# Patient Record
Sex: FEMALE | Race: BLACK OR AFRICAN AMERICAN | NOT HISPANIC OR LATINO | Employment: FULL TIME | URBAN - METROPOLITAN AREA
[De-identification: names, ages, dates, MRNs, and addresses within clinical notes are randomized per-mention and may not be internally consistent; named-entity substitution may affect disease eponyms.]

---

## 2017-01-10 ENCOUNTER — OFFICE VISIT (OUTPATIENT)
Dept: INTERNAL MEDICINE | Facility: CLINIC | Age: 42
End: 2017-01-10
Payer: COMMERCIAL

## 2017-01-10 VITALS
DIASTOLIC BLOOD PRESSURE: 74 MMHG | SYSTOLIC BLOOD PRESSURE: 126 MMHG | WEIGHT: 190.69 LBS | HEIGHT: 62 IN | TEMPERATURE: 99 F | BODY MASS INDEX: 35.09 KG/M2 | HEART RATE: 71 BPM | OXYGEN SATURATION: 95 %

## 2017-01-10 DIAGNOSIS — H00.021 HORDEOLUM INTERNUM OF RIGHT UPPER EYELID: Primary | ICD-10-CM

## 2017-01-10 PROCEDURE — 99999 PR PBB SHADOW E&M-EST. PATIENT-LVL III: CPT | Mod: PBBFAC,,, | Performed by: INTERNAL MEDICINE

## 2017-01-10 PROCEDURE — 99213 OFFICE O/P EST LOW 20 MIN: CPT | Mod: S$GLB,,, | Performed by: INTERNAL MEDICINE

## 2017-01-10 PROCEDURE — 1159F MED LIST DOCD IN RCRD: CPT | Mod: S$GLB,,, | Performed by: INTERNAL MEDICINE

## 2017-01-10 RX ORDER — ERYTHROMYCIN 5 MG/G
OINTMENT OPHTHALMIC NIGHTLY
Qty: 3.5 G | Refills: 0 | Status: SHIPPED | OUTPATIENT
Start: 2017-01-10 | End: 2017-01-20

## 2017-01-10 NOTE — MR AVS SNAPSHOT
[facility]  [address]  [place] [address]  Phone: [phone]  Fax: [phone]                  Terese Macias   1/10/2017 5:20 PM   Office Visit    Description:  Female : 1975   Provider:  Milla Foote MD   Department:  [facility]           Reason for Visit     Right Eye Lid w/swelling/itching/irritation x5 days           Diagnoses this Visit        Comments    Hordeolum internum of right upper eyelid    -  Primary            To Do List           Goals (5 Years of Data)     None       These Medications        Disp Refills Start End    erythromycin (ROMYCIN) ophthalmic ointment 3.5 g 0 1/10/2017 2017    Place into the right eye every evening. - Right Eye    Pharmacy: [facility]/pharmacy #[id number] - [place] - [address] Ph #: [phone]         [facility] On [facility]     [facility] On Call Nurse Care Line -  Assistance  Registered nurses in the [facility] provide clinical advisement, health education, appointment booking, and other advisory services.  Call for this free service at [phone].             Medications           Message regarding Medications     Verify the changes and/or additions to your medication regime listed below are the same as discussed with your clinician today.  If any of these changes or additions are incorrect, please notify your healthcare provider.        START taking these NEW medications        Refills    erythromycin (ROMYCIN) ophthalmic ointment 0    Sig: Place into the right eye every evening.    Class: Normal    Route: Right Eye           Verify that the below list of medications is an accurate representation of the medications you are currently taking.  If none reported, the list may be blank. If incorrect, please contact your healthcare provider. Carry this list with you in case of emergency.           Current Medications     naproxen sodium (ANAPROX) 550 MG tablet Take 1 tablet (550 mg  "total) by mouth 2 (two) times daily as needed. Do not take for more than 5 straight days    cetirizine (ZYRTEC) 10 MG tablet Take 10 mg by mouth once daily.    erythromycin (ROMYCIN) ophthalmic ointment Place into the right eye every evening.    etonogestrel-ethinyl estradiol (NUVARING) 0.12-0.015 mg/24 hr vaginal ring Please give one ring every 3 weeks. Insert vaginally and leave in place for 3 consecutive weeks continuously, no break for one week. After 3 weeks change immediately to new ring.    gabapentin (NEURONTIN) 300 MG capsule Take 1 capsule (300 mg total) by mouth 3 (three) times daily.    meloxicam (MOBIC) 15 MG tablet Take 1 tablet (15 mg total) by mouth once daily.    methylPREDNISolone (MEDROL DOSEPACK) 4 mg tablet Take 1 tablet (4 mg total) by mouth once daily. Use as instructed on dose pack    NUVARING 0.12-0.015 mg/24 hr vaginal ring INSERT 1 RING VAGINALLY AS DIRECTED. REMOVE AFTER 3 WEEKS & WAIT 7 DAYS BEFORE INSERTING A NEW RING           Clinical Reference Information           Vital Signs - Last Recorded  Most recent update: 1/10/2017  5:35 PM by Smita García MA    BP Pulse Temp Ht Wt SpO2    126/74 (BP Location: Left arm) 71 98.7 °F (37.1 °C) (Oral) 5' 2" (1.575 m) 86.5 kg (190 lb 11.2 oz) 95%    BMI                34.88 kg/m2          Blood Pressure          Most Recent Value    BP  126/74      Allergies as of 1/10/2017     Pcn [Penicillins]      Immunizations Administered on Date of Encounter - 1/10/2017     None      Orders Placed During Today's Visit      Normal Orders This Visit    Ambulatory consult to Ophthalmology       "

## 2017-01-11 ENCOUNTER — OFFICE VISIT (OUTPATIENT)
Dept: OPTOMETRY | Facility: CLINIC | Age: 42
End: 2017-01-11
Payer: COMMERCIAL

## 2017-01-11 DIAGNOSIS — H00.021 HORDEOLUM INTERNUM OF RIGHT UPPER EYELID: Primary | ICD-10-CM

## 2017-01-11 PROCEDURE — 92004 COMPRE OPH EXAM NEW PT 1/>: CPT | Mod: S$GLB,,, | Performed by: OPTOMETRIST

## 2017-01-11 PROCEDURE — 99999 PR PBB SHADOW E&M-EST. PATIENT-LVL II: CPT | Mod: PBBFAC,,, | Performed by: OPTOMETRIST

## 2017-01-11 RX ORDER — FLUOROMETHOLONE 1 MG/ML
1 SUSPENSION/ DROPS OPHTHALMIC 4 TIMES DAILY
Qty: 5 ML | Refills: 0 | Status: SHIPPED | OUTPATIENT
Start: 2017-01-11 | End: 2017-01-21

## 2017-01-11 RX ORDER — DOXYCYCLINE HYCLATE 100 MG
100 TABLET ORAL 2 TIMES DAILY
Qty: 20 TABLET | Refills: 0 | Status: SHIPPED | OUTPATIENT
Start: 2017-01-11 | End: 2017-01-11 | Stop reason: ALTCHOICE

## 2017-01-11 RX ORDER — DOXYCYCLINE 100 MG/1
100 CAPSULE ORAL 2 TIMES DAILY
Qty: 20 CAPSULE | Refills: 0 | Status: SHIPPED | OUTPATIENT
Start: 2017-01-11 | End: 2017-04-26

## 2017-01-11 NOTE — PROGRESS NOTES
HPI     Pt states right eye is swollen and very painful with FBS x 1 week. Pt   states pain is a 7-8 on pain scale.   +yellow discharge with crusting on the lashes  +photophobia od   +sensitive to touch  Pt states va is blurry in the left eye.   Pt has been using warm compresses     EES amanda BID        Last edited by Milana Araya on 1/11/2017  1:43 PM.     ROS     Positive for: Eyes    Negative for: Constitutional, Gastrointestinal, Neurological, Skin,   Genitourinary, Musculoskeletal, HENT, Endocrine, Cardiovascular,   Respiratory, Psychiatric, Allergic/Imm, Heme/Lymph    Last edited by Heath Ruffin, OD on 1/11/2017  1:48 PM. (History)        Assessment /Plan     For exam results, see Encounter Report.    Hordeolum internum of right upper eyelid  -     doxycycline (VIBRA-TABS) 100 MG tablet; Take 1 tablet (100 mg total) by mouth 2 (two) times daily.  Dispense: 20 tablet; Refill: 0  -     fluorometholone 0.1% (FML) 0.1 % DrpS; Place 1 drop into the right eye 4 (four) times daily.  Dispense: 5 mL; Refill: 0      Int adeola RUL w mild preseptal cellulitis    PLAN:    1)  PCN allergy--so Rx DOXY 100mg BID PO X 10 days   2) Hot compresses, 5 minutes at a time, QID, followed by FML drops QID, for one week.  3) Discussed chalazion formation  4) Pt will call if not resolved in one week, and will schedule excision

## 2017-01-11 NOTE — PROGRESS NOTES
Subjective:       Patient ID: Terese Macias is a 41 y.o. female.    Chief Complaint: Right Eye Lid w/swelling/itching/irritation x5 days    HPI Comments: Swelling , itching and redness of right upper eye lid for a few days. Has some sticky discharge    Review of Systems   Constitutional: Negative for activity change, chills, fatigue and fever.   HENT: Negative for congestion, ear pain, nosebleeds, postnasal drip, sinus pressure and sore throat.    Eyes: Negative.  Negative for visual disturbance.   Respiratory: Negative for cough, chest tightness, shortness of breath and wheezing.    Cardiovascular: Negative for chest pain.   Gastrointestinal: Negative for abdominal pain, diarrhea, nausea and vomiting.   Genitourinary: Negative for difficulty urinating, dysuria, frequency and urgency.   Musculoskeletal: Negative for arthralgias and neck stiffness.   Skin: Negative for rash.   Neurological: Negative for dizziness, weakness and headaches.   Psychiatric/Behavioral: Negative for sleep disturbance. The patient is not nervous/anxious.        Objective:      Physical Exam   Eyes: Right eye exhibits hordeolum. No scleral icterus.           Assessment:       1. Hordeolum internum of right upper eyelid        Plan:   Terese was seen today for right eye lid w/swelling/itching/irritation x5 days.    Diagnoses and all orders for this visit:    Hordeolum internum of right upper eyelid  -     Ambulatory consult to Ophthalmology    Other orders  -     erythromycin (ROMYCIN) ophthalmic ointment; Place into the right eye every evening.

## 2017-01-11 NOTE — MR AVS SNAPSHOT
[facility]   [facility]  [place] [address]  Phone: [phone]  Fax: [phone]                  Terese Macias   2017 1:15 PM   Office Visit    Description:  Female : 1975   Provider:  Heath Ruffin OD   Department:  [facility]           Reason for Visit     Concerns About Ocular Health           Diagnoses this Visit        Comments    Hordeolum internum of right upper eyelid    -  Primary            To Do List           Goals (5 Years of Data)     None       These Medications        Disp Refills Start End    doxycycline (VIBRA-TABS) 100 MG tablet 20 tablet 0 2017     Take 1 tablet (100 mg total) by mouth 2 (two) times daily. - Oral    Pharmacy: [facility]/pharmacy #[id number] - [place] [address] Ph #: [phone]       fluorometholone 0.1% (FML) 0.1 % DrpS 5 mL 0 2017    Place 1 drop into the right eye 4 (four) times daily. - Right Eye    Pharmacy: [facility]/pharmacy #[id number] - [place] [address] Ph #: [phone]         [facility] On Call     [facility] On Call Nurse Care Line -  Assistance  Registered nurses in the [facility] provide clinical advisement, health education, appointment booking, and other advisory services.  Call for this free service at [phone].             Medications           Message regarding Medications     Verify the changes and/or additions to your medication regime listed below are the same as discussed with your clinician today.  If any of these changes or additions are incorrect, please notify your healthcare provider.        START taking these NEW medications        Refills    doxycycline (VIBRA-TABS) 100 MG tablet 0    Sig: Take 1 tablet (100 mg total) by mouth 2 (two) times daily.    Class: Normal    Route: Oral    fluorometholone 0.1% (FML) 0.1 % DrpS 0    Sig: Place 1 drop into the right eye 4 (four) times daily.    Class: Normal    Route: Right Eye           Verify that the  below list of medications is an accurate representation of the medications you are currently taking.  If none reported, the list may be blank. If incorrect, please contact your healthcare provider. Carry this list with you in case of emergency.           Current Medications     cetirizine (ZYRTEC) 10 MG tablet Take 10 mg by mouth once daily.    erythromycin (ROMYCIN) ophthalmic ointment Place into the right eye every evening.    etonogestrel-ethinyl estradiol (NUVARING) 0.12-0.015 mg/24 hr vaginal ring Please give one ring every 3 weeks. Insert vaginally and leave in place for 3 consecutive weeks continuously, no break for one week. After 3 weeks change immediately to new ring.    meloxicam (MOBIC) 15 MG tablet Take 1 tablet (15 mg total) by mouth once daily.    methylPREDNISolone (MEDROL DOSEPACK) 4 mg tablet Take 1 tablet (4 mg total) by mouth once daily. Use as instructed on dose pack    naproxen sodium (ANAPROX) 550 MG tablet Take 1 tablet (550 mg total) by mouth 2 (two) times daily as needed. Do not take for more than 5 straight days    NUVARING 0.12-0.015 mg/24 hr vaginal ring INSERT 1 RING VAGINALLY AS DIRECTED. REMOVE AFTER 3 WEEKS & WAIT 7 DAYS BEFORE INSERTING A NEW RING    doxycycline (VIBRA-TABS) 100 MG tablet Take 1 tablet (100 mg total) by mouth 2 (two) times daily.    fluorometholone 0.1% (FML) 0.1 % DrpS Place 1 drop into the right eye 4 (four) times daily.    gabapentin (NEURONTIN) 300 MG capsule Take 1 capsule (300 mg total) by mouth 3 (three) times daily.           Clinical Reference Information           Allergies as of 1/11/2017     Pcn [Penicillins]      Immunizations Administered on Date of Encounter - 1/11/2017     None

## 2017-04-24 DIAGNOSIS — Z12.31 OTHER SCREENING MAMMOGRAM: ICD-10-CM

## 2017-04-25 ENCOUNTER — TELEPHONE (OUTPATIENT)
Dept: OPTOMETRY | Facility: CLINIC | Age: 42
End: 2017-04-25

## 2017-04-25 NOTE — TELEPHONE ENCOUNTER
----- Message from Heath Ruffin, FELIX sent at 4/25/2017 10:06 AM CDT -----  Contact: Terese  Really, I need to see her before we call in the med.      ----- Message -----     From: Milana Araya     Sent: 4/25/2017   9:54 AM       To: Heath Ruffin, OD    Is it okay to call in this Rx.     -Milana   ----- Message -----     From: Karen Chapman     Sent: 4/24/2017   2:17 PM       To: Augustin Torres    Ms. Macias states that she is having the same problem in her right now in her left eye. She would like the medication to be called into the [facility] pharmacy [address] [place] [address] [phone] ( fluorometholone). She also would like for you to contact her to discuss the issue. She can be reached at [phone]

## 2017-04-26 ENCOUNTER — OFFICE VISIT (OUTPATIENT)
Dept: OPTOMETRY | Facility: CLINIC | Age: 42
End: 2017-04-26
Payer: COMMERCIAL

## 2017-04-26 DIAGNOSIS — H00.024 HORDEOLUM INTERNUM OF LEFT UPPER EYELID: ICD-10-CM

## 2017-04-26 PROCEDURE — 92012 INTRM OPH EXAM EST PATIENT: CPT | Mod: S$GLB,,, | Performed by: OPTOMETRIST

## 2017-04-26 PROCEDURE — 99999 PR PBB SHADOW E&M-EST. PATIENT-LVL I: CPT | Mod: PBBFAC,,, | Performed by: OPTOMETRIST

## 2017-04-26 RX ORDER — FLUOROMETHOLONE 1 MG/ML
1 SUSPENSION/ DROPS OPHTHALMIC 4 TIMES DAILY
Qty: 5 ML | Refills: 0 | Status: SHIPPED | OUTPATIENT
Start: 2017-04-26 | End: 2017-05-06

## 2017-04-26 RX ORDER — DOXYCYCLINE 100 MG/1
100 CAPSULE ORAL 2 TIMES DAILY
Qty: 20 CAPSULE | Refills: 0 | Status: SHIPPED | OUTPATIENT
Start: 2017-04-26 | End: 2017-11-27

## 2017-04-26 NOTE — PROGRESS NOTES
Subjective:       Patient ID: Terese Macias is a 41 y.o. female      Chief Complaint   Patient presents with    Eye Problem     History of Present Illness  Dls: 1/11/17 Dr. Ruffin    Pt states x 6 days notice bump on PRIMO itching os mucous os pt states has   decreased in size. Pt did not use any gtts. Using warm compresses. Today   no pain, only pain when she applies pressure on it        Assessment/Plan:     1. Hordeolum internum of left upper eyelid  Discussed with patient. Internal hordeolum with mild preseptal. FML drop four times daily OS x 7-10 days. Warm compresses QID x 5 - 10 minutes. Oral doxy 100mg BID PO (PCN allergy). Discussed chalazion formation. RTC if no improvement or worsening of symptoms    -     doxycycline (MONODOX) 100 MG capsule; Take 1 capsule (100 mg total) by mouth 2 (two) times daily.  Dispense: 20 capsule; Refill: 0  -     fluorometholone 0.1% (FML) 0.1 % DrpS; Place 1 drop into the left eye 4 (four) times daily.  Dispense: 5 mL; Refill: 0        Return if symptoms worsen or fail to improve.

## 2017-09-10 RX ORDER — ETONOGESTREL AND ETHINYL ESTRADIOL .12; .015 MG/D; MG/D
INSERT, EXTENDED RELEASE VAGINAL
Qty: 1 EACH | Refills: 1 | Status: SHIPPED | OUTPATIENT
Start: 2017-09-10 | End: 2017-10-19

## 2017-09-12 NOTE — TELEPHONE ENCOUNTER
I called the pt and someone picked up the phone but no answer. I was unable to leave a message for the pt

## 2017-09-28 ENCOUNTER — NURSE TRIAGE (OUTPATIENT)
Dept: ADMINISTRATIVE | Facility: CLINIC | Age: 42
End: 2017-09-28

## 2017-09-28 NOTE — TELEPHONE ENCOUNTER
Reason for Disposition   [1] Age > 40 AND [2] no obvious cause AND [3] pain even when not moving the arm    (Exception: pain is clearly made worse by moving arm or bending neck)    Protocols used: ST ARM PAIN-A-AH

## 2017-09-28 NOTE — TELEPHONE ENCOUNTER
"  Answer Assessment - Initial Assessment Questions  1. ONSET: "When did the pain start?"      Tuesday   2. LOCATION: "Where is the pain located?"      Right arm/shoulder   3. PAIN: "How bad is the pain?" (Scale 1-10; or mild, moderate, severe)    - MILD (1-3): doesn't interfere with normal activities    - MODERATE (4-7): interferes with normal activities (e.g., work or school) or awakens from sleep    - SEVERE (8-10): excruciating pain, unable to do any normal activities, unable to hold a cup of water      Mild-moderate   4. WORK OR EXERCISE: "Has there been any recent work or exercise that involved this part of the body?"      No   5. CAUSE: "What do you think is causing the arm pain?"      Unsure (angia years ago)   6. OTHER SYMPTOMS: "Do you have any other symptoms?" (e.g., neck pain, swelling, rash, fever, numbness, weakness)      No   7. PREGNANCY: "Is there any chance you are pregnant?" "When was your last menstrual period?"      No    Protocols used: ST ARM PAIN-A-AH    "

## 2017-10-19 ENCOUNTER — OFFICE VISIT (OUTPATIENT)
Dept: OBSTETRICS AND GYNECOLOGY | Facility: CLINIC | Age: 42
End: 2017-10-19
Payer: COMMERCIAL

## 2017-10-19 VITALS
SYSTOLIC BLOOD PRESSURE: 114 MMHG | WEIGHT: 189.63 LBS | HEIGHT: 62 IN | DIASTOLIC BLOOD PRESSURE: 78 MMHG | BODY MASS INDEX: 34.89 KG/M2

## 2017-10-19 DIAGNOSIS — Z30.9 ENCOUNTER FOR CONTRACEPTIVE MANAGEMENT, UNSPECIFIED TYPE: ICD-10-CM

## 2017-10-19 DIAGNOSIS — Z12.31 VISIT FOR SCREENING MAMMOGRAM: ICD-10-CM

## 2017-10-19 DIAGNOSIS — K21.9 GASTROESOPHAGEAL REFLUX DISEASE, ESOPHAGITIS PRESENCE NOT SPECIFIED: Primary | ICD-10-CM

## 2017-10-19 DIAGNOSIS — Z01.419 ENCOUNTER FOR GYNECOLOGICAL EXAMINATION WITHOUT ABNORMAL FINDING: ICD-10-CM

## 2017-10-19 PROCEDURE — 99999 PR PBB SHADOW E&M-EST. PATIENT-LVL III: CPT | Mod: PBBFAC,,, | Performed by: OBSTETRICS & GYNECOLOGY

## 2017-10-19 PROCEDURE — 99396 PREV VISIT EST AGE 40-64: CPT | Mod: S$GLB,,, | Performed by: OBSTETRICS & GYNECOLOGY

## 2017-10-19 RX ORDER — NAPROXEN 500 MG/1
500 TABLET ORAL 2 TIMES DAILY
Refills: 0 | COMMUNITY
Start: 2017-09-28 | End: 2017-11-27

## 2017-10-19 RX ORDER — CYCLOBENZAPRINE HCL 10 MG
10 TABLET ORAL EVERY 8 HOURS
Refills: 0 | COMMUNITY
Start: 2017-09-28 | End: 2017-11-27

## 2017-10-19 NOTE — PROGRESS NOTES
HISTORY OF PRESENT ILLNESS:    Terese Macias is a 42 y.o. female, , Patient's last menstrual period was 2017.,  presents for a routine exam.   Patient previously on Nuva Ring continuously for CPP. Had 2-3 cycles since using meds continuously.     Past Medical History:   Diagnosis Date    Abnormal Pap smear     Abnormal Pap smear of vagina     Anemia     Fever blister     GERD (gastroesophageal reflux disease)     Heavy periods 2015    Hemorrhoids without complication     Joint pain     Screening for HPV (human papillomavirus)     Thalassemia     Thalassemia        Past Surgical History:   Procedure Laterality Date    COLONOSCOPY      HERNIA REPAIR       MEDICATIONS AND ALLERGIES:    Current Outpatient Prescriptions:     cetirizine (ZYRTEC) 10 MG tablet, Take 10 mg by mouth once daily., Disp: , Rfl:     cyclobenzaprine (FLEXERIL) 10 MG tablet, Take 10 mg by mouth every 8 (eight) hours., Disp: , Rfl: 0    doxycycline (MONODOX) 100 MG capsule, Take 1 capsule (100 mg total) by mouth 2 (two) times daily., Disp: 20 capsule, Rfl: 0    etonogestrel-ethinyl estradiol (NUVARING) 0.12-0.015 mg/24 hr vaginal ring, Please give one ring every 3 weeks. Insert vaginally and leave in place for 3 consecutive weeks continuously, no break for one week. After 3 weeks change immediately to new ring., Disp: 1 each, Rfl: 6    meloxicam (MOBIC) 15 MG tablet, Take 1 tablet (15 mg total) by mouth once daily., Disp: 30 tablet, Rfl: 0    naproxen (NAPROSYN) 500 MG tablet, Take 500 mg by mouth 2 (two) times daily., Disp: , Rfl: 0    naproxen sodium (ANAPROX) 550 MG tablet, Take 1 tablet (550 mg total) by mouth 2 (two) times daily as needed. Do not take for more than 5 straight days, Disp: 30 tablet, Rfl: 1    Allergies   Allergen Reactions    Pcn [Penicillins] Other (See Comments)     Pt had reaction to PCN as an infant.       Family History   Problem Relation Age of Onset    Hypertension Mother      Diabetes Father     Heart disease Father     Eczema Father     Eczema Other     Colon cancer Paternal Uncle 60     colon cancer    Cancer Cousin      breast    Breast cancer Cousin     No Known Problems Other     Cancer Cousin      stomach cancer    Breast cancer Maternal Grandmother     Cancer Maternal Grandmother      breast    Eczema Sister     Acne Sister     Eczema Brother     Eczema Daughter     Cancer Maternal Aunt      breast    Breast cancer Maternal Aunt     No Known Problems Maternal Uncle     No Known Problems Paternal Aunt     No Known Problems Maternal Grandfather     No Known Problems Paternal Grandmother     No Known Problems Paternal Grandfather     Melanoma Neg Hx     Psoriasis Neg Hx     Lupus Neg Hx     Ovarian cancer Neg Hx     Amblyopia Neg Hx     Blindness Neg Hx     Cataracts Neg Hx     Glaucoma Neg Hx     Macular degeneration Neg Hx     Retinal detachment Neg Hx     Strabismus Neg Hx     Stroke Neg Hx     Thyroid disease Neg Hx        Social History     Social History    Marital status: Single     Spouse name: N/A    Number of children: N/A    Years of education: N/A     Occupational History     [employer]     Social History Main Topics    Smoking status: Never Smoker    Smokeless tobacco: Never Used    Alcohol use Yes      Comment: Occasionally    Drug use: No    Sexual activity: No     Other Topics Concern    Are You Pregnant Or Think You May Be? No    Breast-Feeding No     Social History Narrative    Teacher, [employer]. S, 1 child       COMPREHENSIVE GYN HISTORY:  PAP History: Denies abnormal Paps.  Infection History: Denies STDs. Denies PID.  Benign History: Denies uterine fibroids. Denies ovarian cysts. Denies endometriosis. Denies other conditions.  Cancer History: Denies cervical cancer. Denies uterine cancer or hyperplasia. Denies ovarian cancer. Denies vulvar cancer or pre-cancer. Denies vaginal cancer or  "pre-cancer. Denies breast cancer. Denies colon cancer.  Sexual Activity History: Reports currently being sexually active  Menstrual History: Monthly. Mod then light flow.   Dysmenorrhea History: Reports mild dysmenorrhea.     ROS:  GENERAL: No weight changes. No swelling. No fatigue. No fever.  CARDIOVASCULAR: No chest pain. No shortness of breath. No leg cramps.   NEUROLOGICAL: No headaches. No vision changes.  BREASTS: No pain. No lumps. No discharge.  ABDOMEN: No pain. No nausea. No vomiting. No diarrhea. No constipation.  REPRODUCTIVE: No abnormal bleeding.   VULVA: No pain. No lesions. No itching.  VAGINA: No relaxation. No itching. No odor. No discharge. No lesions.  URINARY: No incontinence. No nocturia. No frequency. No dysuria.    /78   Ht 5' 2" (1.575 m)   Wt 86 kg (189 lb 9.5 oz)   LMP 09/25/2017   BMI 34.68 kg/m²     PE:  APPEARANCE: Well nourished, well developed, in no acute distress.  AFFECT: WNL, alert and oriented x 3.  SKIN: No acne or hirsutism.  NECK: Neck symmetric, without masses or thyromegaly.  NODES: No inguinal, cervical, axillary or femoral lymph node enlargement.  CHEST: Good respiratory effort.   ABDOMEN: Soft. No tenderness or masses. No hepatosplenomegaly. No hernias.  BREASTS: Symmetrical, no skin changes, visible lesions, palpable masses or nipple discharge bilaterally.  PELVIC: External female genitalia without lesions.  Female hair distribution. Adequate perineal body, Normal urethral meatus. Vagina moist and well rugated without lesions or discharge.  No significant cystocele or rectocele present. Cervix pink without lesions, discharge or tenderness. Uterus is 6 week size, regular, mobile and nontender. Adnexa without masses or tenderness.  EXTREMITIES: No edema    8/2015  Comparison: Ultrasound pelvis 05/27/14.     Technique: Transabdominal and transvaginal sonographic evaluation of pelvic organs was performed including grayscale, color flow and spectral " technique.    Findings:    The uterus is normal in size measuring 10.1 x 4.8 x 7.1 cm and the endometrial stripe is uniform in thickness measuring 5 mm. Again identified is a small intramural uterine fibroid along the posterior aspect of the uterine body, which has slightly   increased in size and now measures 2.1 x 1.9 x 2.1 cm, previously 1.3 x 1.3 x 1.5 cm. There is a newly-visualized subserosal uterine fibroid along the anterior left aspect of the uterine body, which measures 1.9 x 1.6 x 2.0 cm.    The right ovary measures 2.5 x 1.3 x 2.8 cm with normal blood flow.    The left ovary measures 2.7 x 1.2 x 2.7 cm with normal blood flow.    There is no free pelvic fluid.      Impression         Leiomyomatous uterus, with slight enlargement of the previously identified posterior uterine intramural fibroid and identification of a new anterior subserosal fibroid.    Otherwise, no significant sonographic abnormalities are detected.  ______________________________________          DIAGNOSIS:  1. Gastroesophageal reflux disease, esophagitis presence not specified    2. Visit for screening mammogram    3. Encounter for gynecological examination without abnormal finding      COUNSELING:  The patient was counseled today on:  -A.C.S. Pap and pelvic exam guidelines (pap every 3 years), recomendations for yearly mammogram;  -to follow up with her PCP for other health maintenance.    FOLLOW-UP with me in 6 months   Patient to follow up with PCP for chronic left leg pain

## 2017-11-27 ENCOUNTER — OFFICE VISIT (OUTPATIENT)
Dept: FAMILY MEDICINE | Facility: CLINIC | Age: 42
End: 2017-11-27
Payer: COMMERCIAL

## 2017-11-27 ENCOUNTER — TELEPHONE (OUTPATIENT)
Dept: INTERNAL MEDICINE | Facility: CLINIC | Age: 42
End: 2017-11-27

## 2017-11-27 VITALS
HEART RATE: 74 BPM | DIASTOLIC BLOOD PRESSURE: 80 MMHG | TEMPERATURE: 98 F | OXYGEN SATURATION: 99 % | SYSTOLIC BLOOD PRESSURE: 112 MMHG | BODY MASS INDEX: 34.61 KG/M2 | WEIGHT: 188.06 LBS | HEIGHT: 62 IN | RESPIRATION RATE: 16 BRPM

## 2017-11-27 DIAGNOSIS — J06.9 UPPER RESPIRATORY TRACT INFECTION, UNSPECIFIED TYPE: Primary | ICD-10-CM

## 2017-11-27 DIAGNOSIS — M79.10 MYALGIA: ICD-10-CM

## 2017-11-27 PROCEDURE — 99213 OFFICE O/P EST LOW 20 MIN: CPT | Mod: S$GLB,,, | Performed by: NURSE PRACTITIONER

## 2017-11-27 PROCEDURE — 99999 PR PBB SHADOW E&M-EST. PATIENT-LVL IV: CPT | Mod: PBBFAC,,, | Performed by: NURSE PRACTITIONER

## 2017-11-27 NOTE — TELEPHONE ENCOUNTER
Message left for pt to call office to notify pt dr spencer is out until tomorrow but pt can schedule uc appt with a different provider

## 2017-11-27 NOTE — LETTER
November 27, 2017      Terese Macias   [address] [facility] [place] [address]             [facility]  [address] [facility] [place] [address]  Phone: [phone]  Fax: [phone] Terese Macias    Was treated here on 11/27/2017    May Return to work/school on 11/28/2017 after lab work scheduled for 830 am.                 Kathryn Rausch, NP

## 2017-11-27 NOTE — TELEPHONE ENCOUNTER
----- Message from Shruthi Kulkarni sent at 11/27/2017 11:17 AM CST -----  Contact: Pt [phone]  Patient would like to get medical advice.  Symptoms (please be specific):  Nose bleeding, green mucus, nose running, congested, unusual snorning  How long has patient had these symptoms:  Last week   Pharmacy name and phone #:  [id number] - [place] - [address] [phone] (Phone)  [phone] (Fax)  Any drug allergies:    Comments:

## 2017-11-27 NOTE — TELEPHONE ENCOUNTER
Message left on VM for patient to contact Nurse Mildred at [phone] to discuss her needs and attempts to get concerns resolved as per phone staff ( Ext. [phone] ) this Am.

## 2017-11-27 NOTE — PATIENT INSTRUCTIONS
Viral Upper Respiratory Illness (Adult)  You have a viral upper respiratory illness (URI), which is another term for the common cold. This illness is contagious during the first few days. It is spread through the air by coughing and sneezing. It may also be spread by direct contact (touching the sick person and then touching your own eyes, nose, or mouth). Frequent handwashing will decrease risk of spread. Most viral illnesses go away within 7 to 10 days with rest and simple home remedies. Sometimes the illness may last for several weeks. Antibiotics will not kill a virus, and they are generally not prescribed for this condition.    Home care  · If symptoms are severe, rest at home for the first 2 to 3 days. When you resume activity, don't let yourself get too tired.  · Avoid being exposed to cigarette smoke (yours or others).  · You may use acetaminophen or ibuprofen to control pain and fever, unless another medicine was prescribed. (Note: If you have chronic liver or kidney disease, have ever had a stomach ulcer or gastrointestinal bleeding, or are taking blood-thinning medicines, talk with your healthcare provider before using these medicines.) Aspirin should never be given to anyone under 18 years of age who is ill with a viral infection or fever. It may cause severe liver or brain damage.  · Your appetite may be poor, so a light diet is fine. Avoid dehydration by drinking 6 to 8 glasses of fluids per day (water, soft drinks, juices, tea, or soup). Extra fluids will help loosen secretions in the nose and lungs.  · Over-the-counter cold medicines will not shorten the length of time youre sick, but they may be helpful for the following symptoms: cough, sore throat, and nasal and sinus congestion. (Note: Do not use decongestants if you have high blood pressure.)  Follow-up care  Follow up with your healthcare provider, or as advised.  When to seek medical advice  Call your healthcare provider right away if any  of these occur:  · Cough with lots of colored sputum (mucus)  · Severe headache; face, neck, or ear pain  · Difficulty swallowing due to throat pain  · Fever of 100.4°F (38°C)  Call 911, or get immediate medical care  Call emergency services right away if any of these occur:  · Chest pain, shortness of breath, wheezing, or difficulty breathing  · Coughing up blood  · Inability to swallow due to throat pain  Date Last Reviewed: 9/13/2015  © 7354-2766 [employer]. [address] [place] [id number]. All rights reserved. This information is not intended as a substitute for professional medical care. Always follow your healthcare professional's instructions.

## 2017-11-28 ENCOUNTER — LAB VISIT (OUTPATIENT)
Dept: LAB | Facility: HOSPITAL | Age: 42
End: 2017-11-28
Attending: INTERNAL MEDICINE
Payer: COMMERCIAL

## 2017-11-28 ENCOUNTER — TELEPHONE (OUTPATIENT)
Dept: FAMILY MEDICINE | Facility: CLINIC | Age: 42
End: 2017-11-28

## 2017-11-28 DIAGNOSIS — M79.10 MYALGIA: ICD-10-CM

## 2017-11-28 LAB
ALBUMIN SERPL BCP-MCNC: 3.2 G/DL
ALP SERPL-CCNC: 64 U/L
ALT SERPL W/O P-5'-P-CCNC: 10 U/L
ANION GAP SERPL CALC-SCNC: 5 MMOL/L
ANISOCYTOSIS BLD QL SMEAR: SLIGHT
AST SERPL-CCNC: 16 U/L
BASOPHILS # BLD AUTO: 0.04 K/UL
BASOPHILS NFR BLD: 0.4 %
BILIRUB SERPL-MCNC: 0.3 MG/DL
BUN SERPL-MCNC: 9 MG/DL
CALCIUM SERPL-MCNC: 9.1 MG/DL
CHLORIDE SERPL-SCNC: 108 MMOL/L
CHOLEST SERPL-MCNC: 133 MG/DL
CHOLEST/HDLC SERPL: 2.5 {RATIO}
CO2 SERPL-SCNC: 24 MMOL/L
CREAT SERPL-MCNC: 0.8 MG/DL
DIFFERENTIAL METHOD: ABNORMAL
EOSINOPHIL # BLD AUTO: 0.1 K/UL
EOSINOPHIL NFR BLD: 1 %
ERYTHROCYTE [DISTWIDTH] IN BLOOD BY AUTOMATED COUNT: 15.4 %
EST. GFR  (AFRICAN AMERICAN): >60 ML/MIN/1.73 M^2
EST. GFR  (NON AFRICAN AMERICAN): >60 ML/MIN/1.73 M^2
FERRITIN SERPL-MCNC: 5 NG/ML
GLUCOSE SERPL-MCNC: 86 MG/DL
HCT VFR BLD AUTO: 32 %
HDLC SERPL-MCNC: 54 MG/DL
HDLC SERPL: 40.6 %
HGB BLD-MCNC: 9.9 G/DL
IMM GRANULOCYTES # BLD AUTO: 0.02 K/UL
IMM GRANULOCYTES NFR BLD AUTO: 0.2 %
LDLC SERPL CALC-MCNC: 65.4 MG/DL
LYMPHOCYTES # BLD AUTO: 3 K/UL
LYMPHOCYTES NFR BLD: 28.9 %
MCH RBC QN AUTO: 25.3 PG
MCHC RBC AUTO-ENTMCNC: 30.9 G/DL
MCV RBC AUTO: 82 FL
MONOCYTES # BLD AUTO: 0.8 K/UL
MONOCYTES NFR BLD: 7.9 %
NEUTROPHILS # BLD AUTO: 6.3 K/UL
NEUTROPHILS NFR BLD: 61.6 %
NONHDLC SERPL-MCNC: 79 MG/DL
NRBC BLD-RTO: 0 /100 WBC
OVALOCYTES BLD QL SMEAR: ABNORMAL
PLATELET # BLD AUTO: 323 K/UL
PMV BLD AUTO: 11.6 FL
POIKILOCYTOSIS BLD QL SMEAR: SLIGHT
POTASSIUM SERPL-SCNC: 3.7 MMOL/L
PROT SERPL-MCNC: 8 G/DL
RBC # BLD AUTO: 3.92 M/UL
SODIUM SERPL-SCNC: 137 MMOL/L
T4 FREE SERPL-MCNC: 1.07 NG/DL
TRIGL SERPL-MCNC: 68 MG/DL
TSH SERPL DL<=0.005 MIU/L-ACNC: 1.91 UIU/ML
WBC # BLD AUTO: 10.23 K/UL

## 2017-11-28 PROCEDURE — 85025 COMPLETE CBC W/AUTO DIFF WBC: CPT

## 2017-11-28 PROCEDURE — 36415 COLL VENOUS BLD VENIPUNCTURE: CPT | Mod: PO

## 2017-11-28 PROCEDURE — 84443 ASSAY THYROID STIM HORMONE: CPT

## 2017-11-28 PROCEDURE — 84439 ASSAY OF FREE THYROXINE: CPT

## 2017-11-28 PROCEDURE — 82728 ASSAY OF FERRITIN: CPT

## 2017-11-28 PROCEDURE — 80061 LIPID PANEL: CPT

## 2017-11-28 PROCEDURE — 80053 COMPREHEN METABOLIC PANEL: CPT

## 2017-11-28 NOTE — TELEPHONE ENCOUNTER
Pt inform of anemia, pt reports intolerance (bad constipation to iron) in the past so she has taken iron infusions. Reports heavy cycles, US pelvis in past reveal fibroid, pt reports cycles usually once a month, last 6 days, usually use thick pad, change every 3 hours, pt instructed to f/u with GYN to manage heavy cycle and restart iron infusions if indicated, pt verbalized understanding and agreement.

## 2017-12-01 ENCOUNTER — TELEPHONE (OUTPATIENT)
Dept: OBSTETRICS AND GYNECOLOGY | Facility: CLINIC | Age: 42
End: 2017-12-01

## 2017-12-01 NOTE — TELEPHONE ENCOUNTER
----- Message from Kiera Dacosta sent at 12/1/2017 10:55 AM CST -----  Contact: self  Pt needing a call back, she have questions and can be reached at [phone].

## 2017-12-01 NOTE — PROGRESS NOTES
Patient Name: Terese Macias    : 1975  MRN: [id number]    Subjective:  Terese is a 42 y.o. female who presents today for     1. Feeling ill x 1 week. Reports no tx. Reports scratchy throat, sniffling, blowing mucus, feeling drain, slight nosebleed, productive cough. She reports she always has bodyaches, this is not usual for her. No change in bowel or bladder. Reports heavy periods.     Past Medical History  Past Medical History:   Diagnosis Date    Abnormal Pap smear     Abnormal Pap smear of vagina     Anemia     Fever blister     GERD (gastroesophageal reflux disease)     Heavy periods 2015    Hemorrhoids without complication     Joint pain     Screening for HPV (human papillomavirus)     Thalassemia     Thalassemia        Past Surgical History  Past Surgical History:   Procedure Laterality Date    COLONOSCOPY      HERNIA REPAIR         Family History  Family History   Problem Relation Age of Onset    Hypertension Mother     Diabetes Father     Heart disease Father     Eczema Father     Eczema Other     Colon cancer Paternal Uncle 60     colon cancer    Cancer Cousin      breast    Breast cancer Cousin     No Known Problems Other     Cancer Cousin      stomach cancer    Breast cancer Maternal Grandmother     Cancer Maternal Grandmother      breast    Eczema Sister     Acne Sister     Eczema Brother     Eczema Daughter     Cancer Maternal Aunt      breast    Breast cancer Maternal Aunt     No Known Problems Maternal Uncle     No Known Problems Paternal Aunt     No Known Problems Maternal Grandfather     No Known Problems Paternal Grandmother     No Known Problems Paternal Grandfather     Melanoma Neg Hx     Psoriasis Neg Hx     Lupus Neg Hx     Ovarian cancer Neg Hx     Amblyopia Neg Hx     Blindness Neg Hx     Cataracts Neg Hx     Glaucoma Neg Hx     Macular degeneration Neg Hx     Retinal detachment Neg Hx     Strabismus Neg Hx     Stroke Neg Hx      "Thyroid disease Neg Hx        Social History  Social History     Social History    Marital status: Single     Spouse name: N/A    Number of children: N/A    Years of education: N/A     Occupational History     [employer]     Social History Main Topics    Smoking status: Never Smoker    Smokeless tobacco: Never Used    Alcohol use Yes      Comment: Occasionally    Drug use: No    Sexual activity: No     Other Topics Concern    Are You Pregnant Or Think You May Be? No    Breast-Feeding No     Social History Narrative    Teacher, [employer]. S, 1 child       Allergies  Review of patient's allergies indicates:   Allergen Reactions    Pcn [penicillins] Other (See Comments)     Pt had reaction to PCN as an infant.    -reviewed and updated      Medications  Reviewed and updated.   Current Outpatient Prescriptions   Medication Sig Dispense Refill    etonogestrel-ethinyl estradiol (NUVARING) 0.12-0.015 mg/24 hr vaginal ring Please give one ring every 3 weeks. Insert vaginally and leave in place for 3 consecutive weeks continuously, no break for one week. After 3 weeks change immediately to new ring. 1 each 6     No current facility-administered medications for this visit.          Review of Systems   Constitutional: Positive for malaise/fatigue. Negative for chills and fever.   HENT: Positive for congestion and sore throat. Negative for ear pain and sinus pain.    Respiratory: Positive for cough and sputum production. Negative for shortness of breath and wheezing.    Cardiovascular: Negative for chest pain.   Gastrointestinal: Positive for nausea (in am ). Negative for abdominal pain and vomiting.   Genitourinary:        Heavy cycles   Musculoskeletal: Positive for myalgias (chronic).         Physical Exam  /80 (BP Location: Right arm, Patient Position: Sitting, BP Method: Medium (Manual))   Pulse 74   Temp 98.3 °F (36.8 °C) (Oral)   Resp 16   Ht 5' 2" (1.575 m)   Wt 85.3 kg " (188 lb 0.8 oz)   LMP 11/17/2017   SpO2 99%   BMI 34.40 kg/m²   Physical Exam   Constitutional: She appears well-developed. No distress.   HENT:   Head: Normocephalic.   Right Ear: A middle ear effusion is present.   Left Ear: A middle ear effusion is present.   Nose: Mucosal edema and nose lacerations (right nare with excoriation and dried blood) present.   Mouth/Throat: No posterior oropharyngeal edema or posterior oropharyngeal erythema.   Cardiovascular: Normal rate, regular rhythm and normal heart sounds.    Pulmonary/Chest: Effort normal and breath sounds normal.   Skin: She is not diaphoretic.         Assessment/Plan:  Terese Macias is a 42 y.o. female who presents today for :    Myalgia  Suspect secondary to iron deficiency, obtain labwork  -     CBC auto differential; Future; Expected date: 11/27/2017  -     Ferritin; Future; Expected date: 11/27/2017  -     TSH; Future; Expected date: 11/27/2017  -     T4, free; Future; Expected date: 11/27/2017  -     Lipid panel; Future; Expected date: 11/27/2017  -     Comprehensive metabolic panel; Future; Expected date: 11/27/2017    Upper respiratory tract infection, unspecified type  Advise hydration, rest, symptom management, zyrtec, delsym, tylenol or motrin prn      Return if symptoms worsen or fail to improve.

## 2017-12-13 ENCOUNTER — TELEPHONE (OUTPATIENT)
Dept: INTERNAL MEDICINE | Facility: CLINIC | Age: 42
End: 2017-12-13

## 2017-12-14 ENCOUNTER — HOSPITAL ENCOUNTER (OUTPATIENT)
Dept: RADIOLOGY | Facility: HOSPITAL | Age: 42
Discharge: HOME | End: 2017-12-14
Attending: INTERNAL MEDICINE
Payer: COMMERCIAL

## 2017-12-14 VITALS — HEIGHT: 62 IN | BODY MASS INDEX: 34.6 KG/M2 | WEIGHT: 188 LBS

## 2017-12-14 DIAGNOSIS — Z12.31 SCREENING MAMMOGRAM, ENCOUNTER FOR: ICD-10-CM

## 2017-12-14 PROCEDURE — 77067 SCR MAMMO BI INCL CAD: CPT | Mod: 26,,, | Performed by: RADIOLOGY

## 2017-12-14 PROCEDURE — 77063 BREAST TOMOSYNTHESIS BI: CPT | Mod: 26,,, | Performed by: RADIOLOGY

## 2017-12-14 PROCEDURE — 77067 SCR MAMMO BI INCL CAD: CPT | Mod: TC

## 2017-12-16 ENCOUNTER — OFFICE VISIT (OUTPATIENT)
Dept: INTERNAL MEDICINE | Facility: CLINIC | Age: 42
End: 2017-12-16
Payer: COMMERCIAL

## 2017-12-16 VITALS
BODY MASS INDEX: 34.89 KG/M2 | WEIGHT: 189.63 LBS | DIASTOLIC BLOOD PRESSURE: 70 MMHG | TEMPERATURE: 98 F | HEIGHT: 62 IN | SYSTOLIC BLOOD PRESSURE: 110 MMHG | HEART RATE: 76 BPM

## 2017-12-16 DIAGNOSIS — R19.00 ABDOMINAL MASS, UNSPECIFIED ABDOMINAL LOCATION: Primary | ICD-10-CM

## 2017-12-16 DIAGNOSIS — L98.9 SKIN LESION: ICD-10-CM

## 2017-12-16 DIAGNOSIS — D50.9 IRON DEFICIENCY ANEMIA, UNSPECIFIED IRON DEFICIENCY ANEMIA TYPE: ICD-10-CM

## 2017-12-16 PROCEDURE — 99999 PR PBB SHADOW E&M-EST. PATIENT-LVL IV: CPT | Mod: PBBFAC,,, | Performed by: INTERNAL MEDICINE

## 2017-12-16 PROCEDURE — 99214 OFFICE O/P EST MOD 30 MIN: CPT | Mod: S$GLB,,, | Performed by: INTERNAL MEDICINE

## 2017-12-18 RX ORDER — ETONOGESTREL AND ETHINYL ESTRADIOL .12; .015 MG/D; MG/D
INSERT, EXTENDED RELEASE VAGINAL
Qty: 1 EACH | Refills: 3 | Status: SHIPPED | OUTPATIENT
Start: 2017-12-18 | End: 2018-01-10 | Stop reason: SDUPTHER

## 2017-12-20 ENCOUNTER — TELEPHONE (OUTPATIENT)
Dept: HEMATOLOGY/ONCOLOGY | Facility: CLINIC | Age: 42
End: 2017-12-20

## 2017-12-20 NOTE — PROGRESS NOTES
Subjective:       Patient ID: Terese Macias is a 42 y.o. female.    Chief Complaint: Anemia    HPI: She returns for follow-up of anemia.  She has a history of iron deficiency anemia.  Previously treated with IV infusion.  She also has a history of a mesenteric mass.  She denies pain located in her abdomen.  Denies vomiting.  No blood in stool.    Past medical history: Iron deficiency anemia, mesenteric mass     Medications: None     ALLERGIES: Penicillin       Review of Systems   Constitutional: Negative for chills, fatigue, fever and unexpected weight change.   Respiratory: Negative for chest tightness and shortness of breath.    Cardiovascular: Negative for chest pain and palpitations.   Gastrointestinal: Negative for abdominal pain and blood in stool.   Neurological: Negative for dizziness, syncope, numbness and headaches.       Objective:      Physical Exam   HENT:   Right Ear: External ear normal.   Left Ear: External ear normal.   Nose: Nose normal.   Mouth/Throat: Oropharynx is clear and moist.   Eyes: Pupils are equal, round, and reactive to light.   Neck: Normal range of motion.   Cardiovascular: Normal rate and regular rhythm.    No murmur heard.  Pulmonary/Chest: Breath sounds normal.   Abdominal: She exhibits no distension. There is no hepatosplenomegaly. There is no tenderness.   Lymphadenopathy:     She has no cervical adenopathy.     She has no axillary adenopathy.   Neurological: She has normal strength and normal reflexes. No cranial nerve deficit or sensory deficit.       Assessment:     assessment and plan: 1.  Anemia: Follow-up with hematology  2.  Mesenteric mass: Schedule follow-up CT scan      Plan:       As above

## 2018-01-01 ENCOUNTER — TELEPHONE (OUTPATIENT)
Dept: INTERNAL MEDICINE | Facility: CLINIC | Age: 43
End: 2018-01-01

## 2018-01-01 NOTE — TELEPHONE ENCOUNTER
She did not show for her CT scan appt. Called her numerous times about rescheduling-no answer. Will mail letter asking her to contact our office

## 2018-01-05 ENCOUNTER — TELEPHONE (OUTPATIENT)
Dept: INTERNAL MEDICINE | Facility: CLINIC | Age: 43
End: 2018-01-05

## 2018-01-05 RX ORDER — DOXYCYCLINE HYCLATE 100 MG
100 TABLET ORAL 2 TIMES DAILY WITH MEALS
Qty: 14 TABLET | Refills: 0 | Status: SHIPPED | OUTPATIENT
Start: 2018-01-05 | End: 2018-01-12

## 2018-01-05 NOTE — TELEPHONE ENCOUNTER
----- Message from Ro Gates sent at 1/5/2018 10:55 AM CST -----  Contact: SELF/[phone]  Patient called in regards needing an rx for coughing, a little of soar throat, and sinus drip. [id number] - [place], [place] - [address] [phone] (Phone)  [phone] (Fax). Please call and advise.       Thank you!!!

## 2018-01-10 ENCOUNTER — OFFICE VISIT (OUTPATIENT)
Dept: OBSTETRICS AND GYNECOLOGY | Facility: CLINIC | Age: 43
End: 2018-01-10
Payer: COMMERCIAL

## 2018-01-10 VITALS
WEIGHT: 188.25 LBS | SYSTOLIC BLOOD PRESSURE: 122 MMHG | HEIGHT: 62 IN | DIASTOLIC BLOOD PRESSURE: 84 MMHG | BODY MASS INDEX: 34.64 KG/M2

## 2018-01-10 DIAGNOSIS — D25.9 UTERINE LEIOMYOMA, UNSPECIFIED LOCATION: Primary | ICD-10-CM

## 2018-01-10 DIAGNOSIS — D50.8 OTHER IRON DEFICIENCY ANEMIA: ICD-10-CM

## 2018-01-10 PROCEDURE — 99213 OFFICE O/P EST LOW 20 MIN: CPT | Mod: S$GLB,,, | Performed by: OBSTETRICS & GYNECOLOGY

## 2018-01-10 PROCEDURE — 99999 PR PBB SHADOW E&M-EST. PATIENT-LVL III: CPT | Mod: PBBFAC,,, | Performed by: OBSTETRICS & GYNECOLOGY

## 2018-01-10 RX ORDER — ETONOGESTREL AND ETHINYL ESTRADIOL VAGINAL RING .015; .12 MG/D; MG/D
RING VAGINAL
Qty: 1 EACH | Refills: 6 | Status: SHIPPED | OUTPATIENT
Start: 2018-01-10 | End: 2018-06-17 | Stop reason: SDUPTHER

## 2018-01-10 NOTE — PROGRESS NOTES
HISTORY OF PRESENT ILLNESS:    Terese Macias is a 42 y.o. female  Patient's last menstrual period was 2017. presents today complaining of menorrhagia.   Cycles once a month lasting 6-7 days using 5-6 pads per day with clotting for the last 4-5 years.     Anemia H&H 9.9/32.0  Patient on continuously Nuva Ring since November with cycle beginning today.       Narrative     Comparison: Ultrasound pelvis 14.      Technique: Transabdominal and transvaginal sonographic evaluation of pelvic organs was performed including grayscale, color flow and spectral technique.    Findings:    The uterus is normal in size measuring 10.1 x 4.8 x 7.1 cm and the endometrial stripe is uniform in thickness measuring 5 mm.  Again identified is a small intramural uterine fibroid along the posterior aspect of the uterine body, which has slightly   increased in size and now measures 2.1 x 1.9 x 2.1 cm, previously 1.3 x 1.3 x 1.5 cm.  There is a newly-visualized subserosal uterine fibroid along the anterior left aspect of the uterine body, which measures 1.9 x 1.6 x 2.0 cm.    The right ovary measures 2.5 x 1.3 x 2.8 cm with normal blood flow.    The left ovary measures 2.7 x 1.2 x 2.7 cm with normal blood flow.    There is no free pelvic fluid.   Impression     Leiomyomatous uterus, with slight enlargement of the previously identified posterior uterine intramural fibroid and identification of a new anterior subserosal fibroid.    Otherwise, no significant sonographic abnormalities are detected.  ______________________________________     Electronically signed by resident: SANDEEP WOODY MD  Date: 08/31/15  Time: 15:38         Past Medical History:   Diagnosis Date    Abnormal Pap smear     Abnormal Pap smear of vagina     Anemia     Fever blister     GERD (gastroesophageal reflux disease)     Heavy periods 2015    Hemorrhoids without complication     Joint pain     Screening for HPV (human papillomavirus)      Thalassemia     Thalassemia        Past Surgical History:   Procedure Laterality Date    COLONOSCOPY      HERNIA REPAIR         MEDICATIONS AND ALLERGIES:      Current Outpatient Prescriptions:     doxycycline (VIBRA-TABS) 100 MG tablet, Take 1 tablet (100 mg total) by mouth 2 (two) times daily with meals., Disp: 14 tablet, Rfl: 0    etonogestrel-ethinyl estradiol (NUVARING) 0.12-0.015 mg/24 hr vaginal ring, Please give one ring every 3 weeks. Insert vaginally and leave in place for 3 consecutive weeks continuously, no break for one week. After 3 weeks change immediately to new ring., Disp: 1 each, Rfl: 6    etonogestrel-ethinyl estradiol (NUVARING) 0.12-0.015 mg/24 hr vaginal ring, INSERT 1 RING VAGINALLY AS DIRECTED. REMOVE AFTER 3 WEEKS & WAIT 7 DAYS BEFORE INSERTING A NEW RING, Disp: 1 each, Rfl: 6    Review of patient's allergies indicates:   Allergen Reactions    Pcn [penicillins] Other (See Comments)     Pt had reaction to PCN as an infant.       COMPREHENSIVE GYN HISTORY:  PAP History: Denies abnormal Paps.  Infection History: Denies STDs. Denies PID.  Benign History: Denies uterine fibroids. Denies ovarian cysts. Denies endometriosis. Denies other conditions.  Cancer History: Denies cervical cancer. Denies uterine cancer or hyperplasia. Denies ovarian cancer. Denies vulvar cancer or pre-cancer. Denies vaginal cancer or pre-cancer. Denies breast cancer. Denies colon cancer.  Sexual Activity History: Reports currently being sexually active  Menstrual History: Every 28 days, flows for 4 days. Light flow.  Dysmenorrhea History: Denies dysmenorrhea.    ROS:  GENERAL: No fever or chills.  BREASTS: No pain. No lumps. No discharge.  ABDOMEN: No pain. No nausea. No vomiting. No diarrhea. No constipation.  REPRODUCTIVE: No abnormal bleeding.   VULVA: No pain. No lesions. No itching.  VAGINA: No relaxation. No itching. No odor. No discharge. No lesions.  URINARY: No incontinence. No nocturia. No frequency. No  dysuria.    PE:  APPEARANCE: Well nourished, well developed, in no acute distress.  AFFECT: WNL, alert and oriented x 3.  Deferred      1. Uterine leiomyoma, unspecified location    2. Other iron deficiency anemia      FOLLOW-UP with me in 4-6 weeks  Pelvic US   Labs

## 2018-01-16 ENCOUNTER — HOSPITAL ENCOUNTER (OUTPATIENT)
Dept: RADIOLOGY | Facility: HOSPITAL | Age: 43
Discharge: HOME | End: 2018-01-16
Attending: OBSTETRICS & GYNECOLOGY
Payer: COMMERCIAL

## 2018-01-16 DIAGNOSIS — D25.9 UTERINE LEIOMYOMA, UNSPECIFIED LOCATION: ICD-10-CM

## 2018-02-07 ENCOUNTER — OFFICE VISIT (OUTPATIENT)
Dept: INTERNAL MEDICINE | Facility: CLINIC | Age: 43
End: 2018-02-07
Payer: COMMERCIAL

## 2018-02-07 VITALS
HEIGHT: 62 IN | WEIGHT: 188.69 LBS | BODY MASS INDEX: 34.72 KG/M2 | DIASTOLIC BLOOD PRESSURE: 72 MMHG | OXYGEN SATURATION: 97 % | SYSTOLIC BLOOD PRESSURE: 134 MMHG | HEART RATE: 91 BPM

## 2018-02-07 DIAGNOSIS — J45.909 MILD REACTIVE AIRWAYS DISEASE, UNSPECIFIED WHETHER PERSISTENT: ICD-10-CM

## 2018-02-07 DIAGNOSIS — R05.9 COUGH: ICD-10-CM

## 2018-02-07 DIAGNOSIS — J30.9 ALLERGIC RHINITIS, UNSPECIFIED CHRONICITY, UNSPECIFIED SEASONALITY, UNSPECIFIED TRIGGER: Primary | ICD-10-CM

## 2018-02-07 PROCEDURE — 99999 PR PBB SHADOW E&M-EST. PATIENT-LVL III: CPT | Mod: PBBFAC,,, | Performed by: NURSE PRACTITIONER

## 2018-02-07 PROCEDURE — 3008F BODY MASS INDEX DOCD: CPT | Mod: S$GLB,,, | Performed by: NURSE PRACTITIONER

## 2018-02-07 PROCEDURE — 99213 OFFICE O/P EST LOW 20 MIN: CPT | Mod: S$GLB,,, | Performed by: NURSE PRACTITIONER

## 2018-02-07 RX ORDER — ALBUTEROL SULFATE 90 UG/1
2 AEROSOL, METERED RESPIRATORY (INHALATION) EVERY 6 HOURS PRN
Qty: 18 G | Refills: 0 | Status: SHIPPED | OUTPATIENT
Start: 2018-02-07 | End: 2021-11-02

## 2018-02-07 RX ORDER — FLUTICASONE PROPIONATE 50 MCG
1 SPRAY, SUSPENSION (ML) NASAL DAILY
Qty: 1 BOTTLE | Refills: 0 | Status: SHIPPED | OUTPATIENT
Start: 2018-02-07

## 2018-02-07 RX ORDER — BENZONATATE 100 MG/1
100 CAPSULE ORAL 3 TIMES DAILY PRN
Qty: 30 CAPSULE | Refills: 0 | Status: SHIPPED | OUTPATIENT
Start: 2018-02-07 | End: 2018-02-17

## 2018-02-07 NOTE — MEDICAL/APP STUDENT
Subjective:       Patient ID: Terese Macias is a 42 y.o. female.    Chief Complaint: Cough (x2 months )    43 yo female with c/o cough for approximately 1 months.  The cough is mostly dry, but occsinally productive (sputum is currently clear).  Has taken antibiotic prescribed to her a few weeks ago, cough did not subside.  Denies congestion, fever, chills.  Took mucinex for the first time last night, mild relief.     GERD- does not take regular medication.  Patient reports occasinal indigestion.       Review of Systems   Constitutional: Negative for chills, fatigue and fever.   HENT: Positive for postnasal drip, sneezing and sore throat. Negative for congestion, sinus pain and sinus pressure.    Eyes: Negative.    Respiratory: Positive for shortness of breath (when coughing) and wheezing.    Cardiovascular: Positive for chest pain (pinching pain, pain was fleeting).   Gastrointestinal: Positive for nausea. Negative for diarrhea.   Genitourinary: Negative.    Neurological: Positive for dizziness (r/t coughing), light-headedness (r/t coughing) and headaches.       Objective:      Physical Exam   Constitutional: She appears well-developed and well-nourished.   HENT:   Head: Normocephalic and atraumatic.   Nose: Right sinus exhibits no maxillary sinus tenderness and no frontal sinus tenderness. Left sinus exhibits no maxillary sinus tenderness and no frontal sinus tenderness.   Mouth/Throat: Oropharynx is clear and moist and mucous membranes are normal.   Eyes: Pupils are equal, round, and reactive to light.   Neck: Normal range of motion. Neck supple.   Cardiovascular: Normal rate and regular rhythm.    Pulmonary/Chest: Effort normal and breath sounds normal.   Lymphadenopathy:     She has no axillary adenopathy.       Assessment:           Plan:

## 2018-02-07 NOTE — PROGRESS NOTES
INTERNAL MEDICINE PROGRESS/URGENT CARE NOTE    CHIEF COMPLAINT     Chief Complaint   Patient presents with    Cough     x2 months        HPI     Terese Macias is a 42 y.o. female with DUSTIN, gerd, hemorrhoids, and thalassemia minor who presents for an urgent visit today.  She is an established pt of Dr. Mohamud.     Here with c/o cough x 2 months - treated with doxycycline 100mg bid starting 1/5/2018 for cough and congestion. Completed antibiotics but still with fatigue, cough prod with clear sputum, occ. congestion, no fever. No body aches, no chills.  Took mucinex for the first time last night, mild relief.     GERD- does not take regular medication.  Patient reports occasinal indigestion.     Past Medical History:  Past Medical History:   Diagnosis Date    Abnormal Pap smear     Abnormal Pap smear of vagina     Anemia     Fever blister     GERD (gastroesophageal reflux disease)     Heavy periods 6/8/2015    Hemorrhoids without complication     Joint pain     Screening for HPV (human papillomavirus)     Thalassemia     Thalassemia        Home Medications:  Prior to Admission medications    Medication Sig Start Date End Date Taking? Authorizing Provider   etonogestrel-ethinyl estradiol (NUVARING) 0.12-0.015 mg/24 hr vaginal ring Please give one ring every 3 weeks. Insert vaginally and leave in place for 3 consecutive weeks continuously, no break for one week. After 3 weeks change immediately to new ring. 2/17/16  Yes Ellen Moctezuma MD   etonogestrel-ethinyl estradiol (NUVARING) 0.12-0.015 mg/24 hr vaginal ring INSERT 1 RING VAGINALLY AS DIRECTED. REMOVE AFTER 3 WEEKS & WAIT 7 DAYS BEFORE INSERTING A NEW RING 1/10/18   Ellen Moctezuma MD       Review of Systems:  Review of Systems   Constitutional: Negative for chills, fatigue and fever.   HENT: Positive for postnasal drip, sneezing and sore throat. Negative for congestion, sinus pain and sinus pressure.    Eyes: Negative.    Respiratory:  "Positive for shortness of breath (when coughing) and wheezing.    Cardiovascular: Positive for chest pain (pinching pain, pain was fleeting).   Gastrointestinal: Positive for nausea. Negative for diarrhea.   Genitourinary: Negative.    Neurological: Positive for dizziness (r/t coughing), light-headedness (r/t coughing) and headaches.     Health Maintainence:   Immunizations:  Health Maintenance       Date Due Completion Date    Pneumococcal PCV13 (High Risk) (1 - PCV13 Required) 09/13/1977 ---    TETANUS VACCINE 09/13/1994 ---    Pneumococcal PPSV23 (High Risk) (1) 09/13/1994 ---    Influenza Vaccine 08/01/2017 ---    Pap Smear with HPV Cotest 02/17/2019 2/17/2016    Mammogram 12/14/2019 12/14/2017           PHYSICAL EXAM     /72 (BP Location: Left arm, Patient Position: Sitting, BP Method: Large (Manual))   Pulse 91   Ht 5' 2" (1.575 m)   Wt 85.6 kg (188 lb 11.4 oz)   SpO2 97%   BMI 34.52 kg/m²     Physical Exam   HENT:   Nose: Mucosal edema and rhinorrhea present.   turbs boggy; PND       Constitutional: She appears well-developed and well-nourished.   HENT:   Head: Normocephalic and atraumatic.   Nose: Right sinus exhibits no maxillary sinus tenderness and no frontal sinus tenderness. Left sinus exhibits no maxillary sinus tenderness and no frontal sinus tenderness.   Mouth/Throat: Oropharynx is clear and moist and mucous membranes are normal.   Eyes: Pupils are equal, round, and reactive to light.   Neck: Normal range of motion. Neck supple.   Cardiovascular: Normal rate and regular rhythm.    Pulmonary/Chest: Effort normal and breath sounds normal.   Lymphadenopathy:     She has no axillary adenopathy.     LABS     No results found for: LABA1C, HGBA1C  CMP  Sodium   Date Value Ref Range Status   11/28/2017 137 136 - 145 mmol/L Final     Potassium   Date Value Ref Range Status   11/28/2017 3.7 3.5 - 5.1 mmol/L Final     Chloride   Date Value Ref Range Status   11/28/2017 108 95 - 110 mmol/L Final "     CO2   Date Value Ref Range Status   11/28/2017 24 23 - 29 mmol/L Final     Glucose   Date Value Ref Range Status   11/28/2017 86 70 - 110 mg/dL Final     BUN, Bld   Date Value Ref Range Status   11/28/2017 9 6 - 20 mg/dL Final     Creatinine   Date Value Ref Range Status   11/28/2017 0.8 0.5 - 1.4 mg/dL Final     Calcium   Date Value Ref Range Status   11/28/2017 9.1 8.7 - 10.5 mg/dL Final     Total Protein   Date Value Ref Range Status   11/28/2017 8.0 6.0 - 8.4 g/dL Final     Albumin   Date Value Ref Range Status   11/28/2017 3.2 (L) 3.5 - 5.2 g/dL Final     Total Bilirubin   Date Value Ref Range Status   11/28/2017 0.3 0.1 - 1.0 mg/dL Final     Comment:     For infants and newborns, interpretation of results should be based  on gestational age, weight and in agreement with clinical  observations.  Premature Infant recommended reference ranges:  Up to 24 hours.............<8.0 mg/dL  Up to 48 hours............<12.0 mg/dL  3-5 days..................<15.0 mg/dL  6-29 days.................<15.0 mg/dL       Alkaline Phosphatase   Date Value Ref Range Status   11/28/2017 64 55 - 135 U/L Final     AST   Date Value Ref Range Status   11/28/2017 16 10 - 40 U/L Final     ALT   Date Value Ref Range Status   11/28/2017 10 10 - 44 U/L Final     Anion Gap   Date Value Ref Range Status   11/28/2017 5 (L) 8 - 16 mmol/L Final     eGFR if    Date Value Ref Range Status   11/28/2017 >60.0 >60 mL/min/1.73 m^2 Final     eGFR if non    Date Value Ref Range Status   11/28/2017 >60.0 >60 mL/min/1.73 m^2 Final     Comment:     Calculation used to obtain the estimated glomerular filtration  rate (eGFR) is the CKD-EPI equation.        Lab Results   Component Value Date    WBC 10.23 11/28/2017    HGB 9.9 (L) 11/28/2017    HCT 32.0 (L) 11/28/2017    MCV 82 11/28/2017     11/28/2017     Lab Results   Component Value Date    CHOL 133 11/28/2017    CHOL 156 06/28/2016     Lab Results   Component Value  Date    HDL 54 11/28/2017    HDL 59 06/28/2016     Lab Results   Component Value Date    LDLCALC 65.4 11/28/2017    LDLCALC 83.2 06/28/2016     Lab Results   Component Value Date    TRIG 68 11/28/2017    TRIG 69 06/28/2016     Lab Results   Component Value Date    CHOLHDL 40.6 11/28/2017    CHOLHDL 37.8 06/28/2016     Lab Results   Component Value Date    TSH 1.911 11/28/2017    I5NMIJP 6.4 02/24/2010       ASSESSMENT/PLAN     Terese Macias is a 42 y.o. female with  Past Medical History:   Diagnosis Date    Abnormal Pap smear     Abnormal Pap smear of vagina     Anemia     Fever blister     GERD (gastroesophageal reflux disease)     Heavy periods 6/8/2015    Hemorrhoids without complication     Joint pain     Screening for HPV (human papillomavirus)     Thalassemia     Thalassemia      Allergic rhinitis, unspecified chronicity, unspecified seasonality, unspecified trigger- will start nasal saline rinses bid and flonase 2 squirts daily   -     fluticasone (FLONASE) 50 mcg/actuation nasal spray; 1 spray (50 mcg total) by Each Nare route once daily.  Dispense: 1 Bottle; Refill: 0    Cough- mitchell from [place], will treat as above and may use benzonatate as needed for cough   -     benzonatate (TESSALON) 100 MG capsule; Take 1 capsule (100 mg total) by mouth 3 (three) times daily as needed for Cough.  Dispense: 30 capsule; Refill: 0    Mild reactive airways disease, unspecified whether persistent  -     albuterol 90 mcg/actuation inhaler; Inhale 2 puffs into the lungs every 6 (six) hours as needed for Wheezing. Rescue  Dispense: 18 g; Refill: 0          Follow up if s/s do not improve.     Patient education provided from [facility]. Patient was counseled on when and how to seek emergent care.       Sidra DOWNING, APRN, FNP-c   Department of Internal Medicine - [facility]  4:52 PM

## 2018-03-21 ENCOUNTER — OFFICE VISIT (OUTPATIENT)
Dept: INTERNAL MEDICINE | Facility: CLINIC | Age: 43
End: 2018-03-21
Payer: COMMERCIAL

## 2018-03-21 ENCOUNTER — LAB VISIT (OUTPATIENT)
Dept: LAB | Facility: HOSPITAL | Age: 43
End: 2018-03-21
Attending: NURSE PRACTITIONER
Payer: COMMERCIAL

## 2018-03-21 ENCOUNTER — NURSE TRIAGE (OUTPATIENT)
Dept: ADMINISTRATIVE | Facility: CLINIC | Age: 43
End: 2018-03-21

## 2018-03-21 VITALS
HEART RATE: 76 BPM | SYSTOLIC BLOOD PRESSURE: 116 MMHG | HEIGHT: 62 IN | DIASTOLIC BLOOD PRESSURE: 66 MMHG | OXYGEN SATURATION: 99 % | WEIGHT: 186.31 LBS | TEMPERATURE: 99 F | BODY MASS INDEX: 34.29 KG/M2

## 2018-03-21 DIAGNOSIS — R07.9 CHEST PAIN, UNSPECIFIED TYPE: ICD-10-CM

## 2018-03-21 DIAGNOSIS — R07.9 CHEST PAIN, UNSPECIFIED TYPE: Primary | ICD-10-CM

## 2018-03-21 LAB
ANION GAP SERPL CALC-SCNC: 6 MMOL/L
BUN SERPL-MCNC: 11 MG/DL
CALCIUM SERPL-MCNC: 9.2 MG/DL
CHLORIDE SERPL-SCNC: 106 MMOL/L
CO2 SERPL-SCNC: 24 MMOL/L
CREAT SERPL-MCNC: 0.7 MG/DL
EST. GFR  (AFRICAN AMERICAN): >60 ML/MIN/1.73 M^2
EST. GFR  (NON AFRICAN AMERICAN): >60 ML/MIN/1.73 M^2
GLUCOSE SERPL-MCNC: 89 MG/DL
POTASSIUM SERPL-SCNC: 4.1 MMOL/L
SODIUM SERPL-SCNC: 136 MMOL/L
TROPONIN I SERPL DL<=0.01 NG/ML-MCNC: 0.02 NG/ML

## 2018-03-21 PROCEDURE — 93010 ELECTROCARDIOGRAM REPORT: CPT | Mod: S$GLB,,, | Performed by: INTERNAL MEDICINE

## 2018-03-21 PROCEDURE — 93005 ELECTROCARDIOGRAM TRACING: CPT | Mod: S$GLB,,, | Performed by: NURSE PRACTITIONER

## 2018-03-21 PROCEDURE — 99999 PR PBB SHADOW E&M-EST. PATIENT-LVL III: CPT | Mod: PBBFAC,,, | Performed by: NURSE PRACTITIONER

## 2018-03-21 PROCEDURE — 36415 COLL VENOUS BLD VENIPUNCTURE: CPT

## 2018-03-21 PROCEDURE — 80048 BASIC METABOLIC PNL TOTAL CA: CPT

## 2018-03-21 PROCEDURE — 84484 ASSAY OF TROPONIN QUANT: CPT

## 2018-03-21 PROCEDURE — 99213 OFFICE O/P EST LOW 20 MIN: CPT | Mod: S$GLB,,, | Performed by: NURSE PRACTITIONER

## 2018-03-21 RX ORDER — OMEPRAZOLE 20 MG/1
20 CAPSULE, DELAYED RELEASE ORAL DAILY
Qty: 30 CAPSULE | Refills: 2 | Status: SHIPPED | OUTPATIENT
Start: 2018-03-21 | End: 2019-09-17

## 2018-03-21 RX ORDER — NAPROXEN 500 MG/1
500 TABLET ORAL 2 TIMES DAILY
Qty: 10 TABLET | Refills: 0 | Status: SHIPPED | OUTPATIENT
Start: 2018-03-21 | End: 2018-03-26

## 2018-03-21 NOTE — TELEPHONE ENCOUNTER
Pt c/o of onset of chest pain yesterday- located upper left quad below collar bone- pain worse with certain movements. Up to 8/10 with movement and 2 at this time -driving,not moving much. No radiation. No other sx's reported. No trauma/strain reported. 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  Reason for Disposition   All other patients with chest pain    Protocols used: ST CHEST PAIN-A-[place]    Recommended due to her additional comments about past  Hx would recommend she go to ER for eval rather than walk in clinic

## 2018-03-22 NOTE — PROGRESS NOTES
"Subjective:       Patient ID: Terese Macias is a 42 y.o. female.    Chief Complaint: Chest Pain    HPI:  43 yo female that presents to clinic with chest pain x 2 days.    States that the chest pain started yesterday.  Denies any trauma to chest or any recent illnesses.  States that the pain is located to the left side of her chest and is a deep sharp pain.  States that pain is mostly brought on by changes in body position.  States that the pain comes on all  Of a sudden and then resolves on its own.  States that it does not radiate down her arm or into her back.  States that she had similar episode of few years ago and was told that it was "muscle spasms or contractions."    Patient has history of mesenteric mass.    Denies any SOB, n/v, dizziness or heart palpitations.  Denies any numbness or tingling in arm.  States that she has noticed that she has had more "gas" recently as she has been belching more.      Review of Systems   Constitutional: Negative for appetite change, chills, fatigue and fever.   Respiratory: Negative for apnea, cough, shortness of breath and wheezing.    Cardiovascular: Positive for chest pain. Negative for palpitations and leg swelling.   Gastrointestinal: Negative for abdominal pain, constipation, diarrhea, nausea and vomiting.   Musculoskeletal: Negative for arthralgias, back pain, joint swelling, myalgias, neck pain and neck stiffness.   Neurological: Negative for dizziness, light-headedness and headaches.   Psychiatric/Behavioral: Negative for behavioral problems.       Objective:      Physical Exam   Constitutional: She is oriented to person, place, and time. She appears well-developed and well-nourished. No distress.   Neck: Normal range of motion. Neck supple. No thyromegaly present.   Cardiovascular: Normal rate, regular rhythm, normal heart sounds and intact distal pulses.    No murmur heard.  Pulmonary/Chest: Effort normal and breath sounds normal. No respiratory distress. She " has no wheezes. She has no rales. She exhibits no mass, no tenderness, no edema and no swelling.       Patient reports left anterior chest wall pain with changes in body position.  There is no pain or tenderness with palpation of area.  There is no pain reproduced with flexion, extension, abduction or adduction of bilateral upper extremities.  Pain is not reproduced with flexion or extension of back.  There is no pain produced with deep inspiration or exhalation.   Lymphadenopathy:     She has no cervical adenopathy.   Neurological: She is alert and oriented to person, place, and time. No sensory deficit.   Skin: Skin is warm and dry. No erythema.   Psychiatric: Her behavior is normal.       Assessment:       1. Chest pain, unspecified type        Plan:     1. Chest pain, unspecified type   -Vitals are stable in clinic.  -Chest pain is not reproducible in clinic.  -EKG in clinic is normal.  -Will check a bmp and troponin level today.  -Musculoskeletal vs acid reflux.  -Will try a 5 day course of scheduled naproxen 500mg po bid to see if this improves pain.  -Will also start patient on omeprazole 20mg po daily to see if this improves chest pain.  -Encouraged to reduce meal portion sizes and limit intake of greasy, fried and spicy foods.

## 2018-06-19 RX ORDER — ETONOGESTREL AND ETHINYL ESTRADIOL .12; .015 MG/D; MG/D
INSERT, EXTENDED RELEASE VAGINAL
Qty: 1 EACH | Refills: 3 | Status: SHIPPED | OUTPATIENT
Start: 2018-06-19 | End: 2019-04-25

## 2018-08-25 ENCOUNTER — NURSE TRIAGE (OUTPATIENT)
Dept: ADMINISTRATIVE | Facility: CLINIC | Age: 43
End: 2018-08-25

## 2018-08-25 NOTE — TELEPHONE ENCOUNTER
"    Reason for Disposition   [1] SEVERE pain (e.g., excruciating, scale 8-10) AND [2] present > 1 hour    Answer Assessment - Initial Assessment Questions  1. LOCATION: "Where does it hurt?" (e.g., left, right)      Left side  2. ONSET: "When did the pain start?"      Night before last  3. SEVERITY: "How bad is the pain?" (e.g., Scale 1-10; mild, moderate, or severe)    - MILD (1-3): doesn't interfere with normal activities     - MODERATE (4-7): interferes with normal activities or awakens from sleep     - SEVERE (8-10): excruciating pain and patient unable to do normal activities (stays in bed)        7-8/10    4. PATTERN: "Does the pain come and go, or is it constant?"       intermittent  5. CAUSE: "What do you think is causing the pain?"      unsure  6. OTHER SYMPTOMS:  "Do you have any other symptoms?" (e.g., fever, abdominal pain, vomiting, leg weakness, burning with urination, blood in urine)      Like spasms in her side  7. PREGNANCY:  "Is there any chance you are pregnant?" "When was your last menstrual period?"      No, lmp last week    Feels like spasms, worse with any movement, better if she lays on the left side.  Took aleve and zantac, and tums, nothing seems to make it any better.    Protocols used: [facility] FLANK PAIN-A-[facility]      "

## 2018-09-17 ENCOUNTER — APPOINTMENT (OUTPATIENT)
Dept: RADIOLOGY | Facility: HOSPITAL | Age: 43
End: 2018-09-17
Attending: ORTHOPAEDIC SURGERY
Payer: COMMERCIAL

## 2018-09-17 ENCOUNTER — OFFICE VISIT (OUTPATIENT)
Dept: ORTHOPEDICS | Facility: CLINIC | Age: 43
End: 2018-09-17
Payer: COMMERCIAL

## 2018-09-17 VITALS
BODY MASS INDEX: 33.31 KG/M2 | HEIGHT: 62 IN | DIASTOLIC BLOOD PRESSURE: 70 MMHG | WEIGHT: 181 LBS | SYSTOLIC BLOOD PRESSURE: 110 MMHG

## 2018-09-17 DIAGNOSIS — M25.512 ACUTE PAIN OF LEFT SHOULDER: Primary | ICD-10-CM

## 2018-09-17 DIAGNOSIS — M25.512 CHRONIC LEFT SHOULDER PAIN: ICD-10-CM

## 2018-09-17 DIAGNOSIS — G89.29 CHRONIC LEFT SHOULDER PAIN: ICD-10-CM

## 2018-09-17 DIAGNOSIS — G89.29 CHRONIC LEFT SHOULDER PAIN: Primary | ICD-10-CM

## 2018-09-17 DIAGNOSIS — M25.512 CHRONIC LEFT SHOULDER PAIN: Primary | ICD-10-CM

## 2018-09-17 PROCEDURE — 73030 X-RAY EXAM OF SHOULDER: CPT | Mod: TC,FY,PN,LT

## 2018-09-17 PROCEDURE — 73030 X-RAY EXAM OF SHOULDER: CPT | Mod: 26,LT,, | Performed by: RADIOLOGY

## 2018-09-17 PROCEDURE — 99999 PR PBB SHADOW E&M-EST. PATIENT-LVL III: CPT | Mod: PBBFAC,,, | Performed by: ORTHOPAEDIC SURGERY

## 2018-09-17 PROCEDURE — 3008F BODY MASS INDEX DOCD: CPT | Mod: CPTII,S$GLB,, | Performed by: ORTHOPAEDIC SURGERY

## 2018-09-17 PROCEDURE — 99203 OFFICE O/P NEW LOW 30 MIN: CPT | Mod: S$GLB,,, | Performed by: ORTHOPAEDIC SURGERY

## 2018-09-17 RX ORDER — MELOXICAM 7.5 MG/1
7.5 TABLET ORAL DAILY
Qty: 30 TABLET | Refills: 0 | Status: SHIPPED | OUTPATIENT
Start: 2018-09-17 | End: 2019-03-12

## 2018-09-17 NOTE — LETTER
September 17, 2018    Terese Macias  [address] [place] [address]         [facility]  [address] [place] [address]  Phone: [phone] September 17, 2018     Patient: Terese Macias   YOB: 1975   Date of Visit: 9/17/2018       To Whom It May Concern:    It is my medical opinion that Terese Macias may return to work on 9/19/18.    If you have any questions or concerns, please don't hesitate to call.    Sincerely,        Vannesa Suarez MD

## 2018-09-17 NOTE — H&P
CC: Left shoulder pain      HPI: Terese Macias is a 43 y.o. female who presents today complaining of left shoulder pain for 1 day. Onset of pain was not associated with injury or new activities -- she first noticed it yesterday afternoon while napping after [employer]anda[employer]. No associated shortness of breath or chest pain.  The pain is constant and 8/10.  Aggravating factors include lying on the left shoulder and moving the left arm and relieving factors include aleeve, lidocaine patch and resting.  She does not have associated shortness of breath or worsening of pain with exertion.  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    She has not been treated in the past with PT, prescription NSAIDs, ice and steroid injection .   The pain does interfere with activities at work  and of leisure.    This is the extent of the patient's complaints at this time.     Hand dominance: Right     Occupation:* Works for [employer]     Review of Systems   Constitutional: Negative.    HENT: Negative.    Eyes: Negative.    Respiratory: Negative.    Cardiovascular: Negative.    Gastrointestinal: Positive for nausea.   Genitourinary: Negative.    Musculoskeletal: Positive for joint pain. Negative for myalgias.   Skin: Negative.    Neurological: Negative.    Endo/Heme/Allergies: Negative.         History of thalessemia and anemia    Psychiatric/Behavioral: Negative.          Review of patient's allergies indicates:   Allergen Reactions    Pcn [penicillins] Other (See Comments)     Pt had reaction to PCN as an infant.       Current Outpatient Medications:     albuterol 90 mcg/actuation inhaler, Inhale 2 puffs into the lungs every 6 (six) hours as needed for Wheezing. Rescue, Disp: 18 g, Rfl: 0    etonogestrel-ethinyl estradiol (NUVARING) 0.12-0.015 mg/24 hr vaginal ring, Please  give one ring every 3 weeks. Insert vaginally and leave in place for 3 consecutive weeks continuously, no break for one week. After 3 weeks change immediately to new ring., Disp: 1 each, Rfl: 6    fluticasone (FLONASE) 50 mcg/actuation nasal spray, 1 spray (50 mcg total) by Each Nare route once daily., Disp: 1 Bottle, Rfl: 0    NUVARING 0.12-0.015 mg/24 hr vaginal ring, INSERT 1 RING VAGINALLY AS DIRECTED. REMOVE AFTER 3 WEEKS & WAIT 7 DAYS BEFORE INSERTING A NEW RING, Disp: 1 each, Rfl: 3    omeprazole (PRILOSEC) 20 MG capsule, Take 1 capsule (20 mg total) by mouth once daily., Disp: 30 capsule, Rfl: 2  Past Medical History:   Diagnosis Date    Abnormal Pap smear     Abnormal Pap smear of vagina     Anemia     Fever blister     GERD (gastroesophageal reflux disease)     Heavy periods 6/8/2015    Hemorrhoids without complication     Joint pain     Screening for HPV (human papillomavirus)     Thalassemia     Thalassemia      Social History     Tobacco Use    Smoking status: Never Smoker    Smokeless tobacco: Never Used   Substance Use Topics    Alcohol use: Yes     Comment: Occasionally    Drug use: No     Family History   Problem Relation Age of Onset    Hypertension Mother     Diabetes Father     Heart disease Father     Eczema Father     Eczema Other     Colon cancer Paternal Uncle 60        colon cancer    Cancer Cousin         breast    Breast cancer Cousin     No Known Problems Other     Cancer Cousin         stomach cancer    Breast cancer Maternal Grandmother     Cancer Maternal Grandmother         breast    Eczema Sister     Acne Sister     Eczema Brother     Eczema Daughter     Cancer Maternal Aunt         breast    Breast cancer Maternal Aunt     No Known Problems Maternal Uncle     No Known Problems Paternal Aunt     No Known Problems Maternal Grandfather     No Known Problems Paternal Grandmother     No Known Problems Paternal Grandfather     Melanoma Neg Hx      Psoriasis Neg Hx     Lupus Neg Hx     Ovarian cancer Neg Hx     Amblyopia Neg Hx     Blindness Neg Hx     Cataracts Neg Hx     Glaucoma Neg Hx     Macular degeneration Neg Hx     Retinal detachment Neg Hx     Strabismus Neg Hx     Stroke Neg Hx     Thyroid disease Neg Hx      Physical Exam:     There were no vitals filed for this visit.      General: Weight: 82.1 kg (180 lb 16 oz) Body mass index is 33.1 kg/m².  Patient is alert, awake and oriented to time, place and person. Mood and affect are appropriate.  Patient does not appear to be in any distress, denies any constitutional symptoms and appears stated age.   HEENT: Pupils are equal and round, sclera are not injected. External examination of ears and nose reveals no abnormalities. Cranial nerves II-X are grossly intact  Neck: examination demonstrates painful extension of the c spine, other motion full and painless.  Spurling's sign is negative  Skin: no rashes, abrasions or open wounds on the affected extremity   Resp: No respiratory distress or audible wheezing   CV: 2+  pulses, all extremities warm and well perfused   Left Shoulder:    Shoulder Range of Motion    Right     Left   (Active/Passive)       Forward Elevation     160/160           160/160  External rotation (arm at side)  40/40              40/40   Internal rotation behind the back  t12     t12    Range of motion is painful     Scapular winging no   Scapular dyskinesia no    Examination of the back shows no atrophy     Acromioclavicular joint is not tender  Crossbody test: negative    Neer's negative  Hawkin's positive    Cherrie's positive - pain no weakness   Drop arm negative  Belly press negative  Liftoff negative  External rotation lag sign negative  Internal rotation lag sign negative    Cuff Strength     Right     Left   Supraspinatus        5/5    5/5  Infraspinatus     5/5    5/5  Subscapularis     5/5    5/5    Deltoid testing            5/5    5/5    Vasquez's test  negative  Speeds negative  Yergasons negative    Elbow examination demonstrates no tenderness to palpation and has normal range of motion.     Imaging: 3 views L shoulder:  Mild DJD of AC joint. No glenohumeral arthritis.  The humeral head is centered on the glenoid on AP and axillary views.  Humeral-acromial interval is preserved. No calcfications, sclerosis or cystic changes at the greater tuberosity.      Assessment: 43 y.o. female with left shoulder pain     Plan:   - Mobic 7.5 mg qd x 2 weeks then PRN. The patient was advised that NSAID-type medications have two very important potential side effects: gastrointestinal irritation including hemorrhage and renal injuries. She was asked to take the medication with food and to stop if she experiences any GI upset. I asked her to call for vomiting, abdominal pain or black/bloody stools. The patient expresses understanding of these issues and questions were answered.  - Call clinic in 1 week if mobic does not help relieve syptoms. Return sooner if symptoms worsen or fail to improve.  - Discussed that sometimes shoulder pain can be a cardiac symptom -- instructed her to go to ER for any associated shortness of breath, diaphoresis, malaise etc.  On exam today her pain does appear to be associated with shoulder motion/testing and does not appear to be cardiac related.     All questions were answered in detail. The patient is in full agreement with the treatment plan and will proceed accordingly.

## 2018-09-19 ENCOUNTER — OFFICE VISIT (OUTPATIENT)
Dept: FAMILY MEDICINE | Facility: CLINIC | Age: 43
End: 2018-09-19
Payer: COMMERCIAL

## 2018-09-19 VITALS
HEART RATE: 82 BPM | BODY MASS INDEX: 33.39 KG/M2 | RESPIRATION RATE: 20 BRPM | HEIGHT: 62 IN | WEIGHT: 181.44 LBS | DIASTOLIC BLOOD PRESSURE: 64 MMHG | OXYGEN SATURATION: 99 % | TEMPERATURE: 99 F | SYSTOLIC BLOOD PRESSURE: 110 MMHG

## 2018-09-19 DIAGNOSIS — D56.3 THALASSEMIA MINOR: ICD-10-CM

## 2018-09-19 DIAGNOSIS — F32.A DEPRESSION, UNSPECIFIED DEPRESSION TYPE: Primary | ICD-10-CM

## 2018-09-19 PROCEDURE — 99999 PR PBB SHADOW E&M-EST. PATIENT-LVL IV: CPT | Mod: PBBFAC,,, | Performed by: FAMILY MEDICINE

## 2018-09-19 PROCEDURE — 99214 OFFICE O/P EST MOD 30 MIN: CPT | Mod: S$GLB,,, | Performed by: FAMILY MEDICINE

## 2018-09-19 PROCEDURE — 3008F BODY MASS INDEX DOCD: CPT | Mod: CPTII,S$GLB,, | Performed by: FAMILY MEDICINE

## 2018-09-19 RX ORDER — FLUOXETINE 10 MG/1
10 CAPSULE ORAL DAILY
Qty: 30 CAPSULE | Refills: 0 | Status: SHIPPED | OUTPATIENT
Start: 2018-09-19 | End: 2018-10-17 | Stop reason: SDUPTHER

## 2018-09-19 NOTE — PROGRESS NOTES
[facility]   Psychiatric Assessment  Patient Health Questionnaire-9 (PHQ-9)    Patient Name: Terese Macias  2018  4:34 PM  Start Date:  : 1975    SUBJECTIVE:      Patient Active Problem List    Diagnosis Date Noted    Pain in limb     Calcified mesenteric mass 2015    Abdominal pain, left lateral 2015    Heavy periods 2015    DUSTIN (iron deficiency anemia) 2015    Abdominal pain 2014    Thalassemia minor 2013    GERD (gastroesophageal reflux disease)     Hemorrhoids without complication    , presenting with principal problem of   Chief Complaint   Patient presents with    Establish Care   . Psychiatry is asking for a depression screening score.     Psychiatric Review Of Systems - Is patient experiencing or having changes in:  sleep: yes  appetite: yes  weight: yes  energy/anergy: yes  interest/pleasure/anhedonia: yes  somatic symptoms: no  libido: yes  anxiety/panic: yes  guilty/hopelessness: yes  concentration: yes  S.I.B.s/risky behavior: no  any drugs: no  alcohol: no    1. Little interest or pleasure in doing things? ,  More than half of days  = 2    2. Feeling down, depressed, or hopeless? , Several days                = 1    3. Trouble falling or staying asleep, or sleeping too much? , Nearly every day           = 3    4. Feeling tired or having little energy? , Nearly every day           = 3    5. Poor appetite or overeating? , More than half of days  = 2    6. Feeling bad about yourself- or that you are a failure or have let yourself or your family down?  Nearly every day           = 3    7. Trouble concentrating on things, such as reading the newspaper or watching TV?  Nearly every day           = 3    8. Moving or speaking so slowly that other people could have noticed? Or the opposite- being so fidgety or restless that you have been moving around a lot more than usual? , Nearly every day           = 3    9. Thoughts that you would be better off dead  or of hurting yourself in some way? , Not at all                       = 0    Total Score: 20    How difficult have these problems made it for you to do your work, take care of things at home, or get along with other people? , More than half of days  = 2    Scale:   0-4= No intervention  5-9= Recheck next visit and make patient aware of resources  10-14= Call  and consider initiation of antidepressant with MD  15-19= Call  to refer to Psychiatry and start antidepressant  20-27= Call social work and consult Psychiatry    Past Medical/Surgical History:   Past Medical History:   Diagnosis Date    Abnormal Pap smear     Abnormal Pap smear of vagina     Anemia     Fever blister     GERD (gastroesophageal reflux disease)     Heavy periods 6/8/2015    Hemorrhoids without complication     Joint pain     Screening for HPV (human papillomavirus)     Thalassemia     Thalassemia      Past Surgical History:   Procedure Laterality Date    COLONOSCOPY      ESOPHAGOGASTRODUODENOSCOPY (EGD) N/A 3/23/2015    Performed [facility] at [facility] ([facility] F[facility])    HERNIA REPAIR      [place] COLONOSCOPY,DIAGNOSTIC [48808 (CPT®)] N/A 7/2/2014    Performed by Cj Lassiter MD at [facility] (4TH FLR)     Current Medications:   Home Psychiatric Meds:   Psychotherapeutics (From admission, onward)    None        Allergies:   Review of patient's allergies indicates:   Allergen Reactions    Pcn [penicillins] Other (See Comments)     Pt had reaction to PCN as an infant.       Jaun Justice Jr, MD  9/19/2018

## 2018-09-19 NOTE — LETTER
September 25, 2018      Vannesa Suarez MD  [address]  [place] [address]           [facility]  [address]  [place] [address]  Phone: [phone]          Patient: Terese Macias   MR Number: [id number]   YOB: 1975   Date of Visit: 9/19/2018       Dear Dr. Vannesa Suarez:    Thank you for referring Terese Macias to me for evaluation. Attached you will find relevant portions of my assessment and plan of care.    If you have questions, please do not hesitate to call me. I look forward to following Terese Macias along with you.    Sincerely,    Jaun Justice Jr., MD    Enclosure  CC:  No Recipients    If you would like to receive this communication electronically, please contact [email or web address] or [phone] to request more information on [facility] access.    For providers and/or their staff who would like to refer a patient to [facility], please contact us through our one-stop-shop provider referral line, [facility], at [phone].    If you feel you have received this communication in error or would no longer like to receive these types of communications, please e-mail [email or web address]

## 2018-09-25 ENCOUNTER — LAB VISIT (OUTPATIENT)
Dept: LAB | Facility: HOSPITAL | Age: 43
End: 2018-09-25
Attending: FAMILY MEDICINE
Payer: COMMERCIAL

## 2018-09-25 DIAGNOSIS — D56.3 THALASSEMIA MINOR: ICD-10-CM

## 2018-09-25 LAB
BASOPHILS # BLD AUTO: 0.01 K/UL
BASOPHILS NFR BLD: 0.2 %
DIFFERENTIAL METHOD: ABNORMAL
EOSINOPHIL # BLD AUTO: 0.1 K/UL
EOSINOPHIL NFR BLD: 1.9 %
ERYTHROCYTE [DISTWIDTH] IN BLOOD BY AUTOMATED COUNT: 15.9 %
HCT VFR BLD AUTO: 25.8 %
HGB BLD-MCNC: 7.6 G/DL
IRON SERPL-MCNC: 19 UG/DL
LYMPHOCYTES # BLD AUTO: 1.8 K/UL
LYMPHOCYTES NFR BLD: 27.4 %
MCH RBC QN AUTO: 23.8 PG
MCHC RBC AUTO-ENTMCNC: 29.5 G/DL
MCV RBC AUTO: 81 FL
MONOCYTES # BLD AUTO: 0.7 K/UL
MONOCYTES NFR BLD: 11 %
NEUTROPHILS # BLD AUTO: 3.8 K/UL
NEUTROPHILS NFR BLD: 59.3 %
NRBC BLD-RTO: 0 /100 WBC
PLATELET # BLD AUTO: 172 K/UL
PMV BLD AUTO: 11.7 FL
RBC # BLD AUTO: 3.19 M/UL
SATURATED IRON: 4 %
TOTAL IRON BINDING CAPACITY: 487 UG/DL
TRANSFERRIN SERPL-MCNC: 329 MG/DL
WBC # BLD AUTO: 6.45 K/UL

## 2018-09-25 PROCEDURE — 85025 COMPLETE CBC W/AUTO DIFF WBC: CPT

## 2018-09-25 PROCEDURE — 36415 COLL VENOUS BLD VENIPUNCTURE: CPT

## 2018-09-25 PROCEDURE — 83540 ASSAY OF IRON: CPT

## 2018-09-27 ENCOUNTER — TELEPHONE (OUTPATIENT)
Dept: FAMILY MEDICINE | Facility: CLINIC | Age: 43
End: 2018-09-27

## 2018-09-27 DIAGNOSIS — D50.9 IRON DEFICIENCY ANEMIA, UNSPECIFIED IRON DEFICIENCY ANEMIA TYPE: Primary | ICD-10-CM

## 2018-09-28 ENCOUNTER — TELEPHONE (OUTPATIENT)
Dept: HEMATOLOGY/ONCOLOGY | Facility: CLINIC | Age: 43
End: 2018-09-28

## 2018-09-28 NOTE — TELEPHONE ENCOUNTER
Please let patient know that it is very important she keep her appointment that is scheduled as she is once again anemic.  I went ahead and placed a referral for Hematology so she can discuss options as I know that in the past she needed infusions.

## 2018-10-01 NOTE — PROGRESS NOTES
Routine Office Visit    Patient Name: Terese Macias    : 1975  MRN: [id number]    Subjective:  Terese is a 43 y.o. female who presents today for:   Chief Complaint   Patient presents with    [facility] Care       43-year-old female comes in to establish care.  Initially, she has several somatic complaints.  She states that she has seen several providers in the past for these and has been to the emergency room as well and providers have not been able to figure out what is going on.  I did the PHQ-9 with her with the results below.    Once I explained to the patient my concerns for depression she reports that she used to see a therapist when she was a teenager.  She also reports a family history of mental health concerns.  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  The patient is a .  She has no history of a psychiatric hospitalizations, suicide attempts, reports no history of risky behaviors, no gambling, no drug or alcohol abuse.    Medically, she has a history of thalassemia.    Past Medical History  Past Medical History:   Diagnosis Date    Abnormal Pap smear     Abnormal Pap smear of vagina     Anemia     Fever blister     GERD (gastroesophageal reflux disease)     Heavy periods 2015    Hemorrhoids without complication     Joint pain     Screening for HPV (human papillomavirus)     Thalassemia     Thalassemia        Past Surgical History  Past Surgical History:   Procedure Laterality Date    COLONOSCOPY      ESOPHAGOGASTRODUODENOSCOPY (EGD) N/A 3/23/2015    Performed [facility] at [facility] (4TH FLR)    HERNIA REPAIR      [place] COLONOSCOPY,DIAGNOSTIC [53837 (CPT®)] N/A 2014    [facility] by [facility] at [facility] (4TH FLR)        Family History  Family History   Problem Relation Age of Onset    Hypertension Mother     Diabetes Father     Heart  disease Father     Eczema Father     Eczema Other     Colon cancer Paternal Uncle 60        colon cancer    Cancer Cousin         breast    Breast cancer Cousin     No Known Problems Other     Cancer Cousin         stomach cancer    Breast cancer Maternal Grandmother     Cancer Maternal Grandmother         breast    Eczema Sister     Acne Sister     Eczema Brother     Eczema Daughter     Cancer Maternal Aunt         breast    Breast cancer Maternal Aunt     No Known Problems Maternal Uncle     No Known Problems Paternal Aunt     No Known Problems Maternal Grandfather     No Known Problems Paternal Grandmother     No Known Problems Paternal Grandfather     Melanoma Neg Hx     Psoriasis Neg Hx     Lupus Neg Hx     Ovarian cancer Neg Hx     Amblyopia Neg Hx     Blindness Neg Hx     Cataracts Neg Hx     Glaucoma Neg Hx     Macular degeneration Neg Hx     Retinal detachment Neg Hx     Strabismus Neg Hx     Stroke Neg Hx     Thyroid disease Neg Hx        Social History  Social History     Socioeconomic History    Marital status: Single     Spouse name: Not on file    Number of children: Not on file    Years of education: Not on file    Highest education level: Not on file   Social Needs    Financial resource strain: Not on file    Food insecurity - worry: Not on file    Food insecurity - inability: Not on file    Transportation needs - medical: Not on file    Transportation needs - non-medical: Not on file   Occupational History     Employer: [employer]   Tobacco Use    Smoking status: Never Smoker    Smokeless tobacco: Never Used   Substance and Sexual Activity    Alcohol use: Yes     Comment: Occasionally    Drug use: No    Sexual activity: No     Partners: Male     Birth control/protection: OCP   Other Topics Concern    Are you pregnant or think you may be? No    Breast-feeding No   Social History Narrative    Teacher, Demario arce. S, 1 child        Current Medications  Current Outpatient Medications on File Prior to Visit   Medication Sig Dispense Refill    albuterol 90 mcg/actuation inhaler Inhale 2 puffs into the lungs every 6 (six) hours as needed for Wheezing. Rescue 18 g 0    fluticasone (FLONASE) 50 mcg/actuation nasal spray 1 spray (50 mcg total) by Each Nare route once daily. 1 Bottle 0    meloxicam (MOBIC) 7.5 MG tablet Take 1 tablet (7.5 mg total) by mouth once daily. Take once a day for two weeks then as needed. Take with food. 30 tablet 0    NUVARING 0.12-0.015 mg/24 hr vaginal ring INSERT 1 RING VAGINALLY AS DIRECTED. REMOVE AFTER 3 WEEKS & WAIT 7 DAYS BEFORE INSERTING A NEW RING 1 each 3    omeprazole (PRILOSEC) 20 MG capsule Take 1 capsule (20 mg total) by mouth once daily. 30 capsule 2     No current facility-administered medications on file prior to visit.        Allergies   Review of patient's allergies indicates:   Allergen Reactions    Pcn [penicillins] Other (See Comments)     Pt had reaction to PCN as an infant.       Review of Systems   Constitutional: Positive for fatigue. Negative for chills and fever.   Respiratory: Positive for chest tightness (when anxious). Negative for cough, shortness of breath, wheezing and stridor.    Cardiovascular: Positive for palpitations (when anxious). Negative for chest pain and leg swelling.   Gastrointestinal: Positive for abdominal pain (recurring). Negative for anal bleeding, blood in stool, constipation, diarrhea, nausea and vomiting.   Genitourinary: Negative for dysuria.   Psychiatric/Behavioral: Positive for decreased concentration, dysphoric mood and sleep disturbance. Negative for agitation, behavioral problems, hallucinations, self-injury and suicidal ideas. The patient is nervous/anxious. The patient is not hyperactive.          /64 (BP Location: Left arm, Patient Position: Sitting, BP Method: Medium (Manual))   Pulse 82   Temp 98.5 °F (36.9 °C) (Oral)   Resp 20   Ht 5'  "2" (1.575 m)   Wt 82.3 kg (181 lb 7 oz)   LMP 08/19/2018 (Approximate)   SpO2 99%   BMI 33.19 kg/m²     Physical Exam   Constitutional: She appears well-developed and well-nourished.   HENT:   Head: Normocephalic and atraumatic.   Right Ear: External ear normal.   Left Ear: External ear normal.   Nose: Nose normal.   Mouth/Throat: Oropharynx is clear and moist. No oropharyngeal exudate.   Eyes: Conjunctivae and EOM are normal. Pupils are equal, round, and reactive to light. Right eye exhibits no discharge. Left eye exhibits no discharge.   Neck: Normal range of motion. Neck supple. No tracheal deviation present.   Cardiovascular: Normal rate, regular rhythm, normal heart sounds and intact distal pulses.   No murmur heard.  Pulmonary/Chest: Effort normal and breath sounds normal. She has no wheezes. She has no rales.   Abdominal: Soft. Bowel sounds are normal. She exhibits no mass. There is no tenderness.   Lymphadenopathy:     She has no cervical adenopathy.   Psychiatric: Her mood appears anxious. Her affect is not angry, not blunt, not labile and not inappropriate. Her speech is not rapid and/or pressured. She is not agitated, not aggressive, not hyperactive, not slowed, not withdrawn, not actively hallucinating and not combative. Thought content is not paranoid. Cognition and memory are not impaired. She exhibits a depressed mood. She expresses no homicidal and no suicidal ideation.   Once we start discussing mental health concerns, the patient becomes very emotional, and is crying She is attentive.   Vitals reviewed.      Assessment/Plan:  Terese was seen today for establish care.    Diagnoses and all orders for this visit:    Depression, unspecified depression type  -     FLUoxetine (PROZAC) 10 MG capsule; Take 1 capsule (10 mg total) by mouth once daily.  -     Ambulatory referral to Psychiatry  -     Ambulatory referral to Social Work  I discussed risks and benefits of treatment with SSRIs, and it these " medicines can take several weeks to see the full effect.  Also patient agrees to see Psychiatry and a therapist for further management.  She is to follow up me in 2 weeks if she has not seen the therapist and psychiatrist by then.  We discussed somatic complaints that may be related to depression.    Thalassemia minor  -     CBC auto differential; Future  -     Iron and TIBC; Future  Check anemia labs.        This office note has been dictated.  This dictation has been generated using M-Modal Fluency Direct dictation; some phonetic errors may occur.

## 2018-10-03 ENCOUNTER — OFFICE VISIT (OUTPATIENT)
Dept: FAMILY MEDICINE | Facility: CLINIC | Age: 43
End: 2018-10-03
Payer: COMMERCIAL

## 2018-10-03 VITALS
DIASTOLIC BLOOD PRESSURE: 70 MMHG | HEART RATE: 80 BPM | BODY MASS INDEX: 33.15 KG/M2 | HEIGHT: 62 IN | SYSTOLIC BLOOD PRESSURE: 110 MMHG | OXYGEN SATURATION: 99 % | TEMPERATURE: 98 F | WEIGHT: 180.13 LBS | RESPIRATION RATE: 20 BRPM

## 2018-10-03 DIAGNOSIS — D50.9 IRON DEFICIENCY ANEMIA, UNSPECIFIED IRON DEFICIENCY ANEMIA TYPE: ICD-10-CM

## 2018-10-03 DIAGNOSIS — F32.A DEPRESSION, UNSPECIFIED DEPRESSION TYPE: Primary | ICD-10-CM

## 2018-10-03 DIAGNOSIS — D56.3 THALASSEMIA MINOR: ICD-10-CM

## 2018-10-03 PROCEDURE — 3008F BODY MASS INDEX DOCD: CPT | Mod: CPTII,S$GLB,, | Performed by: FAMILY MEDICINE

## 2018-10-03 PROCEDURE — 99999 PR PBB SHADOW E&M-EST. PATIENT-LVL III: CPT | Mod: PBBFAC,,, | Performed by: FAMILY MEDICINE

## 2018-10-03 PROCEDURE — 99214 OFFICE O/P EST MOD 30 MIN: CPT | Mod: S$GLB,,, | Performed by: FAMILY MEDICINE

## 2018-10-03 RX ORDER — FERROUS GLUCONATE 324(38)MG
324 TABLET ORAL
Qty: 30 TABLET | Refills: 0 | Status: SHIPPED | OUTPATIENT
Start: 2018-10-03 | End: 2019-03-12

## 2018-10-05 NOTE — PROGRESS NOTES
Routine Office Visit    Patient Name: Terese Macias    : 1975  MRN: [id number]    Subjective:  Terese is a 43 y.o. female who presents today for:   Chief Complaint   Patient presents with    Depression    Follow-up       43-year-old female comes in for follow-up on depression.  She was prescribed Prozac at the last visit for this.  However she reports that she has not started taking it because after discussing with her mother, her mother felt that medications are not good for psychiatric problems.  Also discussing with the patient, the patient states that her family is very hesitant when it comes to mental health concerns.  The patient has an appointment scheduled with Psychiatry and states that she will discuss this further with the psychiatrist.  The patient is also here to discuss her lab results.    Past Medical History  Past Medical History:   Diagnosis Date    Abnormal Pap smear     Abnormal Pap smear of vagina     Anemia     Fever blister     GERD (gastroesophageal reflux disease)     Heavy periods 2015    Hemorrhoids without complication     Joint pain     Screening for HPV (human papillomavirus)     Thalassemia     Thalassemia        Past Surgical History  Past Surgical History:   Procedure Laterality Date    COLONOSCOPY      ESOPHAGOGASTRODUODENOSCOPY (EGD) N/A 3/23/2015    [facility] (4[facility] F[facility])    HERNIA REPAIR      [place] COLONOSCOPY,DIAGNOSTIC [94728 (CPT®)] N/A 2014    Performed by Cj Lassiter MD at [facility] (4TH FLR)        Family History  Family History   Problem Relation Age of Onset    Hypertension Mother     Diabetes Father     Heart disease Father     Eczema Father     Eczema Other     Colon cancer Paternal Uncle 60        colon cancer    Cancer Cousin         breast    Breast cancer Cousin     No Known Problems Other     Cancer Cousin         stomach cancer    Breast cancer Maternal Grandmother     Cancer Maternal  Grandmother         breast    Eczema Sister     Acne Sister     Eczema Brother     Eczema Daughter     Cancer Maternal Aunt         breast    Breast cancer Maternal Aunt     No Known Problems Maternal Uncle     No Known Problems Paternal Aunt     No Known Problems Maternal Grandfather     No Known Problems Paternal Grandmother     No Known Problems Paternal Grandfather     Melanoma Neg Hx     Psoriasis Neg Hx     Lupus Neg Hx     Ovarian cancer Neg Hx     Amblyopia Neg Hx     Blindness Neg Hx     Cataracts Neg Hx     Glaucoma Neg Hx     Macular degeneration Neg Hx     Retinal detachment Neg Hx     Strabismus Neg Hx     Stroke Neg Hx     Thyroid disease Neg Hx        Social History  Social History     Socioeconomic History    Marital status: Single     Spouse name: Not on file    Number of children: Not on file    Years of education: Not on file    Highest education level: Not on file   Social Needs    Financial resource strain: Not on file    Food insecurity - worry: Not on file    Food insecurity - inability: Not on file    Transportation needs - medical: Not on file    Transportation needs - non-medical: Not on file   Occupational History     Employer: [employer]   Tobacco Use    Smoking status: Never Smoker    Smokeless tobacco: Never Used   Substance and Sexual Activity    Alcohol use: Yes     Comment: Occasionally    Drug use: No    Sexual activity: No     Partners: Male     Birth control/protection: OCP   Other Topics Concern    Are you pregnant or think you may be? No    Breast-feeding No   Social History Narrative    Teacher, Demario arce. S, 1 child       Current Medications  Current Outpatient Medications on File Prior to Visit   Medication Sig Dispense Refill    albuterol 90 mcg/actuation inhaler Inhale 2 puffs into the lungs every 6 (six) hours as needed for Wheezing. Rescue 18 g 0    FLUoxetine (PROZAC) 10 MG capsule Take 1 capsule (10 mg  "total) by mouth once daily. 30 capsule 0    fluticasone (FLONASE) 50 mcg/actuation nasal spray 1 spray (50 mcg total) by Each Nare route once daily. 1 Bottle 0    meloxicam (MOBIC) 7.5 MG tablet Take 1 tablet (7.5 mg total) by mouth once daily. Take once a day for two weeks then as needed. Take with food. 30 tablet 0    NUVARING 0.12-0.015 mg/24 hr vaginal ring INSERT 1 RING VAGINALLY AS DIRECTED. REMOVE AFTER 3 WEEKS & WAIT 7 DAYS BEFORE INSERTING A NEW RING 1 each 3    omeprazole (PRILOSEC) 20 MG capsule Take 1 capsule (20 mg total) by mouth once daily. 30 capsule 2     No current facility-administered medications on file prior to visit.        Allergies   Review of patient's allergies indicates:   Allergen Reactions    Pcn [penicillins] Other (See Comments)     Pt had reaction to PCN as an infant.     Review of Systems   Constitutional: Positive for fatigue. Negative for chills and fever.   Respiratory: Positive for chest tightness (when anxious). Negative for cough, shortness of breath, wheezing and stridor.    Cardiovascular: Positive for palpitations (when anxious). Negative for chest pain and leg swelling.   Gastrointestinal: Positive for abdominal pain (recurring). Negative for anal bleeding, blood in stool, constipation, diarrhea, nausea and vomiting.   Genitourinary: Negative for dysuria.   Psychiatric/Behavioral: Positive for decreased concentration, dysphoric mood and sleep disturbance. Negative for agitation, behavioral problems, hallucinations, self-injury and suicidal ideas. The patient is nervous/anxious. The patient is not hyperactive.        /70 (BP Location: Right arm, Patient Position: Sitting, BP Method: Medium (Manual))   Pulse 80   Temp 98.3 °F (36.8 °C) (Oral)   Resp 20   Ht 5' 2" (1.575 m)   Wt 81.7 kg (180 lb 1.9 oz)   SpO2 99%   BMI 32.94 kg/m²     Physical Exam   Constitutional: She appears well-developed and well-nourished.   HENT:   Head: Normocephalic and atraumatic. "   Right Ear: External ear normal.   Left Ear: External ear normal.   Nose: Nose normal.   Mouth/Throat: Oropharynx is clear and moist. No oropharyngeal exudate.   Eyes: Conjunctivae and EOM are normal. Pupils are equal, round, and reactive to light. Right eye exhibits no discharge. Left eye exhibits no discharge.   Neck: Normal range of motion. Neck supple. No tracheal deviation present.   Cardiovascular: Normal rate, regular rhythm, normal heart sounds and intact distal pulses.   No murmur heard.  Pulmonary/Chest: Effort normal and breath sounds normal. She has no wheezes. She has no rales.   Abdominal: Soft. Bowel sounds are normal. She exhibits no mass. There is no tenderness.   Lymphadenopathy:     She has no cervical adenopathy.   Psychiatric: She has a normal mood and affect.   Vitals reviewed.    Lab Visit on 09/25/2018   Component Date Value Ref Range Status    WBC 09/25/2018 6.45  3.90 - 12.70 K/uL Final    RBC 09/25/2018 3.19* 4.00 - 5.40 M/uL Final    Hemoglobin 09/25/2018 7.6* 12.0 - 16.0 g/dL Final    Hematocrit 09/25/2018 25.8* 37.0 - 48.5 % Final    MCV 09/25/2018 81* 82 - 98 fL Final    MCH 09/25/2018 23.8* 27.0 - 31.0 pg Final    MCHC 09/25/2018 29.5* 32.0 - 36.0 g/dL Final    RDW 09/25/2018 15.9* 11.5 - 14.5 % Final    Platelets 09/25/2018 172  150 - 350 K/uL Final    MPV 09/25/2018 11.7  9.2 - 12.9 fL Final    Gran # (ANC) 09/25/2018 3.8  1.8 - 7.7 K/uL Final    Lymph # 09/25/2018 1.8  1.0 - 4.8 K/uL Final    Mono # 09/25/2018 0.7  0.3 - 1.0 K/uL Final    Eos # 09/25/2018 0.1  0.0 - 0.5 K/uL Final    Baso # 09/25/2018 0.01  0.00 - 0.20 K/uL Final    nRBC 09/25/2018 0  0 /100 WBC Final    Gran% 09/25/2018 59.3  38.0 - 73.0 % Final    Lymph% 09/25/2018 27.4  18.0 - 48.0 % Final    Mono% 09/25/2018 11.0  4.0 - 15.0 % Final    Eosinophil% 09/25/2018 1.9  0.0 - 8.0 % Final    Basophil% 09/25/2018 0.2  0.0 - 1.9 % Final    Differential Method 09/25/2018 Automated   Final    Iron  09/25/2018 19* 30 - 160 ug/dL Final    Transferrin 09/25/2018 329  200 - 375 mg/dL Final    TIBC 09/25/2018 487* 250 - 450 ug/dL Final    Saturated Iron 09/25/2018 4* 20 - 50 % Final         Assessment/Plan:  Terese was seen today for depression and follow-up.    Diagnoses and all orders for this visit:    Depression, unspecified depression type  The patient is currently not a danger to herself or others, and she will discuss treatment options with the psychiatrist and therapist.    Iron deficiency anemia, unspecified iron deficiency anemia type  -     ferrous gluconate (FERGON) 324 MG tablet; Take 1 tablet (324 mg total) by mouth daily with breakfast.  -     docusate sodium (COLACE) 50 MG capsule; Take 1 capsule (50 mg total) by mouth nightly as needed for Constipation.  The patient will do a temporary course of iron.  She will follow up with hematologist which is already scheduled.  We did discuss that in thalassemia, we need to be careful to not iron overload her.    Thalassemia minor  See above.         This office note has been dictated.  This dictation has been generated using M-Modal Fluency Direct dictation; some phonetic errors may occur.

## 2018-10-09 ENCOUNTER — OFFICE VISIT (OUTPATIENT)
Dept: PSYCHIATRY | Facility: CLINIC | Age: 43
End: 2018-10-09
Payer: COMMERCIAL

## 2018-10-09 VITALS
WEIGHT: 181 LBS | DIASTOLIC BLOOD PRESSURE: 71 MMHG | BODY MASS INDEX: 33.31 KG/M2 | HEIGHT: 62 IN | HEART RATE: 60 BPM | SYSTOLIC BLOOD PRESSURE: 118 MMHG

## 2018-10-09 DIAGNOSIS — F32.1 MAJOR DEPRESSIVE DISORDER, SINGLE EPISODE, MODERATE WITH ANXIOUS DISTRESS: Primary | ICD-10-CM

## 2018-10-09 DIAGNOSIS — F40.248 SITUATIONAL PHOBIA: ICD-10-CM

## 2018-10-09 DIAGNOSIS — Z79.899 DRUG THERAPY: ICD-10-CM

## 2018-10-09 DIAGNOSIS — F51.05 INSOMNIA RELATED TO ANOTHER MENTAL DISORDER: ICD-10-CM

## 2018-10-09 PROCEDURE — 3008F BODY MASS INDEX DOCD: CPT | Mod: CPTII,S$GLB,, | Performed by: NURSE PRACTITIONER

## 2018-10-09 PROCEDURE — 99999 PR PBB SHADOW E&M-EST. PATIENT-LVL III: CPT | Mod: PBBFAC,,, | Performed by: NURSE PRACTITIONER

## 2018-10-09 PROCEDURE — 99204 OFFICE O/P NEW MOD 45 MIN: CPT | Mod: S$GLB,,, | Performed by: NURSE PRACTITIONER

## 2018-10-09 RX ORDER — HYDROXYZINE PAMOATE 25 MG/1
25 CAPSULE ORAL NIGHTLY PRN
Qty: 30 CAPSULE | Refills: 0 | Status: SHIPPED | OUTPATIENT
Start: 2018-10-09 | End: 2019-03-12

## 2018-10-09 NOTE — PROGRESS NOTES
Outpatient Psychiatry Initial Visit (MD/NP)    10/9/2018    Terese Macias, a 43 y.o. female, presenting for initial evaluation visit. Met with patient.    Reason for Encounter: Referral from Dr. Justice. Patient complains of   Chief Complaint   Patient presents with    Anxiety    Depression   .    History of Present Illness: Terese Macias states that she has a lot of pain and she went to an Orthopedist for pain to her shoulder and she sent her to Dr. Justice but can not clearly articulate what she was going to see Dr. Justice for. She does state that she was referred back to her PCP but did not feel that she was getting what she needed from her PCP and was referred to . Dr. Justice suggested that she might have some anxiety and depression and he referred her here for an evaluation. She did see a counselor in the past because she was feeling down at the time. This was a spiritual counselor coming from a Hoah[employer]ism point of view. This was a few years ago.     Her overall symptom complex includes: feels sad, excessive worry, cries easily, low energy, easily irritated, anhedonia, history of passive suicidal ideation, poor sleep, feels anxious about driving in the rain, especially at night and especially over bridges, circumstantial speech, multiple somatic complaints, racing thoughts.     Review Of Systems:     GENERAL:  overweight  SKIN:  No rashes or lacerations  HEAD:  No headaches  EYES:  No exophthalmos, jaundice or blindness  EARS:  No dizziness, tinnitus or hearing loss  NOSE:  No changes in smell  MOUTH & THROAT:  No dyskinetic movements or obvious goiter  CHEST:  No shortness of breath  CARDIOVASCULAR:  No chest pain  ABDOMEN:  No vomiting, pain, constipation or diarrhea; feels a little nauseous today  URINARY:  No dysuria  NEUROLOGIC:  No abnormal movements    Current Evaluation:     Nutritional Screening: Considering the patient's height and weight, medications, medical history and preferences, should a  "referral be made to the dietitian? no    Constitutional  Vitals:  Most recent vital signs, dated less than 90 days prior to this appointment, were reviewed.    Vitals:    10/09/18 1408   BP: 118/71   Pulse: 60   Weight: 82.1 kg (180 lb 16 oz)   Height: 5' 2" (1.575 m)        General:  unremarkable, age appropriate     Musculoskeletal  Muscle Strength/Tone:  no tremor, no tic   Gait & Station:  non-ataxic     Psychiatric  Speech:  no latency; no press   Mood & Affect:  "mellow."  sad   Thought Process:  normal and logical; appropriately abstract   Associations:  intact   Thought Content:  normal, no suicidality, no homicidality, delusions, or paranoia   Insight:  has awareness of illness   Judgement: behavior is adequate to circumstances   Orientation:  person, place, situation   Memory: intact for content of interview; immediate memory is 3/3 objects, after 5 minutes is 3/3 objects   Language: grossly intact   Attention Span & Concentration:  able to focus; able to spell WORLD forward and backward   Fund of Knowledge:  intact and appropriate to age and level of education; adequate (President, Obama, white house, current events: Felice).       Relevant Elements of Neurological Exam: normal gait    Functioning in Relationships:  Spouse/partner: NA  Peers: Good  Employers: poor    Laboratory Data  Lab Visit on 09/25/2018   Component Date Value Ref Range Status    WBC 09/25/2018 6.45  3.90 - 12.70 K/uL Final    RBC 09/25/2018 3.19* 4.00 - 5.40 M/uL Final    Hemoglobin 09/25/2018 7.6* 12.0 - 16.0 g/dL Final    Hematocrit 09/25/2018 25.8* 37.0 - 48.5 % Final    MCV 09/25/2018 81* 82 - 98 fL Final    MCH 09/25/2018 23.8* 27.0 - 31.0 pg Final    MCHC 09/25/2018 29.5* 32.0 - 36.0 g/dL Final    RDW 09/25/2018 15.9* 11.5 - 14.5 % Final    Platelets 09/25/2018 172  150 - 350 K/uL Final    MPV 09/25/2018 11.7  9.2 - 12.9 fL Final    Gran # (ANC) 09/25/2018 3.8  1.8 - 7.7 K/uL Final    Lymph # 09/25/2018 1.8  1.0 - " 4.8 K/uL Final    Mono # 09/25/2018 0.7  0.3 - 1.0 K/uL Final    Eos # 09/25/2018 0.1  0.0 - 0.5 K/uL Final    Baso # 09/25/2018 0.01  0.00 - 0.20 K/uL Final    nRBC 09/25/2018 0  0 /100 WBC Final    Gran% 09/25/2018 59.3  38.0 - 73.0 % Final    Lymph% 09/25/2018 27.4  18.0 - 48.0 % Final    Mono% 09/25/2018 11.0  4.0 - 15.0 % Final    Eosinophil% 09/25/2018 1.9  0.0 - 8.0 % Final    Basophil% 09/25/2018 0.2  0.0 - 1.9 % Final    Differential Method 09/25/2018 Automated   Final    Iron 09/25/2018 19* 30 - 160 ug/dL Final    Transferrin 09/25/2018 329  200 - 375 mg/dL Final    TIBC 09/25/2018 487* 250 - 450 ug/dL Final    Saturated Iron 09/25/2018 4* 20 - 50 % Final         Medications  Outpatient Encounter Medications as of 10/9/2018   Medication Sig Dispense Refill    albuterol 90 mcg/actuation inhaler Inhale 2 puffs into the lungs every 6 (six) hours as needed for Wheezing. Rescue 18 g 0    docusate sodium (COLACE) 50 MG capsule Take 1 capsule (50 mg total) by mouth nightly as needed for Constipation. 30 capsule 0    ferrous gluconate (FERGON) 324 MG tablet Take 1 tablet (324 mg total) by mouth daily with breakfast. 30 tablet 0    FLUoxetine (PROZAC) 10 MG capsule Take 1 capsule (10 mg total) by mouth once daily. 30 capsule 0    fluticasone (FLONASE) 50 mcg/actuation nasal spray 1 spray (50 mcg total) by Each Nare route once daily. 1 Bottle 0    meloxicam (MOBIC) 7.5 MG tablet Take 1 tablet (7.5 mg total) by mouth once daily. Take once a day for two weeks then as needed. Take with food. 30 tablet 0    NUVARING 0.12-0.015 mg/24 hr vaginal ring INSERT 1 RING VAGINALLY AS DIRECTED. REMOVE AFTER 3 WEEKS & WAIT 7 DAYS BEFORE INSERTING A NEW RING 1 each 3    omeprazole (PRILOSEC) 20 MG capsule Take 1 capsule (20 mg total) by mouth once daily. 30 capsule 2     No facility-administered encounter medications on file as of 10/9/2018.            Assessment - Diagnosis - Goals:     Impression: major  depressive disorder, moderate with anxious distress, situational phobia and insomnia      ICD-10-CM ICD-9-CM   1. Major depressive disorder, single episode, moderate with anxious distress F32.1 296.22   2. Situational phobia F40.248 300.29   3. Insomnia related to another mental disorder F51.05 300.9     327.02   4. Drug therapy Z79.899 V58.69       Strengths and Liabilities: Strength: Patient accepts guidance/feedback, Strength: Patient is motivated for change., Liability: Patient lacks coping skills.    Treatment Goals:  Specify outcomes written in observable, behavioral terms:   Safety: Will call 911 or Crisis Line or go to ER for suicidal ideation, adverse effects of medication or any other emergency  Depression:  Will no longer be inappropriately depressed.  Will start enjoying things that she once enjoyed.  Anxiety: reducing physical symptoms of anxiety   Insomnia:  Will get a minimum of 7 restful hours of sleep.      Treatment Plan/Recommendations:   · Medication Management: The risks and benefits of medication were discussed with the patient.  · The treatment plan and follow up plan were reviewed with the patient.   1. Safety: Call 911 or Crisis Line or go to ER for suicidal ideation, adverse effects of medication or any other emergency  2. Start Prozac 10 mg po qd (previously ordered by  but not started by patient yet).  3. Start hydroxyzine pamoate 25mg po qhs prn sleep.   4. RTC in 2 weeks or sooner prn.     Patient agrees with POC.    INSTRUCTIONS  Instructed to call 911 or Crisis Line or go to ER for suicidal ideation, adverse effects of medication or any other emergency. Verbalizes understanding and plan to comply.    Instructed on uses, effects, side effects, adverse reactions and benefits vs risks of Prozac with emphasis on risks for suicidality, robert, serotonin syndrome and delay in feeling effects of medication and instructed on when to seek 911/ER. Verbalizes understanding and plans to  comply.    Instructed on uses, effects, side effects, adverse reactions and benefits vs risks of hydroxyzine pamoate with emphasis on sleepiness and avoidance of driving and heavy machinery until effects are known and instructed on when to seek 911/ER. Verbalizes understanding and plans to comply.    Return to Clinic: 2 weeks, as needed    Counseling time: 38 minutes  Total time: 63 minutes  Consulting clinician was informed of the encounter and consult note.

## 2018-10-09 NOTE — PATIENT INSTRUCTIONS
[facility] - DEPARTMENT OF PSYCHIATRY   NEW PATIENT ORIENTATION INFORMATION  OUTPATIENT SERVICES COUNSELING CONTRACT    We appreciate the opportunity to participate in your medical care and hope the following protocols will make it easier for you to receive quality treatment in our department.    1. PUNCTUALITY: Your appointment is scheduled for a fixed amount of time reserved especially for you.  To get the benefit of your appointment, please arrive early enough to allow time for parking and registration.  If you are late for your appointment, your clinician is not able to offer additional time.  Please make every effort to be on time.  You may be asked to reschedule.    2. PAYMENT FOR SERVICES:   Payments are expected at the time of service.  Please contact [phone] if you need to resolve issues involving your account at [facility] or to set up a payment plan.    3. CANCELLATION / MISSED APPOINTMENTS:   In order to receive quality care, all appointments must be kept.  Appointment may be cancelled, ONLY by talking with an  at phone number [phone], between 8:00 a.m. and 5:00 p.m., Monday through Friday, at least 24 hours before your appointment time.  Your clinician reserves this time specifically for you, and if you will be unable to use it, it is necessary that you cancel in a timely manner.  If you do not give at least 24-hour notice of cancellation a fee may be assessed.  Please note that insurance does not cover no-show charges, so you will be billed directly.  If you are consistently late, cancel, or do not show for your appointments, our department reserves the right to terminate treatment.    4. CALLING THE DEPARTMENT:   MESSAGES, SCHEDULE OR CANCEL APPOINTMENTS- In general you can reach the department by calling [phone], between 8:00 a.m. and 5:00 p.m., Monday through Friday, to schedule or cancel appointments or leave a message for your clinician.  It is  advisable to schedule your visits far in advance to obtain the most convenient times for your appointments.   AFTER HOURS, WEEKEND OR HOLIDAYS- For urgent questions after hours, weekends and holidays, calling the department number [phone] will connect you to the [facility] On Call nursing staff or the Psychiatry Inpatient Unit.  The [facility] On Call nursing staff will speak with you and direct your call/care as necessary.   EMERGENCY-  In case of a crisis when there is a concern of harm to self or others, call 911 or the office [phone] between 8:00 a.m. and 5:00 p.m., Monday through Friday.  After hours, weekends or holidays, please call 911 or go to the Emergency Department where you can be thoroughly evaluated by a physician.    5. TEAM APPROACH:  Most patients receive therapy through our team system.  In the team system, your primary therapist will be a , psychologist, psychiatry resident or psychiatrist.  If your therapist is a , psychologist or psychiatry resident, please contact your primary therapist first in matters other than medications or acute medical problems.    6. PRESCRIPTION REFILLS:  Prescription refills must be done at your physician office visit.  You will be given a sufficient number of refills to last one extra month beyond your next appointment.  No additional refills will be approved beyond the original treatment plan.  After hours, sufficient medication may be approved to last until the next scheduled appointment. After hours requests for refills on controlled substances will be declined by the psychiatrist on call, as he or she may not be familiar with your case.  Please work closely with your doctor so that you have sufficient medication until your next appointment.  Again, please note that no additional prescriptions will be approved per patient request over the phone.   No additional refills will be approved beyond the original treatment plan.   In  "certain exceptional situations, a phone consult appointment may be arranged, with appropriate charge, for the review and approval of prescription refills to last until the next scheduled appointment.    7. FOLLOW UP APPOINTMENTS:  Follow-up appointments can be made in person at the [facility] Psych[facility]ry office, or by calling [phone], from 8am to 5pm, between Monday and Friday.  It is advisable to schedule your office visits far enough in advance to obtain the most convenient times for your appointments.    Revised Feb. 23, 2010 - F/OA1/Newpatient                You have been provided with a certain amount of medication with a specified number of refills.  Please follow up within an adequate time before you run out of medications.    REFILLS FOR CONTROLLED SUBSTANCES WILL NOT BE GIVEN WITHOUT AN APPOINTMENT.  I will not honor or fill automated refill requests from pharmacies.  You must come in for an appointment to get refills.        Please book your next appointment for myself or therapist by phone by calling our office at [phone].          PLEASE BE AT LEAST 15 MINUTES EARLY FOR YOUR NEXT APPOINTMENT.  PLEASE, DO NOT BE LATE OR YOU WILL BE TURNED AWAY AND ASKED TO RESCHEDULE.  YOU MUST COME EARLY TO ALLOW TIME FOR CHECK-IN AS WELL AS GET YOUR VITAL SIGNS AND GO OVER YOUR MEDICATIONS.  [employer]iness is not fair to the patients who present after you and are on time for their appointments.  It causes a delay in the appointments for patients and staff.  IF YOU ARE LATE, THERE IS A POSSIBILITY THAT YOU WILL BE CHARGED FOR THE APPOINTMENT TIME PERSONALLY AND IT WILL NOT GO TO YOUR INSURANCE.  YOU MAY ALSO BE DISCHARGED FROM CLINIC with multiple "No Show" appointments.       -----------------------------------------------------------------------------------------------------------------  IF YOU FEEL SUICIDAL OR HAVING THOUGHTS OR PLANS TO HURT YOURSELF OR OTHERS, CALL 911 OR REPORT TO THE NEAREST EMERGENCY ROOM.  " YOU CAN ALSO ACCESS THE FOLLOWING HOTLINE:    National Suicide Hotline Number 3-774-214-TALK (4268)

## 2018-10-17 DIAGNOSIS — F32.A DEPRESSION, UNSPECIFIED DEPRESSION TYPE: ICD-10-CM

## 2018-10-17 RX ORDER — FLUOXETINE 10 MG/1
CAPSULE ORAL
Qty: 30 CAPSULE | Refills: 0 | Status: SHIPPED | OUTPATIENT
Start: 2018-10-17 | End: 2019-03-12

## 2018-10-22 ENCOUNTER — INITIAL CONSULT (OUTPATIENT)
Dept: HEMATOLOGY/ONCOLOGY | Facility: CLINIC | Age: 43
End: 2018-10-22
Payer: COMMERCIAL

## 2018-10-22 ENCOUNTER — LAB VISIT (OUTPATIENT)
Dept: LAB | Facility: HOSPITAL | Age: 43
End: 2018-10-22
Attending: INTERNAL MEDICINE
Payer: COMMERCIAL

## 2018-10-22 VITALS
RESPIRATION RATE: 16 BRPM | DIASTOLIC BLOOD PRESSURE: 74 MMHG | OXYGEN SATURATION: 99 % | TEMPERATURE: 98 F | BODY MASS INDEX: 33.15 KG/M2 | WEIGHT: 180.13 LBS | HEIGHT: 62 IN | HEART RATE: 89 BPM | SYSTOLIC BLOOD PRESSURE: 116 MMHG

## 2018-10-22 DIAGNOSIS — D50.0 IRON DEFICIENCY ANEMIA DUE TO CHRONIC BLOOD LOSS: ICD-10-CM

## 2018-10-22 DIAGNOSIS — D50.0 IRON DEFICIENCY ANEMIA DUE TO CHRONIC BLOOD LOSS: Primary | ICD-10-CM

## 2018-10-22 LAB
ERYTHROCYTE [DISTWIDTH] IN BLOOD BY AUTOMATED COUNT: 17 %
HCT VFR BLD AUTO: 26 %
HGB BLD-MCNC: 7.7 G/DL
IMM GRANULOCYTES # BLD AUTO: 0.01 K/UL
MCH RBC QN AUTO: 23.5 PG
MCHC RBC AUTO-ENTMCNC: 29.6 G/DL
MCV RBC AUTO: 80 FL
NEUTROPHILS # BLD AUTO: 4.3 K/UL
PLATELET # BLD AUTO: 142 K/UL
PMV BLD AUTO: 11.7 FL
RBC # BLD AUTO: 3.27 M/UL
WBC # BLD AUTO: 7.29 K/UL

## 2018-10-22 PROCEDURE — 99215 OFFICE O/P EST HI 40 MIN: CPT | Mod: S$GLB,,, | Performed by: INTERNAL MEDICINE

## 2018-10-22 PROCEDURE — 36415 COLL VENOUS BLD VENIPUNCTURE: CPT

## 2018-10-22 PROCEDURE — 3008F BODY MASS INDEX DOCD: CPT | Mod: CPTII,S$GLB,, | Performed by: INTERNAL MEDICINE

## 2018-10-22 PROCEDURE — 99999 PR PBB SHADOW E&M-EST. PATIENT-LVL IV: CPT | Mod: PBBFAC,,, | Performed by: INTERNAL MEDICINE

## 2018-10-22 PROCEDURE — 85027 COMPLETE CBC AUTOMATED: CPT

## 2018-10-22 RX ORDER — HEPARIN 100 UNIT/ML
500 SYRINGE INTRAVENOUS
Status: CANCELLED | OUTPATIENT
Start: 2018-10-24

## 2018-10-22 RX ORDER — SODIUM CHLORIDE 0.9 % (FLUSH) 0.9 %
10 SYRINGE (ML) INJECTION
Status: CANCELLED | OUTPATIENT
Start: 2018-11-02

## 2018-10-22 RX ORDER — HEPARIN 100 UNIT/ML
500 SYRINGE INTRAVENOUS
Status: CANCELLED | OUTPATIENT
Start: 2018-11-02

## 2018-10-22 RX ORDER — SODIUM CHLORIDE 0.9 % (FLUSH) 0.9 %
10 SYRINGE (ML) INJECTION
Status: CANCELLED | OUTPATIENT
Start: 2018-10-24

## 2018-10-22 NOTE — LETTER
October 22, 2018      Jaun Justice Jr., MD  [address]  [place] [address]           [facility] Hematology Oncology  [facility]  [place] [address]  Phone: [phone]          Patient: Terese Macias   MR Number: [id number]   YOB: 1975   Date of Visit: 10/22/2018       Dear Dr. Jaun Justice Jr.:    Thank you for referring Terese Macias to me for evaluation. Attached you will find relevant portions of my assessment and plan of care.    If you have questions, please do not hesitate to call me. I look forward to following Terese Macias along with you.    Sincerely,    Donell Medina DO, FACP    Enclosure  CC:  No Recipients    If you would like to receive this communication electronically, please contact [email or web address] or [phone] to request more information on [employer] access.    For providers and/or their staff who would like to refer a patient to [facility], please contact us through our one-stop-shop provider referral line, [facility] [place], at [phone].    If you feel you have received this communication in error or would no longer like to receive these types of communications, please e-mail [email or web address]

## 2018-10-22 NOTE — PROGRESS NOTES
PATIENT: Terese Macias  MRN: [id number]  DATE: 10/22/2018      Diagnosis:   1. Iron deficiency anemia due to chronic blood loss        Chief Complaint: Anemia             Subjective:    Interval History: Ms. Macias is seen in follow-up for iron deficiency anemia.  I last saw her in 08/2016 for iron deficiency anemia.  She was previously seen for iron deficiency anemia which was thought to be related to menorrhagia.  She had received 2 weeks of IV iron replacement with improvement her hemoglobin and iron levels.  She subsequently followed up with her primary care physician and in 11/2017 it was noted that she had a drop in her hemoglobin and further drops in 2018 when she started to become symptomatic.  She states that she is no longer having periods every month and is now on different birth control which is helping.  When she does have a period she states that she sometimes has a few days where she has heavy bleeding but she has not had 1 for several months.  She denies any blood in the stool or dark stools.  Denies abdominal pain. She does complain fatigue and some dyspnea on exertion.  Denies chest pain.  No other new complaints.    Past Medical History:   Past Medical History:   Diagnosis Date    Abnormal Pap smear     Abnormal Pap smear of vagina     Anemia     Depression     Fever blister     GERD (gastroesophageal reflux disease)     Heavy periods 6/8/2015    Hemorrhoids without complication     Joint pain     Psychiatric problem     Screening for HPV (human papillomavirus)     Sleep difficulties     Thalassemia     Thalassemia     Therapy        Past Surgical HIstory:   Past Surgical History:   Procedure Laterality Date    COLONOSCOPY      ESOPHAGOGASTRODUODENOSCOPY (EGD) N/A 3/23/2015    Performed by Ba Stewart [facility] at [facility] (4TH FLR)    HERNIA REPAIR      [place] COLONOSCOPY,DIAGNOSTIC [64621 (CPT®)] N/A 7/2/2014    Performed by Cj Lassiter [facility] at [facility] (4[facility] F[facility])       Family History:    Family History   Problem Relation Age of Onset    Hypertension Mother     Diabetes Father     Heart disease Father     Eczema Father     Eczema Other     Colon cancer Paternal Uncle 60        colon cancer    Cancer Cousin         breast    Breast cancer Cousin     No Known Problems Other     Cancer Cousin         stomach cancer    Breast cancer Maternal Grandmother     Cancer Maternal Grandmother         breast    Eczema Sister     Acne Sister     Eczema Brother     Eczema Daughter     Cancer Maternal Aunt         breast    Breast cancer Maternal Aunt     No Known Problems Maternal Uncle     No Known Problems Paternal Aunt     No Known Problems Maternal Grandfather     No Known Problems Paternal Grandmother     No Known Problems Paternal Grandfather     Melanoma Neg Hx     Psoriasis Neg Hx     Lupus Neg Hx     Ovarian cancer Neg Hx     Amblyopia Neg Hx     Blindness Neg Hx     Cataracts Neg Hx     Glaucoma Neg Hx     Macular degeneration Neg Hx     Retinal detachment Neg Hx     Strabismus Neg Hx     Stroke Neg Hx     Thyroid disease Neg Hx        Social History:  reports that  has never smoked. she has never used smokeless tobacco. She reports that she drinks alcohol. She reports that she does not use drugs.    Allergies:  Review of patient's allergies indicates:   Allergen Reactions    Pcn [penicillins] Other (See Comments)     Pt had reaction to PCN as an infant.       Medications:  Current Outpatient Medications   Medication Sig Dispense Refill    albuterol 90 mcg/actuation inhaler Inhale 2 puffs into the lungs every 6 (six) hours as needed for Wheezing. Rescue 18 g 0    docusate sodium (COLACE) 50 MG capsule Take 1 capsule (50 mg total) by mouth nightly as needed for Constipation. 30 capsule 0    ferrous gluconate (FERGON) 324 MG tablet Take 1 tablet (324 mg total) by mouth daily with breakfast. 30 tablet 0    FLUoxetine (PROZAC) 10 MG capsule TAKE 1 CAPSULE BY MOUTH  "EVERY DAY 30 capsule 0    fluticasone (FLONASE) 50 mcg/actuation nasal spray 1 spray (50 mcg total) by Each Nare route once daily. 1 Bottle 0    hydrOXYzine pamoate (VISTARIL) 25 MG Cap Take 1 capsule (25 mg total) by mouth nightly as needed (sleep). 30 capsule 0    meloxicam (MOBIC) 7.5 MG tablet Take 1 tablet (7.5 mg total) by mouth once daily. Take once a day for two weeks then as needed. Take with food. 30 tablet 0    NUVARING 0.12-0.015 mg/24 hr vaginal ring INSERT 1 RING VAGINALLY AS DIRECTED. REMOVE AFTER 3 WEEKS & WAIT 7 DAYS BEFORE INSERTING A NEW RING 1 each 3    omeprazole (PRILOSEC) 20 MG capsule Take 1 capsule (20 mg total) by mouth once daily. 30 capsule 2     No current facility-administered medications for this visit.        Review of Systems   Constitutional: Positive for fatigue. Negative for chills, fever and unexpected weight change.   HENT: Negative for congestion, hearing loss and nosebleeds.    Eyes: Negative for visual disturbance.   Respiratory: Positive for shortness of breath. Negative for cough.    Cardiovascular: Negative for chest pain and palpitations.   Gastrointestinal: Negative for abdominal pain, anal bleeding, blood in stool, constipation, diarrhea, nausea and vomiting.   Genitourinary: Negative for dysuria.   Musculoskeletal: Negative for back pain and gait problem.   Skin: Negative for color change and rash.   Neurological: Negative for dizziness, weakness and headaches.   Hematological: Negative for adenopathy. Does not bruise/bleed easily.   Psychiatric/Behavioral: Negative for confusion.        Objective:      Vitals:   Vitals:    10/22/18 1616   BP: 116/74   Pulse: 89   Resp: 16   Temp: 98.3 °F (36.8 °C)   SpO2: 99%   Weight: 81.7 kg (180 lb 1.9 oz)   Height: 5' 2" (1.575 m)     BMI: Body mass index is 32.94 kg/m².    Physical Exam   Constitutional: She is oriented to person, place, and time. She appears well-developed and well-nourished.   HENT:   Head: Normocephalic " and atraumatic.   Eyes: Pupils are equal, round, and reactive to light. No scleral icterus.   Neck: Normal range of motion. Neck supple.   Cardiovascular: Normal rate and regular rhythm.   Pulmonary/Chest: Effort normal and breath sounds normal.   Abdominal: Soft. She exhibits no distension. There is no tenderness.   Musculoskeletal: Normal range of motion. She exhibits no edema.   Neurological: She is alert and oriented to person, place, and time. No cranial nerve deficit.   Skin: Skin is warm and dry.   Psychiatric: She has a normal mood and affect. Thought content normal.       Laboratory Data:  Lab Visit on 09/25/2018   Component Date Value Ref Range Status    WBC 09/25/2018 6.45  3.90 - 12.70 K/uL Final    RBC 09/25/2018 3.19* 4.00 - 5.40 M/uL Final    Hemoglobin 09/25/2018 7.6* 12.0 - 16.0 g/dL Final    Hematocrit 09/25/2018 25.8* 37.0 - 48.5 % Final    MCV 09/25/2018 81* 82 - 98 fL Final    MCH 09/25/2018 23.8* 27.0 - 31.0 pg Final    MCHC 09/25/2018 29.5* 32.0 - 36.0 g/dL Final    RDW 09/25/2018 15.9* 11.5 - 14.5 % Final    Platelets 09/25/2018 172  150 - 350 K/uL Final    MPV 09/25/2018 11.7  9.2 - 12.9 fL Final    Gran # (ANC) 09/25/2018 3.8  1.8 - 7.7 K/uL Final    Lymph # 09/25/2018 1.8  1.0 - 4.8 K/uL Final    Mono # 09/25/2018 0.7  0.3 - 1.0 K/uL Final    Eos # 09/25/2018 0.1  0.0 - 0.5 K/uL Final    Baso # 09/25/2018 0.01  0.00 - 0.20 K/uL Final    nRBC 09/25/2018 0  0 /100 WBC Final    Gran% 09/25/2018 59.3  38.0 - 73.0 % Final    Lymph% 09/25/2018 27.4  18.0 - 48.0 % Final    Mono% 09/25/2018 11.0  4.0 - 15.0 % Final    Eosinophil% 09/25/2018 1.9  0.0 - 8.0 % Final    Basophil% 09/25/2018 0.2  0.0 - 1.9 % Final    Differential Method 09/25/2018 Automated   Final    Iron 09/25/2018 19* 30 - 160 ug/dL Final    Transferrin 09/25/2018 329  200 - 375 mg/dL Final    TIBC 09/25/2018 487* 250 - 450 ug/dL Final    Saturated Iron 09/25/2018 4* 20 - 50 % Final          Assessment:        1. Iron deficiency anemia due to chronic blood loss           Plan:   Ms. Macias's iron deficiency has returned.  She has previously been intolerant to iron by mouth and wishes to proceed with repeat iron infusions.  I have cautioned her about allergic reactions.  She did tolerate iron infusions well previously.  Will plan to resume.  Additionally, given the fact that she is now denying menorrhagia I will plan to refer her back to GI for further evaluation.  All questions were answered and she is agreeable with this plan.    Donell Medina DO, FACP  Hematology & Oncology  [address] [place] [id number]  ph. [phone]  Fax. [phone]    45 minutes were spent in coordination of patient's care, record review and counseling.  More than 50% of the time was face-to-face.

## 2018-10-23 ENCOUNTER — TELEPHONE (OUTPATIENT)
Dept: HEMATOLOGY/ONCOLOGY | Facility: CLINIC | Age: 43
End: 2018-10-23

## 2018-10-23 NOTE — TELEPHONE ENCOUNTER
Called patient to let her know she is scheduled on Thursday to see Nicolasa Tiwari. She may have to change it I gave her the number to their dept to call if she has to reschedule. Also informed her once the injectafer is approved I will call her to schedule.    ----- Message from Donell Medina DO, FACP sent at 10/22/2018  5:04 PM CDT -----  Set up for injectefer infusion  Refer to GI had previously seen Dr. Lassiter  F/torri 8 weeks

## 2018-10-25 ENCOUNTER — PATIENT MESSAGE (OUTPATIENT)
Dept: GASTROENTEROLOGY | Facility: CLINIC | Age: 43
End: 2018-10-25

## 2018-10-25 ENCOUNTER — TELEPHONE (OUTPATIENT)
Dept: ENDOSCOPY | Facility: HOSPITAL | Age: 43
End: 2018-10-25

## 2018-10-25 ENCOUNTER — OFFICE VISIT (OUTPATIENT)
Dept: GASTROENTEROLOGY | Facility: CLINIC | Age: 43
End: 2018-10-25
Payer: COMMERCIAL

## 2018-10-25 VITALS
SYSTOLIC BLOOD PRESSURE: 117 MMHG | HEART RATE: 90 BPM | BODY MASS INDEX: 32.46 KG/M2 | WEIGHT: 176.38 LBS | HEIGHT: 62 IN | DIASTOLIC BLOOD PRESSURE: 83 MMHG

## 2018-10-25 DIAGNOSIS — D50.9 IRON DEFICIENCY ANEMIA, UNSPECIFIED IRON DEFICIENCY ANEMIA TYPE: Primary | ICD-10-CM

## 2018-10-25 DIAGNOSIS — Z12.11 SPECIAL SCREENING FOR MALIGNANT NEOPLASMS, COLON: Primary | ICD-10-CM

## 2018-10-25 DIAGNOSIS — K58.1 IRRITABLE BOWEL SYNDROME WITH CONSTIPATION: ICD-10-CM

## 2018-10-25 PROCEDURE — 99999 PR PBB SHADOW E&M-EST. PATIENT-LVL V: CPT | Mod: PBBFAC,,, | Performed by: NURSE PRACTITIONER

## 2018-10-25 PROCEDURE — 99204 OFFICE O/P NEW MOD 45 MIN: CPT | Mod: S$GLB,,, | Performed by: NURSE PRACTITIONER

## 2018-10-25 PROCEDURE — 3008F BODY MASS INDEX DOCD: CPT | Mod: CPTII,S$GLB,, | Performed by: NURSE PRACTITIONER

## 2018-10-25 RX ORDER — POLYETHYLENE GLYCOL 3350, SODIUM SULFATE ANHYDROUS, SODIUM BICARBONATE, SODIUM CHLORIDE, POTASSIUM CHLORIDE 236; 22.74; 6.74; 5.86; 2.97 G/4L; G/4L; G/4L; G/4L; G/4L
4 POWDER, FOR SOLUTION ORAL ONCE
Qty: 4000 ML | Refills: 0 | Status: SHIPPED | OUTPATIENT
Start: 2018-10-25 | End: 2018-11-02

## 2018-10-25 NOTE — Clinical Note
I would like to add an EGD to this pt's existing colonoscopy please. I sent a message to the pt about this in [email or web address]

## 2018-10-25 NOTE — PROGRESS NOTES
[facility] Note    Reason for Visit:  The primary encounter diagnosis was Iron deficiency anemia, unspecified iron deficiency anemia type. A diagnosis of Irritable bowel syndrome with constipation was also pertinent to this visit.    PCP:   Mildred Mohamud   [address] [facility] / [place] [address]    Referring MD:  Donell Medina, Do, Facp  [address] [place] [address]    HPI:  This is a 43 y.o. female here for evaluation of anemia    Anemia  Onset-childhood  SOB-yes  CP-no  Increased fatigue-yes  Syncope/near syncope-no  Dizziness/ light headedness-lightheadedness  PICA-yes  GI Bleeding-no  Previous studies and /or eval per GI-evaluated by Dr. Stewart in 2015. EGD nl, sb bx nl. Per pt colon in 2014 unrevealing.  Iron therapy-h/o IV iron infusions. Preparing for IV iron infusions.  Unable to tolerate PO iron d/t constipation, but did restart it last week    Lab Results   Component Value Date    IRON 19 (L) 09/25/2018    TIBC 487 (H) 09/25/2018    FERRITIN 5 (L) 11/28/2017     Lab Results   Component Value Date    WBC 7.29 10/22/2018    HGB 7.7 (L) 10/22/2018    HCT 26.0 (L) 10/22/2018    MCV 80 (L) 10/22/2018     (L) 10/22/2018     Abdominal pain -h/o IBs-C with left sided abd pain on occ. Sometimes better with BM.   Reflux - indigestion on occ. No meds  Dysphagia/odynophagia - no   Bowel habits - h/o IBS-C. Currently with 1-2 bristol type 3,4>5 BM/week. Will take mag 7 tablets prn with some improvement.   GI bleeding - denies hematochezia, hematemesis, melena, BRBPR, black/tarry stools, and coffee ground emesis  NSAID usage - aleve couple days monthly, meloxicam 7.5 mg once weekly or less for shoulder pain     ROS:  Constitutional: No fevers, no chills, No unintentional weight loss, + fatigue,   ENT: No allergies  CV: no chest pain, no palpitations, no perif. edema, no sob on exertion  Pulm: No cough, + shortness of breath, no wheezes, no sputum  Ophtho: No vision  changes  GI: see HPI; also no nausea, no vomiting, decrease in appetite  Derm: No rash  Heme: No lymphadenopathy, No bruising  MSK: + shoulder pain, no muscle pain, no muscle weakness  : No dysuria, No hematuria  Endo: + hot or cold intolerance  Neuro: No syncope, No seizure,       Medical History:  has a past medical history of Abnormal Pap smear, Abnormal Pap smear of vagina, Anemia, Depression, Fever blister, GERD (gastroesophageal reflux disease), Heavy periods (6/8/2015), Hemorrhoids without complication, Joint pain, Psychiatric problem, Screening for HPV (human papillomavirus), Sleep difficulties, Thalassemia, Thalassemia, and Therapy.    Surgical History:  has a past surgical history that includes Hernia repair; Colonoscopy; ESOPHAGOGASTRODUODENOSCOPY (EGD) (N/A, 3/23/2015); and [place] COLONOSCOPY,DIAGNOSTIC [33658 (CPT®)] (N/A, 7/2/2014).    Family History: family history includes Acne in her sister; Breast cancer in her cousin, maternal aunt, and maternal grandmother; Cancer in her cousin, cousin, maternal aunt, and maternal grandmother; Colon cancer (age of onset: 60) in her paternal uncle; Diabetes in her father; Eczema in her brother, daughter, father, other, and sister; Heart disease in her father; Hypertension in her mother; No Known Problems in her maternal grandfather, maternal uncle, other, paternal aunt, paternal grandfather, and paternal grandmother; Stomach cancer (age of onset: 44) in her cousin..     Social History:  reports that  has never smoked. she has never used smokeless tobacco. She reports that she drinks alcohol. She reports that she does not use drugs.    Review of patient's allergies indicates:   Allergen Reactions    Pcn [penicillins] Other (See Comments)     Pt had reaction to PCN as an infant.       Current Outpatient Medications   Medication Sig    albuterol 90 mcg/actuation inhaler Inhale 2 puffs into the lungs every 6 (six) hours as needed for Wheezing. Rescue    ferrous  "gluconate (FERGON) 324 MG tablet Take 1 tablet (324 mg total) by mouth daily with breakfast.    FLUoxetine (PROZAC) 10 MG capsule TAKE 1 CAPSULE BY MOUTH EVERY DAY    fluticasone (FLONASE) 50 mcg/actuation nasal spray 1 spray (50 mcg total) by Each Nare route once daily.    hydrOXYzine pamoate (VISTARIL) 25 MG Cap Take 1 capsule (25 mg total) by mouth nightly as needed (sleep).    meloxicam (MOBIC) 7.5 MG tablet Take 1 tablet (7.5 mg total) by mouth once daily. Take once a day for two weeks then as needed. Take with food.    NUVARING 0.12-0.015 mg/24 hr vaginal ring INSERT 1 RING VAGINALLY AS DIRECTED. REMOVE AFTER 3 WEEKS & WAIT 7 DAYS BEFORE INSERTING A NEW RING    docusate sodium (COLACE) 50 MG capsule Take 1 capsule (50 mg total) by mouth nightly as needed for Constipation.    omeprazole (PRILOSEC) 20 MG capsule Take 1 capsule (20 mg total) by mouth once daily.     No current facility-administered medications for this visit.        Objective Findings:    Vital Signs:  /83   Pulse 90   Ht 5' 2" (1.575 m)   Wt 80 kg (176 lb 5.9 oz)   LMP 10/17/2018   BMI 32.26 kg/m²   Body mass index is 32.26 kg/m².    Physical Exam:  General Appearance: Well appearing in no acute distress  Head:   Normocephalic, without obvious abnormality  Eyes:    No scleral icterus, EOMI  ENT: Neck supple, Lips, mucosa, and tongue normal; teeth and gums normal  Lungs: CTA bilaterally in anterior and posterior fields, no wheezes, no crackles.  Heart:  Regular rate and rhythm, S1, S2 normal, no murmurs heard  Abdomen: Soft, non tender, non distended with positive bowel sounds in all four quadrants. No hepatosplenomegaly, ascites, or mass  Extremities: 2+ radial pulses, no clubbing, cyanosis or edema  Skin: No rash to exposed areas  Neurologic: A&Ox4      Labs:  Lab Results   Component Value Date    WBC 7.29 10/22/2018    HGB 7.7 (L) 10/22/2018    HCT 26.0 (L) 10/22/2018     (L) 10/22/2018    CHOL 133 11/28/2017    TRIG " 68 11/28/2017    HDL 54 11/28/2017    ALT 10 11/28/2017    AST 16 11/28/2017     03/21/2018    K 4.1 03/21/2018     03/21/2018    CREATININE 0.7 03/21/2018    BUN 11 03/21/2018    CO2 24 03/21/2018    TSH 1.911 11/28/2017       Imaging:  CT abd/pelvis 7/6/16: Stable calcified mesenteric mass and in the mid abdomen, again possibly a partially calcified lymph node.  Assessment:  1. Iron deficiency anemia, unspecified iron deficiency anemia type    2. Irritable bowel syndrome with constipation           Recommendations:  1. DUSTIN-EGD/colon. If unrevealing, will discuss VCE  2. IBS-C- daily fiber supplimentation as per handout provided    Follow-up for DUSTIN pending results. .      Order summary:  Orders Placed This Encounter    Case request GI: COLONOSCOPY    Case request GI: EGD (ESOPHAGOGASTRODUODENOSCOPY)         Thank you so much for allowing me to participate in the care of Terese Soares, ABELARDO, FNP-C

## 2018-10-25 NOTE — LETTER
October 25, 2018      Donell Medina DO, FACP  [address] [facility] [address]           [facility] - Gastroenterology  [address]  [place] [address]  Phone: [phone]  Fax: [phone]          Patient: Terese Macias   MR Number: [id number]   YOB: 1975   Date of Visit: 10/25/2018       Dear Dr. Donell Medina:    Thank you for referring Terese Macias to me for evaluation. Attached you will find relevant portions of my assessment and plan of care.    If you have questions, please do not hesitate to call me. I look forward to following Teerse Macias along with you.    Sincerely,    Nicolasa Soares, NP    Enclosure  CC:  No Recipients    If you would like to receive this communication electronically, please contact [email or web address] or [phone] to request more information on [facility] Link access.    For providers and/or their staff who would like to refer a patient to [facility], please contact us through our one-stop-shop provider referral line, [facility], at [phone].    If you feel you have received this communication in error or would no longer like to receive these types of communications, please e-mail [email or web address]

## 2018-10-25 NOTE — PATIENT INSTRUCTIONS
HIGH FIBER KEY POINTS:  1. Goal of 20-25 grams of fiber each day for women, 30-35 grams each day for men. Slowly build up to this goal as you may experience gas and bloating at first.  2. Take a fiber supplement to help reach this goal: Metamucil, Citrucel, Fibercon, Konsyl, or Psyllium  3. For Constipation: Finely ground psyllium husk (with or without flavoring or Additives)   - To start: 1 teaspoon once a day in the morning with 8 oz of liquid    followed by an additional 8 ounce glass of water   - After a week: add a second teaspoon in the middle of the day   - After two weeks: add a third teaspoon at bedtime   - Follow each dose with another glass of water. Can add a splash of juice or lemonade for taste.  3. Drink 6-8 glasses of water a day.  4. Try to do plant fiber (fruits and vegetables) over processed fibers (cereals and breads)     HIGH FIBER DIET  Fiber is present in all fruits, vegetables, cereals and grains. Fiber passes through the body undigested. A high fiber diet helps food move through the intestinal tract. The added bulk is helpful in preventing constipation. In persons with diverticulosis it serves to clean out the pouches along the colon wall while preventing new ones from forming. A high fiber diet may reduce the risk of colon cancer, decreases blood cholesterol and prevents high blood sugar in diabetics.    The foods listed below are high in fiber and should be included in your diet. If you are not used to high fiber foods, start with 1 or 2 foods from this list. Every 3-4 days add a new one to your diet until you are eating 4 high fiber foods per day. This should give you 20-35 Gm of fiber/day. It is also important to drink a lot of water when you are on this diet (6-8 glasses a day). Water causes the fiber to swell and increases the benefit.  Add Fiber to Your Diet   Adding fiber to your diet can help relieve constipation by making stools softer and easier to pass. To increase your fiber  intake, your doctor may recommend a bulking agent, such as psyllium. This is a high-fiber supplement available at most grocery and drugstores. Eating more fiber-rich foods will also help. There are two types of fiber:   Insoluble fiber is the main ingredient in bulking agents. Its also found in foods such as wheat bran, whole-grain breads, fresh fruits, and vegetables.   Soluble fiber is found in foods such as oat bran. Although soluble fiber is good for you, it may not ease constipation as much as foods high in insoluble fiber.    FOODS HIGH IN DIETARY FIBER:  BREADS: Made with 100% whole wheat flour; Joshua, wheat or rye crackers; tortillas, bran muffins. Whole grains, such as wheat bran, corn bran, and brown rice.  CEREALS: Whole grain cereal with bran (Chex, Raisin Bran, Corn Bran), oatmeal, rolled oats, granola, wheat flakes, brown rice  NUTS: Any nuts  FRUITS: All fresh fruits along with edible skins, (bananas, citrus fruit, mangoes, pears, prunes, raisins, apples, pineapple, apricot, melon, jams and marmalades), fruit juices (especially prune juice)  VEGETABLES: All types, preferably raw or lightly cooked: especially, celery, eggplant, potatoes,spinach, broccoli, brussel sprouts, winter squash, carrots, cauliflower, soybeans, lentils, fresh and dried beans of all kinds  OTHER: Popcorn, any spices.   Nuts and legumes, especially peanuts, lentils, and kidney beans.  Easy Ways to Add Fiber   The tips below offer some simple ways to add more high-fiber foods to your meals.   Start your day with a high-fiber breakfast. Eat a wheat bran cereal along with a sliced banana. Or, try peanut butter on whole-wheat toast.   Eat carrot sticks for snacks. Theyre easy to prepare, taste great, and are low in calories.   Use whole-grain breads instead of white bread for sandwiches.   Eat fruits for treats. Try an apple and some raisins instead of a candy bar.  Vegetables  Artichoke, cooked 10.3  Asparagus - 2.8  Beans -  2  Broccoli, boiled 1 cup - 5.1  McKinney sprouts, cooked 1 cup - 4.1  Carrots - boiled 2.5, raw 4  Celery - 1  Corn - 4  Corn on the Cob - 5.9  Lettuce - 1  Peas (canned) - 4  Peas (dried) - 7.9  Green peas (cooked) - 8.8  Potato, with skin, baked - 3.0  Spinach - 4  Sweet potato - 3.4  Turnip greens, boiled 1 cup - 5.0  Sweet corn, cooked  1 cup  4.0  Tomato paste -1/4 cup -2.7  Yam - 2.7  Legumes, Nuts, and Seeds  Split peas, cooked  1 cup  16.3  Lentils, cooked 1 cup 15.6  Black beans, cooked 1 cup 15.0  Black eyed peas (1/2 cup) 4.2  Chick peas (1/2 cup) 5  Lima beans, cooked 1 cup  13.2  Baked beans, vegetarian, canned, cooked 1 cup 10.4  Sunflower seed kernels 1/4 cup 3.9  Almonds 1 ounce (23 nuts)  3.5  Pistachio nuts 1 ounce (49 nuts) 2.9  Pecans 1 ounce (19 halves) 2.7  Beans (lima,kidney,baked) - 10 (1/2 cup)  Refried beans -12 (1cup)  Lentils - 8  Soybeans (1/2 cup) 5  Fruit  Raspberries  1 cup  8.0  Pear, with skin - 5.5  Apple, with skin 4.4 (Juice = 0)  Banana - 3.1  Orange - 3.1  Strawberries (halves) 1 cup - 3.0  Figs, dried 2 medium - 1.6  Raisins 1 ounce (60 raisins) -1.0  Grapefruit - 1.4  Peach - 2  Kiwi - 5  Kendall - 3.7  Pineapple - 2  Cereal, Grains, and Pasta  Spaghetti, whole-wheat, cooked 1 cup 6.3  Barley, pearled, cooked 1 cup 6.0  Bran flakes 3/4 cup 5.3  Oat bran muffin 1 medium 5.2  Oatmeal, instant, cooked 1 cup 4.0  Popcorn, air-popped 3 cups 3.5  Brown rice, cooked  1 cup  3.5  Bread, rye 1 slice 1.9, pumpernickel - 2.1  Bagel - 1.6  Bread, whole-wheat or multigrain  1 slice 1.9  Fiber One - 14  100% Bran - 13  Raisin Bran - 3.5  All Bran Extra Fiber - 13

## 2018-10-25 NOTE — H&P (VIEW-ONLY)
[facility] Note    Reason for Visit:  The primary encounter diagnosis was Iron deficiency anemia, unspecified iron deficiency anemia type. A diagnosis of Irritable bowel syndrome with constipation was also pertinent to this visit.    PCP:   Mildred Mohamud   [address] [facility] / [place] [address]    Referring MD:  Donell Medina, Do, Facp  [address] [facility] [place] [address]    HPI:  This is a 43 y.o. female here for evaluation of anemia    Anemia  Onset-childhood  SOB-yes  CP-no  Increased fatigue-yes  Syncope/near syncope-no  Dizziness/ light headedness-lightheadedness  PICA-yes  GI Bleeding-no  Previous studies and /or eval per GI-evaluated by Dr. Stewart in 2015. EGD nl, sb bx nl. Per pt colon in 2014 unrevealing.  Iron therapy-h/o IV iron infusions. Preparing for IV iron infusions.  Unable to tolerate PO iron d/t constipation, but did restart it last week    Lab Results   Component Value Date    IRON 19 (L) 09/25/2018    TIBC 487 (H) 09/25/2018    FERRITIN 5 (L) 11/28/2017     Lab Results   Component Value Date    WBC 7.29 10/22/2018    HGB 7.7 (L) 10/22/2018    HCT 26.0 (L) 10/22/2018    MCV 80 (L) 10/22/2018     (L) 10/22/2018     Abdominal pain -h/o IBs-C with left sided abd pain on occ. Sometimes better with BM.   Reflux - indigestion on occ. No meds  Dysphagia/odynophagia - no   Bowel habits - h/o IBS-C. Currently with 1-2 bristol type 3,4>5 BM/week. Will take mag 7 tablets prn with some improvement.   GI bleeding - denies hematochezia, hematemesis, melena, BRBPR, black/tarry stools, and coffee ground emesis  NSAID usage - aleve couple days monthly, meloxicam 7.5 mg once weekly or less for shoulder pain     ROS:  Constitutional: No fevers, no chills, No unintentional weight loss, + fatigue,   ENT: No allergies  CV: no chest pain, no palpitations, no perif. edema, no sob on exertion  Pulm: No cough, + shortness of breath, no wheezes, no sputum  Ophtho: No vision  changes  GI: see HPI; also no nausea, no vomiting, decrease in appetite  Derm: No rash  Heme: No lymphadenopathy, No bruising  MSK: + shoulder pain, no muscle pain, no muscle weakness  : No dysuria, No hematuria  Endo: + hot or cold intolerance  Neuro: No syncope, No seizure,       Medical History:  has a past medical history of Abnormal Pap smear, Abnormal Pap smear of vagina, Anemia, Depression, Fever blister, GERD (gastroesophageal reflux disease), Heavy periods (6/8/2015), Hemorrhoids without complication, Joint pain, Psychiatric problem, Screening for HPV (human papillomavirus), Sleep difficulties, Thalassemia, Thalassemia, and Therapy.    Surgical History:  has a past surgical history that includes Hernia repair; Colonoscopy; ESOPHAGOGASTRODUODENOSCOPY (EGD) (N/A, 3/23/2015); and OR COLONOSCOPY,DIAGNOSTIC [49558 (CPT®)] (N/A, 7/2/2014).    Family History: family history includes Acne in her sister; Breast cancer in her cousin, maternal aunt, and maternal grandmother; Cancer in her cousin, cousin, maternal aunt, and maternal grandmother; Colon cancer (age of onset: 60) in her paternal uncle; Diabetes in her father; Eczema in her brother, daughter, father, other, and sister; Heart disease in her father; Hypertension in her mother; No Known Problems in her maternal grandfather, maternal uncle, other, paternal aunt, paternal grandfather, and paternal grandmother; Stomach cancer (age of onset: 44) in her cousin..     Social History:  reports that  has never smoked. she has never used smokeless tobacco. She reports that she drinks alcohol. She reports that she does not use drugs.    Review of patient's allergies indicates:   Allergen Reactions    Pcn [penicillins] Other (See Comments)     Pt had reaction to PCN as an infant.       Current Outpatient Medications   Medication Sig    albuterol 90 mcg/actuation inhaler Inhale 2 puffs into the lungs every 6 (six) hours as needed for Wheezing. Rescue    ferrous  "gluconate (FERGON) 324 MG tablet Take 1 tablet (324 mg total) by mouth daily with breakfast.    FLUoxetine (PROZAC) 10 MG capsule TAKE 1 CAPSULE BY MOUTH EVERY DAY    fluticasone (FLONASE) 50 mcg/actuation nasal spray 1 spray (50 mcg total) by Each Nare route once daily.    hydrOXYzine pamoate (VISTARIL) 25 MG Cap Take 1 capsule (25 mg total) by mouth nightly as needed (sleep).    meloxicam (MOBIC) 7.5 MG tablet Take 1 tablet (7.5 mg total) by mouth once daily. Take once a day for two weeks then as needed. Take with food.    NUVARING 0.12-0.015 mg/24 hr vaginal ring INSERT 1 RING VAGINALLY AS DIRECTED. REMOVE AFTER 3 WEEKS & WAIT 7 DAYS BEFORE INSERTING A NEW RING    docusate sodium (COLACE) 50 MG capsule Take 1 capsule (50 mg total) by mouth nightly as needed for Constipation.    omeprazole (PRILOSEC) 20 MG capsule Take 1 capsule (20 mg total) by mouth once daily.     No current facility-administered medications for this visit.        Objective Findings:    Vital Signs:  /83   Pulse 90   Ht 5' 2" (1.575 m)   Wt 80 kg (176 lb 5.9 oz)   LMP 10/17/2018   BMI 32.26 kg/m²   Body mass index is 32.26 kg/m².    Physical Exam:  General Appearance: Well appearing in no acute distress  Head:   Normocephalic, without obvious abnormality  Eyes:    No scleral icterus, EOMI  ENT: Neck supple, Lips, mucosa, and tongue normal; teeth and gums normal  Lungs: CTA bilaterally in anterior and posterior fields, no wheezes, no crackles.  Heart:  Regular rate and rhythm, S1, S2 normal, no murmurs heard  Abdomen: Soft, non tender, non distended with positive bowel sounds in all four quadrants. No hepatosplenomegaly, ascites, or mass  Extremities: 2+ radial pulses, no clubbing, cyanosis or edema  Skin: No rash to exposed areas  Neurologic: A&Ox4      Labs:  Lab Results   Component Value Date    WBC 7.29 10/22/2018    HGB 7.7 (L) 10/22/2018    HCT 26.0 (L) 10/22/2018     (L) 10/22/2018    CHOL 133 11/28/2017    TRIG " 68 11/28/2017    HDL 54 11/28/2017    ALT 10 11/28/2017    AST 16 11/28/2017     03/21/2018    K 4.1 03/21/2018     03/21/2018    CREATININE 0.7 03/21/2018    BUN 11 03/21/2018    CO2 24 03/21/2018    TSH 1.911 11/28/2017       Imaging:  CT abd/pelvis 7/6/16: Stable calcified mesenteric mass and in the mid abdomen, again possibly a partially calcified lymph node.  Assessment:  1. Iron deficiency anemia, unspecified iron deficiency anemia type    2. Irritable bowel syndrome with constipation           Recommendations:  1. DUSTIN-EGD/colon. If unrevealing, will discuss VCE  2. IBS-C- daily fiber supplimentation as per handout provided    Follow-up for DUSTIN pending results. .      Order summary:  Orders Placed This Encounter    Case request GI: COLONOSCOPY    Case request GI: EGD (ESOPHAGOGASTRODUODENOSCOPY)         Thank you so much for allowing me to participate in the care of Terese Soares, ABELARDO, FNP-C

## 2018-10-29 ENCOUNTER — TELEPHONE (OUTPATIENT)
Dept: HEMATOLOGY/ONCOLOGY | Facility: CLINIC | Age: 43
End: 2018-10-29

## 2018-10-29 NOTE — TELEPHONE ENCOUNTER
Left message to call the office about scheduling iron infusion. Number was provided.        ----- Message from Sandra Larry sent at 10/23/2018  8:10 AM CDT -----  Regarding:  injectefer infusion pending  Donell Medina DO, FACP  Sandra Larry     Set up for injectefer infusion -# # Pending     Refer to GI had previously seen Dr. Lassiter # # 10/25    F/u 8 weeks # # 12/17

## 2018-10-30 ENCOUNTER — TELEPHONE (OUTPATIENT)
Dept: HEMATOLOGY/ONCOLOGY | Facility: CLINIC | Age: 43
End: 2018-10-30

## 2018-10-30 NOTE — TELEPHONE ENCOUNTER
Called patient to schedule injectafer. She will call me back with date and time.    ----- Message from Sandra Larry sent at 10/23/2018  8:10 AM CDT -----  Regarding:  injectefer infusion pending  Donell Medina DO, FACP  Sandra Larry     Set up for injectefer infusion -# # Pending     Refer to GI had previously seen Dr. Lassiter # # 10/25    F/u 8 weeks # # 12/17

## 2018-10-30 NOTE — TELEPHONE ENCOUNTER
Patient called to schedule injectafer for Thursday 11/1.        ----- Message from Sandra Larry sent at 10/23/2018  8:10 AM CDT -----  Regarding:  injectefer infusion pending  Donell Medina DO, FACP  Sandra Larry     Set up for injectefer infusion -# # Pending     Refer to GI had previously seen Dr. Lassiter # # 10/25    F/u 8 weeks # # 12/17

## 2018-11-01 ENCOUNTER — INFUSION (OUTPATIENT)
Dept: INFUSION THERAPY | Facility: HOSPITAL | Age: 43
End: 2018-11-01
Attending: INTERNAL MEDICINE
Payer: COMMERCIAL

## 2018-11-01 VITALS
TEMPERATURE: 99 F | SYSTOLIC BLOOD PRESSURE: 117 MMHG | RESPIRATION RATE: 18 BRPM | HEART RATE: 92 BPM | DIASTOLIC BLOOD PRESSURE: 68 MMHG

## 2018-11-01 DIAGNOSIS — D50.0 IRON DEFICIENCY ANEMIA DUE TO CHRONIC BLOOD LOSS: Primary | ICD-10-CM

## 2018-11-01 PROCEDURE — 96365 THER/PROPH/DIAG IV INF INIT: CPT

## 2018-11-01 PROCEDURE — 63600175 PHARM REV CODE 636 W HCPCS: Mod: JG | Performed by: INTERNAL MEDICINE

## 2018-11-01 PROCEDURE — 25000003 PHARM REV CODE 250: Performed by: INTERNAL MEDICINE

## 2018-11-01 RX ORDER — SODIUM CHLORIDE 0.9 % (FLUSH) 0.9 %
10 SYRINGE (ML) INJECTION
Status: DISCONTINUED | OUTPATIENT
Start: 2018-11-01 | End: 2018-11-01 | Stop reason: HOSPADM

## 2018-11-01 RX ORDER — HEPARIN 100 UNIT/ML
500 SYRINGE INTRAVENOUS
Status: DISCONTINUED | OUTPATIENT
Start: 2018-11-01 | End: 2018-11-01 | Stop reason: HOSPADM

## 2018-11-01 RX ADMIN — FERRIC CARBOXYMALTOSE INJECTION 750 MG: 50 INJECTION, SOLUTION INTRAVENOUS at 05:11

## 2018-11-01 RX ADMIN — SODIUM CHLORIDE: 9 INJECTION, SOLUTION INTRAVENOUS at 05:11

## 2018-11-01 NOTE — PLAN OF CARE
Problem: Patient Care Overview  Goal: Plan of Care Review  Outcome: Ongoing (interventions implemented as appropriate)  8477 -- Patient tolerated treatment well.  Vital signs stable.  No apparent distress noted.  Patient observed for 30 min post infusion and discharged without S/S of adverse effects.  AVS given.  Patient instructed to call provider with any questions or concerns.  RTC in one week for D2.

## 2018-11-01 NOTE — PLAN OF CARE
Problem: Patient Care Overview  Goal: Individualization & Mutuality  Outcome: Ongoing (interventions implemented as appropriate)  1303 -- Patient's labs, history, allergies, and medication reviewed.  Assessment complete.  Vital signs stable.  Patient to receive Injectafer D1.  Discussed plan of care with patient.  Patient in agreement. Chair reclined.  Warm blanket and snack offered.  Will monitor.

## 2018-11-02 ENCOUNTER — TELEPHONE (OUTPATIENT)
Dept: GASTROENTEROLOGY | Facility: CLINIC | Age: 43
End: 2018-11-02

## 2018-11-02 ENCOUNTER — PATIENT MESSAGE (OUTPATIENT)
Dept: GASTROENTEROLOGY | Facility: CLINIC | Age: 43
End: 2018-11-02

## 2018-11-02 NOTE — TELEPHONE ENCOUNTER
----- Message from Geovanna Hannon sent at 11/2/2018 12:49 PM CDT -----  Contact: Self- [phone]  Rodger- pt called to speak with staff regarding the abdominal pain she's experiencing hard to take deep breaths- pt also sent a message via [facility]- please contact pt at [phone]

## 2018-11-02 NOTE — TELEPHONE ENCOUNTER
Spoke with pt. Gave Nicolasa recommendations to take over the counter IBgard and start fiber diet listed in last visits AVS as well as go to ED if SOB is getting worst due to anemia. Pt verbalized understanding.

## 2018-11-08 ENCOUNTER — ANESTHESIA (OUTPATIENT)
Dept: ENDOSCOPY | Facility: HOSPITAL | Age: 43
End: 2018-11-08
Payer: COMMERCIAL

## 2018-11-08 ENCOUNTER — HOSPITAL ENCOUNTER (OUTPATIENT)
Facility: HOSPITAL | Age: 43
Discharge: HOME | End: 2018-11-08
Attending: INTERNAL MEDICINE | Admitting: INTERNAL MEDICINE
Payer: COMMERCIAL

## 2018-11-08 ENCOUNTER — ANESTHESIA EVENT (OUTPATIENT)
Dept: ENDOSCOPY | Facility: HOSPITAL | Age: 43
End: 2018-11-08
Payer: COMMERCIAL

## 2018-11-08 ENCOUNTER — INFUSION (OUTPATIENT)
Dept: INFUSION THERAPY | Facility: HOSPITAL | Age: 43
End: 2018-11-08
Attending: INTERNAL MEDICINE
Payer: COMMERCIAL

## 2018-11-08 VITALS
SYSTOLIC BLOOD PRESSURE: 110 MMHG | RESPIRATION RATE: 18 BRPM | HEART RATE: 91 BPM | TEMPERATURE: 99 F | DIASTOLIC BLOOD PRESSURE: 72 MMHG

## 2018-11-08 VITALS
HEART RATE: 81 BPM | HEIGHT: 62 IN | RESPIRATION RATE: 16 BRPM | DIASTOLIC BLOOD PRESSURE: 68 MMHG | BODY MASS INDEX: 32.39 KG/M2 | OXYGEN SATURATION: 100 % | TEMPERATURE: 98 F | SYSTOLIC BLOOD PRESSURE: 113 MMHG | WEIGHT: 176 LBS

## 2018-11-08 DIAGNOSIS — R10.9 ABDOMINAL PAIN, LEFT LATERAL: ICD-10-CM

## 2018-11-08 DIAGNOSIS — D50.0 IRON DEFICIENCY ANEMIA DUE TO CHRONIC BLOOD LOSS: Primary | ICD-10-CM

## 2018-11-08 DIAGNOSIS — D50.9 IDA (IRON DEFICIENCY ANEMIA): ICD-10-CM

## 2018-11-08 LAB
B-HCG UR QL: NEGATIVE
CTP QC/QA: YES

## 2018-11-08 PROCEDURE — 88305 TISSUE EXAM BY PATHOLOGIST: CPT | Performed by: PATHOLOGY

## 2018-11-08 PROCEDURE — 43235 EGD DIAGNOSTIC BRUSH WASH: CPT | Mod: 51,,, | Performed by: INTERNAL MEDICINE

## 2018-11-08 PROCEDURE — 25000003 PHARM REV CODE 250: Performed by: INTERNAL MEDICINE

## 2018-11-08 PROCEDURE — D9220A PRA ANESTHESIA: Mod: ANES,,, | Performed by: ANESTHESIOLOGY

## 2018-11-08 PROCEDURE — 27201089 HC SNARE, DISP (ANY): Performed by: INTERNAL MEDICINE

## 2018-11-08 PROCEDURE — D9220A PRA ANESTHESIA: Mod: CRNA,,, | Performed by: NURSE ANESTHETIST, CERTIFIED REGISTERED

## 2018-11-08 PROCEDURE — 37000009 HC ANESTHESIA EA ADD 15 MINS: Performed by: INTERNAL MEDICINE

## 2018-11-08 PROCEDURE — 43235 EGD DIAGNOSTIC BRUSH WASH: CPT | Performed by: INTERNAL MEDICINE

## 2018-11-08 PROCEDURE — 88305 TISSUE EXAM BY PATHOLOGIST: CPT | Mod: 26,,, | Performed by: PATHOLOGY

## 2018-11-08 PROCEDURE — 37000008 HC ANESTHESIA 1ST 15 MINUTES: Performed by: INTERNAL MEDICINE

## 2018-11-08 PROCEDURE — 63600175 PHARM REV CODE 636 W HCPCS: Mod: JG | Performed by: INTERNAL MEDICINE

## 2018-11-08 PROCEDURE — 63600175 PHARM REV CODE 636 W HCPCS: Performed by: NURSE ANESTHETIST, CERTIFIED REGISTERED

## 2018-11-08 PROCEDURE — 25000003 PHARM REV CODE 250: Performed by: NURSE ANESTHETIST, CERTIFIED REGISTERED

## 2018-11-08 PROCEDURE — 96365 THER/PROPH/DIAG IV INF INIT: CPT

## 2018-11-08 PROCEDURE — 45385 COLONOSCOPY W/LESION REMOVAL: CPT | Performed by: INTERNAL MEDICINE

## 2018-11-08 PROCEDURE — 45385 COLONOSCOPY W/LESION REMOVAL: CPT | Mod: ,,, | Performed by: INTERNAL MEDICINE

## 2018-11-08 PROCEDURE — 81025 URINE PREGNANCY TEST: CPT | Performed by: INTERNAL MEDICINE

## 2018-11-08 RX ORDER — PROPOFOL 10 MG/ML
VIAL (ML) INTRAVENOUS CONTINUOUS PRN
Status: DISCONTINUED | OUTPATIENT
Start: 2018-11-08 | End: 2018-11-08

## 2018-11-08 RX ORDER — SODIUM CHLORIDE 9 MG/ML
INJECTION, SOLUTION INTRAVENOUS CONTINUOUS
Status: DISCONTINUED | OUTPATIENT
Start: 2018-11-08 | End: 2018-11-08 | Stop reason: HOSPADM

## 2018-11-08 RX ORDER — PROPOFOL 10 MG/ML
VIAL (ML) INTRAVENOUS
Status: DISCONTINUED | OUTPATIENT
Start: 2018-11-08 | End: 2018-11-08

## 2018-11-08 RX ORDER — HEPARIN 100 UNIT/ML
500 SYRINGE INTRAVENOUS
Status: DISCONTINUED | OUTPATIENT
Start: 2018-11-08 | End: 2018-11-08 | Stop reason: HOSPADM

## 2018-11-08 RX ORDER — SODIUM CHLORIDE 9 MG/ML
INJECTION, SOLUTION INTRAVENOUS CONTINUOUS PRN
Status: DISCONTINUED | OUTPATIENT
Start: 2018-11-08 | End: 2018-11-08

## 2018-11-08 RX ORDER — SODIUM CHLORIDE 0.9 % (FLUSH) 0.9 %
10 SYRINGE (ML) INJECTION
Status: DISCONTINUED | OUTPATIENT
Start: 2018-11-08 | End: 2018-11-08 | Stop reason: HOSPADM

## 2018-11-08 RX ORDER — SODIUM CHLORIDE 0.9 % (FLUSH) 0.9 %
3 SYRINGE (ML) INJECTION
Status: DISCONTINUED | OUTPATIENT
Start: 2018-11-08 | End: 2018-11-08 | Stop reason: HOSPADM

## 2018-11-08 RX ORDER — LIDOCAINE HYDROCHLORIDE 10 MG/ML
INJECTION, SOLUTION INTRAVENOUS
Status: DISCONTINUED | OUTPATIENT
Start: 2018-11-08 | End: 2018-11-08

## 2018-11-08 RX ADMIN — PROPOFOL 200 MCG/KG/MIN: 10 INJECTION, EMULSION INTRAVENOUS at 10:11

## 2018-11-08 RX ADMIN — SODIUM CHLORIDE: 0.9 INJECTION, SOLUTION INTRAVENOUS at 10:11

## 2018-11-08 RX ADMIN — PROPOFOL 80 MG: 10 INJECTION, EMULSION INTRAVENOUS at 10:11

## 2018-11-08 RX ADMIN — LIDOCAINE HYDROCHLORIDE 100 MG: 10 INJECTION, SOLUTION INTRAVENOUS at 10:11

## 2018-11-08 RX ADMIN — SODIUM CHLORIDE: 9 INJECTION, SOLUTION INTRAVENOUS at 01:11

## 2018-11-08 RX ADMIN — FERRIC CARBOXYMALTOSE INJECTION 750 MG: 50 INJECTION, SOLUTION INTRAVENOUS at 01:11

## 2018-11-08 NOTE — ANESTHESIA PREPROCEDURE EVALUATION
11/08/2018  Terese Macias is a 43 y.o., female.  Past Medical History:   Diagnosis Date    Abnormal Pap smear     Abnormal Pap smear of vagina     Anemia     Depression     Fever blister     GERD (gastroesophageal reflux disease)     Heavy periods 6/8/2015    Hemorrhoids without complication     Joint pain     Psychiatric problem     Screening for HPV (human papillomavirus)     Sleep difficulties     Thalassemia     Thalassemia     Therapy      Past Surgical History:   Procedure Laterality Date    COLONOSCOPY      ESOPHAGOGASTRODUODENOSCOPY (EGD) N/A 3/23/2015    Performed by Ba Stewart MD at [facility] (4TH FLR)    HERNIA REPAIR      [place] COLONOSCOPY,DIAGNOSTIC [89871 (CPT®)] N/A 7/2/2014    Performed by Cj Lassiter MD at [facility] (4TH FLR)         Anesthesia Evaluation    I have reviewed the Patient Summary Reports.    I have reviewed the Nursing Notes.   I have reviewed the Medications.     Review of Systems  Anesthesia Hx:  No problems with previous Anesthesia  History of prior surgery of interest to airway management or planning: Previous anesthesia: General Denies Family Hx of Anesthesia complications.   Denies Personal Hx of Anesthesia complications.   Social:  Non-Smoker    Hematology/Oncology:        Hematology Comments: thalassemia   Cardiovascular:  Cardiovascular Normal Exercise tolerance: good  Denies Hypertension.  Denies MI.  Denies CAD.       Pulmonary:  Pulmonary Normal    Renal/:  Renal/ Normal     Hepatic/GI:   Bowel Prep. IBS   Endocrine:  Endocrine Normal      Lab Results   Component Value Date    WBC 7.29 10/22/2018    HGB 7.7 (L) 10/22/2018    HCT 26.0 (L) 10/22/2018    MCV 80 (L) 10/22/2018     (L) 10/22/2018         Physical Exam  General:  Well nourished    Airway/Jaw/Neck:  Airway Findings: Mouth Opening: Normal Tongue: Normal  General Airway  Assessment: Adult  Mallampati: I  TM Distance: Normal, at least 6 cm  Jaw/Neck Findings:  Neck ROM: Normal ROM      Dental:  Dental Findings: In tact   Chest/Lungs:  Chest/Lungs Findings: Normal Respiratory Rate     Heart/Vascular:  Heart Findings: Rate: Normal        Mental Status:  Mental Status Findings:  Cooperative, Alert and Oriented         Anesthesia Plan  Type of Anesthesia, risks & benefits discussed:  Anesthesia Type:  general  Patient's Preference:   Intra-op Monitoring Plan:   Intra-op Monitoring Plan Comments:   Post Op Pain Control Plan:   Post Op Pain Control Plan Comments:   Induction:   IV  Beta Blocker:  Patient is not currently on a Beta-Blocker (No further documentation required).       Informed Consent: Patient understands risks and agrees with Anesthesia plan.  Questions answered. Anesthesia consent signed with patient.  ASA Score: 3     Day of Surgery Review of History & Physical:            Ready For Surgery From Anesthesia Perspective.

## 2018-11-08 NOTE — ANESTHESIA POSTPROCEDURE EVALUATION
"Anesthesia Post Evaluation    Patient: Terese Macias    Procedure(s) Performed: Procedure(s) (LRB):  EGD (ESOPHAGOGASTRODUODENOSCOPY) (N/A)  COLONOSCOPY (N/A)    Final Anesthesia Type: general  Patient location during evaluation: [facility]  Patient participation: Yes- Able to Participate  Level of consciousness: awake and alert  Post-procedure vital signs: reviewed and stable  Pain management: adequate  Airway patency: patent  PONV status at discharge: No PONV  Anesthetic complications: no      Cardiovascular status: hemodynamically stable  Respiratory status: unassisted, spontaneous ventilation and room air  Hydration status: euvolemic  Follow-up not needed.        Visit Vitals  /68   Pulse 81   Temp 36.7 °C (98 °F) (Temporal)   Resp 16   Ht 5' 2" (1.575 m)   Wt 79.8 kg (176 lb)   LMP 10/17/2018   SpO2 100%   Breastfeeding? No   BMI 32.19 kg/m²       Pain/Eduardo Score: Pain Assessment Performed: Yes (11/8/2018 11:45 AM)  Presence of Pain: denies (11/8/2018 11:45 AM)  Eduardo Score: 10 (11/8/2018 11:30 AM)        "

## 2018-11-08 NOTE — PROVATION PATIENT INSTRUCTIONS
Discharge Summary/Instructions after an Endoscopic Procedure  Patient Name: Terese Macias  Patient MRN: [id number]  Patient YOB: 1975 Thursday, November 08, 2018  Ba Stewart MD  RESTRICTIONS:  During your procedure today, you received medications for sedation.  These   medications may affect your judgment, balance and coordination.  Therefore,   for 24 hours, you have the following restrictions:   - DO NOT drive a car, operate machinery, make legal/financial decisions,   sign important papers or drink alcohol.    ACTIVITY:  Today: no heavy lifting, straining or running due to procedural   sedation/anesthesia.  The following day: return to full activity including work.  DIET:  Eat and drink normally unless instructed otherwise.     TREATMENT FOR COMMON SIDE EFFECTS:  - Mild abdominal pain, nausea, belching, bloating or excessive gas:  rest,   eat lightly and use a heating pad.  - Sore Throat: treat with throat lozenges and/or gargle with warm salt   water.  - Because air was used during the procedure, expelling large amounts of air   from your rectum or belching is normal.  - If a bowel prep was taken, you may not have a bowel movement for 1-3 days.    This is normal.  SYMPTOMS TO WATCH FOR AND REPORT TO YOUR PHYSICIAN:  1. Abdominal pain or bloating, other than gas cramps.  2. Chest pain.  3. Back pain.  4. Signs of infection such as: chills or fever occurring within 24 hours   after the procedure.  5. Rectal bleeding, which would show as bright red, maroon, or black stools.   (A tablespoon of blood from the rectum is not serious, especially if   hemorrhoids are present.)  6. Vomiting.  7. Weakness or dizziness.  GO DIRECTLY TO THE NEAREST EMERGENCY ROOM IF YOU HAVE ANY OF THE FOLLOWING:      Difficulty breathing              Chills and/or fever over 101 F   Persistent vomiting and/or vomiting blood   Severe abdominal pain   Severe chest pain   Black, tarry stools   Bleeding- more than one  tablespoon   Any other symptom or condition that you feel may need urgent attention  Your doctor recommends these additional instructions:  If any biopsies were taken, your doctors clinic will contact you in 1 to 2   weeks with any results.  - Perform CT scan of the abdomen with contrast.   - Perform a colonoscopy now.   - See colonoscopy report for further details.  For questions, problems or results please call your physician - Ba Stewart MD at Work:  [phone].  [facility] [place], EMERGENCY ROOM PHONE NUMBER: [phone]  IF A COMPLICATION OR EMERGENCY SITUATION ARISES AND YOU ARE UNABLE TO REACH   YOUR PHYSICIAN - GO DIRECTLY TO THE EMERGENCY ROOM.  Ba Stewart MD  11/8/2018 11:27:16 AM  This report has been verified and signed electronically.  [employer]

## 2018-11-08 NOTE — PLAN OF CARE
Problem: Anemia (Adult)  Goal: Identify Related Risk Factors and Signs and Symptoms  Related risk factors and signs and symptoms are identified upon initiation of Human Response Clinical Practice Guideline (CPG)  Outcome: Ongoing (interventions implemented as appropriate)  Labs , hx, and medications reviewed. Assessment completed. Discussed plan of care with patient. Arrived with PIV s/p EGD. Flushes well with slight burning.  Will restart if continues to burn.  Patient in agreement. VSS.  Chair reclined and warm blanket and snack offered. Will cont to monitor

## 2018-11-08 NOTE — INTERVAL H&P NOTE
Pre-Procedure H and P Addendum    Patient seen and examined.  History and exam unchanged from prior history and physical.      Procedure: EGD and colonoscopy  Indication: worsening iron deficiency anemia   ASA Class: per anesthesiology  Airway: normal  Neck Mobility: full range of motion  Mallampatti score: per anesthesia  History of anesthesia problems: no  Family history of anesthesia problems: no  Anesthesia Plan: MAC    Anesthesia/Surgery risks, benefits and alternative options discussed and understood by patient/family.          Active Hospital Problems    Diagnosis  POA    DUSTIN (iron deficiency anemia) [D50.9]  Yes      Resolved Hospital Problems   No resolved problems to display.

## 2018-11-08 NOTE — DISCHARGE INSTRUCTIONS

## 2018-11-08 NOTE — PLAN OF CARE
Problem: Patient Care Overview  Goal: Plan of Care Review  Outcome: Ongoing (interventions implemented as appropriate)  Pt tolerated C@ injectafer & obs period without adverse effects. VSS. Provided AVS & verbalized understanding of RTC date. DC with family ambulating independently.

## 2018-11-08 NOTE — TRANSFER OF CARE
"Anesthesia Transfer of Care Note    Patient: Terese Macias    Procedure(s) Performed: Procedure(s) (LRB):  EGD (ESOPHAGOGASTRODUODENOSCOPY) (N/A)  COLONOSCOPY (N/A)    Patient location: PACU    Anesthesia Type: general    Transport from OR: Transported from OR on room air with adequate spontaneous ventilation. Transported from OR on 6-10 L/min O2 by face mask with adequate spontaneous ventilation    Post pain: adequate analgesia    Post assessment: no apparent anesthetic complications and tolerated procedure well    Post vital signs: stable    Level of consciousness: awake and alert    Nausea/Vomiting: no nausea/vomiting    Complications: none    Transfer of care protocol was followed      Last vitals:   Visit Vitals  /77 (BP Location: Left arm, Patient Position: Sitting)   Pulse 101   Temp 36.7 °C (98.1 °F) (Temporal)   Resp 18   Ht 5' 2" (1.575 m)   Wt 79.8 kg (176 lb)   LMP 10/17/2018   SpO2 100%   Breastfeeding? No   BMI 32.19 kg/m²     "

## 2018-11-08 NOTE — PROVATION PATIENT INSTRUCTIONS
Discharge Summary/Instructions after an Endoscopic Procedure  Patient Name: Terese Macias  Patient MRN: [id number]  Patient YOB: 1975 Thursday, November 08, 2018  Ba Stewart MD  RESTRICTIONS:  During your procedure today, you received medications for sedation.  These   medications may affect your judgment, balance and coordination.  Therefore,   for 24 hours, you have the following restrictions:   - DO NOT drive a car, operate machinery, make legal/financial decisions,   sign important papers or drink alcohol.    ACTIVITY:  Today: no heavy lifting, straining or running due to procedural   sedation/anesthesia.  The following day: return to full activity including work.  DIET:  Eat and drink normally unless instructed otherwise.     TREATMENT FOR COMMON SIDE EFFECTS:  - Mild abdominal pain, nausea, belching, bloating or excessive gas:  rest,   eat lightly and use a heating pad.  - Sore Throat: treat with throat lozenges and/or gargle with warm salt   water.  - Because air was used during the procedure, expelling large amounts of air   from your rectum or belching is normal.  - If a bowel prep was taken, you may not have a bowel movement for 1-3 days.    This is normal.  SYMPTOMS TO WATCH FOR AND REPORT TO YOUR PHYSICIAN:  1. Abdominal pain or bloating, other than gas cramps.  2. Chest pain.  3. Back pain.  4. Signs of infection such as: chills or fever occurring within 24 hours   after the procedure.  5. Rectal bleeding, which would show as bright red, maroon, or black stools.   (A tablespoon of blood from the rectum is not serious, especially if   hemorrhoids are present.)  6. Vomiting.  7. Weakness or dizziness.  GO DIRECTLY TO THE NEAREST EMERGENCY ROOM IF YOU HAVE ANY OF THE FOLLOWING:      Difficulty breathing              Chills and/or fever over 101 F   Persistent vomiting and/or vomiting blood   Severe abdominal pain   Severe chest pain   Black, tarry stools   Bleeding- more than one  tablespoon   Any other symptom or condition that you feel may need urgent attention  Your doctor recommends these additional instructions:  If any biopsies were taken, your doctors clinic will contact you in 1 to 2   weeks with any results.  - Discharge patient to home.   - Patient has a contact number available for emergencies.  The signs and   symptoms of potential delayed complications were discussed with the   patient.  Return to normal activities tomorrow.  Written discharge   instructions were provided to the patient.   - Resume previous diet.   - Continue present medications.   - Await pathology results.   - Telephone GI clinic for pathology results in 1 week.   - Repeat colonoscopy is recommended.  The colonoscopy date will be   determined after pathology results from today's exam become available for   review.   - Return to GI clinic.  For questions, problems or results please call your physician - Ba Stewart MD at Work:  [phone].  [facility] [place], EMERGENCY ROOM PHONE NUMBER: [phone]  IF A COMPLICATION OR EMERGENCY SITUATION ARISES AND YOU ARE UNABLE TO REACH   YOUR PHYSICIAN - GO DIRECTLY TO THE EMERGENCY ROOM.  Ba Stewart MD  11/8/2018 11:23:48 AM  This report has been verified and signed electronically.  [employer]

## 2018-11-08 NOTE — PROGRESS NOTES
Dr. Stewart and Anesthesiology said to leave the patient's IV in upon discharge because she has an iron infusion scheduled for after her procedure.

## 2018-11-08 NOTE — ANESTHESIA RELEASE NOTE
"Anesthesia Release from PACU Note    Patient: Terese Macias    Procedure(s) Performed: Procedure(s) (LRB):  EGD (ESOPHAGOGASTRODUODENOSCOPY) (N/A)  COLONOSCOPY (N/A)    Anesthesia type: general    Post pain: Adequate analgesia    Post assessment: no apparent anesthetic complications and tolerated procedure well    Last Vitals:   Visit Vitals  /68   Pulse 81   Temp 36.7 °C (98 °F) (Temporal)   Resp 16   Ht 5' 2" (1.575 m)   Wt 79.8 kg (176 lb)   LMP 10/17/2018   SpO2 100%   Breastfeeding? No   BMI 32.19 kg/m²       Post vital signs: stable    Level of consciousness: awake and alert     Nausea/Vomiting: no nausea/no vomiting    Complications: none    Airway Patency: patent    Respiratory: unassisted, spontaneous ventilation, room air    Cardiovascular: stable and blood pressure at baseline    Hydration: euvolemic  "

## 2018-11-09 ENCOUNTER — TELEPHONE (OUTPATIENT)
Dept: GASTROENTEROLOGY | Facility: CLINIC | Age: 43
End: 2018-11-09

## 2018-11-09 NOTE — TELEPHONE ENCOUNTER
----- Message from Nicolasa Soares NP sent at 11/9/2018  2:40 PM CST -----  Regarding: FW: follow-up  Pt needs clinic visit with me to consent for VCE  Llilian Loja NP  ----- Message -----  From: Ba Stewart MD  Sent: 11/8/2018  11:59 AM  To: Nicolasa Soares NP, Terry Cosme Staff  Subject: follow-up                                        Jose Alberto barrera,  Did this patient's EGD and colonoscopy today.  Didn't find anything to explain the low blood counts and iron.  She will need a pill cam.  I explained it in detail, but due to the anesthetics, I could not get consent yet.    Found an extrensic compression in the fundus area and recommend we do a CT of her abdomen - i've already placed the order.  Skye, will you please contact her about scheduling.      Patrick

## 2018-11-12 ENCOUNTER — TELEPHONE (OUTPATIENT)
Dept: GASTROENTEROLOGY | Facility: CLINIC | Age: 43
End: 2018-11-12

## 2018-11-12 NOTE — TELEPHONE ENCOUNTER
----- Message from Ba Stewart MD sent at 11/8/2018 11:59 AM CST -----  Regarding: follow-up  Hi holly,  Did this patient's EGD and colonoscopy today.  Didn't find anything to explain the low blood counts and iron.  She will need a pill cam.  I explained it in detail, but due to the anesthetics, I could not get consent yet.    Found an extrensic compression in the fundus area and recommend we do a CT of her abdomen - i've already placed the order.  Skye, will you please contact her about scheduling.      Patrick

## 2018-11-12 NOTE — TELEPHONE ENCOUNTER
Spoke with patient. GI and CT scan appointments scheduled and instructions reviewed with patient.

## 2018-11-13 ENCOUNTER — TELEPHONE (OUTPATIENT)
Dept: GASTROENTEROLOGY | Facility: CLINIC | Age: 43
End: 2018-11-13

## 2018-11-13 NOTE — TELEPHONE ENCOUNTER
----- Message from Conchita Riley sent at 11/13/2018  4:15 PM CST -----  Contact: self - [phone]  haseeb - has questions re her appt tomorrow - please call patient at

## 2018-11-14 ENCOUNTER — OFFICE VISIT (OUTPATIENT)
Dept: GASTROENTEROLOGY | Facility: CLINIC | Age: 43
End: 2018-11-14
Payer: COMMERCIAL

## 2018-11-14 ENCOUNTER — HOSPITAL ENCOUNTER (OUTPATIENT)
Dept: RADIOLOGY | Facility: HOSPITAL | Age: 43
Discharge: HOME | End: 2018-11-14
Attending: INTERNAL MEDICINE
Payer: COMMERCIAL

## 2018-11-14 VITALS
BODY MASS INDEX: 32.49 KG/M2 | WEIGHT: 176.56 LBS | HEART RATE: 99 BPM | HEIGHT: 62 IN | DIASTOLIC BLOOD PRESSURE: 68 MMHG | SYSTOLIC BLOOD PRESSURE: 113 MMHG

## 2018-11-14 DIAGNOSIS — D50.9 IRON DEFICIENCY ANEMIA, UNSPECIFIED IRON DEFICIENCY ANEMIA TYPE: Primary | ICD-10-CM

## 2018-11-14 DIAGNOSIS — R10.9 ABDOMINAL PAIN, LEFT LATERAL: ICD-10-CM

## 2018-11-14 DIAGNOSIS — K58.1 IRRITABLE BOWEL SYNDROME WITH CONSTIPATION: ICD-10-CM

## 2018-11-14 PROCEDURE — 74177 CT ABD & PELVIS W/CONTRAST: CPT | Mod: 26,,, | Performed by: RADIOLOGY

## 2018-11-14 PROCEDURE — 99999 PR PBB SHADOW E&M-EST. PATIENT-LVL IV: CPT | Mod: PBBFAC,,, | Performed by: NURSE PRACTITIONER

## 2018-11-14 PROCEDURE — 25500020 PHARM REV CODE 255: Performed by: INTERNAL MEDICINE

## 2018-11-14 PROCEDURE — 74177 CT ABD & PELVIS W/CONTRAST: CPT | Mod: TC

## 2018-11-14 PROCEDURE — 99214 OFFICE O/P EST MOD 30 MIN: CPT | Mod: S$GLB,,, | Performed by: NURSE PRACTITIONER

## 2018-11-14 PROCEDURE — 3008F BODY MASS INDEX DOCD: CPT | Mod: CPTII,S$GLB,, | Performed by: NURSE PRACTITIONER

## 2018-11-14 RX ADMIN — IOHEXOL 75 ML: 350 INJECTION, SOLUTION INTRAVENOUS at 06:11

## 2018-11-14 RX ADMIN — IOHEXOL 15 ML: 350 INJECTION, SOLUTION INTRAVENOUS at 05:11

## 2018-11-14 NOTE — PROGRESS NOTES
[facility] Note    Reason for Visit:  The primary encounter diagnosis was Iron deficiency anemia, unspecified iron deficiency anemia type. A diagnosis of Irritable bowel syndrome with constipation was also pertinent to this visit.    PCP:   Mildred Mohamud       Referring MD:  No referring provider defined for this encounter.    HPI:  This is a 43 y.o. female here for f/u evaluation of anemia    Anemia  Onset-childhood  SOB-yes  CP-no  Increased fatigue-yes  Syncope/near syncope-no  Dizziness/ light headedness-lightheadedness  PICA-yes, some improveent  GI Bleeding-no  Previous studies and /or eval per GI-EGD/colon 11/8/18 unrevealing; also evaluated by Dr. Stewart in 2015. EGD nl, sb bx nl. Per pt colon in 2014 unrevealing.  Iron therapy-.has started IV iron infusions.  Unable to tolerate PO iron d/t constipation.    Lab Results   Component Value Date    IRON 19 (L) 09/25/2018    TIBC 487 (H) 09/25/2018    FERRITIN 5 (L) 11/28/2017     Lab Results   Component Value Date    WBC 7.29 10/22/2018    HGB 7.7 (L) 10/22/2018    HCT 26.0 (L) 10/22/2018    MCV 80 (L) 10/22/2018     (L) 10/22/2018     Abdominal pain -h/o IBs-C with left sided abd pain  x at least 3 yrs. Now constant. Worse with lying on rt side. Associated with BM. Sometimes better with BM. Worse with deep breaths, coughing, sneezing. Tightening.has not tried IBgard.  Reflux - indigestion on occ. No meds  Dysphagia/odynophagia - no   Bowel habits - h/o IBS-C. Currently with 1-2 bristol type 3,4>5 BM/week. Will take mag 7 tablets prn with some improvement.   GI bleeding - denies hematochezia, hematemesis, melena, BRBPR, black/tarry stools, and coffee ground emesis  NSAID usage - aleve couple days monthly, meloxicam 7.5 mg once weekly or less for shoulder pain     ROS:  Constitutional: No fevers, no chills, No unintentional weight loss, + fatigue,   ENT: No allergies  CV: no chest pain, no palpitations, no perif. edema, no sob on  exertion  Pulm: No cough, + shortness of breath, no wheezes, no sputum  Ophtho: No vision changes  GI: see HPI; also no nausea, no vomiting, decrease in appetite  Derm: No rash  Heme: No lymphadenopathy, No bruising  MSK: + shoulder pain, no muscle pain, no muscle weakness  : No dysuria, No hematuria  Endo: + hot or cold intolerance  Neuro: No syncope, No seizure,       Medical History:  has a past medical history of Abnormal Pap smear, Abnormal Pap smear of vagina, Anemia, Depression, Fever blister, GERD (gastroesophageal reflux disease), Heavy periods (6/8/2015), Hemorrhoids without complication, IBS (irritable colon syndrome), Joint pain, Psychiatric problem, Screening for HPV (human papillomavirus), Sleep difficulties, Thalassemia, Thalassemia, and Therapy.    Surgical History:  has a past surgical history that includes Hernia repair; Colonoscopy; EGD (ESOPHAGOGASTRODUODENOSCOPY) (N/A, 11/8/2018); COLONOSCOPY (N/A, 11/8/2018); ESOPHAGOGASTRODUODENOSCOPY (EGD) (N/A, 3/23/2015); and [place] COLONOSCOPY,DIAGNOSTIC [62604 (CPT®)] (N/A, 7/2/2014).    Family History: family history includes Acne in her sister; Breast cancer in her cousin, maternal aunt, and maternal grandmother; Cancer in her cousin, cousin, maternal aunt, and maternal grandmother; Colon cancer (age of onset: 60) in her paternal uncle; Diabetes in her father; Eczema in her brother, daughter, father, other, and sister; Heart disease in her father; Hypertension in her mother; No Known Problems in her maternal grandfather, maternal uncle, other, paternal aunt, paternal grandfather, and paternal grandmother; Stomach cancer (age of onset: 44) in her cousin..     Social History:  reports that  has never smoked. she has never used smokeless tobacco. She reports that she drinks alcohol. She reports that she does not use drugs.    Review of patient's allergies indicates:   Allergen Reactions    Pcn [penicillins] Other (See Comments)     Pt had reaction to PCN as  "an infant.       Current Outpatient Medications   Medication Sig    FLUoxetine (PROZAC) 10 MG capsule TAKE 1 CAPSULE BY MOUTH EVERY DAY    fluticasone (FLONASE) 50 mcg/actuation nasal spray 1 spray (50 mcg total) by Each Nare route once daily. (Patient taking differently: 1 spray by Each Nare route daily as needed. )    hydrOXYzine pamoate (VISTARIL) 25 MG Cap Take 1 capsule (25 mg total) by mouth nightly as needed (sleep).    meloxicam (MOBIC) 7.5 MG tablet Take 1 tablet (7.5 mg total) by mouth once daily. Take once a day for two weeks then as needed. Take with food.    NUVARING 0.12-0.015 mg/24 hr vaginal ring INSERT 1 RING VAGINALLY AS DIRECTED. REMOVE AFTER 3 WEEKS & WAIT 7 DAYS BEFORE INSERTING A NEW RING    omeprazole (PRILOSEC) 20 MG capsule Take 1 capsule (20 mg total) by mouth once daily.    albuterol 90 mcg/actuation inhaler Inhale 2 puffs into the lungs every 6 (six) hours as needed for Wheezing. Rescue    docusate sodium (COLACE) 50 MG capsule Take 1 capsule (50 mg total) by mouth nightly as needed for Constipation.    ferrous gluconate (FERGON) 324 MG tablet Take 1 tablet (324 mg total) by mouth daily with breakfast.    linaclotide (LINZESS) 145 mcg Cap capsule Take 1 capsule (145 mcg total) by mouth once daily.     No current facility-administered medications for this visit.        Objective Findings:    Vital Signs:  /68   Pulse 99   Ht 5' 2" (1.575 m)   Wt 80.1 kg (176 lb 9.4 oz)   LMP 10/17/2018   BMI 32.30 kg/m²   Body mass index is 32.3 kg/m².    Physical Exam:  General Appearance: Well appearing in no acute distress  Head:   Normocephalic, without obvious abnormality  Eyes:    No scleral icterus, EOMI  ENT: Neck supple, Lips, mucosa, and tongue normal; teeth and gums normal  Lungs: CTA bilaterally in anterior and posterior fields, no wheezes, no crackles.  Heart:  Regular rate and rhythm, S1, S2 normal, no murmurs heard  Abdomen: Soft, non tender, non distended with positive " bowel sounds in all four quadrants. No hepatosplenomegaly, ascites, or mass  Extremities: 2+ radial pulses, no clubbing, cyanosis or edema  Skin: No rash to exposed areas  Neurologic: A&Ox4      Labs:  Lab Results   Component Value Date    WBC 7.29 10/22/2018    HGB 7.7 (L) 10/22/2018    HCT 26.0 (L) 10/22/2018     (L) 10/22/2018    CHOL 133 11/28/2017    TRIG 68 11/28/2017    HDL 54 11/28/2017    ALT 10 11/28/2017    AST 16 11/28/2017     03/21/2018    K 4.1 03/21/2018     03/21/2018    CREATININE 0.7 03/21/2018    BUN 11 03/21/2018    CO2 24 03/21/2018    TSH 1.911 11/28/2017       Imaging:  CT abd/pelvis 7/6/16: Stable calcified mesenteric mass and in the mid abdomen, again possibly a partially calcified lymph node.  Assessment:  1. Iron deficiency anemia, unspecified iron deficiency anemia type    2. Irritable bowel syndrome with constipation           Recommendations:  1. DUSTIN-SBFT. VCE if SBFT okay. pt consented today for VCE  2. IBS-C- linzess 145 mcg daily. OTC IBgard.           Order summary:  Orders Placed This Encounter    FL Small Bowel Follow Through    linaclotide (LINZESS) 145 mcg Cap capsule         Thank you so much for allowing me to participate in the care of Terese Soares, APRN, FNP-C

## 2018-11-14 NOTE — PATIENT INSTRUCTIONS
Take over the counter IBgard for abdominal pain.  Start linzess 145 mcg one daily for constipation. This can help with abdominal pain over time.

## 2018-11-14 NOTE — LETTER
November 14, 2018                 [facility]  [address]  [facility] [address]  Phone: [phone]  Fax: [phone] To Whom it May Concern,    Ms. Terese Macias is currently under our care here at [facility]. Due to her on going health issues and evaluation, we will be in constant telephone communication with her. We are unable to reach her on any work/office phone due to HIPPA and patient privacy and confidentiality rights.     If you have any questions or concerns, please don't hesitate to call.    Warm Regards,          ABELARDO Moreno, FNP-C

## 2018-11-20 ENCOUNTER — HOSPITAL ENCOUNTER (OUTPATIENT)
Dept: RADIOLOGY | Facility: HOSPITAL | Age: 43
Discharge: HOME | End: 2018-11-20
Attending: NURSE PRACTITIONER
Payer: COMMERCIAL

## 2018-11-20 DIAGNOSIS — D50.9 IRON DEFICIENCY ANEMIA, UNSPECIFIED IRON DEFICIENCY ANEMIA TYPE: ICD-10-CM

## 2018-11-20 PROCEDURE — 74250 X-RAY XM SM INT 1CNTRST STD: CPT | Mod: TC,FY

## 2018-11-20 PROCEDURE — 74250 X-RAY XM SM INT 1CNTRST STD: CPT | Mod: 26,,, | Performed by: RADIOLOGY

## 2018-11-30 ENCOUNTER — TELEPHONE (OUTPATIENT)
Dept: GASTROENTEROLOGY | Facility: CLINIC | Age: 43
End: 2018-11-30

## 2018-11-30 DIAGNOSIS — D50.9 IRON DEFICIENCY ANEMIA, UNSPECIFIED IRON DEFICIENCY ANEMIA TYPE: Primary | ICD-10-CM

## 2018-11-30 DIAGNOSIS — R93.5 ABNORMAL CT OF THE ABDOMEN: ICD-10-CM

## 2018-11-30 DIAGNOSIS — R16.2 HEPATOSPLENOMEGALY: Primary | ICD-10-CM

## 2018-11-30 NOTE — TELEPHONE ENCOUNTER
Spoke with pt and gave pt results and scheduled VCE. Pt verbalized understanding. Also informed pt that she was contacted for ct results but didn't answer. Sent message to Dr. Terry TAPIA to contact with results.

## 2018-11-30 NOTE — TELEPHONE ENCOUNTER
----- Message from Nicolasa Soares NP sent at 11/30/2018 12:36 PM CST -----  SBFT w/o GI stricture or obstruction. May proceed with VCE. Order placed. Pt consented during clinic visit.     Thanks,  ALEXIS Snyder

## 2018-11-30 NOTE — TELEPHONE ENCOUNTER
----- Message from Nichol Orr MA sent at 11/30/2018  3:15 PM CST -----  Regarding: CT results   Pt would like her Ct results. Told pt that you were contacted but Dr. Terry TAPIA and she left a vm for you to call back.

## 2018-12-01 NOTE — TELEPHONE ENCOUNTER
Spoke with the patient by phone.  The CT indicates an enlarged spleen and small area of possible infarct.  Unclear etiology.  She was having left sided pain that was positional and this could be related.    I will send a message to the her hematologist, Dr. Medina, for recommendations regarding further evaluation.

## 2018-12-05 ENCOUNTER — TELEPHONE (OUTPATIENT)
Dept: GASTROENTEROLOGY | Facility: CLINIC | Age: 43
End: 2018-12-05

## 2018-12-05 ENCOUNTER — TELEPHONE (OUTPATIENT)
Dept: HEMATOLOGY/ONCOLOGY | Facility: CLINIC | Age: 43
End: 2018-12-05

## 2018-12-05 NOTE — TELEPHONE ENCOUNTER
Patient she reports she spoke with Dr. Stewart today.  Her EUS is not scheduled yet, I told her I would message ENDO about that. She pushed her appt to later in the day. Will ask Dr. Medina if he would like to wait for results from EUS prior to seeing her.

## 2018-12-05 NOTE — TELEPHONE ENCOUNTER
Spoke with patient. US w/doppler scheduled for tomorrow. Instructions reviewed with patient, she verbalized understanding.

## 2018-12-05 NOTE — TELEPHONE ENCOUNTER
----- Message from Conchita Riley sent at 12/5/2018  4:53 PM CST -----  Contact: self - [phone]  kiersten  - does she need anyone to come with her tomorrow for her procedure - please call patient at [phone]

## 2018-12-05 NOTE — TELEPHONE ENCOUNTER
I have communicated with Dr. Medina.  He points out that she also has a small irregular area of the pancreas that was not on previous scans.  It is unclear what the cause of the enlarged spleen is yet.    To evaluate further, we will proceed with a couple of tests.  I recommend an upper endoscopic ultrasound and a doppler ultrasound of the abdominal vessels.    I have spoken with the patient regarding this.

## 2018-12-05 NOTE — TELEPHONE ENCOUNTER
----- Message from Dolores Hitchcock sent at 12/5/2018  1:57 PM CST -----  Contact: patient  Please call pt at [phone]    Patient is calling to discuss the recent CT scan results (spleen enlarged)  Having pain on the left side    Thank you

## 2018-12-06 ENCOUNTER — HOSPITAL ENCOUNTER (OUTPATIENT)
Dept: RADIOLOGY | Facility: HOSPITAL | Age: 43
Discharge: HOME | End: 2018-12-06
Attending: INTERNAL MEDICINE
Payer: COMMERCIAL

## 2018-12-06 DIAGNOSIS — R16.2 HEPATOSPLENOMEGALY: ICD-10-CM

## 2018-12-06 DIAGNOSIS — F32.A DEPRESSION, UNSPECIFIED DEPRESSION TYPE: ICD-10-CM

## 2018-12-06 DIAGNOSIS — R93.5 ABNORMAL CT OF THE ABDOMEN: ICD-10-CM

## 2018-12-06 PROCEDURE — 93975 VASCULAR STUDY: CPT | Mod: TC

## 2018-12-06 PROCEDURE — 93975 VASCULAR STUDY: CPT | Mod: 26,,, | Performed by: RADIOLOGY

## 2018-12-06 RX ORDER — FLUOXETINE 10 MG/1
10 CAPSULE ORAL DAILY
Qty: 30 CAPSULE | Refills: 0 | OUTPATIENT
Start: 2018-12-06

## 2018-12-06 NOTE — TELEPHONE ENCOUNTER
----- Message from Marleny Canales sent at 12/5/2018  4:09 PM CST -----  Contact: Self   Med refill- [phone]    FLUoxetine (PROZAC) 10 MG capsule      [facility]/pharmacy #[id number] - [place], [place] - [address]

## 2018-12-07 NOTE — TELEPHONE ENCOUNTER
Attempted to contact patient regarding mediation denial. No answer, mailbox was full and unable to LVM.       *PATIENT NEEDS OV FOR FURTHER REFILLS.

## 2018-12-10 ENCOUNTER — TELEPHONE (OUTPATIENT)
Dept: ENDOSCOPY | Facility: HOSPITAL | Age: 43
End: 2018-12-10

## 2018-12-10 ENCOUNTER — TELEPHONE (OUTPATIENT)
Dept: GASTROENTEROLOGY | Facility: CLINIC | Age: 43
End: 2018-12-10

## 2018-12-10 DIAGNOSIS — K86.89 PANCREATIC MASS: Primary | ICD-10-CM

## 2018-12-10 NOTE — TELEPHONE ENCOUNTER
Spoke with patient about ___1230__ arrival time. Clear liquids past 7pm the day before the procedure. NPO past midnight. Please take blood pressure, heart seizure medication the morning of the procedure. Hold all diabetic medication the morning of the procedure. Leave all valuable at home.  Please have a ride available the day of the procedure.

## 2018-12-10 NOTE — TELEPHONE ENCOUNTER
Your Upper EUS is scheduled for  12/12/2018 @ 1230pm      At [facility] which is located at [address] [place] [address].  You will check in at the Hospital Admit Desk located on the 1st floor of the hospital. Please call the the office if you have any questions at [phone]      Upper Endoscopic Ultrasound    Endoscopic ultrasound(EUS) is a procedure used to image the digestive tract, including pancreas, lesions in esophagus and stomach.  It is used to diagnose and stage cancers of the digestive tract.  If necessary, your doctor may need to take samples during the procedure.    A responsible adult (family member or friend) must come with you and transport you home.  You are not allowed to drive, take a taxi or bus or leave the Endoscopy Center alone.  If you do not have a responsible adult with you to take you home, your exam will be cancelled.     If you have questions about the cost of your procedure, you should contact your insurance company as soon as possible.  Please bring a picture ID and your insurance card.  You will sign  treatment authorization forms at check in.  It is necessary for you to sign these forms again even if you recently signed these at the time of your clinic visit.    Please follow instructions of  if you are taking anticoagulant/blood thinning medications such as Aggrenox, aspirin, Brilinta, Effient, Eliquis, Lovenox, Plavix, Pletal, Pradaxa, Ticilid, Xarelto or Coumadin.     Take blood pressure, heart, anti-rejection and or seizure medication at 600 am the morning of the procedure.    Skip your morning dose of insulin or other oral medications for diabetes the morning of the procedure unless instructed otherwise by your doctor.      Day Before The Procedure    Eat a light evening meal and eat no solid food after 7 pm.  You may drink clear liquids including:    Water, Coffee or decaffeinated coffee (no milk or cream)  Tea, Herbal tea  Carbonated beverages (soft  drinks), regular and sugar free  Gelatin  Apple Juice, grape juice, white cranberry juice  Gatorade, Power Aid, Crystal Light, Derian Aid  Lemonade and Limeade  Bouillon, clear consomme'  Snowball, popsicles  DO NOT DRINK ANY LIQUIDS CONTAINING RED DYE  DO NOT DRINK ANY LIQUIDS NOT SPECIFICALLY LISTED  DO NOT DRINK ALCOHOL  NOTHING BY MOUTH AFTER MIDNIGHT    Day of Procedure    600 am take last dose of any medications you are allowed to take with a small amount of clear liquid

## 2018-12-11 ENCOUNTER — PATIENT MESSAGE (OUTPATIENT)
Dept: GASTROENTEROLOGY | Facility: CLINIC | Age: 43
End: 2018-12-11

## 2018-12-12 ENCOUNTER — HOSPITAL ENCOUNTER (OUTPATIENT)
Facility: HOSPITAL | Age: 43
Discharge: HOME | End: 2018-12-12
Attending: INTERNAL MEDICINE | Admitting: INTERNAL MEDICINE
Payer: COMMERCIAL

## 2018-12-12 ENCOUNTER — ANESTHESIA (OUTPATIENT)
Dept: ENDOSCOPY | Facility: HOSPITAL | Age: 43
End: 2018-12-12
Payer: COMMERCIAL

## 2018-12-12 ENCOUNTER — ANESTHESIA EVENT (OUTPATIENT)
Dept: ENDOSCOPY | Facility: HOSPITAL | Age: 43
End: 2018-12-12
Payer: COMMERCIAL

## 2018-12-12 VITALS
SYSTOLIC BLOOD PRESSURE: 120 MMHG | TEMPERATURE: 98 F | BODY MASS INDEX: 32.57 KG/M2 | HEART RATE: 78 BPM | HEIGHT: 62 IN | RESPIRATION RATE: 18 BRPM | WEIGHT: 177 LBS | DIASTOLIC BLOOD PRESSURE: 78 MMHG | OXYGEN SATURATION: 100 %

## 2018-12-12 DIAGNOSIS — R10.9 ABDOMINAL PAIN, UNSPECIFIED ABDOMINAL LOCATION: Primary | ICD-10-CM

## 2018-12-12 DIAGNOSIS — K86.89 MASS OF PANCREAS: ICD-10-CM

## 2018-12-12 LAB
B-HCG UR QL: NEGATIVE
CTP QC/QA: YES

## 2018-12-12 PROCEDURE — 88172 CYTP DX EVAL FNA 1ST EA SITE: CPT | Mod: 26,,, | Performed by: PATHOLOGY

## 2018-12-12 PROCEDURE — 27202059 HC NEEDLE, FNA (ANY): Performed by: INTERNAL MEDICINE

## 2018-12-12 PROCEDURE — 88173 CYTOPATH EVAL FNA REPORT: CPT | Mod: 26,,, | Performed by: PATHOLOGY

## 2018-12-12 PROCEDURE — 37000009 HC ANESTHESIA EA ADD 15 MINS: Performed by: INTERNAL MEDICINE

## 2018-12-12 PROCEDURE — 43242 EGD US FINE NEEDLE BX/ASPIR: CPT | Mod: ,,, | Performed by: INTERNAL MEDICINE

## 2018-12-12 PROCEDURE — 37000008 HC ANESTHESIA 1ST 15 MINUTES: Performed by: INTERNAL MEDICINE

## 2018-12-12 PROCEDURE — 25000003 PHARM REV CODE 250: Performed by: NURSE ANESTHETIST, CERTIFIED REGISTERED

## 2018-12-12 PROCEDURE — 88305 TISSUE EXAM BY PATHOLOGIST: CPT

## 2018-12-12 PROCEDURE — 88312 SPECIAL STAINS GROUP 1: CPT

## 2018-12-12 PROCEDURE — 27201028 HC NEEDLE, SCLERO: Performed by: INTERNAL MEDICINE

## 2018-12-12 PROCEDURE — 63600175 PHARM REV CODE 636 W HCPCS: Performed by: NURSE ANESTHETIST, CERTIFIED REGISTERED

## 2018-12-12 PROCEDURE — 88312 SPECIAL STAINS GROUP 1: CPT | Mod: 26,,, | Performed by: PATHOLOGY

## 2018-12-12 PROCEDURE — 81025 URINE PREGNANCY TEST: CPT | Performed by: INTERNAL MEDICINE

## 2018-12-12 PROCEDURE — 88342 IMHCHEM/IMCYTCHM 1ST ANTB: CPT | Mod: 26,,, | Performed by: PATHOLOGY

## 2018-12-12 PROCEDURE — 25000003 PHARM REV CODE 250: Performed by: INTERNAL MEDICINE

## 2018-12-12 PROCEDURE — 43242 EGD US FINE NEEDLE BX/ASPIR: CPT | Performed by: INTERNAL MEDICINE

## 2018-12-12 RX ORDER — SODIUM CHLORIDE 9 MG/ML
INJECTION, SOLUTION INTRAVENOUS CONTINUOUS
Status: DISCONTINUED | OUTPATIENT
Start: 2018-12-12 | End: 2018-12-12 | Stop reason: HOSPADM

## 2018-12-12 RX ORDER — PROPOFOL 10 MG/ML
VIAL (ML) INTRAVENOUS CONTINUOUS PRN
Status: DISCONTINUED | OUTPATIENT
Start: 2018-12-12 | End: 2018-12-12

## 2018-12-12 RX ORDER — PROPOFOL 10 MG/ML
VIAL (ML) INTRAVENOUS
Status: DISCONTINUED | OUTPATIENT
Start: 2018-12-12 | End: 2018-12-12

## 2018-12-12 RX ORDER — SODIUM CHLORIDE 0.9 % (FLUSH) 0.9 %
3 SYRINGE (ML) INJECTION
Status: DISCONTINUED | OUTPATIENT
Start: 2018-12-12 | End: 2018-12-12 | Stop reason: HOSPADM

## 2018-12-12 RX ORDER — LIDOCAINE HCL/PF 100 MG/5ML
SYRINGE (ML) INTRAVENOUS
Status: DISCONTINUED | OUTPATIENT
Start: 2018-12-12 | End: 2018-12-12

## 2018-12-12 RX ADMIN — TOPICAL ANESTHETIC 1 EACH: 200 SPRAY DENTAL; PERIODONTAL at 02:12

## 2018-12-12 RX ADMIN — PROPOFOL 20 MG: 10 INJECTION, EMULSION INTRAVENOUS at 02:12

## 2018-12-12 RX ADMIN — PROPOFOL 150 MCG/KG/MIN: 10 INJECTION, EMULSION INTRAVENOUS at 02:12

## 2018-12-12 RX ADMIN — PROPOFOL 80 MG: 10 INJECTION, EMULSION INTRAVENOUS at 02:12

## 2018-12-12 RX ADMIN — LIDOCAINE HYDROCHLORIDE 100 MG: 20 INJECTION, SOLUTION INTRAVENOUS at 02:12

## 2018-12-12 RX ADMIN — SODIUM CHLORIDE: 0.9 INJECTION, SOLUTION INTRAVENOUS at 02:12

## 2018-12-12 NOTE — TRANSFER OF CARE
"Anesthesia Transfer of Care Note    Patient: Terese Macias    Procedure(s) Performed: Procedure(s) (LRB):  ULTRASOUND-ENDOSCOPIC-UPPER (N/A)    Patient location: GI    Anesthesia Type: MAC    Transport from OR: Transported from OR on room air with adequate spontaneous ventilation    Post pain: adequate analgesia    Post assessment: no apparent anesthetic complications    Post vital signs: stable    Level of consciousness: alert, oriented and awake    Nausea/Vomiting: no nausea/vomiting    Complications: none    Transfer of care protocol was followed      Last vitals:   Visit Vitals  /75   Pulse 85   Temp 36.7 °C (98.1 °F)   Resp 20   Ht 5' 2" (1.575 m)   Wt 80.3 kg (177 lb)   SpO2 100%   Breastfeeding? No   BMI 32.37 kg/m²     "

## 2018-12-12 NOTE — ANESTHESIA PREPROCEDURE EVALUATION
12/12/2018  Terese Macias is a 43 y.o., female here for ULTRASOUND-ENDOSCOPIC-UPPER (N/A ) under MAC  Past Medical History:   Diagnosis Date    Abnormal Pap smear     Abnormal Pap smear of vagina     Anemia     Depression     Fever blister     GERD (gastroesophageal reflux disease)     Heavy periods 6/8/2015    Hemorrhoids without complication     IBS (irritable colon syndrome)     Joint pain     Psychiatric problem     Screening for HPV (human papillomavirus)     Sleep difficulties     Thalassemia     Thalassemia     Therapy      Past Surgical History:   Procedure Laterality Date    COLONOSCOPY      COLONOSCOPY N/A 11/8/2018    Procedure: COLONOSCOPY;  Surgeon: Ba Stewart MD;  Location: [facility] ([facility]);  Service: Endoscopy;  Laterality: N/A;  non diabetic constipation prep      ok to schedule per Tiara    COLONOSCOPY N/A 11/8/2018    Performed by Ba Stewart MD at [facility] (2ND FLR)    EGD (ESOPHAGOGASTRODUODENOSCOPY) N/A 11/8/2018    Performed by Ba Stewart MD at [facility] (2ND FLR)    ESOPHAGOGASTRODUODENOSCOPY N/A 11/8/2018    Procedure: EGD (ESOPHAGOGASTRODUODENOSCOPY);  Surgeon: Ba Stewart MD;  Location: [facility] (2ND FLR);  Service: Endoscopy;  Laterality: N/A;  ok to schedule per Tiara    ESOPHAGOGASTRODUODENOSCOPY (EGD) N/A 3/23/2015    Performed by Ba Stewart MD at [facility] (4TH FLR)    HERNIA REPAIR      [place] COLONOSCOPY,DIAGNOSTIC [24817 (CPT®)] N/A 7/2/2014    Performed by Cj Lassiter MD at [facility] (4TH FLR)         Pre-op Assessment    I have reviewed the Patient Summary Reports.     I have reviewed the Nursing Notes.   I have reviewed the Medications.     Review of Systems  Anesthesia Hx:  No problems with previous Anesthesia  History of prior surgery of interest to airway management or planning: Previous anesthesia: General Denies  Family Hx of Anesthesia complications.   Denies Personal Hx of Anesthesia complications.   Social:  Non-Smoker    Hematology/Oncology:         -- Anemia: Hematology Comments: thalassemia   Cardiovascular:  Cardiovascular Normal Exercise tolerance: good  Denies Hypertension.  Denies MI.  Denies CAD.       Pulmonary:  Pulmonary Normal    Renal/:  Renal/ Normal     Hepatic/GI:   Bowel Prep. GERD IBS   Endocrine:  Endocrine Normal    Psych:   depression        Lab Results   Component Value Date    WBC 7.29 10/22/2018    HGB 7.7 (L) 10/22/2018    HCT 26.0 (L) 10/22/2018    MCV 80 (L) 10/22/2018     (L) 10/22/2018         Physical Exam  General:  Well nourished    Airway/Jaw/Neck:  Airway Findings: Mouth Opening: Normal Tongue: Normal  General Airway Assessment: Adult  Mallampati: I  TM Distance: Normal, at least 6 cm  Jaw/Neck Findings:  Neck ROM: Normal ROM      Dental:  Dental Findings: In tact   Chest/Lungs:  Chest/Lungs Findings: Normal Respiratory Rate     Heart/Vascular:  Heart Findings: Rate: Normal        Mental Status:  Mental Status Findings:  Cooperative, Alert and Oriented         Anesthesia Plan  Type of Anesthesia, risks & benefits discussed:  Anesthesia Type:  general, MAC  Patient's Preference:   Intra-op Monitoring Plan:   Intra-op Monitoring Plan Comments:   Post Op Pain Control Plan:   Post Op Pain Control Plan Comments:   Induction:   IV  Beta Blocker:  Patient is not currently on a Beta-Blocker (No further documentation required).       Informed Consent: Patient understands risks and agrees with Anesthesia plan.  Questions answered. Anesthesia consent signed with patient.  ASA Score: 3     Day of Surgery Review of History & Physical:            Ready For Surgery From Anesthesia Perspective.

## 2018-12-12 NOTE — ANESTHESIA POSTPROCEDURE EVALUATION
"Anesthesia Post Evaluation    Patient: Terese Macias    Procedure(s) Performed: Procedure(s) (LRB):  ULTRASOUND-ENDOSCOPIC-UPPER (N/A)    Final Anesthesia Type: MAC  Patient location during evaluation: GI PACU  Patient participation: Yes- Able to Participate  Level of consciousness: awake and alert and oriented  Post-procedure vital signs: reviewed and stable  Pain management: adequate  Airway patency: patent  PONV status at discharge: No PONV  Anesthetic complications: no      Cardiovascular status: stable, hemodynamically stable and blood pressure returned to baseline  Respiratory status: room air, unassisted and spontaneous ventilation  Hydration status: euvolemic  Follow-up not needed.        Visit Vitals  /75   Pulse 85   Temp 36.7 °C (98.1 °F)   Resp 20   Ht 5' 2" (1.575 m)   Wt 80.3 kg (177 lb)   SpO2 100%   Breastfeeding? No   BMI 32.37 kg/m²       Pain/Eduardo Score: Eduardo Score: 9 (12/12/2018  3:09 PM)        "

## 2018-12-12 NOTE — DISCHARGE SUMMARY
Discharge Summary/Instructions after an Endoscopic Procedure    Patient Name: Terese Macias  Patient MRN: [id number]  Patient YOB: 1975 Wednesday, December 12, 2018  Venu Montiel MD    RESTRICTIONS:  During your procedure today, you received medications for sedation.  These medications may affect your judgment, balance and coordination.  Therefore, for 24 hours, you have the following restrictions:     - DO NOT drive a car, operate machinery, make legal/financial decisions, sign important papers or drink alcohol.      ACTIVITY:  Today: no heavy lifting, straining or running due to procedural sedation/anesthesia.  The following day: return to full activity including work.    DIET:  Eat and drink normally unless instructed otherwise.     TREATMENT FOR COMMON SIDE EFFECTS:  - Mild abdominal pain, nausea, belching, bloating or excessive gas:  rest, eat lightly and use a heating pad.  - Sore Throat: treat with throat lozenges and/or gargle with warm salt water.  - Because air was used during the procedure, expelling large amounts of air from your rectum or belching is normal.  - If a bowel prep was taken, you may not have a bowel movement for 1-3 days.  This is normal.      SYMPTOMS TO WATCH FOR AND REPORT TO YOUR PHYSICIAN:  1. Abdominal pain or bloating, other than gas cramps.  2. Chest pain.  3. Back pain.  4. Signs of infection such as: chills or fever occurring within 24 hours after the procedure.  5. Rectal bleeding, which would show as bright red, maroon, or black stools. (A tablespoon of blood from the rectum is not serious, especially if hemorrhoids are present.)  6. Vomiting.  7. Weakness or dizziness.      GO DIRECTLY TO THE NEAREST EMERGENCY ROOM IF YOU HAVE ANY OF THE FOLLOWING:     Difficulty breathing              Chills and/or fever over 101 F   Persistent vomiting and/or vomiting blood   Severe abdominal pain   Severe chest pain   Black, tarry stools   Bleeding- more than one  tablespoon   Any other symptom or condition that you feel may need urgent attention    Your doctor recommends these additional instructions:  If any biopsies were taken, your doctors clinic will contact you in 1 to 2 weeks with any results.    - Discharge patient to home (ambulatory).   - Await path results.   - Refer to  Surgical Oncology at appointment to be scheduled.   - Refer to an Oncologist at appointment to be scheduled.    For questions, problems or results please call your physician - Venu Montiel MD at Work:  [phone].    EMERGENCY PHONE NUMBER: [phone],  LAB RESULTS: [phone]    IF A COMPLICATION OR EMERGENCY SITUATION ARISES AND YOU ARE UNABLE TO REACH YOUR PHYSICIAN - GO DIRECTLY TO THE EMERGENCY ROOM.

## 2018-12-12 NOTE — H&P
History & Physical - Short Stay  Gastroenterology      SUBJECTIVE:     Procedure: EUS    Chief Complaint/Indication for Procedure: Abnormal imaging    History of Present Illness:  Patient is a 43 y.o. female presents with evidence of a pancreas lesion in the uncinate process on recent CT scan and US. No family history of pancreas cancer. No previous pancreatitis.    PTA Medications   Medication Sig    linaclotide (LINZESS) 145 mcg Cap capsule Take 1 capsule (145 mcg total) by mouth once daily.    albuterol 90 mcg/actuation inhaler Inhale 2 puffs into the lungs every 6 (six) hours as needed for Wheezing. Rescue    docusate sodium (COLACE) 50 MG capsule Take 1 capsule (50 mg total) by mouth nightly as needed for Constipation.    ferrous gluconate (FERGON) 324 MG tablet Take 1 tablet (324 mg total) by mouth daily with breakfast.    FLUoxetine (PROZAC) 10 MG capsule TAKE 1 CAPSULE BY MOUTH EVERY DAY    fluticasone (FLONASE) 50 mcg/actuation nasal spray 1 spray (50 mcg total) by Each Nare route once daily. (Patient taking differently: 1 spray by Each Nare route daily as needed. )    hydrOXYzine pamoate (VISTARIL) 25 MG Cap Take 1 capsule (25 mg total) by mouth nightly as needed (sleep).    meloxicam (MOBIC) 7.5 MG tablet Take 1 tablet (7.5 mg total) by mouth once daily. Take once a day for two weeks then as needed. Take with food.    NUVARING 0.12-0.015 mg/24 hr vaginal ring INSERT 1 RING VAGINALLY AS DIRECTED. REMOVE AFTER 3 WEEKS & WAIT 7 DAYS BEFORE INSERTING A NEW RING    omeprazole (PRILOSEC) 20 MG capsule Take 1 capsule (20 mg total) by mouth once daily.       Review of patient's allergies indicates:   Allergen Reactions    Pcn [penicillins] Other (See Comments)     Pt had reaction to PCN as an infant.        Past Medical History:   Diagnosis Date    Abnormal Pap smear     Abnormal Pap smear of vagina     Anemia     Depression     Fever blister     GERD (gastroesophageal reflux disease)     Heavy  periods 6/8/2015    Hemorrhoids without complication     IBS (irritable colon syndrome)     Joint pain     Psychiatric problem     Screening for HPV (human papillomavirus)     Sleep difficulties     Thalassemia     Thalassemia     Therapy      Past Surgical History:   Procedure Laterality Date    COLONOSCOPY      COLONOSCOPY N/A 11/8/2018    Procedure: COLONOSCOPY;  Surgeon: Ba Stewart MD;  Location: [facility] (2ND FLR);  Service: Endoscopy;  Laterality: N/A;  non diabetic constipation prep      ok to schedule per Tiara    COLONOSCOPY N/A 11/8/2018    Performed by Ba Stewart MD at [facility] (2ND FLR)    EGD (ESOPHAGOGASTRODUODENOSCOPY) N/A 11/8/2018    Performed by Ba Stewart MD at [facility] (2ND FLR)    ESOPHAGOGASTRODUODENOSCOPY N/A 11/8/2018    Procedure: EGD (ESOPHAGOGASTRODUODENOSCOPY);  Surgeon: Ba Stewart MD;  Location: [facility] (2ND FLR);  Service: Endoscopy;  Laterality: N/A;  ok to schedule per Tiara    ESOPHAGOGASTRODUODENOSCOPY (EGD) N/A 3/23/2015    Performed by Ba Stewart MD at [facility] (4TH FLR)    HERNIA REPAIR      VT COLONOSCOPY,DIAGNOSTIC [32865 (CPT®)] N/A 7/2/2014    Performed by Cj Lassiter MD at [facility] (4TH FLR)     Family History   Problem Relation Age of Onset    Hypertension Mother     Diabetes Father     Heart disease Father     Eczema Father     Eczema Other     Colon cancer Paternal Uncle 60        colon cancer    Cancer Cousin         breast    Breast cancer Cousin     No Known Problems Other     Cancer Cousin         stomach cancer    Breast cancer Maternal Grandmother     Cancer Maternal Grandmother         breast    Eczema Sister     Acne Sister     Eczema Brother     Eczema Daughter     Cancer Maternal Aunt         breast    Breast cancer Maternal Aunt     No Known Problems Maternal Uncle     No Known Problems Paternal Aunt     No Known Problems Maternal Grandfather     No Known Problems Paternal Grandmother      No Known Problems Paternal Grandfather     Stomach cancer Cousin 44    Melanoma Neg Hx     Psoriasis Neg Hx     Lupus Neg Hx     Ovarian cancer Neg Hx     Amblyopia Neg Hx     Blindness Neg Hx     Cataracts Neg Hx     Glaucoma Neg Hx     Macular degeneration Neg Hx     Retinal detachment Neg Hx     Strabismus Neg Hx     Stroke Neg Hx     Thyroid disease Neg Hx     Esophageal cancer Neg Hx     Irritable bowel syndrome Neg Hx     Crohn's disease Neg Hx     Ulcerative colitis Neg Hx     Celiac disease Neg Hx      Social History     Tobacco Use    Smoking status: Never Smoker    Smokeless tobacco: Never Used   Substance Use Topics    Alcohol use: Yes     Comment: Occasionally    Drug use: No       Review of Systems:  Constitutional: no fever or chills  Respiratory: no cough or shortness of breath  Cardiovascular: no chest pain or palpitations  Gastrointestinal: positive for abdominal pain, negative for diarrhea    OBJECTIVE:     Vital Signs (Most Recent)  Temp: 98.1 °F (36.7 °C) (12/12/18 1325)  Pulse: 85 (12/12/18 1325)  Resp: 20 (12/12/18 1325)  BP: 112/75 (12/12/18 1325)  SpO2: 100 % (12/12/18 1325)    Physical Exam:  General: well developed, well nourished  Lungs:  clear to auscultation bilaterally and normal respiratory effort  Heart: regular rate, S1, S2 normal  Abdomen: soft, non-tender non-distented; bowel sounds normal; no masses,  no organomegaly    Laboratory  CBC: No results for input(s): WBC, RBC, HGB, HCT, PLT, MCV, MCH, MCHC in the last 168 hours.  CMP: No results for input(s): GLU, CALCIUM, ALBUMIN, PROT, NA, K, CO2, CL, BUN, CREATININE, ALKPHOS, ALT, AST, BILITOT in the last 168 hours.  Coagulation: No results for input(s): LABPROT, INR, APTT in the last 168 hours.      Diagnostic Results:      ASSESSMENT/PLAN:     Pancreas lesion    Plan: EUS    Anesthesia Plan: MAC    ASA Grade: ASA 2 - Patient with mild systemic disease with no functional limitations     The impression and  plan was discussed in detail with the patient and family. All questions have been answered and the patient voices understanding of our plan at this point. The risk of the procedure was discussed in detail which includes but not limited to bleeding, infection, perforation in some cases requiring surgery with its spectrum of complications.

## 2018-12-13 ENCOUNTER — CLINICAL SUPPORT (OUTPATIENT)
Dept: GASTROENTEROLOGY | Facility: CLINIC | Age: 43
End: 2018-12-13
Payer: COMMERCIAL

## 2018-12-13 DIAGNOSIS — D50.9 IRON DEFICIENCY ANEMIA, UNSPECIFIED IRON DEFICIENCY ANEMIA TYPE: ICD-10-CM

## 2018-12-14 ENCOUNTER — TELEPHONE (OUTPATIENT)
Dept: GASTROENTEROLOGY | Facility: CLINIC | Age: 43
End: 2018-12-14

## 2018-12-14 ENCOUNTER — LAB VISIT (OUTPATIENT)
Dept: LAB | Facility: HOSPITAL | Age: 43
End: 2018-12-14
Attending: INTERNAL MEDICINE
Payer: COMMERCIAL

## 2018-12-14 DIAGNOSIS — R10.9 ABDOMINAL PAIN, UNSPECIFIED ABDOMINAL LOCATION: ICD-10-CM

## 2018-12-14 DIAGNOSIS — R10.9 ABDOMINAL PAIN, UNSPECIFIED ABDOMINAL LOCATION: Primary | ICD-10-CM

## 2018-12-14 LAB
ALBUMIN SERPL BCP-MCNC: 3.3 G/DL
ALP SERPL-CCNC: 75 U/L
ALT SERPL W/O P-5'-P-CCNC: 11 U/L
ANION GAP SERPL CALC-SCNC: 7 MMOL/L
AST SERPL-CCNC: 33 U/L
BASOPHILS # BLD AUTO: 0.02 K/UL
BASOPHILS NFR BLD: 0.3 %
BILIRUB SERPL-MCNC: 0.3 MG/DL
BUN SERPL-MCNC: 8 MG/DL
CALCIUM SERPL-MCNC: 9.2 MG/DL
CHLORIDE SERPL-SCNC: 105 MMOL/L
CO2 SERPL-SCNC: 26 MMOL/L
CREAT SERPL-MCNC: 0.8 MG/DL
DIFFERENTIAL METHOD: ABNORMAL
EOSINOPHIL # BLD AUTO: 0.1 K/UL
EOSINOPHIL NFR BLD: 0.7 %
ERYTHROCYTE [DISTWIDTH] IN BLOOD BY AUTOMATED COUNT: 19.5 %
EST. GFR  (AFRICAN AMERICAN): >60 ML/MIN/1.73 M^2
EST. GFR  (NON AFRICAN AMERICAN): >60 ML/MIN/1.73 M^2
GLUCOSE SERPL-MCNC: 85 MG/DL
HCT VFR BLD AUTO: 29.1 %
HGB BLD-MCNC: 8.8 G/DL
LIPASE SERPL-CCNC: >1000 U/L
LYMPHOCYTES # BLD AUTO: 1.3 K/UL
LYMPHOCYTES NFR BLD: 19.2 %
MCH RBC QN AUTO: 26.8 PG
MCHC RBC AUTO-ENTMCNC: 30.2 G/DL
MCV RBC AUTO: 89 FL
MONOCYTES # BLD AUTO: 0.7 K/UL
MONOCYTES NFR BLD: 9.7 %
NEUTROPHILS # BLD AUTO: 4.8 K/UL
NEUTROPHILS NFR BLD: 70.1 %
NRBC BLD-RTO: 0 /100 WBC
PLATELET # BLD AUTO: 133 K/UL
PMV BLD AUTO: 11.7 FL
POTASSIUM SERPL-SCNC: 3.4 MMOL/L
PROT SERPL-MCNC: 7.4 G/DL
RBC # BLD AUTO: 3.28 M/UL
SODIUM SERPL-SCNC: 138 MMOL/L
WBC # BLD AUTO: 6.88 K/UL

## 2018-12-14 PROCEDURE — 80053 COMPREHEN METABOLIC PANEL: CPT

## 2018-12-14 PROCEDURE — 36415 COLL VENOUS BLD VENIPUNCTURE: CPT

## 2018-12-14 PROCEDURE — 85025 COMPLETE CBC W/AUTO DIFF WBC: CPT

## 2018-12-14 PROCEDURE — 83690 ASSAY OF LIPASE: CPT

## 2018-12-14 NOTE — TELEPHONE ENCOUNTER
Had EUS yesterday and now having abd spasm above belly button, bloating and little gas lasting a few sec with inconsistent frequency. Has only taken Nexium and is hesitant to take anything since having the EUS She stated she was told that there will be air in her stomach after the EUS. Asked her if she tried taking IBgard as directed for her abd pain per Nicolasa Soares last visit. She currently isnt and will try it.   s/s RATE:7-8    -Please advise

## 2018-12-14 NOTE — TELEPHONE ENCOUNTER
----- Message from Conchita Riley sent at 12/14/2018 10:02 AM CST -----  Contact: self   Terry - has stomach pain and spasms since yesterday - please call patient at

## 2018-12-15 DIAGNOSIS — K85.80 OTHER ACUTE PANCREATITIS WITHOUT INFECTION OR NECROSIS: Primary | ICD-10-CM

## 2018-12-16 ENCOUNTER — TELEPHONE (OUTPATIENT)
Dept: GASTROENTEROLOGY | Facility: HOSPITAL | Age: 43
End: 2018-12-16

## 2018-12-16 NOTE — TELEPHONE ENCOUNTER
Called by patient.  - pending VCE tomorrow (12/17)  - misplaced instructions also had recent EUS and has been having stomach pains and being managed for pancreatitis  - capsule for evaluation of iron deficiency anemia  - abdominal pain is waxing and waning. Mostly tolerating clear liquids.  - discussed and recommend she call back tomorrow to reschedule the capsule as given the pancreatitis may not be ideal time for her to take a bowel prep and risk dehydration given only tolerating clears and not acutely bleeding.  - discussed reasons to present to the hospital for pancreatitis.

## 2018-12-17 ENCOUNTER — TELEPHONE (OUTPATIENT)
Dept: GASTROENTEROLOGY | Facility: CLINIC | Age: 43
End: 2018-12-17

## 2018-12-17 ENCOUNTER — LAB VISIT (OUTPATIENT)
Dept: LAB | Facility: HOSPITAL | Age: 43
End: 2018-12-17
Attending: INTERNAL MEDICINE
Payer: COMMERCIAL

## 2018-12-17 DIAGNOSIS — K85.80 OTHER ACUTE PANCREATITIS WITHOUT INFECTION OR NECROSIS: Primary | ICD-10-CM

## 2018-12-17 DIAGNOSIS — K85.80 OTHER ACUTE PANCREATITIS WITHOUT INFECTION OR NECROSIS: ICD-10-CM

## 2018-12-17 LAB — LIPASE SERPL-CCNC: 301 U/L

## 2018-12-17 PROCEDURE — 36415 COLL VENOUS BLD VENIPUNCTURE: CPT

## 2018-12-17 PROCEDURE — 83690 ASSAY OF LIPASE: CPT

## 2018-12-17 NOTE — TELEPHONE ENCOUNTER
Message   Received: Today   Message Contents   MD Tiara Whitten MA   Caller: Unspecified (Today,  9:44 AM)             Can you check on her? She need to do the lipase lab.   Thanks   Venu Montiel MD     She says she is doing better. She took Tylenol and the pain is subsiding. I scheduled lab for her today

## 2018-12-17 NOTE — TELEPHONE ENCOUNTER
Treatment Plan  12/16/2018  7:32 PM    Patient called because she was complaining of worsening abdominal cramping which were now an 8/10.  The cramping was not associated with fever, chills, nausea, or emesis. She had chicken salad with crackers and apple juice but had not taken any pain medications.    Instructed patient to start on a clear liquid diet and that it would be OK to take Tylenol for pain. I clearly instructed that is she didn't feel better and developed fever, chills, nausea, emesis I wanted her to present to the ED as over the phone I could only provide limited instructions.    Recommendations:  --Liquid diet  --Ok to take tylenol for pain (max of 4 g in 24 hours)  --If worsening pain, fever, nausea, emesis within the next 12-24 hours recommend patient go to the ED  --Patient to update office on progress tomorrow (i.e. Whether she feels pain is resolving or whether she goes to the ED)  --Patient verbalized understanding instructions    Manuel Collier M.D.  Gastroenterology Fellow, PGY-V  Pager: [phone]  [facility]

## 2018-12-17 NOTE — TELEPHONE ENCOUNTER
Post procedure instructions. Refer to Surgical Oncology at appointment to be scheduled. Refer to an Oncologist at appointment to be scheduled. Please contact to schedule.

## 2018-12-17 NOTE — TELEPHONE ENCOUNTER
----- Message from Venu Montiel MD sent at 12/15/2018 11:02 AM CST -----  Patient informed that then lipase is > 1000. Stated doing better today. She will remain on a low fat diet and increased fluid intake. We will repeat the lipase on Monday. If worsening symptoms will need to go to the ED.

## 2018-12-18 ENCOUNTER — TELEPHONE (OUTPATIENT)
Dept: ENDOSCOPY | Facility: HOSPITAL | Age: 43
End: 2018-12-18

## 2018-12-18 NOTE — TELEPHONE ENCOUNTER
----- Message from Venu Montiel MD sent at 12/18/2018  8:36 AM CST -----  Please let the patient know the lipase is 301. Improving. If she feel better can advance the diet, but need to be low fat.

## 2018-12-19 ENCOUNTER — OFFICE VISIT (OUTPATIENT)
Dept: HEMATOLOGY/ONCOLOGY | Facility: CLINIC | Age: 43
End: 2018-12-19
Payer: COMMERCIAL

## 2018-12-19 ENCOUNTER — TELEPHONE (OUTPATIENT)
Dept: HEMATOLOGY/ONCOLOGY | Facility: CLINIC | Age: 43
End: 2018-12-19

## 2018-12-19 ENCOUNTER — LAB VISIT (OUTPATIENT)
Dept: LAB | Facility: HOSPITAL | Age: 43
End: 2018-12-19
Attending: INTERNAL MEDICINE
Payer: COMMERCIAL

## 2018-12-19 VITALS
HEIGHT: 62 IN | HEART RATE: 87 BPM | RESPIRATION RATE: 16 BRPM | BODY MASS INDEX: 31.52 KG/M2 | WEIGHT: 171.31 LBS | TEMPERATURE: 99 F | OXYGEN SATURATION: 97 % | DIASTOLIC BLOOD PRESSURE: 75 MMHG | SYSTOLIC BLOOD PRESSURE: 110 MMHG

## 2018-12-19 DIAGNOSIS — R10.12 LEFT UPPER QUADRANT PAIN: ICD-10-CM

## 2018-12-19 DIAGNOSIS — K86.89 PANCREATIC MASS: ICD-10-CM

## 2018-12-19 DIAGNOSIS — R16.1 SPLENOMEGALY: ICD-10-CM

## 2018-12-19 DIAGNOSIS — D50.0 IRON DEFICIENCY ANEMIA DUE TO CHRONIC BLOOD LOSS: ICD-10-CM

## 2018-12-19 DIAGNOSIS — K86.89 MASS OF PANCREAS: Primary | ICD-10-CM

## 2018-12-19 DIAGNOSIS — K85.80 OTHER ACUTE PANCREATITIS WITHOUT INFECTION OR NECROSIS: ICD-10-CM

## 2018-12-19 LAB
ERYTHROCYTE [DISTWIDTH] IN BLOOD BY AUTOMATED COUNT: 18.2 %
HCT VFR BLD AUTO: 29.7 %
HGB BLD-MCNC: 9 G/DL
IMM GRANULOCYTES # BLD AUTO: 0.02 K/UL
LIPASE SERPL-CCNC: 522 U/L
MCH RBC QN AUTO: 26.8 PG
MCHC RBC AUTO-ENTMCNC: 30.3 G/DL
MCV RBC AUTO: 88 FL
NEUTROPHILS # BLD AUTO: 5 K/UL
PLATELET # BLD AUTO: 133 K/UL
PMV BLD AUTO: 11.1 FL
RBC # BLD AUTO: 3.36 M/UL
WBC # BLD AUTO: 7.31 K/UL

## 2018-12-19 PROCEDURE — 36415 COLL VENOUS BLD VENIPUNCTURE: CPT

## 2018-12-19 PROCEDURE — 99999 PR PBB SHADOW E&M-EST. PATIENT-LVL IV: CPT | Mod: PBBFAC,,, | Performed by: INTERNAL MEDICINE

## 2018-12-19 PROCEDURE — 3008F BODY MASS INDEX DOCD: CPT | Mod: CPTII,S$GLB,, | Performed by: INTERNAL MEDICINE

## 2018-12-19 PROCEDURE — 83690 ASSAY OF LIPASE: CPT

## 2018-12-19 PROCEDURE — 99215 OFFICE O/P EST HI 40 MIN: CPT | Mod: S$GLB,,, | Performed by: INTERNAL MEDICINE

## 2018-12-19 PROCEDURE — 85027 COMPLETE CBC AUTOMATED: CPT

## 2018-12-19 NOTE — PROGRESS NOTES
PATIENT: Terese Macias  MRN: [id number]  DATE: 12/19/2018      Diagnosis:   1. Pancreatic mass    2. Splenomegaly    3. Left upper quadrant pain        Chief Complaint: Anemia             Subjective:    Interval History: Ms. Macias is seen in follow-up for iron deficiency anemia and a pancreatic mass. Since I had seen her last she underwent a CT of the abdomen and pelvis which revealed the presence a pancreatic mass and also splenomegaly.  She had undergone her iron infusions without issue but recently had an endoscopic ultrasound which revealed a pancreatic mass in the uncinate which was biopsied along with an abnormal appearing lymph node.  She developed pancreatitis following this.  She also complains of left sided abdominal pain which is worsened over the course of the last month.  She uses Tylenol with relief.  She does admit to weight loss over the last several months.  No other new complaints.    Past Medical History:   Past Medical History:   Diagnosis Date    Abnormal Pap smear     Abnormal Pap smear of vagina     Anemia     Depression     Fever blister     GERD (gastroesophageal reflux disease)     Heavy periods 6/8/2015    Hemorrhoids without complication     IBS (irritable colon syndrome)     Joint pain     Left upper quadrant pain 12/19/2018    Pancreatic mass 12/19/2018    Psychiatric problem     Screening for HPV (human papillomavirus)     Sleep difficulties     Splenomegaly 12/19/2018    Thalassemia     Thalassemia     Therapy        Past Surgical HIstory:   Past Surgical History:   Procedure Laterality Date    COLONOSCOPY      COLONOSCOPY N/A 11/8/2018    Procedure: COLONOSCOPY;  Surgeon: Ba Stewart MD;  Location: [facility] ([address] [place] [address]);  Service: Endoscopy;  Laterality: N/A;  non diabetic constipation prep      ok to schedule per Tiara    COLONOSCOPY N/A 11/8/2018    Per[facility] by Ba Stewart [facility] at [facility]    EGD (ESOPHAGOGASTRODUODENOSCOPY) N/A 11/8/2018     Performed by Ba Stewart MD at [facility] (2ND FLR)    ENDOSCOPIC ULTRASOUND OF UPPER GASTROINTESTINAL TRACT N/A 12/12/2018    Procedure: ULTRASOUND-ENDOSCOPIC-UPPER;  Surgeon: Venu Montiel MD;  Location: [facility];  Service: Endoscopy;  Laterality: N/A;    ESOPHAGOGASTRODUODENOSCOPY N/A 11/8/2018    Procedure: EGD (ESOPHAGOGASTRODUODENOSCOPY);  Surgeon: Ba Stewart MD;  Location: [facility] (2ND FLR);  Service: Endoscopy;  Laterality: N/A;  ok to schedule per Tiara    ESOPHAGOGASTRODUODENOSCOPY (EGD) N/A 3/23/2015    Performed by Ba Stewart MD at [facility] (4TH FLR)    HERNIA REPAIR      OR COLONOSCOPY,DIAGNOSTIC [30333 (CPT®)] N/A 7/2/2014    Performed by Cj Lassiter MD at [facility] (4TH FLR)    ULTRASOUND-ENDOSCOPIC-UPPER N/A 12/12/2018    Performed by Venu Montiel MD at [facility]       Family History:   Family History   Problem Relation Age of Onset    Hypertension Mother     Diabetes Father     Heart disease Father     Eczema Father     Eczema Other     Colon cancer Paternal Uncle 60        colon cancer    Cancer Cousin         breast    Breast cancer Cousin     No Known Problems Other     Cancer Cousin         stomach cancer    Breast cancer Maternal Grandmother     Cancer Maternal Grandmother         breast    Eczema Sister     Acne Sister     Eczema Brother     Eczema Daughter     Cancer Maternal Aunt         breast    Breast cancer Maternal Aunt     No Known Problems Maternal Uncle     No Known Problems Paternal Aunt     No Known Problems Maternal Grandfather     No Known Problems Paternal Grandmother     No Known Problems Paternal Grandfather     Stomach cancer Cousin 44    Melanoma Neg Hx     Psoriasis Neg Hx     Lupus Neg Hx     Ovarian cancer Neg Hx     Amblyopia Neg Hx     Blindness Neg Hx     Cataracts Neg Hx     Glaucoma Neg Hx     Macular degeneration Neg Hx     Retinal detachment Neg Hx     Strabismus Neg Hx     Stroke Neg Hx      Thyroid disease Neg Hx     Esophageal cancer Neg Hx     Irritable bowel syndrome Neg Hx     Crohn's disease Neg Hx     Ulcerative colitis Neg Hx     Celiac disease Neg Hx        Social History:  reports that  has never smoked. she has never used smokeless tobacco. She reports that she drinks alcohol. She reports that she does not use drugs.    Allergies:  Review of patient's allergies indicates:   Allergen Reactions    Pcn [penicillins] Other (See Comments)     Pt had reaction to PCN as an infant.       Medications:  Current Outpatient Medications   Medication Sig Dispense Refill    albuterol 90 mcg/actuation inhaler Inhale 2 puffs into the lungs every 6 (six) hours as needed for Wheezing. Rescue 18 g 0    docusate sodium (COLACE) 50 MG capsule Take 1 capsule (50 mg total) by mouth nightly as needed for Constipation. 30 capsule 0    FLUoxetine (PROZAC) 10 MG capsule TAKE 1 CAPSULE BY MOUTH EVERY DAY 30 capsule 0    fluticasone (FLONASE) 50 mcg/actuation nasal spray 1 spray (50 mcg total) by Each Nare route once daily. (Patient taking differently: 1 spray by Each Nare route daily as needed. ) 1 Bottle 0    hydrOXYzine pamoate (VISTARIL) 25 MG Cap Take 1 capsule (25 mg total) by mouth nightly as needed (sleep). 30 capsule 0    linaclotide (LINZESS) 145 mcg Cap capsule Take 1 capsule (145 mcg total) by mouth once daily. 30 capsule 5    meloxicam (MOBIC) 7.5 MG tablet Take 1 tablet (7.5 mg total) by mouth once daily. Take once a day for two weeks then as needed. Take with food. 30 tablet 0    NUVARING 0.12-0.015 mg/24 hr vaginal ring INSERT 1 RING VAGINALLY AS DIRECTED. REMOVE AFTER 3 WEEKS & WAIT 7 DAYS BEFORE INSERTING A NEW RING 1 each 3    omeprazole (PRILOSEC) 20 MG capsule Take 1 capsule (20 mg total) by mouth once daily. 30 capsule 2    ferrous gluconate (FERGON) 324 MG tablet Take 1 tablet (324 mg total) by mouth daily with breakfast. 30 tablet 0     No current facility-administered medications  "for this visit.        Review of Systems   Constitutional: Positive for fatigue. Negative for chills, fever and unexpected weight change.   HENT: Negative for congestion, hearing loss and nosebleeds.    Eyes: Negative for visual disturbance.   Respiratory: Positive for shortness of breath. Negative for cough.    Cardiovascular: Negative for chest pain and palpitations.   Gastrointestinal: Positive for abdominal pain. Negative for anal bleeding, blood in stool, constipation, diarrhea, nausea and vomiting.   Genitourinary: Negative for dysuria.   Musculoskeletal: Negative for back pain and gait problem.   Skin: Negative for color change and rash.   Neurological: Negative for dizziness, weakness and headaches.   Hematological: Negative for adenopathy. Does not bruise/bleed easily.   Psychiatric/Behavioral: Negative for confusion.        Objective:      Vitals:   Vitals:    12/19/18 0954   BP: 110/75   Pulse: 87   Resp: 16   Temp: 98.6 °F (37 °C)   SpO2: 97%   Weight: 77.7 kg (171 lb 4.8 oz)   Height: 5' 2" (1.575 m)     BMI: Body mass index is 31.33 kg/m².    Physical Exam   Constitutional: She is oriented to person, place, and time. She appears well-developed and well-nourished.   HENT:   Head: Normocephalic and atraumatic.   Eyes: Pupils are equal, round, and reactive to light. No scleral icterus.   Neck: Normal range of motion. Neck supple.   Cardiovascular: Normal rate and regular rhythm.   Pulmonary/Chest: Effort normal and breath sounds normal.   Abdominal: Soft. She exhibits no distension. There is no tenderness.   Musculoskeletal: Normal range of motion. She exhibits no edema.   Neurological: She is alert and oriented to person, place, and time. No cranial nerve deficit.   Skin: Skin is warm and dry.   Psychiatric: She has a normal mood and affect. Thought content normal.       Laboratory Data:  Lab Visit on 12/19/2018   Component Date Value Ref Range Status    WBC 12/19/2018 7.31  3.90 - 12.70 K/uL Final    " RBC 12/19/2018 3.36* 4.00 - 5.40 M/uL Final    Hemoglobin 12/19/2018 9.0* 12.0 - 16.0 g/dL Final    Hematocrit 12/19/2018 29.7* 37.0 - 48.5 % Final    MCV 12/19/2018 88  82 - 98 fL Final    MCH 12/19/2018 26.8* 27.0 - 31.0 pg Final    MCHC 12/19/2018 30.3* 32.0 - 36.0 g/dL Final    RDW 12/19/2018 18.2* 11.5 - 14.5 % Final    Platelets 12/19/2018 133* 150 - 350 K/uL Final    MPV 12/19/2018 11.1  9.2 - 12.9 fL Final    Gran # (ANC) 12/19/2018 5.0  1.8 - 7.7 K/uL Final    Comment: The ANC is based on a white cell differential from an   automated cell counter. It has not been microscopically   reviewed for the presence of abnormal cells. Clinical   correlation is required.      Immature Grans (Abs) 12/19/2018 0.02  0.00 - 0.04 K/uL Final    Comment: Mild elevation in immature granulocytes is non specific and   can be seen in a variety of conditions including stress response,   acute inflammation, trauma and pregnancy. Correlation with other   laboratory and clinical findings is essential.     Lab Visit on 12/17/2018   Component Date Value Ref Range Status    Lipase 12/17/2018 301* 4 - 60 U/L Final   Lab Visit on 12/14/2018   Component Date Value Ref Range Status    WBC 12/14/2018 6.88  3.90 - 12.70 K/uL Final    RBC 12/14/2018 3.28* 4.00 - 5.40 M/uL Final    Hemoglobin 12/14/2018 8.8* 12.0 - 16.0 g/dL Final    Hematocrit 12/14/2018 29.1* 37.0 - 48.5 % Final    MCV 12/14/2018 89  82 - 98 fL Final    MCH 12/14/2018 26.8* 27.0 - 31.0 pg Final    MCHC 12/14/2018 30.2* 32.0 - 36.0 g/dL Final    RDW 12/14/2018 19.5* 11.5 - 14.5 % Final    Platelets 12/14/2018 133* 150 - 350 K/uL Final    MPV 12/14/2018 11.7  9.2 - 12.9 fL Final    Gran # (ANC) 12/14/2018 4.8  1.8 - 7.7 K/uL Final    Lymph # 12/14/2018 1.3  1.0 - 4.8 K/uL Final    Mono # 12/14/2018 0.7  0.3 - 1.0 K/uL Final    Eos # 12/14/2018 0.1  0.0 - 0.5 K/uL Final    Baso # 12/14/2018 0.02  0.00 - 0.20 K/uL Final    nRBC 12/14/2018 0  0 /100 WBC  Final    Gran% 12/14/2018 70.1  38.0 - 73.0 % Final    Lymph% 12/14/2018 19.2  18.0 - 48.0 % Final    Mono% 12/14/2018 9.7  4.0 - 15.0 % Final    Eosinophil% 12/14/2018 0.7  0.0 - 8.0 % Final    Basophil% 12/14/2018 0.3  0.0 - 1.9 % Final    Differential Method 12/14/2018 Automated   Final    Sodium 12/14/2018 138  136 - 145 mmol/L Final    Potassium 12/14/2018 3.4* 3.5 - 5.1 mmol/L Final    Chloride 12/14/2018 105  95 - 110 mmol/L Final    CO2 12/14/2018 26  23 - 29 mmol/L Final    Glucose 12/14/2018 85  70 - 110 mg/dL Final    BUN, Bld 12/14/2018 8  6 - 20 mg/dL Final    Creatinine 12/14/2018 0.8  0.5 - 1.4 mg/dL Final    Calcium 12/14/2018 9.2  8.7 - 10.5 mg/dL Final    Total Protein 12/14/2018 7.4  6.0 - 8.4 g/dL Final    Albumin 12/14/2018 3.3* 3.5 - 5.2 g/dL Final    Total Bilirubin 12/14/2018 0.3  0.1 - 1.0 mg/dL Final    Comment: For infants and newborns, interpretation of results should be based  on gestational age, weight and in agreement with clinical  observations.  Premature Infant recommended reference ranges:  Up to 24 hours.............<8.0 mg/dL  Up to 48 hours............<12.0 mg/dL  3-5 days..................<15.0 mg/dL  6-29 days.................<15.0 mg/dL      Alkaline Phosphatase 12/14/2018 75  55 - 135 U/L Final    AST 12/14/2018 33  10 - 40 U/L Final    ALT 12/14/2018 11  10 - 44 U/L Final    Anion Gap 12/14/2018 7* 8 - 16 mmol/L Final    eGFR if African American 12/14/2018 >60.0  >60 mL/min/1.73 m^2 Final    eGFR if non African American 12/14/2018 >60.0  >60 mL/min/1.73 m^2 Final    Comment: Calculation used to obtain the estimated glomerular filtration  rate (eGFR) is the CKD-EPI equation.       Lipase 12/14/2018 >1000* 4 - 60 U/L Final   Admission on 12/12/2018, Discharged on 12/12/2018   Component Date Value Ref Range Status    POC Preg Test, Ur 12/12/2018 Negative  Negative Final     Acceptable 12/12/2018 Yes   Final       Imaging:  CT  11/14/2018  COMPARISON:  CT abdomen and pelvis 07/06/2018; 06/13/2014    FINDINGS:  Heart: Normal size. No effusion.    Lung Bases: Symmetrically expanded without focal consolidation, pneumothorax, or pleural effusion.    Liver: Normal size and attenuation. No focal lesions.    Gallbladder: No calcified gallstones.    Bile Ducts: No dilatation.    Pancreas: There is a 1.7 cm hypodensity in the region of the uncinate process, not previously seen.  The attenuation measures higher than that for a simple cyst.  No mass. No peripancreatic fat stranding.    Spleen: Enlarged to 15 cm with an area of hypodensity in the superior portion which could represent necrosis/infarct.  There is no abnormal enhancement and the spleen is homogeneous enlarged.    Adrenals: Unremarkable.    Kidneys/Ureters: Normal enhancement.  No mass or hydroureteronephrosis.  Ureters are normal in course and caliber.  No nephrolithiasis.    Bladder: No wall thickening.    Reproductive organs: Unremarkable.    GI Tract/Mesentery: No evidence of bowel obstruction or inflammation.  There is a 2.3 cm partially calcified soft tissue mass in the mid abdominal region, likely lymph node, stable since 2014.    Peritoneal Space: No ascites or free air.    Retroperitoneum: No significant adenopathy.    Abdominal wall: Unremarkable    Vasculature: The splenic vein is not definitely visualized, therefore splenic vein thrombosis cannot be excluded.    Bones: No acute fracture. No suspicious lytic or sclerotic lesions.      Impression       Splenomegaly without abnormal enhancement.    There is a small region of hypodensity at the superior aspect of the spleen which could represent necrosis/infarct.    The splenic vein is not definitely visualized.    The differential for splenic enlargement has multiple etiologies with the more likely could be secondary to neoplasm,such as lymphoma, infection, such as mononucleosis, isolated left portal venous hypertension, or  splenic vein thrombosis.  Clinical correlation and further evaluation recommended.    Calcified 2.3 cm soft tissue mass in mid abdomen, likely lymph node and stable since 2014.    1.7cm hypodensity in the region of the uncinate process, attenuation higher than simple cyst.  MRI or EUS may be helpful.            Assessment:       1. Pancreatic mass    2. Splenomegaly    3. Left upper quadrant pain           Plan:     I have had a long discussion Mrs. Macias and family members today concerning her disease.  She has a pancreatic lesion seen on CT and endoscopic ultrasound which is concerning for malignancy.  Additionally, she has an abnormal appearing lymph node which was also biopsied along with symptomatic splenomegaly.  At this point we will await her biopsy results.  I will check a CA 19 9 on her now.  She will continue to use Tylenol for her abdominal pain and we can increase if needed.  She does need to be mindful trauma and risk splenic rupture and she is to report any new symptoms immediately.  We will notify her of the results the biopsy once air received and determine treatment planning after that.  All questions were answered and she is agreeable with this plan.    Donell Medina DO, FACP  Hematology & Oncology  [address] [place] [id number]  ph. [phone]  Fax. [phone]    40 minutes were spent in coordination of patient's care, record review and counseling.  More than 50% of the time was face-to-face.

## 2018-12-19 NOTE — TELEPHONE ENCOUNTER
----- Message from Elsy Simpson sent at 12/19/2018  9:04 AM CST -----  Contact: Pt   Pt called to states that she is on her way and is running late   Callback#[phone]  Thank You  HAYLEE Simpson

## 2018-12-20 ENCOUNTER — TELEPHONE (OUTPATIENT)
Dept: GASTROENTEROLOGY | Facility: CLINIC | Age: 43
End: 2018-12-20

## 2018-12-20 DIAGNOSIS — R16.1 SPLENOMEGALY: ICD-10-CM

## 2018-12-20 DIAGNOSIS — K86.89 PANCREATIC MASS: Primary | ICD-10-CM

## 2018-12-20 DIAGNOSIS — D49.89 NEOPLASM OF ABDOMEN: ICD-10-CM

## 2018-12-20 NOTE — TELEPHONE ENCOUNTER
----- Message from Ba Stewart MD sent at 12/20/2018  3:15 PM CST -----  You can tell her that and see if she still wants to do it.       ----- Message -----  From: Skye Park MA  Sent: 12/20/2018   2:11 PM  To: Ba Stewart MD    Please advise, thanks.  ----- Message -----  From: Adriana Romo, KIMMY  Sent: 12/20/2018   1:58 PM  To: Skye Park MA    It looks like Dr. Medina was leaving it up to Dr. Stewart.  This was his message to Dr. Stewart earlier (he left for the day)    Not sure if you saw the prelim path.  Her  was negative.  We're going to get a PET and LDH on her and if this is a lymphoma maybe there's something else we could get an excisional biopsy on.  I just spoke to her and let her know.  She asked if the video capsule study is necessary given this new information.  I told her I would ask you.  She's schedule to have this done tomorrow.  Please let me know what you think.  Thanks.   Bneito       ----- Message -----  From: Skye Park MA  Sent: 12/20/2018   1:46 PM  To: [facility], I just need clarification. Patient was informed it is ok to proceed with the pill camera.     She stated, she was informed that Dr. Medina did not want her to have the pill camera done right now. Please let us know if she should or should not proceed.    Thanks,  Skye ext [phone]  ----- Message -----  From: Ba Stewart MD  Sent: 12/20/2018  10:59 AM  To: Terry Cosme Staff    [facility], will you call and let her know that Dr. Medina and Dr. Montiel have been communicating with me and I still recommend that we proceed with the pill camera scheduled for tomorrow.      ----- Message -----  From: Donell Medina DO, [facility]  Sent: 12/20/2018   9:41 AM  To: Ba Stewart MD, Adriana Romo RN    Not sure if you saw the prelim path.  Her  was negative.  We're going to get a PET and LDH on her and if this is a lymphoma maybe there's something else  we could get an excisional biopsy on.  I just spoke to her and let her know.  She asked if the video capsule study is necessary given this new information.  I told her I would ask you.  She's schedule to have this done tomorrow.  Please let me know what you think.  Thanks.  Benito    ----- Message -----  From: Venu Montiel MD  Sent: 12/19/2018   5:18 PM  To: Donell Medina DO, FACP    Preliminar for pathology suspected a lymphoid process. Pathology need more tissue. Do you want us to repeat the EUS with core biopsies or do you want to get the tissue surgically or by IR? Just yoselyn me know.  Thanks  Venu

## 2018-12-20 NOTE — TELEPHONE ENCOUNTER
----- Message from Donell Medina DO FACSARAHY sent at 12/20/2018  9:35 AM CST -----  Please get PET and LDH now.

## 2018-12-20 NOTE — TELEPHONE ENCOUNTER
Spoke with patient. VCE rescheduled because she ate after 12:00.    Instructions reviewed and mailed to address on file.

## 2018-12-24 ENCOUNTER — HOSPITAL ENCOUNTER (OUTPATIENT)
Dept: RADIOLOGY | Facility: HOSPITAL | Age: 43
Discharge: HOME | End: 2018-12-24
Attending: INTERNAL MEDICINE
Payer: COMMERCIAL

## 2018-12-24 DIAGNOSIS — D49.89 NEOPLASM OF ABDOMEN: ICD-10-CM

## 2018-12-24 LAB — POCT GLUCOSE: 85 MG/DL (ref 70–110)

## 2018-12-24 PROCEDURE — A9552 F18 FDG: HCPCS

## 2018-12-24 PROCEDURE — 78815 PET IMAGE W/CT SKULL-THIGH: CPT | Mod: 26,PI,, | Performed by: RADIOLOGY

## 2018-12-24 PROCEDURE — 78815 PET IMAGE W/CT SKULL-THIGH: CPT | Mod: TC

## 2018-12-27 ENCOUNTER — TELEPHONE (OUTPATIENT)
Dept: HEMATOLOGY/ONCOLOGY | Facility: CLINIC | Age: 43
End: 2018-12-27

## 2018-12-27 DIAGNOSIS — R16.1 SPLENOMEGALY: Primary | ICD-10-CM

## 2018-12-27 NOTE — PROGRESS NOTES
Spoke with patient concerning results of PET-CT concerning for lymphoma.  Have discussed with Dr. Goncalves appropriate area for excisional biopsy.  It does not appear she has any superficial lymph nodes that are PET avid and may require splenectomy for diagnosis.  I have discussed this with the patient who is agreeable for an appointment and we will make referral.  Following biopsy will set her up to see Dr. Hameed for medical oncology management.

## 2018-12-27 NOTE — Clinical Note
Please refer to Dr. Johnathan Goncalves.  I have spoken to him already.  She will then need follow up with Dr. Hameed for med onc management after her surgery.

## 2018-12-27 NOTE — TELEPHONE ENCOUNTER
Called patient, she had more questions about her PET scan.  She said she wasn't prepared earlier and had to ask additional questions.  I read his not to her to see if it was anything I could discuss with her, she states no, it was nothing dicussed earlier.

## 2018-12-27 NOTE — TELEPHONE ENCOUNTER
----- Message from Blair Braun sent at 12/27/2018 11:21 AM CST -----  Contact: Pt  Pt calling to speak with the provider if possible, she stated she spoke with him earlier and she has a couple of questions she would like to ask him.       Call back number: [phone]

## 2018-12-28 ENCOUNTER — OFFICE VISIT (OUTPATIENT)
Dept: GASTROENTEROLOGY | Facility: CLINIC | Age: 43
End: 2018-12-28
Payer: COMMERCIAL

## 2018-12-28 PROCEDURE — 99999 PR PBB SHADOW E&M-EST. PATIENT-LVL I: CPT | Mod: PBBFAC,,,

## 2018-12-28 PROCEDURE — 99999 PR PBB SHADOW E&M-EST. PATIENT-LVL I: ICD-10-PCS | Mod: PBBFAC,,,

## 2018-12-31 ENCOUNTER — INITIAL CONSULT (OUTPATIENT)
Dept: SURGERY | Facility: CLINIC | Age: 43
End: 2018-12-31
Payer: COMMERCIAL

## 2018-12-31 ENCOUNTER — CLINICAL SUPPORT (OUTPATIENT)
Dept: INFECTIOUS DISEASES | Facility: CLINIC | Age: 43
End: 2018-12-31
Payer: COMMERCIAL

## 2018-12-31 VITALS
BODY MASS INDEX: 31.16 KG/M2 | WEIGHT: 169.31 LBS | TEMPERATURE: 98 F | HEIGHT: 62 IN | DIASTOLIC BLOOD PRESSURE: 77 MMHG | SYSTOLIC BLOOD PRESSURE: 114 MMHG | HEART RATE: 72 BPM

## 2018-12-31 DIAGNOSIS — R16.1 SPLENOMEGALY: Primary | ICD-10-CM

## 2018-12-31 DIAGNOSIS — R16.1 SPLENOMEGALY: ICD-10-CM

## 2018-12-31 PROCEDURE — 99205 PR OFFICE/OUTPT VISIT, NEW, LEVL V, 60-74 MIN: ICD-10-PCS | Mod: S$GLB,,, | Performed by: SURGERY

## 2018-12-31 PROCEDURE — 90688 IIV4 VACCINE SPLT 0.5 ML IM: CPT | Mod: S$GLB,,, | Performed by: INTERNAL MEDICINE

## 2018-12-31 PROCEDURE — 90471 IMMUNIZATION ADMIN: CPT | Mod: S$GLB,,, | Performed by: INTERNAL MEDICINE

## 2018-12-31 PROCEDURE — 99205 OFFICE O/P NEW HI 60 MIN: CPT | Mod: S$GLB,,, | Performed by: SURGERY

## 2018-12-31 PROCEDURE — 3008F PR BODY MASS INDEX (BMI) DOCUMENTED: ICD-10-PCS | Mod: CPTII,S$GLB,, | Performed by: SURGERY

## 2018-12-31 PROCEDURE — 3008F BODY MASS INDEX DOCD: CPT | Mod: CPTII,S$GLB,, | Performed by: SURGERY

## 2018-12-31 PROCEDURE — 90620 MENB-4C VACCINE IM: CPT | Mod: S$GLB,,, | Performed by: INTERNAL MEDICINE

## 2018-12-31 PROCEDURE — 99999 PR PBB SHADOW E&M-EST. PATIENT-LVL IV: CPT | Mod: PBBFAC,,, | Performed by: SURGERY

## 2018-12-31 PROCEDURE — 90648 HIB PRP-T VACCINE 4 DOSE IM: CPT | Mod: S$GLB,,, | Performed by: INTERNAL MEDICINE

## 2018-12-31 PROCEDURE — 90670 PCV13 VACCINE IM: CPT | Mod: S$GLB,,, | Performed by: INTERNAL MEDICINE

## 2018-12-31 PROCEDURE — 99999 PR PBB SHADOW E&M-EST. PATIENT-LVL IV: ICD-10-PCS | Mod: PBBFAC,,, | Performed by: SURGERY

## 2018-12-31 PROCEDURE — 90472 IMMUNIZATION ADMIN EACH ADD: CPT | Mod: S$GLB,,, | Performed by: INTERNAL MEDICINE

## 2018-12-31 PROCEDURE — 90715 TDAP VACCINE 7 YRS/> IM: CPT | Mod: S$GLB,,, | Performed by: INTERNAL MEDICINE

## 2018-12-31 NOTE — PROGRESS NOTES
Ms. Macias has received Prevnar 13 0.5mL, Tdap 0.5mL, influenza 0.5mL, Hib 0.5mL and Meningococcal OMV 0.5mL .  Patient tolerated well.  Patient left unit in NAD.

## 2018-12-31 NOTE — [employer] PATIENT INSTRUCTIONS
Discharge Summary/Instructions for after Video Capsule Endoscopy  Patient Name: Terese Macias  Patient MRN: [id number]  Patient YOB: 1975 Friday, December 28, 2018  Ba Stewart MD  This is a 43 year old female.  1.  Do Not eat or drink anything for 1 hour.  Try sips of water first.  If   tolerated, resume your regular diet or one recommended by your physician.  2.  Do not drive, operate machinery, make critical decisions, or do   activities that require coordination or balance for 24 hours.  3.   You may experience a sore throat for 24 to 48 hours.  You may use   throat lozenges or gargle with warm salt water to relieve the discomfort.  4.  Because air was put into your stomach during the procedure, you may   experience some belching.  5.  Do not use any medication containing aspirin for 10 days.  6.  Go directly to the emergency room if you notice any of the following:   Chills and/or fever over 101 F   Persistent vomiting or vomiting with blood/nasal regurgitation   Severe abdominal pain, other than gas cramps   Severe chest pain   Black, tarry stools     Your doctor recommends these additional instructions:  Return to Dr. Medina with hematology/oncology.  If you have any questions or problems, please call your physician.  EMERGENCY PHONE NUMBER: [phone]  LAB RESULTS: [phone]  Ba Stewart MD  12/31/2018 1:28:59 PM  This report has been verified and signed electronically.  [employer]

## 2018-12-31 NOTE — PROGRESS NOTES
SURGICAL ONCOLOGY  HISTORY AND PHYSICAL    Encounter Date:  2018    Patient ID: Terese Macias  Age:  43 y.o. :  1975    PCP: Mildred Mohamud MD    Chief Complaint:  Splenomegaly, left upper quadrant pain    History:    Ms. Macias is a 43 y.o. female who presented with left upper quadrant pain and anemia found to be due to massive splenomegaly that appears to be lymphoma with associated involvement in the head of the pancreas, stomach, and mediastinal nodes that can be seen on CT and PET. EUS aspiration of pancreatic head showed atypical cells, which are suspicious for lymphoma. EUS shows compression of SMV and no involvement with the SMA/celiac trunk.    She has a history of thalassemia minor. She had bilateral inguinal hernia repairs as a child.     She has some subjective shortness of breath and shoulder/chest pain. She has been to the ED for the chest pain and workup for cardiac etiology has always been negative. She denies dark urine, pale stools, steatorrhea, pruritus. She has had twenty pounds of weight loss since 2018.     She does not currently exercise, but says she can easily climb two stories of stairs, though she would be a little winded at the top.    She works as a .    Past Medical History:   Diagnosis Date    Abnormal Pap smear     Abnormal Pap smear of vagina     Anemia     Depression     Fever blister     GERD (gastroesophageal reflux disease)     Heavy periods 2015    Hemorrhoids without complication     IBS (irritable colon syndrome)     Joint pain     Left upper quadrant pain 2018    Pancreatic mass 2018    Psychiatric problem     Screening for HPV (human papillomavirus)     Sleep difficulties     Splenomegaly 2018    Thalassemia     Thalassemia     Therapy      Past Surgical History:   Procedure Laterality Date    COLONOSCOPY      COLONOSCOPY N/A 2018    Performed by Ba Stewart MD at [facility] ([facility]  FL[facility])    EGD (ESOPHAGOGASTRODUODENOSCOPY) N/A 11/8/2018    Performed by Ba Stewart MD at [facility] ENDO (2ND FLR)    ESOPHAGOGASTRODUODENOSCOPY (EGD) N/A 3/23/2015    Performed by Ba Stewart MD at [facility] ENDO (4TH FLR)    HERNIA REPAIR      VT COLONOSCOPY,DIAGNOSTIC [45682 (CPT®)] N/A 7/2/2014    Performed by Cj Lassiter MD at [facility] ENDO (4TH FLR)    ULTRASOUND-ENDOSCOPIC-UPPER N/A 12/12/2018    Performed by Venu Montiel MD at [facility]     Current Outpatient Medications on File Prior to Visit   Medication Sig Dispense Refill    albuterol 90 mcg/actuation inhaler Inhale 2 puffs into the lungs every 6 (six) hours as needed for Wheezing. Rescue 18 g 0    docusate sodium (COLACE) 50 MG capsule Take 1 capsule (50 mg total) by mouth nightly as needed for Constipation. 30 capsule 0    ferrous gluconate (FERGON) 324 MG tablet Take 1 tablet (324 mg total) by mouth daily with breakfast. 30 tablet 0    FLUoxetine (PROZAC) 10 MG capsule TAKE 1 CAPSULE BY MOUTH EVERY DAY 30 capsule 0    fluticasone (FLONASE) 50 mcg/actuation nasal spray 1 spray (50 mcg total) by Each Nare route once daily. (Patient taking differently: 1 spray by Each Nare route daily as needed. ) 1 Bottle 0    hydrOXYzine pamoate (VISTARIL) 25 MG Cap Take 1 capsule (25 mg total) by mouth nightly as needed (sleep). 30 capsule 0    linaclotide (LINZESS) 145 mcg Cap capsule Take 1 capsule (145 mcg total) by mouth once daily. 30 capsule 5    meloxicam (MOBIC) 7.5 MG tablet Take 1 tablet (7.5 mg total) by mouth once daily. Take once a day for two weeks then as needed. Take with food. 30 tablet 0    NUVARING 0.12-0.015 mg/24 hr vaginal ring INSERT 1 RING VAGINALLY AS DIRECTED. REMOVE AFTER 3 WEEKS & WAIT 7 DAYS BEFORE INSERTING A NEW RING 1 each 3    omeprazole (PRILOSEC) 20 MG capsule Take 1 capsule (20 mg total) by mouth once daily. 30 capsule 2     No current facility-administered medications on file prior to visit.      Review of patient's  "allergies indicates:   Allergen Reactions    Pcn [penicillins] Other (See Comments)     Pt had reaction to PCN as an infant.       Family History:  Her family history includes Acne in her sister; Breast cancer in her cousin, maternal aunt, and maternal grandmother; Cancer in her cousin, cousin, maternal aunt, and maternal grandmother; Colon cancer (age of onset: 60) in her paternal uncle; Diabetes in her father; Eczema in her brother, daughter, father, other, and sister; Heart disease in her father; Hypertension in her mother; No Known Problems in her maternal grandfather, maternal uncle, other, paternal aunt, paternal grandfather, and paternal grandmother; Stomach cancer (age of onset: 44) in her cousin.     Social History:  She reports that  has never smoked. she has never used smokeless tobacco. She reports that she drinks alcohol. She reports that she does not use drugs.     ROS:    12-pt review of systems reviewed.    General: No fevers. Fatigue.  Neuro: No headaches. No confusion. No weakness. No problems with balance.  Ears: No change in hearing.  Endocrine: No heat / cold sensitivities.  Eyes: No changes in hearing or vision.  Respiratory: No cough. No congestion.  Cardiac: See above  Abdomen: No constipation. No diarrhea. No blood in stool. No dark stools. No nausea/vomiting. No pale stools.  Genital: No dysuria. No changes in color of urine.  Extremities: No edema. No new joint pains. No pathologic fractures.    Pertinent positive/negatives detailed in HPI, all other systems negative.     Physical Exam:  /77   Pulse 72   Temp 98 °F (36.7 °C) (Oral)   Ht 5' 2" (1.575 m)   Wt 76.8 kg (169 lb 5 oz)   LMP 12/05/2018 (Approximate)   BMI 30.97 kg/m²     Physical Exam   Constitutional: She is oriented to person, place, and time and well-developed, well-nourished, and in no distress. No distress.   Cardiovascular: Normal rate and regular rhythm. Exam reveals no gallop and no friction rub.   No murmur " heard.  Pulmonary/Chest: Effort normal and breath sounds normal.   Abdominal: Soft. Bowel sounds are normal. She exhibits mass. She exhibits no distension. There is no tenderness. There is no rebound and no guarding.   She has palpable spleen in the left upper quadrant   Musculoskeletal: She exhibits no edema.   Neurological: She is alert and oriented to person, place, and time.   Skin: Skin is warm and dry. She is not diaphoretic.   Psychiatric: Mood, memory, affect and judgment normal.   Nursing note and vitals reviewed.    Data:     Radiology:  I personally reviewed these images: CT (11/14/2018), PET (12/24/2018)    Endoscopy:  EUS (12/12/2018, described above), colonoscopy (11/8/2018, negative), upper GI (11/8/2018)    Labs:    Ca 19-9 is < 2    Pathology:   EUS pancreatic head mass FNA is suspicious for lymphoma    Assessment:  Terese Macias is a 43 y.o. female with metastatic disease, suspicious for lymphoma, that involves the spleen, pancreatic head, mediastinal nodes and stomach. She has had weight loss, pain and anemia.    Performance status is adequate for surgery.      Plan:  · Splenectomy vaccinations  · Lap, possible open splenectomy (consented)      AROLDO Alonzo MD   General Surgery, PGY-1  Pager: [phone]

## 2018-12-31 NOTE — LETTER
January 3, 2019      Donell Medina DO, FACP  [address]  [facility] [address]           [facility] Gen S[facility]/Surg Onc  [address]  [facility] [address]  Phone: [phone]          Patient: Terese Macias   MR Number: [id number]   YOB: 1975   Date of Visit: 12/31/2018       Dear Dr. Donell Medina:    Thank you for referring Terese Macias to me for evaluation. Attached you will find relevant portions of my assessment and plan of care.    If you have questions, please do not hesitate to call me. I look forward to following Terese Macias along with you.    Sincerely,    Johnathan Goncalves MD    Enclosure  CC:  No Recipients    If you would like to receive this communication electronically, please contact [email or web address] or [phone] to request more information on [employer]iance [employer] Link access.    For providers and/or their staff who would like to refer a patient to [facility], please contact us through our one-stop-shop provider referral line, [facility], at [phone].    If you feel you have received this communication in error or would no longer like to receive these types of communications, please e-mail [email or web address]

## 2019-01-02 ENCOUNTER — TELEPHONE (OUTPATIENT)
Dept: ENDOSCOPY | Facility: HOSPITAL | Age: 44
End: 2019-01-02

## 2019-01-02 NOTE — TELEPHONE ENCOUNTER
----- Message from Ba Stewart MD sent at 12/31/2018  1:29 PM CST -----  Regarding: VCE  Will you please let her know that her pill cam results were normal - normal small bowel.  She should continue to follow-up with Dr. Medina.  She does not need general GI clinic follow-up.

## 2019-01-07 ENCOUNTER — TELEPHONE (OUTPATIENT)
Dept: HEMATOLOGY/ONCOLOGY | Facility: CLINIC | Age: 44
End: 2019-01-07

## 2019-01-07 NOTE — TELEPHONE ENCOUNTER
Spoke with patient, let her know paperwork was more applicable for surgery since the majority of her time would be out due to surgery.  It can be amended once she sees Dr. Hameed. She will fax over paperwork and I will then give it to Dr. Goncalves's staff.

## 2019-01-07 NOTE — TELEPHONE ENCOUNTER
----- Message from Kimberly García sent at 1/7/2019  1:58 PM CST -----  Contact: Pt  Pt is requesting a callback from office has questions regarding her procedure and need to get her doctor to fill out paper work to return to her job    Pt can be reached at [phone]    Thanks

## 2019-01-08 ENCOUNTER — HOSPITAL ENCOUNTER (INPATIENT)
Facility: HOSPITAL | Age: 44
LOS: 1 days | Discharge: HOME | DRG: 440 | End: 2019-01-09
Attending: EMERGENCY MEDICINE | Admitting: EMERGENCY MEDICINE
Payer: COMMERCIAL

## 2019-01-08 DIAGNOSIS — K85.90 ACUTE PANCREATITIS, UNSPECIFIED COMPLICATION STATUS, UNSPECIFIED PANCREATITIS TYPE: Primary | ICD-10-CM

## 2019-01-08 LAB
ALBUMIN SERPL BCP-MCNC: 3.5 G/DL
ALP SERPL-CCNC: 58 U/L
ALT SERPL W/O P-5'-P-CCNC: 18 U/L
ANION GAP SERPL CALC-SCNC: 6 MMOL/L
AST SERPL-CCNC: 40 U/L
BASOPHILS # BLD AUTO: 0.01 K/UL
BASOPHILS NFR BLD: 0.1 %
BILIRUB SERPL-MCNC: 0.3 MG/DL
BUN SERPL-MCNC: 8 MG/DL
CALCIUM SERPL-MCNC: 9.4 MG/DL
CHLORIDE SERPL-SCNC: 105 MMOL/L
CO2 SERPL-SCNC: 26 MMOL/L
CREAT SERPL-MCNC: 0.7 MG/DL
DIFFERENTIAL METHOD: ABNORMAL
EOSINOPHIL # BLD AUTO: 0.1 K/UL
EOSINOPHIL NFR BLD: 0.9 %
ERYTHROCYTE [DISTWIDTH] IN BLOOD BY AUTOMATED COUNT: 15.4 %
EST. GFR  (AFRICAN AMERICAN): >60 ML/MIN/1.73 M^2
EST. GFR  (NON AFRICAN AMERICAN): >60 ML/MIN/1.73 M^2
GLUCOSE SERPL-MCNC: 101 MG/DL
HCT VFR BLD AUTO: 29.8 %
HGB BLD-MCNC: 9.3 G/DL
IMM GRANULOCYTES # BLD AUTO: 0.02 K/UL
IMM GRANULOCYTES NFR BLD AUTO: 0.3 %
LIPASE SERPL-CCNC: 492 U/L
LYMPHOCYTES # BLD AUTO: 1.6 K/UL
LYMPHOCYTES NFR BLD: 20.3 %
MCH RBC QN AUTO: 27.7 PG
MCHC RBC AUTO-ENTMCNC: 31.2 G/DL
MCV RBC AUTO: 89 FL
MONOCYTES # BLD AUTO: 1 K/UL
MONOCYTES NFR BLD: 12.8 %
NEUTROPHILS # BLD AUTO: 5.2 K/UL
NEUTROPHILS NFR BLD: 65.6 %
NRBC BLD-RTO: 0 /100 WBC
PLATELET # BLD AUTO: 147 K/UL
PMV BLD AUTO: 10.7 FL
POTASSIUM SERPL-SCNC: 3.6 MMOL/L
PROT SERPL-MCNC: 7.6 G/DL
RBC # BLD AUTO: 3.36 M/UL
SODIUM SERPL-SCNC: 137 MMOL/L
WBC # BLD AUTO: 7.89 K/UL

## 2019-01-08 PROCEDURE — 96361 HYDRATE IV INFUSION ADD-ON: CPT

## 2019-01-08 PROCEDURE — 96376 TX/PRO/DX INJ SAME DRUG ADON: CPT

## 2019-01-08 PROCEDURE — 80053 COMPREHEN METABOLIC PANEL: CPT

## 2019-01-08 PROCEDURE — 63600175 PHARM REV CODE 636 W HCPCS: Performed by: EMERGENCY MEDICINE

## 2019-01-08 PROCEDURE — 96374 THER/PROPH/DIAG INJ IV PUSH: CPT

## 2019-01-08 PROCEDURE — 25000003 PHARM REV CODE 250: Performed by: EMERGENCY MEDICINE

## 2019-01-08 PROCEDURE — 96375 TX/PRO/DX INJ NEW DRUG ADDON: CPT

## 2019-01-08 PROCEDURE — 99284 PR EMERGENCY DEPT VISIT,LEVEL IV: ICD-10-PCS | Mod: ,,, | Performed by: EMERGENCY MEDICINE

## 2019-01-08 PROCEDURE — 83690 ASSAY OF LIPASE: CPT

## 2019-01-08 PROCEDURE — 85025 COMPLETE CBC W/AUTO DIFF WBC: CPT

## 2019-01-08 PROCEDURE — 99284 EMERGENCY DEPT VISIT MOD MDM: CPT | Mod: ,,, | Performed by: EMERGENCY MEDICINE

## 2019-01-08 PROCEDURE — 99285 EMERGENCY DEPT VISIT HI MDM: CPT | Mod: 25

## 2019-01-08 RX ORDER — ONDANSETRON 2 MG/ML
4 INJECTION INTRAMUSCULAR; INTRAVENOUS
Status: COMPLETED | OUTPATIENT
Start: 2019-01-08 | End: 2019-01-08

## 2019-01-08 RX ORDER — MORPHINE SULFATE 2 MG/ML
6 INJECTION, SOLUTION INTRAMUSCULAR; INTRAVENOUS
Status: COMPLETED | OUTPATIENT
Start: 2019-01-08 | End: 2019-01-08

## 2019-01-08 RX ADMIN — SODIUM CHLORIDE 1000 ML: 0.9 INJECTION, SOLUTION INTRAVENOUS at 11:01

## 2019-01-08 RX ADMIN — ONDANSETRON 4 MG: 2 INJECTION INTRAMUSCULAR; INTRAVENOUS at 11:01

## 2019-01-08 RX ADMIN — Medication 6 MG: at 11:01

## 2019-01-09 VITALS
TEMPERATURE: 98 F | OXYGEN SATURATION: 97 % | HEART RATE: 82 BPM | HEIGHT: 62 IN | DIASTOLIC BLOOD PRESSURE: 62 MMHG | BODY MASS INDEX: 31.1 KG/M2 | RESPIRATION RATE: 18 BRPM | SYSTOLIC BLOOD PRESSURE: 97 MMHG | WEIGHT: 169 LBS

## 2019-01-09 PROBLEM — R11.2 INTRACTABLE VOMITING WITH NAUSEA: Status: ACTIVE | Noted: 2019-01-09

## 2019-01-09 PROBLEM — K85.90 ACUTE PANCREATITIS: Status: ACTIVE | Noted: 2019-01-09

## 2019-01-09 PROBLEM — K85.90 ACUTE PANCREATITIS: Status: RESOLVED | Noted: 2019-01-09 | Resolved: 2019-01-09

## 2019-01-09 PROBLEM — E86.9 VOLUME DEPLETION, GASTROINTESTINAL LOSS: Status: ACTIVE | Noted: 2019-01-09

## 2019-01-09 PROBLEM — R11.2 INTRACTABLE VOMITING WITH NAUSEA: Status: RESOLVED | Noted: 2019-01-09 | Resolved: 2019-01-09

## 2019-01-09 LAB
ALBUMIN SERPL BCP-MCNC: 3.2 G/DL
ALP SERPL-CCNC: 55 U/L
ALT SERPL W/O P-5'-P-CCNC: 16 U/L
ANION GAP SERPL CALC-SCNC: 10 MMOL/L
AST SERPL-CCNC: 35 U/L
BASOPHILS # BLD AUTO: 0.01 K/UL
BASOPHILS NFR BLD: 0.1 %
BILIRUB SERPL-MCNC: 0.3 MG/DL
BUN SERPL-MCNC: 6 MG/DL
CALCIUM SERPL-MCNC: 8.5 MG/DL
CHLORIDE SERPL-SCNC: 105 MMOL/L
CO2 SERPL-SCNC: 21 MMOL/L
CREAT SERPL-MCNC: 0.6 MG/DL
DIFFERENTIAL METHOD: ABNORMAL
EOSINOPHIL # BLD AUTO: 0 K/UL
EOSINOPHIL NFR BLD: 0 %
ERYTHROCYTE [DISTWIDTH] IN BLOOD BY AUTOMATED COUNT: 15.5 %
EST. GFR  (AFRICAN AMERICAN): >60 ML/MIN/1.73 M^2
EST. GFR  (NON AFRICAN AMERICAN): >60 ML/MIN/1.73 M^2
GLUCOSE SERPL-MCNC: 109 MG/DL
HCT VFR BLD AUTO: 27.4 %
HGB BLD-MCNC: 8.5 G/DL
IMM GRANULOCYTES # BLD AUTO: 0.05 K/UL
IMM GRANULOCYTES NFR BLD AUTO: 0.5 %
LIPASE SERPL-CCNC: 280 U/L
LYMPHOCYTES # BLD AUTO: 0.9 K/UL
LYMPHOCYTES NFR BLD: 8.9 %
MAGNESIUM SERPL-MCNC: 1.9 MG/DL
MCH RBC QN AUTO: 27.5 PG
MCHC RBC AUTO-ENTMCNC: 31 G/DL
MCV RBC AUTO: 89 FL
MONOCYTES # BLD AUTO: 0.5 K/UL
MONOCYTES NFR BLD: 4.7 %
NEUTROPHILS # BLD AUTO: 8.5 K/UL
NEUTROPHILS NFR BLD: 85.8 %
NRBC BLD-RTO: 0 /100 WBC
PHOSPHATE SERPL-MCNC: 2.7 MG/DL
PLATELET # BLD AUTO: 139 K/UL
PMV BLD AUTO: 11.2 FL
POTASSIUM SERPL-SCNC: 3.7 MMOL/L
PROT SERPL-MCNC: 7 G/DL
RBC # BLD AUTO: 3.09 M/UL
SODIUM SERPL-SCNC: 136 MMOL/L
WBC # BLD AUTO: 9.92 K/UL

## 2019-01-09 PROCEDURE — 36415 COLL VENOUS BLD VENIPUNCTURE: CPT

## 2019-01-09 PROCEDURE — 99223 1ST HOSP IP/OBS HIGH 75: CPT | Mod: ,,, | Performed by: HOSPITALIST

## 2019-01-09 PROCEDURE — 25000242 PHARM REV CODE 250 ALT 637 W/ HCPCS: Performed by: HOSPITALIST

## 2019-01-09 PROCEDURE — 11000001 HC ACUTE MED/SURG PRIVATE ROOM

## 2019-01-09 PROCEDURE — 86038 ANTINUCLEAR ANTIBODIES: CPT

## 2019-01-09 PROCEDURE — 82787 IGG 1 2 3 OR 4 EACH: CPT

## 2019-01-09 PROCEDURE — 25000003 PHARM REV CODE 250: Performed by: HOSPITALIST

## 2019-01-09 PROCEDURE — 63600175 PHARM REV CODE 636 W HCPCS: Performed by: HOSPITALIST

## 2019-01-09 PROCEDURE — 99223 PR INITIAL HOSPITAL CARE,LEVL III: ICD-10-PCS | Mod: ,,, | Performed by: HOSPITALIST

## 2019-01-09 PROCEDURE — 25000003 PHARM REV CODE 250: Performed by: PHYSICIAN ASSISTANT

## 2019-01-09 PROCEDURE — 80053 COMPREHEN METABOLIC PANEL: CPT

## 2019-01-09 PROCEDURE — 83690 ASSAY OF LIPASE: CPT

## 2019-01-09 PROCEDURE — 84100 ASSAY OF PHOSPHORUS: CPT

## 2019-01-09 PROCEDURE — 85025 COMPLETE CBC W/AUTO DIFF WBC: CPT

## 2019-01-09 PROCEDURE — 63600175 PHARM REV CODE 636 W HCPCS: Performed by: PHYSICIAN ASSISTANT

## 2019-01-09 PROCEDURE — 83735 ASSAY OF MAGNESIUM: CPT

## 2019-01-09 RX ORDER — ENOXAPARIN SODIUM 100 MG/ML
40 INJECTION SUBCUTANEOUS EVERY 24 HOURS
Status: CANCELLED | OUTPATIENT
Start: 2019-01-09

## 2019-01-09 RX ORDER — AMOXICILLIN 250 MG
2 CAPSULE ORAL DAILY
COMMUNITY
Start: 2019-01-09 | End: 2019-09-17

## 2019-01-09 RX ORDER — MORPHINE SULFATE 4 MG/ML
4 INJECTION, SOLUTION INTRAMUSCULAR; INTRAVENOUS EVERY 4 HOURS PRN
Status: DISCONTINUED | OUTPATIENT
Start: 2019-01-09 | End: 2019-01-09

## 2019-01-09 RX ORDER — MORPHINE SULFATE 2 MG/ML
6 INJECTION, SOLUTION INTRAMUSCULAR; INTRAVENOUS
Status: COMPLETED | OUTPATIENT
Start: 2019-01-09 | End: 2019-01-09

## 2019-01-09 RX ORDER — HYDROMORPHONE HYDROCHLORIDE 1 MG/ML
0.5 INJECTION, SOLUTION INTRAMUSCULAR; INTRAVENOUS; SUBCUTANEOUS EVERY 4 HOURS PRN
Status: DISCONTINUED | OUTPATIENT
Start: 2019-01-09 | End: 2019-01-09 | Stop reason: HOSPADM

## 2019-01-09 RX ORDER — SODIUM CHLORIDE 0.9 % (FLUSH) 0.9 %
5 SYRINGE (ML) INJECTION
Status: CANCELLED | OUTPATIENT
Start: 2019-01-09

## 2019-01-09 RX ORDER — IPRATROPIUM BROMIDE AND ALBUTEROL SULFATE 2.5; .5 MG/3ML; MG/3ML
3 SOLUTION RESPIRATORY (INHALATION) EVERY 4 HOURS PRN
Status: DISCONTINUED | OUTPATIENT
Start: 2019-01-09 | End: 2019-01-09 | Stop reason: HOSPADM

## 2019-01-09 RX ORDER — ACETAMINOPHEN 325 MG/1
650 TABLET ORAL EVERY 4 HOURS PRN
Status: DISCONTINUED | OUTPATIENT
Start: 2019-01-09 | End: 2019-01-09 | Stop reason: HOSPADM

## 2019-01-09 RX ORDER — GLUCAGON 1 MG
1 KIT INJECTION
Status: DISCONTINUED | OUTPATIENT
Start: 2019-01-09 | End: 2019-01-09 | Stop reason: HOSPADM

## 2019-01-09 RX ORDER — OXYCODONE HYDROCHLORIDE 5 MG/1
5 TABLET ORAL EVERY 6 HOURS PRN
Status: DISCONTINUED | OUTPATIENT
Start: 2019-01-09 | End: 2019-01-09 | Stop reason: HOSPADM

## 2019-01-09 RX ORDER — ONDANSETRON 2 MG/ML
4 INJECTION INTRAMUSCULAR; INTRAVENOUS
Status: COMPLETED | OUTPATIENT
Start: 2019-01-09 | End: 2019-01-09

## 2019-01-09 RX ORDER — OXYCODONE HYDROCHLORIDE 5 MG/1
5 TABLET ORAL EVERY 6 HOURS PRN
Qty: 28 TABLET | Refills: 0 | Status: SHIPPED | OUTPATIENT
Start: 2019-01-09 | End: 2019-09-17

## 2019-01-09 RX ORDER — FLUOXETINE 10 MG/1
10 CAPSULE ORAL DAILY
Status: DISCONTINUED | OUTPATIENT
Start: 2019-01-09 | End: 2019-01-09 | Stop reason: HOSPADM

## 2019-01-09 RX ORDER — SODIUM CHLORIDE, SODIUM LACTATE, POTASSIUM CHLORIDE, CALCIUM CHLORIDE 600; 310; 30; 20 MG/100ML; MG/100ML; MG/100ML; MG/100ML
INJECTION, SOLUTION INTRAVENOUS CONTINUOUS
Status: DISCONTINUED | OUTPATIENT
Start: 2019-01-09 | End: 2019-01-09 | Stop reason: HOSPADM

## 2019-01-09 RX ORDER — ENOXAPARIN SODIUM 100 MG/ML
40 INJECTION SUBCUTANEOUS EVERY 24 HOURS
Status: DISCONTINUED | OUTPATIENT
Start: 2019-01-09 | End: 2019-01-09 | Stop reason: HOSPADM

## 2019-01-09 RX ORDER — IBUPROFEN 200 MG
16 TABLET ORAL
Status: DISCONTINUED | OUTPATIENT
Start: 2019-01-09 | End: 2019-01-09 | Stop reason: HOSPADM

## 2019-01-09 RX ORDER — NALOXONE HCL 0.4 MG/ML
0.4 VIAL (ML) INJECTION
Status: DISCONTINUED | OUTPATIENT
Start: 2019-01-09 | End: 2019-01-09 | Stop reason: HOSPADM

## 2019-01-09 RX ORDER — FLUTICASONE PROPIONATE 50 MCG
1 SPRAY, SUSPENSION (ML) NASAL DAILY
Status: DISCONTINUED | OUTPATIENT
Start: 2019-01-09 | End: 2019-01-09 | Stop reason: HOSPADM

## 2019-01-09 RX ORDER — IBUPROFEN 200 MG
24 TABLET ORAL
Status: DISCONTINUED | OUTPATIENT
Start: 2019-01-09 | End: 2019-01-09 | Stop reason: HOSPADM

## 2019-01-09 RX ORDER — HYDROXYZINE PAMOATE 25 MG/1
25 CAPSULE ORAL NIGHTLY PRN
Status: DISCONTINUED | OUTPATIENT
Start: 2019-01-09 | End: 2019-01-09 | Stop reason: HOSPADM

## 2019-01-09 RX ORDER — LUBIPROSTONE 24 UG/1
24 CAPSULE ORAL
Status: DISCONTINUED | OUTPATIENT
Start: 2019-01-09 | End: 2019-01-09 | Stop reason: HOSPADM

## 2019-01-09 RX ORDER — PANTOPRAZOLE SODIUM 40 MG/1
40 TABLET, DELAYED RELEASE ORAL DAILY
Status: DISCONTINUED | OUTPATIENT
Start: 2019-01-09 | End: 2019-01-09 | Stop reason: HOSPADM

## 2019-01-09 RX ORDER — SODIUM CHLORIDE 0.9 % (FLUSH) 0.9 %
5 SYRINGE (ML) INJECTION
Status: DISCONTINUED | OUTPATIENT
Start: 2019-01-09 | End: 2019-01-09 | Stop reason: HOSPADM

## 2019-01-09 RX ORDER — FERROUS GLUCONATE 324(37.5)
324 TABLET ORAL
Status: DISCONTINUED | OUTPATIENT
Start: 2019-01-09 | End: 2019-01-09 | Stop reason: HOSPADM

## 2019-01-09 RX ADMIN — ONDANSETRON 4 MG: 2 INJECTION INTRAMUSCULAR; INTRAVENOUS at 12:01

## 2019-01-09 RX ADMIN — SODIUM CHLORIDE, SODIUM LACTATE, POTASSIUM CHLORIDE, AND CALCIUM CHLORIDE 1000 ML: .6; .31; .03; .02 INJECTION, SOLUTION INTRAVENOUS at 03:01

## 2019-01-09 RX ADMIN — SODIUM CHLORIDE, SODIUM LACTATE, POTASSIUM CHLORIDE, AND CALCIUM CHLORIDE: .6; .31; .03; .02 INJECTION, SOLUTION INTRAVENOUS at 02:01

## 2019-01-09 RX ADMIN — FLUTICASONE PROPIONATE 50 MCG: 50 SPRAY, METERED NASAL at 10:01

## 2019-01-09 RX ADMIN — PANTOPRAZOLE SODIUM 40 MG: 40 TABLET, DELAYED RELEASE ORAL at 08:01

## 2019-01-09 RX ADMIN — ENOXAPARIN SODIUM 40 MG: 100 INJECTION SUBCUTANEOUS at 04:01

## 2019-01-09 RX ADMIN — Medication 6 MG: at 12:01

## 2019-01-09 RX ADMIN — PROMETHAZINE HYDROCHLORIDE 6.25 MG: 25 INJECTION INTRAMUSCULAR; INTRAVENOUS at 02:01

## 2019-01-09 NOTE — DISCHARGE SUMMARY
[facility] Hospital Medicine  Discharge Summary      Patient Name: Terese Macias  MRN: [id number]  Admission Date: 1/8/2019  Hospital Length of Stay: 0 days  Discharge Date and Time:  01/09/2019 3:27 PM  Attending Physician: Mj Culp MD   Discharging Provider: Mj Culp MD  Primary Care Provider: Mildred Mohamud MD    [facility] Medicine Team: [facility] Mj Culp MD    HPI: Patient is a 43 year old female with PMH of IBS-C, chronic ion deficiency anemia, GERD, depression, recent pancreatic mass around uncinate process on CT, s/p EUS with FNA of the mass and lymph nodes (atypical cells, lymphoma can not be excluded) and recent diagnosis of acute pancreatitis (lipase > 1000) soon after EUS around mid December. She was not admitted for post EUS pancreatitis and her epigastric pain gradually improved.      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 Reports pain is sharp, intermittent. Reports associated nausea but denies vomiting, fever, chills, diarrhea. Reports pain gets worse with food intake and endorses decrease po intake last few days. States pain is similar to recent episode of pancreatitis but it is more intense now. Denies alcohol use, prior history of gallstone or liver disease. She developed worsened nausea with NBNB emesis after morphine in ED.      Labs significant for elevated lipase 492 with normal LFTs. She received IVF, morphine and zofran in ED.      Recent PET CT showed extensive splenomegaly with multiple splenic lesions. There is a gastric fundal lesion. There is also pancreatic head lymph node and multiple pre vascular lymph nodes. She follows with Dr. Medina from oncology regarding pancreatic mass, splenomegaly and lymph nodes.         * No surgery found *      Hospital Course:     Acute pancreatitis   Pancreatic mass   Splenomegaly with multiple lesions   Gastric  fundal lesion   Pancreatic head lymph node and pre vascular lymph nodes    abdominal pain resolved and tolerated soft diet  s/p AES evaluation - No intervention needed from AES perspective.  s/p Surgical oncology about admission - OK for discharge from Surgical Oncology perspective  S/p EUS guided FNA of pancreatic mass and lymph node on 12/12 complicated by post EUS pancreatitis   -FNA showed atypical cells; lymphoma can not be ruled out   -subsequent PET CT showed above abnormality   -She had follow up appointment with Dr. Medina regarding pancreatic mass and splenomegaly  -family reports she is scheduled for splenectomy in near future   -consult AES given post EUS pancreatitic with ongoing symptoms   -supportive care with IVF, NPO, pain control and antiemetics prn      #IBS-C  Chronic iron deficiency anemia   -had EGD, colonoscopy and VCE w/o signs of blood loss   -she has thalassemia minor   -currently on iron infusion prn  -takes linzess at home for IBS-C     #Depression   -stable on prozac   -denies SI/HI   -family present at bedside\     #GERD  -reports heart burn   -on PPI   -GI cocktail prn       Discharged with Surgical oncology follow up for splenectomy      Consults:   Consults (From admission, onward)        Status Ordering Provider     Inpatient consult to [facility]  Once     Provider:  (Not yet assigned)    RYAN Redding          Final Active Diagnoses:    Diagnosis Date Noted POA    PRINCIPAL PROBLEM:  Acute pancreatitis [K85.90] 01/09/2019 Yes    Intractable vomiting with nausea [R11.2] 01/09/2019 Yes    Volume depletion, gastrointestinal loss [E86.9] 01/09/2019 Yes    Pancreatic mass [K86.9] 12/19/2018 Yes    Splenomegaly [R16.1] 12/19/2018 Yes    DUSTIN (iron deficiency anemia) [D50.9] 03/23/2015 Yes    GERD (gastroesophageal reflux disease) [K21.9]  Yes      Problems Resolved During this Admission:      Discharged Condition: fair    Disposition:     Follow  Up:    Patient Instructions:   No discharge procedures on file.  Medications:  Reconciled Home Medications:      Medication List      START taking these medications    oxyCODONE 5 MG immediate release tablet  Commonly known as:  ROXICODONE  Take 1 tablet (5 mg total) by mouth every 6 (six) hours as needed.     senna-docusate 8.6-50 mg 8.6-50 mg per tablet  Commonly known as:  PERICOLACE  Take 2 tablets by mouth once daily.        CHANGE how you take these medications    fluticasone 50 mcg/actuation nasal spray  Commonly known as:  FLONASE  1 spray (50 mcg total) by Each Nare route once daily.  What changed:    · when to take this  · reasons to take this        CONTINUE taking these medications    albuterol 90 mcg/actuation inhaler  Commonly known as:  PROVENTIL/VENTOLIN HFA  Inhale 2 puffs into the lungs every 6 (six) hours as needed for Wheezing. Rescue     docusate sodium 50 MG capsule  Commonly known as:  COLACE  Take 1 capsule (50 mg total) by mouth nightly as needed for Constipation.     ferrous gluconate 324 MG tablet  Commonly known as:  FERGON  Take 1 tablet (324 mg total) by mouth daily with breakfast.     FLUoxetine 10 MG capsule  TAKE 1 CAPSULE BY MOUTH EVERY DAY     hydrOXYzine pamoate 25 MG Cap  Commonly known as:  VISTARIL  Take 1 capsule (25 mg total) by mouth nightly as needed (sleep).     linaclotide 145 mcg Cap capsule  Commonly known as:  LINZESS  Take 1 capsule (145 mcg total) by mouth once daily.     meloxicam 7.5 MG tablet  Commonly known as:  MOBIC  Take 1 tablet (7.5 mg total) by mouth once daily. Take once a day for two weeks then as needed. Take with food.     NUVARING 0.12-0.015 mg/24 hr vaginal ring  Generic drug:  etonogestrel-ethinyl estradiol  INSERT 1 RING VAGINALLY AS DIRECTED. REMOVE AFTER 3 WEEKS & WAIT 7 DAYS BEFORE INSERTING A NEW RING     omeprazole 20 MG capsule  Commonly known as:  PRILOSEC  Take 1 capsule (20 mg total) by mouth once daily.            Significant Diagnostic  Studies: Labs:   BMP:   Recent Labs   Lab 01/08/19 2254 01/09/19  0600    109    136   K 3.6 3.7    105   CO2 26 21*   BUN 8 6   CREATININE 0.7 0.6   CALCIUM 9.4 8.5*   MG  --  1.9   , CMP   Recent Labs   Lab 01/08/19 2254 01/09/19  0600    136   K 3.6 3.7    105   CO2 26 21*    109   BUN 8 6   CREATININE 0.7 0.6   CALCIUM 9.4 8.5*   PROT 7.6 7.0   ALBUMIN 3.5 3.2*   BILITOT 0.3 0.3   ALKPHOS 58 55   AST 40 35   ALT 18 16   ANIONGAP 6* 10   ESTGFRAFRICA >60.0 >60.0   EGFRNONAA >60.0 >60.0   , CBC   Recent Labs   Lab 01/08/19 2254 01/09/19  0600   WBC 7.89 9.92   HGB 9.3* 8.5*   HCT 29.8* 27.4*   * 139*   , INR No results found for: INR, PROTIME, Lipid Panel   Lab Results   Component Value Date    CHOL 133 11/28/2017    HDL 54 11/28/2017    LDLCALC 65.4 11/28/2017    TRIG 68 11/28/2017    CHOLHDL 40.6 11/28/2017   , Troponin No results for input(s): TROPONINI in the last 168 hours., A1C: No results for input(s): HGBA1C in the last 4320 hours. and All labs within the past 24 hours have been reviewed  Microbiology:   Blood Culture No results found for: LABBLOO, Sputum Culture No results found for: GSRESP, RESPIRATORYC and Urine Culture    Lab Results   Component Value Date    LABURIN ESCHERICHIA COLI  >100,000 cfu/ml   01/15/2016     Radiology  Imaging Results    None       Cardiac Graphics: EKG - NSR @84bpm     Pending Diagnostic Studies:     Procedure Component Value Units Date/Time    AKBAR [[id number]] Collected:  01/09/19 1100    Order Status:  Sent Lab Status:  In process Updated:  01/09/19 1113    Specimen:  Blood     IgG 1, 2, 3, and 4 [[id number]] Collected:  01/09/19 1100    Order Status:  Sent Lab Status:  In process Updated:  01/09/19 1113    Specimen:  Blood         Indwelling Lines/Drains at time of discharge:   Lines/Drains/Airways     Airway                 Airway - Non-Surgical 12/12/18 1431 Nasal Cannula 28 days                    Mj Culp,  MD  Department of Hospital Medicine  [facility]

## 2019-01-09 NOTE — PROGRESS NOTES
[facility]  General Surgery  Progress Note    Subjective:     History of Present Illness:   Patient is a 43 year old female with PMH of IBS-C, chronic ion deficiency anemia, GERD, depression, recent pancreatic mass around uncinate process on CT, s/p EUS on 12/12/18 with FNA of the mass and lymph nodes (atypical cells, lymphoma can not be excluded) and recent diagnosis of acute pancreatitis (lipase > 1000) soon after EUS around mid December. She was not admitted for post EUS pancreatitis and her epigastric pain gradually improved.      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 Reports pain is sharp, intermittent. Reports associated nausea but denies vomiting, fever, chills, diarrhea. Reports pain gets worse with food intake and endorses decrease po intake last few days. States pain is similar to recent episode of pancreatitis but it is more intense now. Denies alcohol use, prior history of gallstone or liver disease. She developed worsened nausea with NBNB emesis after morphine in ED.      Labs significant for elevated lipase 492 with normal LFTs. She received IVF, morphine and zofran in ED.      Recent PET CT showed extensive splenomegaly with multiple splenic lesions. There is a gastric fundal lesion. There is also pancreatic head lymph node and multiple pre vascular lymph nodes. She follows with Dr. Medina from oncology regarding pancreatic mass, splenomegaly and lymph nodes.      Surgical Oncology currently plans to perform a splenectomy; the patient is scheduled for 1/18/19. We were notified that the patient was in the hospital.      Post-Op Info:  * No surgery found *         Interval History: Pancreatitis improving -  lipase improving (492 -> 280), tolerating clears and advancing diet.     Medications:  Continuous Infusions:   lactated ringers 125 mL/hr at 01/09/19 0231     Scheduled Meds:   enoxaparin   40 mg Subcutaneous Daily    ferrous gluconate  324 mg Oral Daily with breakfast    FLUoxetine  10 mg Oral Daily    fluticasone  1 spray Each Nare Daily    lubiprostone  24 mcg Oral Daily with breakfast    pantoprazole  40 mg Oral Daily     PRN Meds:acetaminophen, albuterol-ipratropium, dextrose 50%, dextrose 50%, docusate sodium, GI cocktail (mylanta 30 mL, lidocaine 2 % viscous 10 mL, dicyclomine 10 mL) 50 mL, glucagon (human recombinant), glucose, glucose, HYDROmorphone, hydrOXYzine pamoate, naloxone, oxyCODONE, promethazine (PHENERGAN) IVPB, sodium chloride 0.9%     Review of patient's allergies indicates:   Allergen Reactions    Pcn [penicillins] Other (See Comments)     Pt had reaction to PCN as an infant.     Objective:     Vital Signs (Most Recent):  Temp: 98.4 °F (36.9 °C) (01/09/19 1610)  Pulse: 82 (01/09/19 1610)  Resp: 18 (01/09/19 1610)  BP: 97/62 (01/09/19 1610)  SpO2: 97 % (01/09/19 1610) Vital Signs (24h Range):  Temp:  [97 °F (36.1 °C)-98.7 °F (37.1 °C)] 98.4 °F (36.9 °C)  Pulse:  [65-96] 82  Resp:  [16-19] 18  SpO2:  [95 %-100 %] 97 %  BP: ()/(53-77) 97/62     Weight: 76.7 kg (169 lb)  Body mass index is 30.91 kg/m².    Intake/Output - Last 3 Shifts     None          Physical Exam   Constitutional: She is oriented to person, place, and time. She appears well-developed and well-nourished.   Eyes: No scleral icterus.   Neck: Neck supple.   Cardiovascular: Normal rate. Exam reveals no gallop.   Pulmonary/Chest: Effort normal. No respiratory distress.   Abdominal: Soft. Bowel sounds are normal. She exhibits distension. She exhibits no mass. There is splenomegaly. There is no rebound and no guarding. No hernia.   Minimal epigastric TTP   Musculoskeletal: She exhibits no edema.   Neurological: She is alert and oriented to person, place, and time.   Skin: Skin is warm.   Psychiatric: She has a normal mood and affect. Her behavior is normal.       Significant Labs:  CBC:   Recent Labs   Lab  01/09/19  0600   WBC 9.92   RBC 3.09*   HGB 8.5*   HCT 27.4*   *   MCV 89   MCH 27.5   MCHC 31.0*     CMP:   Recent Labs   Lab 01/09/19  0600      CALCIUM 8.5*   ALBUMIN 3.2*   PROT 7.0      K 3.7   CO2 21*      BUN 6   CREATININE 0.6   ALKPHOS 55   ALT 16   AST 35   BILITOT 0.3     Significant Diagnostics:  I have reviewed and interpreted all pertinent imaging results/findings within the past 24 hours.    Assessment/Plan:     * Acute pancreatitis    42 yo F with splenomegaly likely 2/2 lymphoma. Underwent EUS with FNA biopsy of pancreatic head lesion on 12/12/18, developed resulting abdominal pain with elevated lipase. Admitted for management of pancreatitis by Hospital Medicine.     - patient is doing well, now advanced to soft diet and tolerating well. Lipase is downtrending.  - OK for discharge from Surgical Oncology perspective  - FNA biopsy results show atypical cells suspicious for lymphoma  - Currently scheduled for splenectomy on 1/18/18; no changes to plan         Don Cortez MD  General Surgery  [facility]

## 2019-01-09 NOTE — SUBJECTIVE & OBJECTIVE
Interval History: Pancreatitis improving -  lipase improving (492 -> 280), tolerating clears and advancing diet.     Medications:  Continuous Infusions:   lactated ringers 125 mL/hr at 01/09/19 0231     Scheduled Meds:   enoxaparin  40 mg Subcutaneous Daily    ferrous gluconate  324 mg Oral Daily with breakfast    FLUoxetine  10 mg Oral Daily    fluticasone  1 spray Each Nare Daily    lubiprostone  24 mcg Oral Daily with breakfast    pantoprazole  40 mg Oral Daily     PRN Meds:acetaminophen, albuterol-ipratropium, dextrose 50%, dextrose 50%, docusate sodium, GI cocktail (mylanta 30 mL, lidocaine 2 % viscous 10 mL, dicyclomine 10 mL) 50 mL, glucagon (human recombinant), glucose, glucose, HYDROmorphone, hydrOXYzine pamoate, naloxone, oxyCODONE, promethazine (PHENERGAN) IVPB, sodium chloride 0.9%     Review of patient's allergies indicates:   Allergen Reactions    Pcn [penicillins] Other (See Comments)     Pt had reaction to PCN as an infant.     Objective:     Vital Signs (Most Recent):  Temp: 98.4 °F (36.9 °C) (01/09/19 1610)  Pulse: 82 (01/09/19 1610)  Resp: 18 (01/09/19 1610)  BP: 97/62 (01/09/19 1610)  SpO2: 97 % (01/09/19 1610) Vital Signs (24h Range):  Temp:  [97 °F (36.1 °C)-98.7 °F (37.1 °C)] 98.4 °F (36.9 °C)  Pulse:  [65-96] 82  Resp:  [16-19] 18  SpO2:  [95 %-100 %] 97 %  BP: ()/(53-77) 97/62     Weight: 76.7 kg (169 lb)  Body mass index is 30.91 kg/m².    Intake/Output - Last 3 Shifts     None          Physical Exam   Constitutional: She is oriented to person, place, and time. She appears well-developed and well-nourished.   Eyes: No scleral icterus.   Neck: Neck supple.   Cardiovascular: Normal rate. Exam reveals no gallop.   Pulmonary/Chest: Effort normal. No respiratory distress.   Abdominal: Soft. Bowel sounds are normal. She exhibits distension. She exhibits no mass. There is splenomegaly. There is no rebound and no guarding. No hernia.   Minimal epigastric TTP   Musculoskeletal: She  exhibits no edema.   Neurological: She is alert and oriented to person, place, and time.   Skin: Skin is warm.   Psychiatric: She has a normal mood and affect. Her behavior is normal.       Significant Labs:  CBC:   Recent Labs   Lab 01/09/19  0600   WBC 9.92   RBC 3.09*   HGB 8.5*   HCT 27.4*   *   MCV 89   MCH 27.5   MCHC 31.0*     CMP:   Recent Labs   Lab 01/09/19  0600      CALCIUM 8.5*   ALBUMIN 3.2*   PROT 7.0      K 3.7   CO2 21*      BUN 6   CREATININE 0.6   ALKPHOS 55   ALT 16   AST 35   BILITOT 0.3     Significant Diagnostics:  I have reviewed and interpreted all pertinent imaging results/findings within the past 24 hours.

## 2019-01-09 NOTE — SUBJECTIVE & OBJECTIVE
Past Medical History:   Diagnosis Date    Abnormal Pap smear     Abnormal Pap smear of vagina     Anemia     Depression     Fever blister     GERD (gastroesophageal reflux disease)     Heavy periods 6/8/2015    Hemorrhoids without complication     IBS (irritable colon syndrome)     Joint pain     Left upper quadrant pain 12/19/2018    Pancreatic mass 12/19/2018    Psychiatric problem     Screening for HPV (human papillomavirus)     Sleep difficulties     Splenomegaly 12/19/2018    Thalassemia     Thalassemia     Therapy        Past Surgical History:   Procedure Laterality Date    COLONOSCOPY      COLONOSCOPY N/A 11/8/2018    Performed by [facility] at [facility] (2ND FLR)    EGD (ESOPHAGOGASTRODUODENOSCOPY) N/A 11/8/2018    Performed by Ba [facility] at [facility] (2ND FLR)    ESOPHAGOGASTRODUODENOSCOPY (EGD) N/A 3/23/2015    Performed by Ba [facility] at [facility] (4TH FLR)    HERNIA REPAIR      MT COLONOSCOPY,DIAGNOSTIC [37170 (CPT®)] N/A 7/2/2014    Performed by Cj Lassiter MD at [facility] END[facility] (4TH FLR)    ULTRASOUND-ENDOSCOPIC-UPPER N/A 12/12/2018    Performed by Venu Montiel MD at [facility]       Review of patient's allergies indicates:   Allergen Reactions    Pcn [penicillins] Other (See Comments)     Pt had reaction to PCN as an infant.     Family History     Problem Relation (Age of Onset)    Acne Sister    Breast cancer Cousin, Maternal Grandmother, Maternal Aunt    Cancer Cousin, Cousin, Maternal Grandmother, Maternal Aunt    Colon cancer Paternal Uncle (60)    Diabetes Father    Eczema Father, Other, Sister, Brother, Daughter    Heart disease Father    Hypertension Mother    No Known Problems Other, Maternal Uncle, Paternal Aunt, Maternal Grandfather, Paternal Grandmother, Paternal Grandfather    Stomach cancer Cousin (44)        Tobacco Use    Smoking status: Never Smoker    Smokeless tobacco: Never Used   Substance and Sexual Activity    Alcohol use: Yes      Comment: Occasionally    Drug use: No    Sexual activity: No     Partners: Male     Birth control/protection: OCP     Review of Systems   Constitutional: Positive for activity change, appetite change and chills.   HENT: Negative for ear discharge and facial swelling.    Eyes: Negative for photophobia and redness.   Respiratory: Negative for wheezing and stridor.    Cardiovascular: Negative for chest pain and palpitations.   Gastrointestinal: Positive for abdominal distention, abdominal pain, constipation, nausea and vomiting. Negative for anal bleeding, blood in stool, diarrhea and rectal pain.   Endocrine: Negative for cold intolerance and heat intolerance.   Genitourinary: Negative for dysuria and frequency.   Skin: Negative for color change and rash.   Allergic/Immunologic: Negative for food allergies.   Neurological: Negative for seizures and numbness.   Hematological: Does not bruise/bleed easily.   Psychiatric/Behavioral: Negative for agitation and behavioral problems.     Objective:     Vital Signs (Most Recent):  Temp: 97.3 °F (36.3 °C) (01/09/19 0733)  Pulse: 78 (01/09/19 0733)  Resp: 16 (01/09/19 0733)  BP: (!) 105/57 (01/09/19 0733)  SpO2: 99 % (01/09/19 0733) Vital Signs (24h Range):  Temp:  [97 °F (36.1 °C)-98.7 °F (37.1 °C)] 97.3 °F (36.3 °C)  Pulse:  [65-96] 78  Resp:  [16-19] 16  SpO2:  [95 %-100 %] 99 %  BP: ()/(53-77) 105/57     Weight: 76.7 kg (169 lb) (01/08/19 2129)  Body mass index is 30.91 kg/m².    No intake or output data in the 24 hours ending 01/09/19 1044    Lines/Drains/Airways     Airway                 Airway - Non-Surgical 12/12/18 1431 Nasal Cannula 27 days          Peripheral Intravenous Line                 Peripheral IV - Single Lumen 01/08/19 2243 Left Antecubital less than 1 day                Physical Exam   Constitutional: She is oriented to person, place, and time. She appears well-developed and well-nourished.   Eyes: No scleral icterus.   Neck: Neck supple.  "  Cardiovascular: Normal rate. Exam reveals no gallop.   Pulmonary/Chest: Effort normal. No respiratory distress.   Abdominal: Soft. Bowel sounds are normal. She exhibits distension. She exhibits no mass. There is splenomegaly. There is no tenderness. There is no rebound and no guarding. No hernia.   Musculoskeletal: She exhibits no edema.   Neurological: She is alert and oriented to person, place, and time.   Skin: Skin is warm.   Psychiatric: She has a normal mood and affect. Her behavior is normal.       BP (!) 105/57 (BP Location: Right arm, Patient Position: Lying)   Pulse 78   Temp 97.3 °F (36.3 °C) (Oral)   Resp 16   Ht 5' 2" (1.575 m)   Wt 76.7 kg (169 lb)   SpO2 99%   Breastfeeding? No   BMI 30.91 kg/m²   Lab Results   Component Value Date    WBC 9.92 01/09/2019    HGB 8.5 (L) 01/09/2019    HCT 27.4 (L) 01/09/2019    MCV 89 01/09/2019     (L) 01/09/2019     No results found for: INR  Lab Results   Component Value Date     01/09/2019    K 3.7 01/09/2019    CREATININE 0.6 01/09/2019     Lab Results   Component Value Date    ALBUMIN 3.2 (L) 01/09/2019    ALT 16 01/09/2019    AST 35 01/09/2019    ALKPHOS 55 01/09/2019    BILITOT 0.3 01/09/2019     No results found for: AFP  Lab Results   Component Value Date    LIPASE 280 (H) 01/09/2019     No results found for: TACROLIMUS    Imaging:  Reviewed and as noted in HPI.       "

## 2019-01-09 NOTE — PLAN OF CARE
Problem: Adult Inpatient Plan of Care  Goal: Plan of Care Review  Outcome: Ongoing (interventions implemented as appropriate)  Pt AAOx4, VSS, No falls noted, fall precautions remain. Skin intact, Pain assessed,  pain controlled with PRN regimen, pt comfortable, frequent rounds made for safety and patient care. SCD's in place. Call light within reach, bed wheels locked, bed in lowest position, side rails ^x2, safety maintained. NADN, Will continue monitor.

## 2019-01-09 NOTE — CONSULTS
[facility]  Gastroenterology  Consult Note    Patient Name: Terese Macias  MRN: [id number]  Admission Date: 1/8/2019  Hospital Length of Stay: 0 days  Code Status: Prior   Attending Provider: Mj Culp MD   Consulting Provider: Edson Carmen MD  Primary Care Physician: Mildred Mohamud MD  Principal Problem:Acute pancreatitis    Inpatient consult to [facility]  Consult performed by: Edson Carmen MD  Consult ordered by: Aleena Fox DO  Reason for consult: pancreatitis        Subjective:     HPI:  Terese Macias is a 43 y.o. female with chronic iron deficiency anemia, GERD, depression, recent pancreatic mass around uncinate process on CT, s/p EUS with FNA of the mass on 12/12/18 by Dr. Montiel and lymph nodes (atypical cells, lymphoma can not be excluded) and recent diagnosis of acute pancreatitis (lipase > 1000) soon after EUS around mid December. She was not admitted for post EUS pancreatitis and her epigastric pain gradually improved and managed at home. As part of her diagnostic workup, recent PET CT showed extensive splenomegaly with multiple splenic lesions. There is a gastric fundal lesion. There is also pancreatic head lymph node and multiple pre vascular lymph nodes. She follows with Dr. Medina from oncology regarding pancreatic mass, splenomegaly and lymph nodes. Seen by surgical oncology, Dr. Goncalves's team and scheduled for splenectomy for both diagnostic and therapeutic reasons.     Yesterday, she developed severe epigastric pain radiating to her back. Reports pain is sharp, intermittent. Reports associated nausea but denies vomiting, fever, chills, diarrhea. Reports pain gets worse with food intake and endorses decrease po intake last few days. States pain is similar to recent episode of pancreatitis but it is more intense now. Denies alcohol use, prior history of gallstone or liver disease. She developed worsened nausea with NBNB  emesis after morphine in ED.      Labs significant for elevated lipase 492 with normal LFTs. She received IVF, morphine and zofran in ED. [facility] consulted for further evaluation.              Past Medical History:   Diagnosis Date    Abnormal Pap smear     Abnormal Pap smear of vagina     Anemia     Depression     Fever blister     GERD (gastroesophageal reflux disease)     Heavy periods 6/8/2015    Hemorrhoids without complication     IBS (irritable colon syndrome)     Joint pain     Left upper quadrant pain 12/19/2018    Pancreatic mass 12/19/2018    Psychiatric problem     Screening for HPV (human papillomavirus)     Sleep difficulties     Splenomegaly 12/19/2018    Thalassemia     Thalassemia     Therapy        Past Surgical History:   Procedure Laterality Date    COLONOSCOPY      COLONOSCOPY N/A 11/8/2018    Performed by [facility] at [facility] (2ND FLR)    EGD (ESOPHAGOGASTRODUODENOSCOPY) N/A 11/8/2018    Performed by Ba [facility] at [facility] (2ND FLR)    ESOPHAGOGASTRODUODENOSCOPY (EGD) N/A 3/23/2015    Performed by Ba Stewart MD at [facility] END[facility] (4TH FLR)    HERNIA REPAIR      ME COLONOSCOPY,DIAGNOSTIC [79258 (CPT®)] N/A 7/2/2014    Performed by Cj Lassiter MD at [facility] END[facility] (4TH FLR)    ULTRASOUND-ENDOSCOPIC-UPPER N/A 12/12/2018    Performed by Venu [facility]iel MD at [facility] END[facility]       Review of patient's allergies indicates:   Allergen Reactions    Pcn [penicillins] Other (See Comments)     Pt had reaction to PCN as an infant.     Family History     Problem Relation (Age of Onset)    Acne Sister    Breast cancer Cousin, Maternal Grandmother, Maternal Aunt    Cancer Cousin, Cousin, Maternal Grandmother, Maternal Aunt    Colon cancer Paternal Uncle (60)    Diabetes Father    Eczema Father, Other, Sister, Brother, Daughter    Heart disease Father    Hypertension Mother    No Known Problems Other, Maternal Uncle, Paternal Aunt, Maternal Grandfather, Paternal Grandmother,  Paternal Grandfather    Stomach cancer Cousin (44)        Tobacco Use    Smoking status: Never Smoker    Smokeless tobacco: Never Used   Substance and Sexual Activity    Alcohol use: Yes     Comment: Occasionally    Drug use: No    Sexual activity: No     Partners: Male     Birth control/protection: OCP     Review of Systems   Constitutional: Positive for activity change, appetite change and chills.   HENT: Negative for ear discharge and facial swelling.    Eyes: Negative for photophobia and redness.   Respiratory: Negative for wheezing and stridor.    Cardiovascular: Negative for chest pain and palpitations.   Gastrointestinal: Positive for abdominal distention, abdominal pain, constipation, nausea and vomiting. Negative for anal bleeding, blood in stool, diarrhea and rectal pain.   Endocrine: Negative for cold intolerance and heat intolerance.   Genitourinary: Negative for dysuria and frequency.   Skin: Negative for color change and rash.   Allergic/Immunologic: Negative for food allergies.   Neurological: Negative for seizures and numbness.   Hematological: Does not bruise/bleed easily.   Psychiatric/Behavioral: Negative for agitation and behavioral problems.     Objective:     Vital Signs (Most Recent):  Temp: 97.3 °F (36.3 °C) (01/09/19 0733)  Pulse: 78 (01/09/19 0733)  Resp: 16 (01/09/19 0733)  BP: (!) 105/57 (01/09/19 0733)  SpO2: 99 % (01/09/19 0733) Vital Signs (24h Range):  Temp:  [97 °F (36.1 °C)-98.7 °F (37.1 °C)] 97.3 °F (36.3 °C)  Pulse:  [65-96] 78  Resp:  [16-19] 16  SpO2:  [95 %-100 %] 99 %  BP: ()/(53-77) 105/57     Weight: 76.7 kg (169 lb) (01/08/19 2129)  Body mass index is 30.91 kg/m².    No intake or output data in the 24 hours ending 01/09/19 1044    Lines/Drains/Airways     Airway                 Airway - Non-Surgical 12/12/18 1431 Nasal Cannula 27 days          Peripheral Intravenous Line                 Peripheral IV - Single Lumen 01/08/19 2243 Left Antecubital less than 1 day  "               Physical Exam   Constitutional: She is oriented to person, place, and time. She appears well-developed and well-nourished.   Eyes: No scleral icterus.   Neck: Neck supple.   Cardiovascular: Normal rate. Exam reveals no gallop.   Pulmonary/Chest: Effort normal. No respiratory distress.   Abdominal: Soft. Bowel sounds are normal. She exhibits distension. She exhibits no mass. There is splenomegaly. There is no tenderness. There is no rebound and no guarding. No hernia.   Musculoskeletal: She exhibits no edema.   Neurological: She is alert and oriented to person, place, and time.   Skin: Skin is warm.   Psychiatric: She has a normal mood and affect. Her behavior is normal.       BP (!) 105/57 (BP Location: Right arm, Patient Position: Lying)   Pulse 78   Temp 97.3 °F (36.3 °C) (Oral)   Resp 16   Ht 5' 2" (1.575 m)   Wt 76.7 kg (169 lb)   SpO2 99%   Breastfeeding? No   BMI 30.91 kg/m²    Lab Results   Component Value Date    WBC 9.92 01/09/2019    HGB 8.5 (L) 01/09/2019    HCT 27.4 (L) 01/09/2019    MCV 89 01/09/2019     (L) 01/09/2019     No results found for: INR  Lab Results   Component Value Date     01/09/2019    K 3.7 01/09/2019    CREATININE 0.6 01/09/2019     Lab Results   Component Value Date    ALBUMIN 3.2 (L) 01/09/2019    ALT 16 01/09/2019    AST 35 01/09/2019    ALKPHOS 55 01/09/2019    BILITOT 0.3 01/09/2019     No results found for: AFP  Lab Results   Component Value Date    LIPASE 280 (H) 01/09/2019     No results found for: TACROLIMUS    Imaging:  Reviewed and as noted in HPI.         Assessment/Plan:     * Acute pancreatitis    Terese Macias is a 43 y.o. female with pancreas mass - FNA non conclusive, splenomegaly - overall concern for lymphoma and Hem-onc and surg onc planned for splenectomy 1/18/19. She had post FNA pancreatitis on 12/12 which was managed supportively at home. She presented with abdominal pain on 1/8/2019 and found to have acute pancreatitis. "     Recommend supportive care with IVF, pain control and antiemetics. No intervention needed from [facility] perspective.  Recommend notify surgical oncology about admission.        Thank you for your consult. I will follow-up with patient. Please contact us if you have any additional questions.    Edson Carmen MD  Gastroenterology  [facility]

## 2019-01-09 NOTE — HPI
Terese Macias is a 43 y.o. female with Ichronic ion deficiency anemia, GERD, depression, recent pancreatic mass around uncinate process on CT, s/p EUS with FNA of the mass on 12/12/18 by Dr. Montiel and lymph nodes (atypical cells, lymphoma can not be excluded) and recent diagnosis of acute pancreatitis (lipase > 1000) soon after EUS around mid December. She was not admitted for post EUS pancreatitis and her epigastric pain gradually improved and managed at home. Her pain has improved but returned back 1 day PTA. Recent PET CT showed extensive splenomegaly with multiple splenic lesions. There is a gastric fundal lesion. There is also pancreatic head lymph node and multiple pre vascular lymph nodes. She follows with Dr. Medina from oncology regarding pancreatic mass, splenomegaly and lymph nodes. Seen by surgical oncology, Dr. Goncalves's team and scheduled for splenectomy.     Yesterday, she developed severe epigastric pain radiating to her back. Reports pain is sharp, intermittent. Reports associated nausea but denies vomiting, fever, chills, diarrhea. Reports pain gets worse with food intake and endorses decrease po intake last few days. States pain is similar to recent episode of pancreatitis but it is more intense now. Denies alcohol use, prior history of gallstone or liver disease. She developed worsened nausea with NBNB emesis after morphine in ED.      Labs significant for elevated lipase 492 with normal LFTs. She received IVF, morphine and zofran in ED. [facility] consulted for further evaluation.

## 2019-01-09 NOTE — ASSESSMENT & PLAN NOTE
Terese Macias is a 43 y.o. female with pancreas mass - FNA non conclusive, splenomegaly - overall concern for lymphoma and Hemonc and surg onc planned for splenectomy 1/18/19. She had post FNA pancreatitis on 12/12 which was managed supportively at home. She presented with abdominal pain on 1/8/2019 and found to have acute pancreatitis.     Recommend supportive care with IVF, pain control and antiemetics.   Recommend notify surgical oncology about admission.   Expect recovery soon and will likely proceed with scheduled splenectomy on 1/18.

## 2019-01-09 NOTE — ASSESSMENT & PLAN NOTE
44 yo F with splenomegaly likely 2/2 lymphoma. Underwent EUS with FNA biopsy of pancreatic head lesion on 12/12/18, developed resulting abdominal pain with elevated lipase. Admitted for management of pancreatitis by Hospital Medicine.     - patient is doing well, now advanced to soft diet and tolerating well. Lipase is downtrending.  - OK for discharge from Surgical Oncology perspective  - FNA biopsy results show atypical cells suspicious for lymphoma  - Currently scheduled for splenectomy on 1/18/18; no changes to plan

## 2019-01-09 NOTE — PROGRESS NOTES
Admit note:    Report received. Care assumed. Patient arrived via stretcher AAO x4. Pt lying supine in bed. Pt denies pain or any other concerns at this time. See assessment. Patient oriented to room. Bed in lowest position, side rails up x2, bed wheels locked and call light within reach, NADK. Will continue to monitor.

## 2019-01-09 NOTE — PROVIDER PROGRESS NOTES - EMERGENCY DEPT.
Encounter Date: 1/8/2019    ED Physician Progress Notes       SCRIBE NOTE: I, Nadine Contreras, am scribing for, and in the presence of,  Dr. Mariscal.  Physician Statement: I, Dr. Mariscal, personally performed the services described in this documentation as scribed by Nadine Contreras in my presence, and it is both accurate and complete.     Physician Note:   43 y.o. female with a history of pancreatitis and splenomegaly presents to the ED complaining of abdominal pain that started today. She describes her pain as constant and burning radiating to her back. Denies taking any cough medicine or drinking alcohol today. Patient reports the pain is similar to her pancreatitis but less cramping. She also has a splenectomy planned for next week.

## 2019-01-09 NOTE — ED TRIAGE NOTES
Patient presents to the ED with complaints of abdominal pain, back pain and nausea that is worsened today. Patient also c/o chills. Hx. Of Splenomegaly and has surgery scheduled for the 18th but reports the pain became unbearable today. Denies chest pain, SOB or any other symptom.

## 2019-01-09 NOTE — ED NOTES
Patient identifiers verified and correct for Terese Macias.  C/C: Abdominal Pain  APPEARANCE: awake and alert in NAD.  SKIN: warm, dry and intact. No breakdown or bruising.  MUSCULOSKELETAL: Patient moving all extremities spontaneously, no obvious swelling or deformities noted. Ambulates independently. +Back Pain  RESPIRATORY: Denies shortness of breath. Respirations unlabored.   CARDIAC: Denies CP, 2+ distal pulses; no peripheral edema  ABDOMEN: S/ND. +Tender +Burning/Cramping Abdominal Pain +Nausea  : voids spontaneously, denies difficulty  Neurologic: AAO x 4; follows commands equal strength in all extremities; denies numbness/tingling. Denies dizziness or weakness.

## 2019-01-09 NOTE — H&P
[facility] Hospital Medicine  History & Physical    Patient Name: Terese Macias  MRN: [id number]  Admission Date: 1/8/2019  Attending Physician: Aleena Fox DO   Primary Care Provider: Mildred Mohamud MD    [facility] Medicine Team: [facility] HOSP MED B Aleena Fox DO     Patient information was obtained from patient and ER records.     Subjective:     Principal Problem:Acute pancreatitis    Chief Complaint:   Chief Complaint   Patient presents with    Abdominal Pain     reports abdominal pain and nausea that started tonight         HPI:     Patient is a 43 year old female with PMH of IBS-C, chronic ion deficiency anemia, GERD, depression, recent pancreatic mass around uncinate process on CT, s/p EUS with FNA of the mass and lymph nodes (atypical cells, lymphoma can not be excluded) and recent diagnosis of acute pancreatitis (lipase > 1000) soon after EUS around mid December. She was not admitted for post EUS pancreatitis and her epigastric pain gradually improved.     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 Reports pain is sharp, intermittent. Reports associated nausea but denies vomiting, fever, chills, diarrhea. Reports pain gets worse with food intake and endorses decrease po intake last few days. States pain is similar to recent episode of pancreatitis but it is more intense now. Denies alcohol use, prior history of gallstone or liver disease. She developed worsened nausea with NBNB emesis after morphine in ED.     Labs significant for elevated lipase 492 with normal LFTs. She received IVF, morphine and zofran in ED.     Recent PET CT showed extensive splenomegaly with multiple splenic lesions. There is a gastric fundal lesion. There is also pancreatic head lymph node and multiple pre vascular lymph nodes. She follows with Dr. Medina from oncology regarding pancreatic mass, splenomegaly and lymph nodes.          Past Medical History:   Diagnosis Date    Abnormal Pap smear     Abnormal Pap smear of vagina     Anemia     Depression     Fever blister     GERD (gastroesophageal reflux disease)     Heavy periods 6/8/2015    Hemorrhoids without complication     IBS (irritable colon syndrome)     Joint pain     Left upper quadrant pain 12/19/2018    Pancreatic mass 12/19/2018    Psychiatric problem     Screening for HPV (human papillomavirus)     Sleep difficulties     Splenomegaly 12/19/2018    Thalassemia     Thalassemia     Therapy        Past Surgical History:   Procedure Laterality Date    COLONOSCOPY      COLONOSCOPY N/A 11/8/2018    Performed by Ba Stewart MD at [facility] ENDO (2ND FLR)    EGD (ESOPHAGOGASTRODUODENOSCOPY) N/A 11/8/2018    Performed by Ba Stewart MD at [facility] END[facility] (2ND FLR)    ESOPHAGOGASTRODUODENOSCOPY (EGD) N/A 3/23/2015    Performed by [facility] at [facility] (4TH FLR)    HERNIA REPAIR      [place] COLONOSCOPY,DIAGNOSTIC [20601 (CPT®)] N/A 7/2/2014    Performed by Cj Lassiter MD at [facility] ENDO (4TH FLR)    ULTRASOUND-ENDOSCOPIC-UPPER N/A 12/12/2018    Performed by Venu [facility]iel MD at [facility]       Review of patient's allergies indicates:   Allergen Reactions    Pcn [penicillins] Other (See Comments)     Pt had reaction to PCN as an infant.       No current facility-administered medications on file prior to encounter.      Current Outpatient Medications on File Prior to Encounter   Medication Sig    albuterol 90 mcg/actuation inhaler Inhale 2 puffs into the lungs every 6 (six) hours as needed for Wheezing. Rescue    docusate sodium (COLACE) 50 MG capsule Take 1 capsule (50 mg total) by mouth nightly as needed for Constipation.    ferrous gluconate (FERGON) 324 MG tablet Take 1 tablet (324 mg total) by mouth daily with breakfast.    FLUoxetine (PROZAC) 10 MG capsule TAKE 1 CAPSULE BY MOUTH EVERY DAY    fluticasone (FLONASE) 50 mcg/actuation nasal spray 1  spray (50 mcg total) by Each Nare route once daily. (Patient taking differently: 1 spray by Each Nare route daily as needed. )    hydrOXYzine pamoate (VISTARIL) 25 MG Cap Take 1 capsule (25 mg total) by mouth nightly as needed (sleep).    linaclotide (LINZESS) 145 mcg Cap capsule Take 1 capsule (145 mcg total) by mouth once daily.    meloxicam (MOBIC) 7.5 MG tablet Take 1 tablet (7.5 mg total) by mouth once daily. Take once a day for two weeks then as needed. Take with food.    NUVARING 0.12-0.015 mg/24 hr vaginal ring INSERT 1 RING VAGINALLY AS DIRECTED. REMOVE AFTER 3 WEEKS & WAIT 7 DAYS BEFORE INSERTING A NEW RING    omeprazole (PRILOSEC) 20 MG capsule Take 1 capsule (20 mg total) by mouth once daily.     Family History     Problem Relation (Age of Onset)    Acne Sister    Breast cancer Cousin, Maternal Grandmother, Maternal Aunt    Cancer Cousin, Cousin, Maternal Grandmother, Maternal Aunt    Colon cancer Paternal Uncle (60)    Diabetes Father    Eczema Father, Other, Sister, Brother, Daughter    Heart disease Father    Hypertension Mother    No Known Problems Other, Maternal Uncle, Paternal Aunt, Maternal Grandfather, Paternal Grandmother, Paternal Grandfather    Stomach cancer Cousin (44)        Tobacco Use    Smoking status: Never Smoker    Smokeless tobacco: Never Used   Substance and Sexual Activity    Alcohol use: Yes     Comment: Occasionally    Drug use: No    Sexual activity: No     Partners: Male     Birth control/protection: OCP     Review of Systems   Constitutional: Negative for activity change, appetite change, chills, diaphoresis, fatigue and fever.   HENT: Negative for congestion, dental problem, drooling, ear discharge, ear pain, facial swelling, hearing loss, mouth sores, nosebleeds, postnasal drip, rhinorrhea, sinus pressure, sneezing, sore throat, tinnitus, trouble swallowing and voice change.    Eyes: Negative for photophobia, pain, discharge, redness, itching and visual  disturbance.   Respiratory: Negative for apnea, cough, choking, chest tightness, shortness of breath, wheezing and stridor.    Cardiovascular: Negative for chest pain, palpitations and leg swelling.   Gastrointestinal: Positive for abdominal pain, nausea and vomiting. Negative for abdominal distention, anal bleeding, blood in stool, constipation, diarrhea and rectal pain.   Endocrine: Negative for cold intolerance, heat intolerance, polydipsia, polyphagia and polyuria.   Genitourinary: Negative for decreased urine volume, difficulty urinating, dyspareunia, dysuria, enuresis, flank pain, frequency, genital sores, hematuria, menstrual problem, pelvic pain, urgency, vaginal bleeding, vaginal discharge and vaginal pain.   Musculoskeletal: Positive for back pain. Negative for arthralgias, gait problem, joint swelling, myalgias, neck pain and neck stiffness.   Skin: Negative for color change, pallor, rash and wound.   Allergic/Immunologic: Negative for environmental allergies, food allergies and immunocompromised state.   Neurological: Negative for dizziness, tremors, seizures, syncope, facial asymmetry, speech difficulty, weakness, light-headedness, numbness and headaches.   Hematological: Negative for adenopathy. Does not bruise/bleed easily.   Psychiatric/Behavioral: Negative for agitation, behavioral problems, confusion, decreased concentration, dysphoric mood, hallucinations, self-injury, sleep disturbance and suicidal ideas. The patient is not nervous/anxious and is not hyperactive.      Objective:     Vital Signs (Most Recent):  Temp: 98.7 °F (37.1 °C) (01/09/19 0419)  Pulse: 67 (01/09/19 0419)  Resp: 18 (01/09/19 0419)  BP: 136/77 (01/09/19 0419)  SpO2: 95 % (01/09/19 0419) Vital Signs (24h Range):  Temp:  [97 °F (36.1 °C)-98.7 °F (37.1 °C)] 98.7 °F (37.1 °C)  Pulse:  [65-96] 67  Resp:  [17-19] 18  SpO2:  [95 %-100 %] 95 %  BP: ()/(53-77) 136/77     Weight: 76.7 kg (169 lb)  Body mass index is 30.91  kg/m².    Physical Exam   Constitutional: She is oriented to person, place, and time. She appears well-developed and well-nourished. No distress.   Appears uncomfortable due to pain and nausea. Her mother is present at bedside.    HENT:   Head: Normocephalic and atraumatic.   Right Ear: External ear normal.   Left Ear: External ear normal.   Nose: Nose normal.   Mouth/Throat: No oropharyngeal exudate.   Dry oral mucosa    Eyes: Conjunctivae and EOM are normal. Pupils are equal, round, and reactive to light. No scleral icterus.   Neck: Neck supple. No JVD present. No tracheal deviation present. No thyromegaly present.   Cardiovascular: Normal rate, regular rhythm and normal heart sounds. Exam reveals no gallop.   No murmur heard.  Pulmonary/Chest: Effort normal and breath sounds normal. No respiratory distress. She has no wheezes. She has no rales. She exhibits no tenderness.   Abdominal: Soft. Bowel sounds are normal. She exhibits no distension and no mass. There is tenderness (Moderate epigastric TTP ). There is no rebound and no guarding.   Genitourinary: No vaginal discharge found.   Musculoskeletal: She exhibits no edema or tenderness.   Lymphadenopathy:     She has no cervical adenopathy.   Neurological: She is alert and oriented to person, place, and time. She has normal reflexes. She displays normal reflexes. No cranial nerve deficit. She exhibits normal muscle tone. Coordination normal.   Skin: Skin is warm and dry. No rash noted. She is not diaphoretic. No erythema. No pallor.   Psychiatric: She has a normal mood and affect. Her behavior is normal. Judgment and thought content normal.         CRANIAL NERVES     CN III, IV, VI   Pupils are equal, round, and reactive to light.  Extraocular motions are normal.       Significant Labs:   Recent Results (from the past 24 hour(s))   CBC auto differential    Collection Time: 01/08/19 10:54 PM   Result Value Ref Range    WBC 7.89 3.90 - 12.70 K/uL    RBC 3.36 (L)  4.00 - 5.40 M/uL    Hemoglobin 9.3 (L) 12.0 - 16.0 g/dL    Hematocrit 29.8 (L) 37.0 - 48.5 %    MCV 89 82 - 98 fL    MCH 27.7 27.0 - 31.0 pg    MCHC 31.2 (L) 32.0 - 36.0 g/dL    RDW 15.4 (H) 11.5 - 14.5 %    Platelets 147 (L) 150 - 350 K/uL    MPV 10.7 9.2 - 12.9 fL    Immature Granulocytes 0.3 0.0 - 0.5 %    Gran # (ANC) 5.2 1.8 - 7.7 K/uL    Immature Grans (Abs) 0.02 0.00 - 0.04 K/uL    Lymph # 1.6 1.0 - 4.8 K/uL    Mono # 1.0 0.3 - 1.0 K/uL    Eos # 0.1 0.0 - 0.5 K/uL    Baso # 0.01 0.00 - 0.20 K/uL    nRBC 0 0 /100 WBC    Gran% 65.6 38.0 - 73.0 %    Lymph% 20.3 18.0 - 48.0 %    Mono% 12.8 4.0 - 15.0 %    Eosinophil% 0.9 0.0 - 8.0 %    Basophil% 0.1 0.0 - 1.9 %    Differential Method Automated    Comprehensive metabolic panel    Collection Time: 01/08/19 10:54 PM   Result Value Ref Range    Sodium 137 136 - 145 mmol/L    Potassium 3.6 3.5 - 5.1 mmol/L    Chloride 105 95 - 110 mmol/L    CO2 26 23 - 29 mmol/L    Glucose 101 70 - 110 mg/dL    BUN, Bld 8 6 - 20 mg/dL    Creatinine 0.7 0.5 - 1.4 mg/dL    Calcium 9.4 8.7 - 10.5 mg/dL    Total Protein 7.6 6.0 - 8.4 g/dL    Albumin 3.5 3.5 - 5.2 g/dL    Total Bilirubin 0.3 0.1 - 1.0 mg/dL    Alkaline Phosphatase 58 55 - 135 U/L    AST 40 10 - 40 U/L    ALT 18 10 - 44 U/L    Anion Gap 6 (L) 8 - 16 mmol/L    eGFR if African American >60.0 >60 mL/min/1.73 m^2    eGFR if non African American >60.0 >60 mL/min/1.73 m^2   Lipase    Collection Time: 01/08/19 10:54 PM   Result Value Ref Range    Lipase 492 (H) 4 - 60 U/L         Significant Imaging: I have reviewed and interpreted all pertinent imaging results/findings within the past 24 hours.    Assessment/Plan:     Active Diagnoses:    Diagnosis Date Noted POA    PRINCIPAL PROBLEM:  Acute pancreatitis [K85.90] 01/09/2019 Yes    Intractable vomiting with nausea [R11.2] 01/09/2019 Yes    Volume depletion, gastrointestinal loss [E86.9] 01/09/2019 Yes    Pancreatic mass [K86.9] 12/19/2018 Yes    Splenomegaly [R16.1]  12/19/2018 Yes    DUSTIN (iron deficiency anemia) [D50.9] 03/23/2015 Yes    GERD (gastroesophageal reflux disease) [K21.9]  Yes      Problems Resolved During this Admission:     #Acute pancreatitis   Pancreatic mass   Splenomegaly with multiple lesions   Gastric fundal lesion   Pancreatic head lymph node and pre vascular lymph nodes     S/p EUS guided FNA of pancreatic mass and lymph node on 12/12 complicated by post EUS pancreatitis   -FNA showed atypical cells; lymphoma can not be ruled out   -subsequent PET CT showed above abnormality   -She had follow up appointment with Dr. Medina regarding pancreatic mass and splenomegaly  -family reports she is scheduled for splenectomy in near future   -consult AES given post EUS pancreatitic with ongoing symptoms   -supportive care with IVF, NPO, pain control and antiemetics prn     #IBS-C  Chronic iron deficiency anemia   -had EGD, colonoscopy and VCE w/o signs of blood loss   -she has thalassemia minor   -currently on iron infusion prn  -takes linzess at home for IBS-C    #Depression   -stable on prozac   -denies SI/HI   -family present at bedside\    #GERD  -reports heart burn   -on PPI   -GI cocktail prn        VTE Risk Mitigation (From admission, onward)        Ordered     enoxaparin injection 40 mg  Daily      01/09/19 0059     IP VTE HIGH RISK PATIENT  Once      01/09/19 0059            Aleena Fox DO  Department of Hospital Medicine   [facility]

## 2019-01-09 NOTE — ED PROVIDER NOTES
[facility] ED Visit Note    Encounter Date: 1/8/2019    History     Chief Complaint   Patient presents with    Abdominal Pain     reports abdominal pain and nausea that started tonight      SCRIBE #1 NOTE: I, Kyle Martinez, am scribing for, and in the presence of,  Dr. Webber. I have scribed the following portions of the note - the Resident attestation.     HPI     42 y/o female with PMHx of IBS, thalassemia who presents with epigastsric pain x 12 hours. Patient recently underwent an Upper Endoscopic Ultrasound (on 12/12/2018), which showed splenomegaly, 1 abnormal splenic lymph node and a pancreatic mass in the uncinate process. FNA was performed on the pancreatic mass at the time of the procedure. In the following days, patient suffered a bout of acute pancreatitis. Her abdominal pain improved over time but never resolved completely. Patient's abdominal pain worsened 12 hours prior to arrival. The pain has been constant, sharp, 9/10 in severity and radiates to the back. Endorses associated intermittent nausea and vomiting. Denies fever, chills, chest pain or shortness of breath.     Review of patient's allergies indicates:   Allergen Reactions    Pcn [penicillins] Other (See Comments)     Pt had reaction to PCN as an infant.     Past Medical History:   Diagnosis Date    Abnormal Pap smear     Abnormal Pap smear of vagina     Anemia     Depression     Fever blister     GERD (gastroesophageal reflux disease)     Heavy periods 6/8/2015    Hemorrhoids without complication     IBS (irritable colon syndrome)     Joint pain     Left upper quadrant pain 12/19/2018    Pancreatic mass 12/19/2018    Psychiatric problem     Screening for HPV (human papillomavirus)     Sleep difficulties     Splenomegaly 12/19/2018    Thalassemia     Thalassemia     Therapy      Past Surgical History:   Procedure Laterality Date    COLONOSCOPY      COLONOSCOPY N/A 11/8/2018    Performed by Ba Stewart MD  at [facility] ENDO (2ND FLR)    EGD (ESOPHAGOGASTRODUODENOSCOPY) N/A 11/8/2018    Performed by Ba Stewart [facility] at [facility] ENDO (2ND FLR)    ESOPHAGOGASTRODUODENOSCOPY (EGD) N/A 3/23/2015    Performed by Ba Stewart MD at [facility] ENDO (4TH FLR)    HERNIA REPAIR      [place] COLONOSCOPY,DIAGNOSTIC [61104 (CPT®)] N/A 7/2/2014    Performed by Cj Lassiter MD at [facility] ENDO (4TH FLR)    ULTRASOUND-ENDOSCOPIC-UPPER N/A 12/12/2018    [facility]iel [facility] at [facility]     Family History   Problem Relation Age of Onset    Hypertension Mother     Diabetes Father     Heart disease Father     Eczema Father     Eczema Other     Colon cancer Paternal Uncle 60        colon cancer    Cancer Cousin         breast    Breast cancer Cousin     No Known Problems Other     Cancer Cousin         stomach cancer    Breast cancer Maternal Grandmother     Cancer Maternal Grandmother         breast    Eczema Sister     Acne Sister     Eczema Brother     Eczema Daughter     Cancer Maternal Aunt         breast    Breast cancer Maternal Aunt     No Known Problems Maternal Uncle     No Known Problems Paternal Aunt     No Known Problems Maternal Grandfather     No Known Problems Paternal Grandmother     No Known Problems Paternal Grandfather     Stomach cancer Cousin 44    Melanoma Neg Hx     Psoriasis Neg Hx     Lupus Neg Hx     Ovarian cancer Neg Hx     Amblyopia Neg Hx     Blindness Neg Hx     Cataracts Neg Hx     Glaucoma Neg Hx     Macular degeneration Neg Hx     Retinal detachment Neg Hx     Strabismus Neg Hx     Stroke Neg Hx     Thyroid disease Neg Hx     Esophageal cancer Neg Hx     Irritable bowel syndrome Neg Hx     Crohn's disease Neg Hx     Ulcerative colitis Neg Hx     Celiac disease Neg Hx      Social History     Tobacco Use    Smoking status: Never Smoker    Smokeless tobacco: Never Used   Substance Use Topics    Alcohol use: Yes     Comment: Occasionally    Drug use: No      Review of Systems    Physical Exam     Initial Vitals [01/08/19 2129]   BP Pulse Resp Temp SpO2   129/77 96 18 98.5 °F (36.9 °C) 100 %     Physical Exam    Nursing note and vitals reviewed.  Constitutional: She appears well-developed. No distress.   Uncomfortable, actively vomiting   HENT:   Head: Normocephalic and atraumatic.   Eyes: EOM are normal. Pupils are equal, round, and reactive to light.   Neck: Normal range of motion. Neck supple. No thyromegaly present. No tracheal deviation present.   Cardiovascular: Normal rate, regular rhythm, normal heart sounds and intact distal pulses. Exam reveals no gallop and no friction rub.    No murmur heard.  Pulmonary/Chest: Breath sounds normal. No respiratory distress. She has no wheezes. She has no rhonchi.   Abdominal: Soft. Bowel sounds are normal. She exhibits no distension. There is tenderness (Epigastric). There is no rebound and no guarding.   Musculoskeletal: Normal range of motion. She exhibits no edema or tenderness.   Neurological: She is alert and oriented to person, place, and time. She has normal strength.   Skin: Skin is warm and dry. No rash noted. No erythema.       ED Course     Labs Reviewed   CBC W/ AUTO DIFFERENTIAL - Abnormal; Notable for the following components:       Result Value    RBC 3.36 (*)     Hemoglobin 9.3 (*)     Hematocrit 29.8 (*)     MCHC 31.2 (*)     RDW 15.4 (*)     Platelets 147 (*)     All other components within normal limits   COMPREHENSIVE METABOLIC PANEL - Abnormal; Notable for the following components:    Anion Gap 6 (*)     All other components within normal limits   LIPASE - Abnormal; Notable for the following components:    Lipase 492 (*)     All other components within normal limits     Imaging Results    None       Medical Decision Making    42 y/o female with PMHx of thalassemia, IBS and recently found pancreatic mass on endoscopic ultrasound who presents with epigastric abdominal pain x 12 hours. Vital signs within  normal limits. Differential diagnosis includes pancreatitis, gastritis, PUD, pancreatic mass pain. Physical exam with epigastric tenderness to palpation. Remarkable work-up findings include normocytic anemia (hgb at baseline) and elevated lipase (492) . Patient given morphine, zofran and normal saline in the ED. Nausea and abdominal pain persisted despite medical therapy. Also, patient is unable to tolerate PO intake. Patient has been admitted to Internal Medicine for acute pancreatitis.    Kei Cortez MD  [employer] Emergency Medicine/Internal Medicine, PGY-III     Medical Decision Making:   History:   Old Medical Records: I decided to obtain old medical records.  Old Records Summarized: records from clinic visits and records from previous admission(s).  Clinical Tests:   Lab Tests: Ordered and Reviewed  Radiological Study: Reviewed  Medical Tests: Reviewed            Scribe Attestation:   Scribe #1: I performed the above scribed service and the documentation accurately describes the services I performed. I attest to the accuracy of the note.    Attending Attestation:   Physician Attestation Statement for Resident:  As the supervising MD   Physician Attestation Statement: I have personally seen and examined this patient.   I agree with the above history. -:   As the supervising MD I agree with the above PE.    As the supervising MD I agree with the above treatment, course, plan, and disposition.            I have reviewed and concur with the resident's history, physical, assessment, and plan.  I have personally interviewed and examined the patient at bedside.  See below addendum for my evaluation and additional findings.    Briefly,  this is a 43 y.o.female with a history of pancreatic mass, IBS, thalassemia presenting with epigastric pain for 12 hr.  Differential diagnosis includes not limited to:  Pancreatitis, obstructive mass, obstruction, perforation, intra-abdominal abscess.  Laboratory workup remarkable  for elevated lipase, in the setting of pancreatic mass, likely pancreatitis.  Discussed case with hospital medicine team, admitted for pain control, nausea vomiting and inability to tolerate p.o..  Patient agreeable to admission plan.  Remainder of workup unremarkable.     Complexity:  Moderate      Yuri Webber DO    Dept of Emergency Medicine   [facility]  Spectralink: [id number]      Clinical Impression:   The encounter diagnosis was Acute pancreatitis, unspecified complication status, unspecified pancreatitis type.    Kei Cortez MD  [employer] Internal Medicine/Emergency Medicine, PGY-III  1/9/2019, 3:02 am                      Kei Cortez MD  Resident  01/09/19 0302       Yuri Webber DO  01/16/19 0803

## 2019-01-09 NOTE — HPI
Patient is a 43 year old female with PMH of IBS-C, chronic ion deficiency anemia, GERD, depression, recent pancreatic mass around uncinate process on CT, s/p EUS on 12/12/18 with FNA of the mass and lymph nodes (atypical cells, lymphoma can not be excluded) and recent diagnosis of acute pancreatitis (lipase > 1000) soon after EUS around mid December. She was not admitted for post EUS pancreatitis and her epigastric pain gradually improved.      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 Reports pain is sharp, intermittent. Reports associated nausea but denies vomiting, fever, chills, diarrhea. Reports pain gets worse with food intake and endorses decrease po intake last few days. States pain is similar to recent episode of pancreatitis but it is more intense now. Denies alcohol use, prior history of gallstone or liver disease. She developed worsened nausea with NBNB emesis after morphine in ED.      Labs significant for elevated lipase 492 with normal LFTs. She received IVF, morphine and zofran in ED.      Recent PET CT showed extensive splenomegaly with multiple splenic lesions. There is a gastric fundal lesion. There is also pancreatic head lymph node and multiple pre vascular lymph nodes. She follows with Dr. Medina from oncology regarding pancreatic mass, splenomegaly and lymph nodes.      Surgical Oncology currently plans to perform a splenectomy; the patient is scheduled for 1/18/19. We were notified that the patient was in the hospital.

## 2019-01-10 LAB
ANA SER QL IF: NORMAL
IGG1 SER-MCNC: 836 MG/DL
IGG2 SER-MCNC: 370 MG/DL
IGG3 SER-MCNC: 128 MG/DL
IGG4 SER-MCNC: 83 MG/DL

## 2019-01-15 ENCOUNTER — TELEPHONE (OUTPATIENT)
Dept: SURGERY | Facility: CLINIC | Age: 44
End: 2019-01-15

## 2019-01-15 NOTE — TELEPHONE ENCOUNTER
Spoke w Dr. Melgar at [facility] who indicates IR willingness to access prevasc nodes w flow etc.  We will hold off splenectomy in anticipation of that result.     If node bx is sufficient for diagnosis, would not need up front splenectomy.  If not diagnostic, I would rec splenectomy.

## 2019-01-16 ENCOUNTER — TELEPHONE (OUTPATIENT)
Dept: SURGERY | Facility: CLINIC | Age: 44
End: 2019-01-16

## 2019-02-26 ENCOUNTER — TELEPHONE (OUTPATIENT)
Dept: HEMATOLOGY/ONCOLOGY | Facility: CLINIC | Age: 44
End: 2019-02-26

## 2019-02-26 NOTE — TELEPHONE ENCOUNTER
Patient said her place of work has not received her [facility] paperwork sent to be filled out by DR. Goncalves.  I will ask Dr. Goncalves's staff the status of the paperwork.

## 2019-03-03 ENCOUNTER — PATIENT MESSAGE (OUTPATIENT)
Dept: HEMATOLOGY/ONCOLOGY | Facility: CLINIC | Age: 44
End: 2019-03-03

## 2019-03-06 ENCOUNTER — TELEPHONE (OUTPATIENT)
Dept: HEMATOLOGY/ONCOLOGY | Facility: CLINIC | Age: 44
End: 2019-03-06

## 2019-03-06 DIAGNOSIS — C85.10 LARGE B-CELL LYMPHOMA: Primary | ICD-10-CM

## 2019-03-06 NOTE — TELEPHONE ENCOUNTER
Pt scheduled for 3/12 with Dr Hameed.  She asked about establishing 2x/week follow ups and explained to her that would be ordered by Dr Hameed.    ============================================  ----- Message from Adriana Romo RN sent at 3/6/2019 12:20 PM CST -----  Former benign heme patient of Dr. Medina now with lymphoma (dx here) now at [facility] for treatment, asking for appt with heme doc on Friday for monitoring.  I don't see any openings.  Dr. Medina discussed with Dr. Hameed, but no openings there either.     I have her referral here on my desk.

## 2019-03-06 NOTE — TELEPHONE ENCOUNTER
----- Message from Fabian Love sent at 3/6/2019 11:04 AM CST -----  Contact: Patient  Pt wants to confirm that a faxed was received by the office    Contact:: [phone]

## 2019-03-06 NOTE — TELEPHONE ENCOUNTER
----- Message from Elsy Simpson sent at 3/6/2019 10:07 AM CST -----  Contact: Pt  Pt called to speak with nurse vikki have some questions   Callback#[phone]  Thank You   HAYLEE Simpson

## 2019-03-08 ENCOUNTER — LAB VISIT (OUTPATIENT)
Dept: LAB | Facility: HOSPITAL | Age: 44
End: 2019-03-08
Attending: INTERNAL MEDICINE
Payer: COMMERCIAL

## 2019-03-08 ENCOUNTER — TELEPHONE (OUTPATIENT)
Dept: HEMATOLOGY/ONCOLOGY | Facility: CLINIC | Age: 44
End: 2019-03-08

## 2019-03-08 DIAGNOSIS — C85.10 LARGE B-CELL LYMPHOMA: ICD-10-CM

## 2019-03-08 LAB
ALBUMIN SERPL BCP-MCNC: 3.4 G/DL
ALP SERPL-CCNC: 61 U/L
ALT SERPL W/O P-5'-P-CCNC: 35 U/L
ANION GAP SERPL CALC-SCNC: 7 MMOL/L
AST SERPL-CCNC: 17 U/L
BILIRUB SERPL-MCNC: 1.1 MG/DL
BUN SERPL-MCNC: 17 MG/DL
CALCIUM SERPL-MCNC: 9.1 MG/DL
CHLORIDE SERPL-SCNC: 108 MMOL/L
CO2 SERPL-SCNC: 26 MMOL/L
CREAT SERPL-MCNC: 0.8 MG/DL
ERYTHROCYTE [DISTWIDTH] IN BLOOD BY AUTOMATED COUNT: 18.5 %
EST. GFR  (AFRICAN AMERICAN): >60 ML/MIN/1.73 M^2
EST. GFR  (NON AFRICAN AMERICAN): >60 ML/MIN/1.73 M^2
GLUCOSE SERPL-MCNC: 86 MG/DL
HCT VFR BLD AUTO: 27.9 %
HGB BLD-MCNC: 9.1 G/DL
IMM GRANULOCYTES # BLD AUTO: 6.42 K/UL
MCH RBC QN AUTO: 28.9 PG
MCHC RBC AUTO-ENTMCNC: 32.6 G/DL
MCV RBC AUTO: 89 FL
NEUTROPHILS # BLD AUTO: 55.7 K/UL
PATH REV BLD -IMP: NORMAL
PLATELET # BLD AUTO: 129 K/UL
PMV BLD AUTO: 12.7 FL
POTASSIUM SERPL-SCNC: 3.1 MMOL/L
PROT SERPL-MCNC: 6.4 G/DL
RBC # BLD AUTO: 3.15 M/UL
SODIUM SERPL-SCNC: 141 MMOL/L
WBC # BLD AUTO: 64.31 K/UL

## 2019-03-08 PROCEDURE — 36415 COLL VENOUS BLD VENIPUNCTURE: CPT

## 2019-03-08 PROCEDURE — 85060 PATHOLOGIST REVIEW: ICD-10-PCS | Mod: ,,, | Performed by: PATHOLOGY

## 2019-03-08 PROCEDURE — 80053 COMPREHEN METABOLIC PANEL: CPT

## 2019-03-08 PROCEDURE — 85060 BLOOD SMEAR INTERPRETATION: CPT | Mod: ,,, | Performed by: PATHOLOGY

## 2019-03-08 PROCEDURE — 85027 COMPLETE CBC AUTOMATED: CPT

## 2019-03-08 NOTE — TELEPHONE ENCOUNTER
Gave patient a copy of her bloodwork.  Let her know I faxed Dr. Contreras the blood work also. Will review with Dr. Hameed, and informed Dr. Medina of results.

## 2019-03-11 ENCOUNTER — TELEPHONE (OUTPATIENT)
Dept: HEMATOLOGY/ONCOLOGY | Facility: CLINIC | Age: 44
End: 2019-03-11

## 2019-03-11 DIAGNOSIS — C83.30 DIFFUSE LARGE B-CELL LYMPHOMA, UNSPECIFIED BODY REGION: Primary | ICD-10-CM

## 2019-03-11 NOTE — TELEPHONE ENCOUNTER
----- Message from Gisela Hameed MD sent at 3/11/2019  1:09 PM CDT -----  Cbc and cmp please    ----- Message -----  From: Adriana Romo RN  Sent: 3/11/2019   9:10 AM  To: Gisela Hameed MD    This was a patient of Dr. Medina who is now being treated for Lymphoma. She is seeing you on Tuesady afternoon.  She is getting R-EPOCH and IT chemotherapy at [facility], but seeing you in between treatments.  What labs would you want prior to seeing her tomorrow?

## 2019-03-11 NOTE — TELEPHONE ENCOUNTER
----- Message from Mis Agarwal sent at 3/11/2019  8:47 AM CDT -----  Contact: Peg perez/ MD Cortez   Contact phone #: [phone]  Note: Received the labs and Dr. Najera has ordered her supplemental potassium.   No callback needed. Thank you

## 2019-03-12 ENCOUNTER — INFUSION (OUTPATIENT)
Dept: INFUSION THERAPY | Facility: HOSPITAL | Age: 44
End: 2019-03-12
Attending: INTERNAL MEDICINE
Payer: COMMERCIAL

## 2019-03-12 ENCOUNTER — INITIAL CONSULT (OUTPATIENT)
Dept: HEMATOLOGY/ONCOLOGY | Facility: CLINIC | Age: 44
End: 2019-03-12
Payer: COMMERCIAL

## 2019-03-12 VITALS
OXYGEN SATURATION: 98 % | TEMPERATURE: 98 F | WEIGHT: 170.44 LBS | RESPIRATION RATE: 16 BRPM | SYSTOLIC BLOOD PRESSURE: 105 MMHG | BODY MASS INDEX: 31.36 KG/M2 | DIASTOLIC BLOOD PRESSURE: 52 MMHG | HEART RATE: 99 BPM | HEIGHT: 62 IN

## 2019-03-12 DIAGNOSIS — R16.1 SPLENOMEGALY: ICD-10-CM

## 2019-03-12 DIAGNOSIS — C83.33 DIFFUSE LARGE B-CELL LYMPHOMA OF INTRA-ABDOMINAL LYMPH NODES: Primary | ICD-10-CM

## 2019-03-12 DIAGNOSIS — R10.12 LEFT UPPER QUADRANT PAIN: ICD-10-CM

## 2019-03-12 DIAGNOSIS — D50.8 OTHER IRON DEFICIENCY ANEMIA: ICD-10-CM

## 2019-03-12 DIAGNOSIS — C83.37 DIFFUSE LARGE B-CELL LYMPHOMA OF SPLEEN: ICD-10-CM

## 2019-03-12 PROCEDURE — 99999 PR PBB SHADOW E&M-EST. PATIENT-LVL V: ICD-10-PCS | Mod: PBBFAC,,, | Performed by: INTERNAL MEDICINE

## 2019-03-12 PROCEDURE — 99245 OFF/OP CONSLTJ NEW/EST HI 55: CPT | Mod: S$GLB,,, | Performed by: INTERNAL MEDICINE

## 2019-03-12 PROCEDURE — 99999 PR PBB SHADOW E&M-EST. PATIENT-LVL V: CPT | Mod: PBBFAC,,, | Performed by: INTERNAL MEDICINE

## 2019-03-12 PROCEDURE — 99245 PR OFFICE CONSULTATION,LEVEL V: ICD-10-PCS | Mod: S$GLB,,, | Performed by: INTERNAL MEDICINE

## 2019-03-12 RX ORDER — METOCLOPRAMIDE 5 MG/1
TABLET ORAL
Refills: 0 | COMMUNITY
Start: 2019-01-30 | End: 2019-04-25

## 2019-03-12 RX ORDER — TRAMADOL HYDROCHLORIDE 100 MG/1
100 TABLET, EXTENDED RELEASE ORAL
COMMUNITY
Start: 2019-02-12 | End: 2019-09-17

## 2019-03-12 RX ORDER — PROCHLORPERAZINE MALEATE 10 MG
10 TABLET ORAL
COMMUNITY
Start: 2019-02-12 | End: 2019-09-17

## 2019-03-12 RX ORDER — CETIRIZINE HYDROCHLORIDE 10 MG/1
10 TABLET ORAL
COMMUNITY
Start: 2019-03-05

## 2019-03-12 RX ORDER — ONDANSETRON HYDROCHLORIDE 8 MG/1
8 TABLET, FILM COATED ORAL
COMMUNITY
Start: 2019-02-12 | End: 2020-03-31 | Stop reason: CLARIF

## 2019-03-12 RX ORDER — VALACYCLOVIR HYDROCHLORIDE 500 MG/1
500 TABLET, FILM COATED ORAL DAILY
COMMUNITY
Start: 2019-02-12 | End: 2020-03-31 | Stop reason: CLARIF

## 2019-03-12 RX ORDER — DICYCLOMINE HYDROCHLORIDE 10 MG/1
10 CAPSULE ORAL
COMMUNITY
Start: 2019-02-12 | End: 2019-05-10 | Stop reason: SDUPTHER

## 2019-03-12 RX ORDER — GABAPENTIN 300 MG/1
300 CAPSULE ORAL
COMMUNITY
Start: 2019-02-12 | End: 2019-04-25

## 2019-03-12 RX ORDER — SULFAMETHOXAZOLE AND TRIMETHOPRIM 800; 160 MG/1; MG/1
1 TABLET ORAL
COMMUNITY
Start: 2019-02-13 | End: 2020-03-31 | Stop reason: CLARIF

## 2019-03-12 NOTE — PROGRESS NOTES
Hematology and Medical Oncology   New Patient Consult     03/12/2019  Referred by:  Dr. Donell Medina    Primary Oncologic Diagnosis: Diffuse Large B Cell Lymphoma    History of Present Ilness:   Terese Macias (Terese) is a pleasant 43 y.o.female with newly identified DLBCL on 11/1/2018. Biopsies were initially attempted locally without success. At which point she transferred her care to [facility].    Oncology History:  10/2018 - abdominal pain  11/2018 - EGD & colonoscopy: extrinsic compression of the stomach in the gastric fundus & 4mm polyp.  --CT scan 11/14/18- splenic enlargement, hypodensity in the uncinate process of the pancreas.  --12/12/18 FNA bx of lymph node: abnormal lymphocyte proliferation  --PET on 12/14/18  Index prevascular lymph nodes SUV max 10.06.  Index upper pole spleen lateral lesion SUV max 13.49.  Index gastric lesion SUV max 7.3.  Index pancreatic head lymph node SUV max 8.7.  Mass in the uncinate process of the pancreas. 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  Neoplastic cells are positive for CD20, CD30 positive (30% of the tumor cells), BCL-2, BCL-6 (20-30%)  --treated at [facility] with  R-EPOCH.   --R-EPOCH schedule --C1D1 2/6/2019, C2D1 2/28  IT chemotherapy done on 3/4/19 and tolerated it well.     She has done well with chemo and is happy to be at home between hospital stays. The morning is the roughest time for her, when nausea is at it's worse. Since initiation of therapy her appetite has improved and her stomach is less painful. She can now lay on her side, which she was not able to do.      PAST MEDICAL HISTORY:   Past Medical History:   Diagnosis Date    Abnormal Pap smear     Abnormal Pap smear of vagina     Anemia     Depression     Fever blister     GERD (gastroesophageal reflux disease)     Heavy periods 6/8/2015    Hemorrhoids without  complication     IBS (irritable colon syndrome)     Joint pain     Left upper quadrant pain 12/19/2018    Pancreatic mass 12/19/2018    Psychiatric problem     Screening for HPV (human papillomavirus)     Sleep difficulties     Splenomegaly 12/19/2018    Thalassemia     Thalassemia     Therapy        PAST SURGICAL HISTORY:   Past Surgical History:   Procedure Laterality Date    COLONOSCOPY      COLONOSCOPY N/A 11/8/2018    Performed by Ba Stewart [facility] at [facility]O (2ND FLR)    EGD (ESOPHAGOGASTRODUODENOSCOPY) N/A 11/8/2018    Performed by Ba Stewart MD at [facility] ENDO (2ND FLR)    ESOPHAGOGASTRODUODENOSCOPY (EGD) N/A 3/23/2015    Performed by Ba Stewart MD at [facility] ENDO (4TH FLR)    HERNIA REPAIR      CA COLONOSCOPY,DIAGNOSTIC [99153 (CPT®)] N/A 7/2/2014    Performed by Cj Lassiter MD at [facility] ENDO (4TH FLR)    ULTRASOUND-ENDOSCOPIC-UPPER N/A 12/12/2018    [facility] Venu Montiel [facility] at [facility] END[facility]       PAST SOCIAL HISTORY:   reports that  has never smoked. she has never used smokeless tobacco. She reports that she drinks alcohol. She reports that she does not use drugs.    FAMILY HISTORY:  Family History   Problem Relation Age of Onset    Hypertension Mother     Diabetes Father     Heart disease Father     Eczema Father     Eczema Other     Colon cancer Paternal Uncle 60        colon cancer    Cancer Cousin         breast    Breast cancer Cousin     No Known Problems Other     Cancer Cousin         stomach cancer    Breast cancer Maternal Grandmother     Cancer Maternal Grandmother         breast    Eczema Sister     Acne Sister     Eczema Brother     Eczema Daughter     Cancer Maternal Aunt         breast    Breast cancer Maternal Aunt     No Known Problems Maternal Uncle     No Known Problems Paternal Aunt     No Known Problems Maternal Grandfather     No Known Problems Paternal Grandmother     No Known Problems Paternal Grandfather     Stomach cancer  Cousin 44    Melanoma Neg Hx     Psoriasis Neg Hx     Lupus Neg Hx     Ovarian cancer Neg Hx     Amblyopia Neg Hx     Blindness Neg Hx     Cataracts Neg Hx     Glaucoma Neg Hx     Macular degeneration Neg Hx     Retinal detachment Neg Hx     Strabismus Neg Hx     Stroke Neg Hx     Thyroid disease Neg Hx     Esophageal cancer Neg Hx     Irritable bowel syndrome Neg Hx     Crohn's disease Neg Hx     Ulcerative colitis Neg Hx     Celiac disease Neg Hx        CURRENT MEDICATIONS:   Current Outpatient Medications   Medication Sig    albuterol 90 mcg/actuation inhaler Inhale 2 puffs into the lungs every 6 (six) hours as needed for Wheezing. Rescue    docusate sodium (COLACE) 50 MG capsule Take 1 capsule (50 mg total) by mouth nightly as needed for Constipation.    ferrous gluconate (FERGON) 324 MG tablet Take 1 tablet (324 mg total) by mouth daily with breakfast.    FLUoxetine (PROZAC) 10 MG capsule TAKE 1 CAPSULE BY MOUTH EVERY DAY    fluticasone (FLONASE) 50 mcg/actuation nasal spray 1 spray (50 mcg total) by Each Nare route once daily. (Patient taking differently: 1 spray by Each Nare route daily as needed. )    hydrOXYzine pamoate (VISTARIL) 25 MG Cap Take 1 capsule (25 mg total) by mouth nightly as needed (sleep).    linaclotide (LINZESS) 145 mcg Cap capsule Take 1 capsule (145 mcg total) by mouth once daily.    meloxicam (MOBIC) 7.5 MG tablet Take 1 tablet (7.5 mg total) by mouth once daily. Take once a day for two weeks then as needed. Take with food.    NUVARING 0.12-0.015 mg/24 hr vaginal ring INSERT 1 RING VAGINALLY AS DIRECTED. REMOVE AFTER 3 WEEKS & WAIT 7 DAYS BEFORE INSERTING A NEW RING    omeprazole (PRILOSEC) 20 MG capsule Take 1 capsule (20 mg total) by mouth once daily.    oxyCODONE (ROXICODONE) 5 MG immediate release tablet Take 1 tablet (5 mg total) by mouth every 6 (six) hours as needed.    senna-docusate 8.6-50 mg (PERICOLACE) 8.6-50 mg per tablet Take 2 tablets by mouth  once daily.     No current facility-administered medications for this visit.      ALLERGIES:   Review of patient's allergies indicates:   Allergen Reactions    Pcn [penicillins] Other (See Comments)     Pt had reaction to PCN as an infant.         Review of Systems:     Review of Systems   Constitutional: Positive for appetite change. Negative for chills, diaphoresis, fatigue, fever and unexpected weight change.   HENT:   Negative for hearing loss, mouth sores, nosebleeds, sore throat, trouble swallowing and voice change.    Eyes: Negative for eye problems and icterus.   Respiratory: Negative for chest tightness, cough, hemoptysis, shortness of breath and wheezing.    Cardiovascular: Negative for chest pain, leg swelling and palpitations.   Gastrointestinal: Positive for abdominal pain and nausea. Negative for abdominal distention, blood in stool, diarrhea and vomiting.   Endocrine: Negative for hot flashes.   Genitourinary: Negative for bladder incontinence, difficulty urinating, dysuria and hematuria.    Musculoskeletal: Negative for arthralgias, back pain, flank pain, gait problem, myalgias, neck pain and neck stiffness.   Skin: Negative for itching, rash and wound.   Neurological: Negative for dizziness, extremity weakness, gait problem, headaches, numbness, seizures and speech difficulty.   Hematological: Negative for adenopathy. Does not bruise/bleed easily.   Psychiatric/Behavioral: Negative for confusion, depression and sleep disturbance. The patient is not nervous/anxious.           Physical Exam:     Vitals:    03/12/19 1710   BP: (!) 105/52   Pulse: 99   Resp: 16   Temp: 98.1 °F (36.7 °C)     Physical Exam   Constitutional: She is oriented to person, place, and time. She appears well-developed and well-nourished. No distress.   HENT:   Head: Normocephalic and atraumatic.   Mouth/Throat: Oropharynx is clear and moist. No oropharyngeal exudate.   Eyes: Conjunctivae and EOM are normal. Pupils are equal,  round, and reactive to light.   Neck: Normal range of motion. Neck supple. No JVD present. No tracheal deviation present. No thyromegaly present.   Cardiovascular: Normal rate, regular rhythm and normal heart sounds. Exam reveals no friction rub.   No murmur heard.  Pulmonary/Chest: Effort normal and breath sounds normal. No stridor. No respiratory distress. She has no wheezes. She has no rales. She exhibits no tenderness.   Abdominal: Soft. Bowel sounds are normal. She exhibits distension. There is tenderness. There is no rebound and no guarding.   Spleen is easily palpated and grossly enlarged   Musculoskeletal: Normal range of motion. She exhibits no edema, tenderness or deformity.   Lymphadenopathy:     She has no axillary adenopathy.   Neurological: She is alert and oriented to person, place, and time. She displays normal reflexes. No cranial nerve deficit or sensory deficit. She exhibits normal muscle tone. Coordination normal.   Skin: Skin is warm and dry. Capillary refill takes less than 2 seconds. No rash noted. She is not diaphoretic. No erythema. No pallor.   Psychiatric: She has a normal mood and affect. Her behavior is normal. Judgment and thought content normal.       ECOG Performance Status: (foot note - ECOG PS provided by Eastern Cooperative Oncology Group) 1 - Symptomatic but completely ambulatory    Karnofsky Performance Score:  90%- Able to Carry on Normal Activity: Minor Symptoms of Disease    Labs:   Lab Results   Component Value Date    WBC 2.82 (L) 03/12/2019    HGB 8.4 (L) 03/12/2019    HCT 26.2 (L) 03/12/2019    PLT 43 (L) 03/12/2019    CHOL 133 11/28/2017    TRIG 68 11/28/2017    HDL 54 11/28/2017    ALT 18 03/12/2019    AST 9 (L) 03/12/2019     03/12/2019    K 3.8 03/12/2019     03/12/2019    CREATININE 0.8 03/12/2019    BUN 10 03/12/2019    CO2 24 03/12/2019    TSH 1.911 11/28/2017         Imaging: Previous imaging has been reviewed   Assessment and Plan:     Ms. Macias is a  43 year old female with DLBCL currently receiving R-EPOCH    Diffuse Large B Cell Lymphoma  --Due for cycle 3 next week on 3/21/19  --Plan for twice weekly labs [Tuesday/Friday] locally to assist in dose adjustment of the chemotherapy  --PET scan to be done at [place] on 3/20/19 to evaluate response    60 minutes were spent face to face with the patient and her  family to discuss the disease, natural history, treatment options and survival statistics. I have provided the patient with an opportunity to ask questions and have all questions answered to her satisfaction.       she will return to clinic as needed while she is under the direct care of MD Cortez, but knows to call in the interim if symptoms change or should a problem arise.        Gisela Hameed MD  Hematology and Medical Oncology  Bone Marrow Transplant  [facility]

## 2019-03-12 NOTE — NURSING
Patient arrived for picc line dressing change only per Dr. Hameed. Dressing changed by Alessia Reeder RN.

## 2019-03-12 NOTE — LETTER
March 13, 2019      Donell Medina DO, FACP  [address]  [facility] [address]           [facility] Hematology Oncology  [address]  [facility] [address]  Phone: [phone]          Patient: Terese Macias   MR Number: [id number]   YOB: 1975   Date of Visit: 3/12/2019       Dear Dr. Donell Medina:    Thank you for referring Terese Macias to me for evaluation. Attached you will find relevant portions of my assessment and plan of care.    If you have questions, please do not hesitate to call me. I look forward to following Terese Macias along with you.    Sincerely,    Gisela Hameed MD    Enclosure  CC:  No Recipients    If you would like to receive this communication electronically, please contact [email or web address] or [phone] to request more information on [facility] Link access.    For providers and/or their staff who would like to refer a patient to [facility], please contact us through our one-stop-shop provider referral line, [facility], at [phone].    If you feel you have received this communication in error or would no longer like to receive these types of communications, please e-mail [email or web address]

## 2019-03-13 PROBLEM — C83.37: Status: ACTIVE | Noted: 2019-03-13

## 2019-03-19 ENCOUNTER — INFUSION (OUTPATIENT)
Dept: INFUSION THERAPY | Facility: HOSPITAL | Age: 44
End: 2019-03-19
Attending: INTERNAL MEDICINE
Payer: COMMERCIAL

## 2019-03-19 DIAGNOSIS — C83.37 DIFFUSE LARGE B-CELL LYMPHOMA OF SPLEEN: Primary | ICD-10-CM

## 2019-03-19 PROCEDURE — 25000003 PHARM REV CODE 250: Performed by: INTERNAL MEDICINE

## 2019-03-19 PROCEDURE — A4216 STERILE WATER/SALINE, 10 ML: HCPCS | Performed by: INTERNAL MEDICINE

## 2019-03-19 PROCEDURE — 63600175 PHARM REV CODE 636 W HCPCS: Performed by: INTERNAL MEDICINE

## 2019-03-19 PROCEDURE — 96523 IRRIG DRUG DELIVERY DEVICE: CPT

## 2019-03-19 RX ORDER — SODIUM CHLORIDE 0.9 % (FLUSH) 0.9 %
10 SYRINGE (ML) INJECTION
Status: COMPLETED | OUTPATIENT
Start: 2019-03-19 | End: 2019-03-19

## 2019-03-19 RX ORDER — HEPARIN 100 UNIT/ML
500 SYRINGE INTRAVENOUS
Status: COMPLETED | OUTPATIENT
Start: 2019-03-19 | End: 2019-03-19

## 2019-03-19 RX ORDER — HEPARIN 100 UNIT/ML
500 SYRINGE INTRAVENOUS
Status: CANCELLED | OUTPATIENT
Start: 2019-03-19

## 2019-03-19 RX ORDER — SODIUM CHLORIDE 0.9 % (FLUSH) 0.9 %
10 SYRINGE (ML) INJECTION
Status: CANCELLED | OUTPATIENT
Start: 2019-03-19

## 2019-03-19 RX ADMIN — Medication 10 ML: at 04:03

## 2019-03-19 RX ADMIN — HEPARIN 500 UNITS: 100 SYRINGE at 04:03

## 2019-03-23 ENCOUNTER — PATIENT MESSAGE (OUTPATIENT)
Dept: HEMATOLOGY/ONCOLOGY | Facility: CLINIC | Age: 44
End: 2019-03-23

## 2019-03-29 ENCOUNTER — LAB VISIT (OUTPATIENT)
Dept: LAB | Facility: HOSPITAL | Age: 44
End: 2019-03-29
Attending: INTERNAL MEDICINE
Payer: COMMERCIAL

## 2019-03-29 DIAGNOSIS — C83.33 DIFFUSE LARGE B-CELL LYMPHOMA OF INTRA-ABDOMINAL LYMPH NODES: ICD-10-CM

## 2019-03-29 DIAGNOSIS — C83.30 DIFFUSE LARGE B-CELL LYMPHOMA, UNSPECIFIED BODY REGION: ICD-10-CM

## 2019-03-29 LAB
ALBUMIN SERPL BCP-MCNC: 3.4 G/DL (ref 3.5–5.2)
ALP SERPL-CCNC: 61 U/L (ref 55–135)
ALT SERPL W/O P-5'-P-CCNC: 41 U/L (ref 10–44)
ANION GAP SERPL CALC-SCNC: 9 MMOL/L (ref 8–16)
ANISOCYTOSIS BLD QL SMEAR: SLIGHT
AST SERPL-CCNC: 18 U/L (ref 10–40)
BASOPHILS NFR BLD: 0 % (ref 0–1.9)
BILIRUB SERPL-MCNC: 0.7 MG/DL (ref 0.1–1)
BUN SERPL-MCNC: 14 MG/DL (ref 6–20)
BURR CELLS BLD QL SMEAR: ABNORMAL
CALCIUM SERPL-MCNC: 8.9 MG/DL (ref 8.7–10.5)
CHLORIDE SERPL-SCNC: 106 MMOL/L (ref 95–110)
CO2 SERPL-SCNC: 23 MMOL/L (ref 23–29)
CREAT SERPL-MCNC: 0.8 MG/DL (ref 0.5–1.4)
DIFFERENTIAL METHOD: ABNORMAL
EOSINOPHIL NFR BLD: 0 % (ref 0–8)
ERYTHROCYTE [DISTWIDTH] IN BLOOD BY AUTOMATED COUNT: 19.6 % (ref 11.5–14.5)
EST. GFR  (AFRICAN AMERICAN): >60 ML/MIN/1.73 M^2
EST. GFR  (NON AFRICAN AMERICAN): >60 ML/MIN/1.73 M^2
GIANT PLATELETS BLD QL SMEAR: PRESENT
GLUCOSE SERPL-MCNC: 96 MG/DL (ref 70–110)
HCT VFR BLD AUTO: 30.4 % (ref 37–48.5)
HGB BLD-MCNC: 10 G/DL (ref 12–16)
IMM GRANULOCYTES # BLD AUTO: ABNORMAL K/UL (ref 0–0.04)
IMM GRANULOCYTES NFR BLD AUTO: ABNORMAL % (ref 0–0.5)
LYMPHOCYTES NFR BLD: 4 % (ref 18–48)
MCH RBC QN AUTO: 29.1 PG (ref 27–31)
MCHC RBC AUTO-ENTMCNC: 32.9 G/DL (ref 32–36)
MCV RBC AUTO: 88 FL (ref 82–98)
MONOCYTES NFR BLD: 0 % (ref 4–15)
NEUTROPHILS NFR BLD: 93 % (ref 38–73)
NEUTS BAND NFR BLD MANUAL: 3 %
NRBC BLD-RTO: 0 /100 WBC
OVALOCYTES BLD QL SMEAR: ABNORMAL
PLATELET # BLD AUTO: 116 K/UL (ref 150–350)
PLATELET BLD QL SMEAR: ABNORMAL
PMV BLD AUTO: 11.6 FL (ref 9.2–12.9)
POIKILOCYTOSIS BLD QL SMEAR: SLIGHT
POLYCHROMASIA BLD QL SMEAR: ABNORMAL
POTASSIUM SERPL-SCNC: 3.3 MMOL/L (ref 3.5–5.1)
PROT SERPL-MCNC: 6.2 G/DL (ref 6–8.4)
RBC # BLD AUTO: 3.44 M/UL (ref 4–5.4)
SODIUM SERPL-SCNC: 138 MMOL/L (ref 136–145)
WBC # BLD AUTO: 37.22 K/UL (ref 3.9–12.7)

## 2019-03-29 PROCEDURE — 36415 COLL VENOUS BLD VENIPUNCTURE: CPT

## 2019-03-29 PROCEDURE — 80053 COMPREHEN METABOLIC PANEL: CPT

## 2019-04-02 ENCOUNTER — DOCUMENTATION ONLY (OUTPATIENT)
Dept: HEMATOLOGY/ONCOLOGY | Facility: CLINIC | Age: 44
End: 2019-04-02

## 2019-04-02 ENCOUNTER — INFUSION (OUTPATIENT)
Dept: INFUSION THERAPY | Facility: HOSPITAL | Age: 44
End: 2019-04-02
Attending: INTERNAL MEDICINE
Payer: COMMERCIAL

## 2019-04-02 ENCOUNTER — LAB VISIT (OUTPATIENT)
Dept: LAB | Facility: HOSPITAL | Age: 44
End: 2019-04-02
Attending: INTERNAL MEDICINE
Payer: COMMERCIAL

## 2019-04-02 ENCOUNTER — PATIENT MESSAGE (OUTPATIENT)
Dept: HEMATOLOGY/ONCOLOGY | Facility: CLINIC | Age: 44
End: 2019-04-02

## 2019-04-02 DIAGNOSIS — C83.33 DIFFUSE LARGE B-CELL LYMPHOMA OF INTRA-ABDOMINAL LYMPH NODES: ICD-10-CM

## 2019-04-02 LAB
ALBUMIN SERPL BCP-MCNC: 3.1 G/DL (ref 3.5–5.2)
ALP SERPL-CCNC: 44 U/L (ref 55–135)
ALT SERPL W/O P-5'-P-CCNC: 18 U/L (ref 10–44)
ANION GAP SERPL CALC-SCNC: 5 MMOL/L (ref 8–16)
ANISOCYTOSIS BLD QL SMEAR: SLIGHT
AST SERPL-CCNC: 8 U/L (ref 10–40)
BASOPHILS NFR BLD: 0 % (ref 0–1.9)
BILIRUB SERPL-MCNC: 0.4 MG/DL (ref 0.1–1)
BUN SERPL-MCNC: 7 MG/DL (ref 6–20)
CALCIUM SERPL-MCNC: 8.9 MG/DL (ref 8.7–10.5)
CHLORIDE SERPL-SCNC: 107 MMOL/L (ref 95–110)
CO2 SERPL-SCNC: 25 MMOL/L (ref 23–29)
CREAT SERPL-MCNC: 0.7 MG/DL (ref 0.5–1.4)
DACRYOCYTES BLD QL SMEAR: ABNORMAL
DIFFERENTIAL METHOD: ABNORMAL
DOHLE BOD BLD QL SMEAR: PRESENT
EOSINOPHIL NFR BLD: 0 % (ref 0–8)
ERYTHROCYTE [DISTWIDTH] IN BLOOD BY AUTOMATED COUNT: 18.8 % (ref 11.5–14.5)
EST. GFR  (AFRICAN AMERICAN): >60 ML/MIN/1.73 M^2
EST. GFR  (NON AFRICAN AMERICAN): >60 ML/MIN/1.73 M^2
GLUCOSE SERPL-MCNC: 88 MG/DL (ref 70–110)
HCT VFR BLD AUTO: 25.7 % (ref 37–48.5)
HGB BLD-MCNC: 8.4 G/DL (ref 12–16)
HOWELL-JOLLY BOD BLD QL SMEAR: ABNORMAL
HYPOCHROMIA BLD QL SMEAR: ABNORMAL
IMM GRANULOCYTES # BLD AUTO: ABNORMAL K/UL (ref 0–0.04)
IMM GRANULOCYTES NFR BLD AUTO: ABNORMAL % (ref 0–0.5)
LYMPHOCYTES NFR BLD: 45 % (ref 18–48)
MCH RBC QN AUTO: 29.3 PG (ref 27–31)
MCHC RBC AUTO-ENTMCNC: 32.7 G/DL (ref 32–36)
MCV RBC AUTO: 90 FL (ref 82–98)
MONOCYTES NFR BLD: 3 % (ref 4–15)
NEUTROPHILS NFR BLD: 49 % (ref 38–73)
NRBC BLD-RTO: 0 /100 WBC
OVALOCYTES BLD QL SMEAR: ABNORMAL
PLATELET # BLD AUTO: 18 K/UL (ref 150–350)
PLATELET BLD QL SMEAR: ABNORMAL
PMV BLD AUTO: ABNORMAL FL (ref 9.2–12.9)
POIKILOCYTOSIS BLD QL SMEAR: SLIGHT
POLYCHROMASIA BLD QL SMEAR: ABNORMAL
POTASSIUM SERPL-SCNC: 3.9 MMOL/L (ref 3.5–5.1)
PROT SERPL-MCNC: 6 G/DL (ref 6–8.4)
RBC # BLD AUTO: 2.87 M/UL (ref 4–5.4)
SCHISTOCYTES BLD QL SMEAR: ABNORMAL
SMUDGE CELLS BLD QL SMEAR: PRESENT
SODIUM SERPL-SCNC: 137 MMOL/L (ref 136–145)
WBC # BLD AUTO: 1.54 K/UL (ref 3.9–12.7)
WBC OTHER NFR BLD MANUAL: 3 %

## 2019-04-02 PROCEDURE — 80053 COMPREHEN METABOLIC PANEL: CPT

## 2019-04-02 PROCEDURE — 36415 COLL VENOUS BLD VENIPUNCTURE: CPT

## 2019-04-02 PROCEDURE — 85027 COMPLETE CBC AUTOMATED: CPT

## 2019-04-02 PROCEDURE — 85007 BL SMEAR W/DIFF WBC COUNT: CPT

## 2019-04-05 ENCOUNTER — LAB VISIT (OUTPATIENT)
Dept: LAB | Facility: HOSPITAL | Age: 44
End: 2019-04-05
Attending: INTERNAL MEDICINE
Payer: COMMERCIAL

## 2019-04-05 DIAGNOSIS — C83.33 DIFFUSE LARGE B-CELL LYMPHOMA OF INTRA-ABDOMINAL LYMPH NODES: ICD-10-CM

## 2019-04-05 LAB
ALBUMIN SERPL BCP-MCNC: 3 G/DL (ref 3.5–5.2)
ALP SERPL-CCNC: 69 U/L (ref 55–135)
ALT SERPL W/O P-5'-P-CCNC: 11 U/L (ref 10–44)
ANION GAP SERPL CALC-SCNC: 7 MMOL/L (ref 8–16)
AST SERPL-CCNC: 8 U/L (ref 10–40)
BASOPHILS NFR BLD: 0 % (ref 0–1.9)
BILIRUB SERPL-MCNC: 0.3 MG/DL (ref 0.1–1)
BUN SERPL-MCNC: 8 MG/DL (ref 6–20)
CALCIUM SERPL-MCNC: 8.9 MG/DL (ref 8.7–10.5)
CHLORIDE SERPL-SCNC: 107 MMOL/L (ref 95–110)
CO2 SERPL-SCNC: 26 MMOL/L (ref 23–29)
CREAT SERPL-MCNC: 1 MG/DL (ref 0.5–1.4)
DIFFERENTIAL METHOD: ABNORMAL
EOSINOPHIL NFR BLD: 0 % (ref 0–8)
ERYTHROCYTE [DISTWIDTH] IN BLOOD BY AUTOMATED COUNT: 19.1 % (ref 11.5–14.5)
EST. GFR  (AFRICAN AMERICAN): >60 ML/MIN/1.73 M^2
EST. GFR  (NON AFRICAN AMERICAN): >60 ML/MIN/1.73 M^2
GLUCOSE SERPL-MCNC: 102 MG/DL (ref 70–110)
HCT VFR BLD AUTO: 26.5 % (ref 37–48.5)
HGB BLD-MCNC: 8.3 G/DL (ref 12–16)
IMM GRANULOCYTES # BLD AUTO: ABNORMAL K/UL (ref 0–0.04)
IMM GRANULOCYTES NFR BLD AUTO: ABNORMAL % (ref 0–0.5)
LYMPHOCYTES NFR BLD: 14 % (ref 18–48)
MCH RBC QN AUTO: 29.1 PG (ref 27–31)
MCHC RBC AUTO-ENTMCNC: 31.3 G/DL (ref 32–36)
MCV RBC AUTO: 93 FL (ref 82–98)
METAMYELOCYTES NFR BLD MANUAL: 7 %
MONOCYTES NFR BLD: 1 % (ref 4–15)
MYELOCYTES NFR BLD MANUAL: 11 %
NEUTROPHILS NFR BLD: 67 % (ref 38–73)
NRBC BLD-RTO: 2 /100 WBC
PLATELET # BLD AUTO: 37 K/UL (ref 150–350)
PMV BLD AUTO: ABNORMAL FL (ref 9.2–12.9)
POTASSIUM SERPL-SCNC: 3.5 MMOL/L (ref 3.5–5.1)
PROT SERPL-MCNC: 5.8 G/DL (ref 6–8.4)
RBC # BLD AUTO: 2.85 M/UL (ref 4–5.4)
SODIUM SERPL-SCNC: 140 MMOL/L (ref 136–145)
WBC # BLD AUTO: 11.24 K/UL (ref 3.9–12.7)

## 2019-04-05 PROCEDURE — 80053 COMPREHEN METABOLIC PANEL: CPT

## 2019-04-05 PROCEDURE — 36415 COLL VENOUS BLD VENIPUNCTURE: CPT

## 2019-04-05 PROCEDURE — 85027 COMPLETE CBC AUTOMATED: CPT

## 2019-04-05 PROCEDURE — 85007 BL SMEAR W/DIFF WBC COUNT: CPT

## 2019-04-09 ENCOUNTER — LAB VISIT (OUTPATIENT)
Dept: LAB | Facility: HOSPITAL | Age: 44
End: 2019-04-09
Attending: INTERNAL MEDICINE
Payer: COMMERCIAL

## 2019-04-09 ENCOUNTER — INFUSION (OUTPATIENT)
Dept: INFUSION THERAPY | Facility: HOSPITAL | Age: 44
End: 2019-04-09
Attending: INTERNAL MEDICINE
Payer: COMMERCIAL

## 2019-04-09 DIAGNOSIS — C83.30 DIFFUSE LARGE B-CELL LYMPHOMA, UNSPECIFIED BODY REGION: ICD-10-CM

## 2019-04-09 LAB
ALBUMIN SERPL BCP-MCNC: 3.5 G/DL (ref 3.5–5.2)
ALP SERPL-CCNC: 138 U/L (ref 55–135)
ALT SERPL W/O P-5'-P-CCNC: 11 U/L (ref 10–44)
ANION GAP SERPL CALC-SCNC: 8 MMOL/L (ref 8–16)
AST SERPL-CCNC: 17 U/L (ref 10–40)
BILIRUB SERPL-MCNC: 0.3 MG/DL (ref 0.1–1)
BUN SERPL-MCNC: 4 MG/DL (ref 6–20)
CALCIUM SERPL-MCNC: 9.5 MG/DL (ref 8.7–10.5)
CHLORIDE SERPL-SCNC: 105 MMOL/L (ref 95–110)
CO2 SERPL-SCNC: 26 MMOL/L (ref 23–29)
CREAT SERPL-MCNC: 0.7 MG/DL (ref 0.5–1.4)
ERYTHROCYTE [DISTWIDTH] IN BLOOD BY AUTOMATED COUNT: 20.8 % (ref 11.5–14.5)
EST. GFR  (AFRICAN AMERICAN): >60 ML/MIN/1.73 M^2
EST. GFR  (NON AFRICAN AMERICAN): >60 ML/MIN/1.73 M^2
GLUCOSE SERPL-MCNC: 87 MG/DL (ref 70–110)
HCT VFR BLD AUTO: 26 % (ref 37–48.5)
HGB BLD-MCNC: 8.3 G/DL (ref 12–16)
IMM GRANULOCYTES # BLD AUTO: 2.72 K/UL (ref 0–0.04)
MCH RBC QN AUTO: 29.5 PG (ref 27–31)
MCHC RBC AUTO-ENTMCNC: 31.9 G/DL (ref 32–36)
MCV RBC AUTO: 93 FL (ref 82–98)
NEUTROPHILS # BLD AUTO: 20.8 K/UL (ref 1.8–7.7)
PLATELET # BLD AUTO: 173 K/UL (ref 150–350)
PMV BLD AUTO: 11.1 FL (ref 9.2–12.9)
POTASSIUM SERPL-SCNC: 3.7 MMOL/L (ref 3.5–5.1)
PROT SERPL-MCNC: 6.8 G/DL (ref 6–8.4)
RBC # BLD AUTO: 2.81 M/UL (ref 4–5.4)
SODIUM SERPL-SCNC: 139 MMOL/L (ref 136–145)
WBC # BLD AUTO: 27.84 K/UL (ref 3.9–12.7)

## 2019-04-09 PROCEDURE — 36415 COLL VENOUS BLD VENIPUNCTURE: CPT

## 2019-04-09 PROCEDURE — 80053 COMPREHEN METABOLIC PANEL: CPT

## 2019-04-09 PROCEDURE — 85027 COMPLETE CBC AUTOMATED: CPT

## 2019-04-09 NOTE — NURSING
Patient here for dressing change on right arm PICC line-old dressing removed-no redness at insertion site-area cleaned and new dressing applied over site-patient tolerated well.

## 2019-04-13 ENCOUNTER — PATIENT MESSAGE (OUTPATIENT)
Dept: HEMATOLOGY/ONCOLOGY | Facility: CLINIC | Age: 44
End: 2019-04-13

## 2019-04-13 ENCOUNTER — PATIENT MESSAGE (OUTPATIENT)
Dept: OBSTETRICS AND GYNECOLOGY | Facility: CLINIC | Age: 44
End: 2019-04-13

## 2019-04-13 DIAGNOSIS — C83.37 DIFFUSE LARGE B-CELL LYMPHOMA OF SPLEEN: Primary | ICD-10-CM

## 2019-04-19 ENCOUNTER — PATIENT MESSAGE (OUTPATIENT)
Dept: OBSTETRICS AND GYNECOLOGY | Facility: CLINIC | Age: 44
End: 2019-04-19

## 2019-04-22 ENCOUNTER — TELEPHONE (OUTPATIENT)
Dept: OBSTETRICS AND GYNECOLOGY | Facility: CLINIC | Age: 44
End: 2019-04-22

## 2019-04-22 NOTE — TELEPHONE ENCOUNTER
I was calling the pt to leave a message and her mailbox was full so I was unable to leave a message.

## 2019-04-23 ENCOUNTER — TELEPHONE (OUTPATIENT)
Dept: HEMATOLOGY/ONCOLOGY | Facility: CLINIC | Age: 44
End: 2019-04-23

## 2019-04-23 ENCOUNTER — INFUSION (OUTPATIENT)
Dept: INFUSION THERAPY | Facility: HOSPITAL | Age: 44
End: 2019-04-23
Attending: INTERNAL MEDICINE
Payer: COMMERCIAL

## 2019-04-23 DIAGNOSIS — C83.37 DIFFUSE LARGE B-CELL LYMPHOMA OF SPLEEN: Primary | ICD-10-CM

## 2019-04-23 LAB
ALBUMIN SERPL BCP-MCNC: 3 G/DL (ref 3.5–5.2)
ALP SERPL-CCNC: 54 U/L (ref 55–135)
ALT SERPL W/O P-5'-P-CCNC: 21 U/L (ref 10–44)
ANION GAP SERPL CALC-SCNC: 6 MMOL/L (ref 8–16)
AST SERPL-CCNC: 9 U/L (ref 10–40)
BILIRUB SERPL-MCNC: 0.3 MG/DL (ref 0.1–1)
BUN SERPL-MCNC: 9 MG/DL (ref 6–20)
CALCIUM SERPL-MCNC: 8.7 MG/DL (ref 8.7–10.5)
CHLORIDE SERPL-SCNC: 108 MMOL/L (ref 95–110)
CO2 SERPL-SCNC: 26 MMOL/L (ref 23–29)
CREAT SERPL-MCNC: 0.7 MG/DL (ref 0.5–1.4)
ERYTHROCYTE [DISTWIDTH] IN BLOOD BY AUTOMATED COUNT: 18.5 % (ref 11.5–14.5)
EST. GFR  (AFRICAN AMERICAN): >60 ML/MIN/1.73 M^2
EST. GFR  (NON AFRICAN AMERICAN): >60 ML/MIN/1.73 M^2
GLUCOSE SERPL-MCNC: 94 MG/DL (ref 70–110)
HCT VFR BLD AUTO: 21.8 % (ref 37–48.5)
HGB BLD-MCNC: 7.2 G/DL (ref 12–16)
IMM GRANULOCYTES # BLD AUTO: 0.07 K/UL (ref 0–0.04)
MCH RBC QN AUTO: 30 PG (ref 27–31)
MCHC RBC AUTO-ENTMCNC: 33 G/DL (ref 32–36)
MCV RBC AUTO: 91 FL (ref 82–98)
NEUTROPHILS # BLD AUTO: 0.6 K/UL (ref 1.8–7.7)
PLATELET # BLD AUTO: 19 K/UL (ref 150–350)
PMV BLD AUTO: ABNORMAL FL (ref 9.2–12.9)
POTASSIUM SERPL-SCNC: 3.6 MMOL/L (ref 3.5–5.1)
PROT SERPL-MCNC: 5.5 G/DL (ref 6–8.4)
RBC # BLD AUTO: 2.4 M/UL (ref 4–5.4)
SODIUM SERPL-SCNC: 140 MMOL/L (ref 136–145)
WBC # BLD AUTO: 1.79 K/UL (ref 3.9–12.7)

## 2019-04-23 PROCEDURE — 80053 COMPREHEN METABOLIC PANEL: CPT

## 2019-04-23 PROCEDURE — 63600175 PHARM REV CODE 636 W HCPCS: Performed by: INTERNAL MEDICINE

## 2019-04-23 PROCEDURE — 25000003 PHARM REV CODE 250: Performed by: INTERNAL MEDICINE

## 2019-04-23 PROCEDURE — 85027 COMPLETE CBC AUTOMATED: CPT

## 2019-04-23 PROCEDURE — 36591 DRAW BLOOD OFF VENOUS DEVICE: CPT

## 2019-04-23 PROCEDURE — A4216 STERILE WATER/SALINE, 10 ML: HCPCS | Performed by: INTERNAL MEDICINE

## 2019-04-23 RX ORDER — SODIUM CHLORIDE 0.9 % (FLUSH) 0.9 %
10 SYRINGE (ML) INJECTION
Status: CANCELLED | OUTPATIENT
Start: 2019-04-23

## 2019-04-23 RX ORDER — HEPARIN 100 UNIT/ML
500 SYRINGE INTRAVENOUS
Status: CANCELLED | OUTPATIENT
Start: 2019-04-23

## 2019-04-23 RX ORDER — SODIUM CHLORIDE 0.9 % (FLUSH) 0.9 %
10 SYRINGE (ML) INJECTION
Status: COMPLETED | OUTPATIENT
Start: 2019-04-23 | End: 2019-04-23

## 2019-04-23 RX ORDER — HEPARIN 100 UNIT/ML
500 SYRINGE INTRAVENOUS
Status: COMPLETED | OUTPATIENT
Start: 2019-04-23 | End: 2019-04-23

## 2019-04-23 RX ADMIN — HEPARIN 500 UNITS: 100 SYRINGE at 03:04

## 2019-04-23 RX ADMIN — Medication 10 ML: at 03:04

## 2019-04-24 ENCOUNTER — PATIENT MESSAGE (OUTPATIENT)
Dept: HEMATOLOGY/ONCOLOGY | Facility: CLINIC | Age: 44
End: 2019-04-24

## 2019-04-25 ENCOUNTER — TELEPHONE (OUTPATIENT)
Dept: HEMATOLOGY/ONCOLOGY | Facility: CLINIC | Age: 44
End: 2019-04-25

## 2019-04-25 ENCOUNTER — OFFICE VISIT (OUTPATIENT)
Dept: OBSTETRICS AND GYNECOLOGY | Facility: CLINIC | Age: 44
End: 2019-04-25
Payer: COMMERCIAL

## 2019-04-25 VITALS
HEIGHT: 62 IN | BODY MASS INDEX: 30.91 KG/M2 | SYSTOLIC BLOOD PRESSURE: 112 MMHG | DIASTOLIC BLOOD PRESSURE: 78 MMHG | WEIGHT: 168 LBS

## 2019-04-25 DIAGNOSIS — N92.6 IRREGULAR MENSTRUAL CYCLE: ICD-10-CM

## 2019-04-25 DIAGNOSIS — Z01.419 ENCOUNTER FOR GYNECOLOGICAL EXAMINATION WITHOUT ABNORMAL FINDING: Primary | ICD-10-CM

## 2019-04-25 DIAGNOSIS — Z30.9 ENCOUNTER FOR CONTRACEPTIVE MANAGEMENT, UNSPECIFIED TYPE: ICD-10-CM

## 2019-04-25 DIAGNOSIS — N94.6 DYSMENORRHEA: ICD-10-CM

## 2019-04-25 PROCEDURE — 99396 PREV VISIT EST AGE 40-64: CPT | Mod: S$GLB,,, | Performed by: OBSTETRICS & GYNECOLOGY

## 2019-04-25 PROCEDURE — 88175 CYTOPATH C/V AUTO FLUID REDO: CPT | Performed by: PATHOLOGY

## 2019-04-25 PROCEDURE — 88141 LIQUID-BASED PAP SMEAR, SCREENING: ICD-10-PCS | Mod: ,,, | Performed by: PATHOLOGY

## 2019-04-25 PROCEDURE — 87624 HPV HI-RISK TYP POOLED RSLT: CPT

## 2019-04-25 PROCEDURE — 99999 PR PBB SHADOW E&M-EST. PATIENT-LVL III: CPT | Mod: PBBFAC,,, | Performed by: OBSTETRICS & GYNECOLOGY

## 2019-04-25 PROCEDURE — 88141 CYTOPATH C/V INTERPRET: CPT | Mod: ,,, | Performed by: PATHOLOGY

## 2019-04-25 PROCEDURE — 99396 PR PREVENTIVE VISIT,EST,40-64: ICD-10-PCS | Mod: S$GLB,,, | Performed by: OBSTETRICS & GYNECOLOGY

## 2019-04-25 PROCEDURE — 99999 PR PBB SHADOW E&M-EST. PATIENT-LVL III: ICD-10-PCS | Mod: PBBFAC,,, | Performed by: OBSTETRICS & GYNECOLOGY

## 2019-04-25 RX ORDER — MEDROXYPROGESTERONE ACETATE 150 MG/ML
150 INJECTION, SUSPENSION INTRAMUSCULAR
Qty: 1 ML | Refills: 3 | Status: SHIPPED | OUTPATIENT
Start: 2019-04-25 | End: 2019-10-07 | Stop reason: SDUPTHER

## 2019-04-25 NOTE — TELEPHONE ENCOUNTER
Message fwd to MD.         ----- Message from Fabian Love sent at 4/25/2019  8:24 AM CDT -----  Contact: Peg (MD Cortez)  Received labs to review on the pt, and she had a hemoglobin of 7.2. Wants to know if Dr Hameed gave the pt any blood transfusion or medication. Or if she's planning on doing so.      Contact:: [phone]

## 2019-04-25 NOTE — PROGRESS NOTES
HISTORY OF PRESENT ILLNESS:    Terese Macias is a 43 y.o. female, , Patient's last menstrual period was 2019.,  presents for a routine exam and has no complaints.    On Nuva Ring continuously, would cycle every 3 months   First chemotherapy treatment started on 2019, heavy cycle in March   Admitted on , had significant cramping. Had Depo Provera 4/15/2019, started bleeding on 2019 - heavy for first 3 days now light       Past Medical History:   Diagnosis Date    Abnormal Pap smear     Abnormal Pap smear of vagina     Anemia     Depression     Fever blister     GERD (gastroesophageal reflux disease)     Heavy periods 2015    Hemorrhoids without complication     IBS (irritable colon syndrome)     Joint pain     Left upper quadrant pain 2018    Pancreatic mass 2018    Psychiatric problem     Screening for HPV (human papillomavirus)     Sleep difficulties     Splenomegaly 2018    Thalassemia     Thalassemia     Therapy        Past Surgical History:   Procedure Laterality Date    COLONOSCOPY      COLONOSCOPY N/A 2018    Performed by Ba Stewart MD at [facility] ENDO (2ND FLR)    EGD (ESOPHAGOGASTRODUODENOSCOPY) N/A 2018    Performed by Ba Stewart [facility] at [facility] ENDO (2ND FLR)    ESOPHAGOGASTRODUODENOSCOPY (EGD) N/A 3/23/2015    Performed by Ba Stewart MD at [facility] ENDO (4TH FLR)    HERNIA REPAIR      ME COLONOSCOPY,DIAGNOSTIC [38533 (CPT®)] N/A 2014    Performed by [facility] at [facility] ([facility] F[facility])    ULTRASOUND-ENDOSCOPIC-UPPER N/A 2018    Performed by Venu Montiel MD at [facility] ENDO       MEDICATIONS AND ALLERGIES:      Current Outpatient Medications:     cetirizine (ZYRTEC) 10 MG tablet, Take 10 mg by mouth., Disp: , Rfl:     dicyclomine (BENTYL) 10 MG capsule, Take 10 mg by mouth., Disp: , Rfl:     docusate sodium (COLACE) 50 MG capsule, Take 1 capsule (50 mg total) by mouth nightly as needed for Constipation.,  Disp: 30 capsule, Rfl: 0    fluticasone (FLONASE) 50 mcg/actuation nasal spray, 1 spray (50 mcg total) by Each Nare route once daily. (Patient taking differently: 1 spray by Each Nare route daily as needed. ), Disp: 1 Bottle, Rfl: 0    linaclotide (LINZESS) 145 mcg Cap capsule, Take 1 capsule (145 mcg total) by mouth once daily., Disp: 30 capsule, Rfl: 5    ondansetron (ZOFRAN) 8 MG tablet, Take 8 mg by mouth., Disp: , Rfl:     oxyCODONE (ROXICODONE) 5 MG immediate release tablet, Take 1 tablet (5 mg total) by mouth every 6 (six) hours as needed., Disp: 28 tablet, Rfl: 0    prochlorperazine (COMPAZINE) 10 MG tablet, Take 10 mg by mouth., Disp: , Rfl:     senna-docusate 8.6-50 mg (PERICOLACE) 8.6-50 mg per tablet, Take 2 tablets by mouth once daily., Disp: , Rfl:     sulfamethoxazole-trimethoprim 800-160mg (BACTRIM DS) 800-160 mg Tab, Take 1 tablet by mouth., Disp: , Rfl:     traMADol (ULTRAM-ER) 100 MG Tb24, Take 100 mg by mouth., Disp: , Rfl:     valACYclovir (VALTREX) 500 MG tablet, Take 500 mg by mouth., Disp: , Rfl:     albuterol 90 mcg/actuation inhaler, Inhale 2 puffs into the lungs every 6 (six) hours as needed for Wheezing. Rescue, Disp: 18 g, Rfl: 0    medroxyPROGESTERone (DEPO-PROVERA) 150 mg/mL injection, Inject 1 mL (150 mg total) into the muscle every 3 (three) months., Disp: 1 mL, Rfl: 3    omeprazole (PRILOSEC) 20 MG capsule, Take 1 capsule (20 mg total) by mouth once daily., Disp: 30 capsule, Rfl: 2    Review of patient's allergies indicates:   Allergen Reactions    Pcn [penicillins] Other (See Comments)     Pt had reaction to PCN as an infant.       Family History   Problem Relation Age of Onset    Hypertension Mother     Diabetes Father     Heart disease Father     Eczema Father     Eczema Other     Colon cancer Paternal Uncle 60        colon cancer    Cancer Cousin         breast    Breast cancer Cousin     No Known Problems Other     Cancer Cousin         stomach cancer     Breast cancer Maternal Grandmother     Cancer Maternal Grandmother         breast    Eczema Sister     Acne Sister     Eczema Brother     Eczema Daughter     Cancer Maternal Aunt         breast    Breast cancer Maternal Aunt     No Known Problems Maternal Uncle     No Known Problems Paternal Aunt     No Known Problems Maternal Grandfather     No Known Problems Paternal Grandmother     No Known Problems Paternal Grandfather     Stomach cancer Cousin 44    Melanoma Neg Hx     Psoriasis Neg Hx     Lupus Neg Hx     Ovarian cancer Neg Hx     Amblyopia Neg Hx     Blindness Neg Hx     Cataracts Neg Hx     Glaucoma Neg Hx     Macular degeneration Neg Hx     Retinal detachment Neg Hx     Strabismus Neg Hx     Stroke Neg Hx     Thyroid disease Neg Hx     Esophageal cancer Neg Hx     Irritable bowel syndrome Neg Hx     Crohn's disease Neg Hx     Ulcerative colitis Neg Hx     Celiac disease Neg Hx        Social History     Socioeconomic History    Marital status: Single     Spouse name: Not on file    Number of children: 1    Years of education: Not on file    Highest education level: Not on file   Occupational History    Occupation: Teacher     Employer: [employer]   Social Needs    Financial resource strain: Not on file    Food insecurity:     Worry: Not on file     Inability: Not on file    Transportation needs:     Medical: Not on file     Non-medical: Not on file   Tobacco Use    Smoking status: Never Smoker    Smokeless tobacco: Never Used   Substance and Sexual Activity    Alcohol use: Yes     Comment: Occasionally    Drug use: No    Sexual activity: Never     Partners: Male     Birth control/protection: Injection   Lifestyle    Physical activity:     Days per week: Not on file     Minutes per session: Not on file    Stress: Not on file   Relationships    Social connections:     Talks on phone: Not on file     Gets together: Not on file     Attends  "Episcop[employer] service: Not on file     Active member of club or organization: Not on file     Attends meetings of clubs or organizations: Not on file     Relationship status: Not on file   Other Topics Concern    Are you pregnant or think you may be? No    Breast-feeding No    Patient feels they ought to cut down on drinking/drug use No    Patient annoyed by others criticizing their drinking/drug use No    Patient has felt bad or guilty about drinking/drug use No    Patient has had a drink/used drugs as an eye opener in the AM No   Social History Narrative    Teacher, [employer]. Single, 1 child, daughter    Lives with parents at present.       COMPREHENSIVE GYN HISTORY:  PAP History: Denies abnormal Paps.  Infection History: Denies STDs. Denies PID.  Benign History: Denies uterine fibroids. Denies ovarian cysts. Denies endometriosis. Denies other conditions.  Cancer History: Denies cervical cancer. Denies uterine cancer or hyperplasia. Denies ovarian cancer. Denies vulvar cancer or pre-cancer. Denies vaginal cancer or pre-cancer. Denies breast cancer. Denies colon cancer.  Sexual Activity History: Reports currently being sexually active  Menstrual History: Monthly. Mod then light flow.   Dysmenorrhea History: Reports mild dysmenorrhea.       ROS:  GENERAL: No weight changes. No swelling. No fatigue. No fever.  CARDIOVASCULAR: No chest pain. No shortness of breath. No leg cramps.   NEUROLOGICAL: No headaches. No vision changes.  BREASTS: No pain. No lumps. No discharge.  ABDOMEN: No pain. No nausea. No vomiting. No diarrhea. No constipation.  REPRODUCTIVE: No abnormal bleeding.   VULVA: No pain. No lesions. No itching.  VAGINA: No relaxation. No itching. No odor. No discharge. No lesions.  URINARY: No incontinence. No nocturia. No frequency. No dysuria.    /78   Ht 5' 2" (1.575 m)   Wt 76.2 kg (168 lb)   LMP 04/17/2019   BMI 30.73 kg/m²     PE:  APPEARANCE: Well nourished, well developed, in no acute " distress.  AFFECT: WNL, alert and oriented x 3.  SKIN: No acne or hirsutism.  NECK: Neck symmetric, without masses or thyromegaly.  NODES: No inguinal, cervical, axillary or femoral lymph node enlargement.  CHEST: Good respiratory effort.   ABDOMEN: Soft. No tenderness or masses. No hepatosplenomegaly. No hernias.  BREASTS: Symmetrical, no skin changes, visible lesions, palpable masses or nipple discharge bilaterally.  PELVIC: External female genitalia without lesions.  Female hair distribution. Adequate perineal body, Normal urethral meatus. Vagina moist and well rugated without lesions or discharge.  No significant cystocele or rectocele present. Cervix pink without lesions, discharge or tenderness. Uterus is 4-6 week size, regular, mobile and nontender. Adnexa without masses or tenderness.  EXTREMITIES: No edema    DIAGNOSIS:  1. Encounter for gynecological examination without abnormal finding    2. Encounter for contraceptive management, unspecified type    3. Irregular menstrual cycle    4. Dysmenorrhea        PLAN:    Orders Placed This Encounter    Liquid-based pap smear, screening    medroxyPROGESTERone (DEPO-PROVERA) 150 mg/mL injection       COUNSELING:  The patient was counseled today on:  -A.C.S. Pap and pelvic exam guidelines (pap every 3 years), recomendations for yearly mammogram;  -to follow up with her PCP for other health maintenance.    FOLLOW-UP with me in 3 months

## 2019-04-26 ENCOUNTER — TELEPHONE (OUTPATIENT)
Dept: HEMATOLOGY/ONCOLOGY | Facility: CLINIC | Age: 44
End: 2019-04-26

## 2019-04-26 ENCOUNTER — INFUSION (OUTPATIENT)
Dept: INFUSION THERAPY | Facility: HOSPITAL | Age: 44
End: 2019-04-26
Attending: INTERNAL MEDICINE
Payer: COMMERCIAL

## 2019-04-26 ENCOUNTER — HOSPITAL ENCOUNTER (OUTPATIENT)
Facility: HOSPITAL | Age: 44
Discharge: HOME | End: 2019-04-27
Attending: EMERGENCY MEDICINE | Admitting: INTERNAL MEDICINE
Payer: COMMERCIAL

## 2019-04-26 DIAGNOSIS — D64.9 ANEMIA, UNSPECIFIED TYPE: Primary | ICD-10-CM

## 2019-04-26 DIAGNOSIS — C83.37 DIFFUSE LARGE B-CELL LYMPHOMA OF SPLEEN: Primary | ICD-10-CM

## 2019-04-26 DIAGNOSIS — R06.02 SOB (SHORTNESS OF BREATH): ICD-10-CM

## 2019-04-26 DIAGNOSIS — D64.9 ANEMIA: ICD-10-CM

## 2019-04-26 PROBLEM — E87.6 HYPOKALEMIA: Status: ACTIVE | Noted: 2019-04-26

## 2019-04-26 LAB
ABO + RH BLD: NORMAL
ALBUMIN SERPL BCP-MCNC: 3.2 G/DL (ref 3.5–5.2)
ALBUMIN SERPL BCP-MCNC: 3.3 G/DL (ref 3.5–5.2)
ALP SERPL-CCNC: 64 U/L (ref 55–135)
ALP SERPL-CCNC: 68 U/L (ref 55–135)
ALT SERPL W/O P-5'-P-CCNC: 14 U/L (ref 10–44)
ALT SERPL W/O P-5'-P-CCNC: 14 U/L (ref 10–44)
ANION GAP SERPL CALC-SCNC: 10 MMOL/L (ref 8–16)
ANION GAP SERPL CALC-SCNC: 9 MMOL/L (ref 8–16)
ANISOCYTOSIS BLD QL SMEAR: ABNORMAL
AST SERPL-CCNC: 13 U/L (ref 10–40)
AST SERPL-CCNC: 14 U/L (ref 10–40)
B-HCG UR QL: NEGATIVE
BASOPHILS NFR BLD: 0 % (ref 0–1.9)
BILIRUB SERPL-MCNC: 0.3 MG/DL (ref 0.1–1)
BILIRUB SERPL-MCNC: 0.4 MG/DL (ref 0.1–1)
BILIRUB UR QL STRIP: NEGATIVE
BLD GP AB SCN CELLS X3 SERPL QL: NORMAL
BLD PROD TYP BPU: NORMAL
BLOOD UNIT EXPIRATION DATE: NORMAL
BLOOD UNIT TYPE CODE: 1700
BLOOD UNIT TYPE: NORMAL
BUN SERPL-MCNC: 4 MG/DL (ref 6–20)
BUN SERPL-MCNC: 4 MG/DL (ref 6–20)
CALCIUM SERPL-MCNC: 8.7 MG/DL (ref 8.7–10.5)
CALCIUM SERPL-MCNC: 9.3 MG/DL (ref 8.7–10.5)
CHLORIDE SERPL-SCNC: 106 MMOL/L (ref 95–110)
CHLORIDE SERPL-SCNC: 109 MMOL/L (ref 95–110)
CLARITY UR REFRACT.AUTO: ABNORMAL
CO2 SERPL-SCNC: 22 MMOL/L (ref 23–29)
CO2 SERPL-SCNC: 23 MMOL/L (ref 23–29)
CODING SYSTEM: NORMAL
COLOR UR AUTO: YELLOW
CREAT SERPL-MCNC: 0.7 MG/DL (ref 0.5–1.4)
CREAT SERPL-MCNC: 0.7 MG/DL (ref 0.5–1.4)
CTP QC/QA: YES
DIFFERENTIAL METHOD: ABNORMAL
DISPENSE STATUS: NORMAL
EOSINOPHIL NFR BLD: 0 % (ref 0–8)
ERYTHROCYTE [DISTWIDTH] IN BLOOD BY AUTOMATED COUNT: 19.6 % (ref 11.5–14.5)
ERYTHROCYTE [DISTWIDTH] IN BLOOD BY AUTOMATED COUNT: 19.8 % (ref 11.5–14.5)
EST. GFR  (AFRICAN AMERICAN): >60 ML/MIN/1.73 M^2
EST. GFR  (AFRICAN AMERICAN): >60 ML/MIN/1.73 M^2
EST. GFR  (NON AFRICAN AMERICAN): >60 ML/MIN/1.73 M^2
EST. GFR  (NON AFRICAN AMERICAN): >60 ML/MIN/1.73 M^2
GLUCOSE SERPL-MCNC: 90 MG/DL (ref 70–110)
GLUCOSE SERPL-MCNC: 98 MG/DL (ref 70–110)
GLUCOSE UR QL STRIP: NEGATIVE
HCT VFR BLD AUTO: 21.5 % (ref 37–48.5)
HCT VFR BLD AUTO: 21.7 % (ref 37–48.5)
HGB BLD-MCNC: 6.7 G/DL (ref 12–16)
HGB BLD-MCNC: 6.8 G/DL (ref 12–16)
HGB UR QL STRIP: ABNORMAL
IMM GRANULOCYTES # BLD AUTO: 1.49 K/UL (ref 0–0.04)
IMM GRANULOCYTES # BLD AUTO: ABNORMAL K/UL (ref 0–0.04)
IMM GRANULOCYTES NFR BLD AUTO: ABNORMAL % (ref 0–0.5)
INR PPP: 1.1 (ref 0.8–1.2)
KETONES UR QL STRIP: NEGATIVE
LEUKOCYTE ESTERASE UR QL STRIP: NEGATIVE
LYMPHOCYTES NFR BLD: 13 % (ref 18–48)
MCH RBC QN AUTO: 29.3 PG (ref 27–31)
MCH RBC QN AUTO: 29.7 PG (ref 27–31)
MCHC RBC AUTO-ENTMCNC: 31.2 G/DL (ref 32–36)
MCHC RBC AUTO-ENTMCNC: 31.3 G/DL (ref 32–36)
MCV RBC AUTO: 94 FL (ref 82–98)
MCV RBC AUTO: 95 FL (ref 82–98)
METAMYELOCYTES NFR BLD MANUAL: 1 %
MICROSCOPIC COMMENT: ABNORMAL
MONOCYTES NFR BLD: 2 % (ref 4–15)
MYELOCYTES NFR BLD MANUAL: 1 %
NEUTROPHILS # BLD AUTO: 7.2 K/UL (ref 1.8–7.7)
NEUTROPHILS NFR BLD: 79 % (ref 38–73)
NEUTS BAND NFR BLD MANUAL: 1 %
NITRITE UR QL STRIP: NEGATIVE
NRBC BLD-RTO: 4 /100 WBC
OVALOCYTES BLD QL SMEAR: ABNORMAL
PH UR STRIP: 5 [PH] (ref 5–8)
PLATELET # BLD AUTO: 50 K/UL (ref 150–350)
PLATELET # BLD AUTO: 61 K/UL (ref 150–350)
PLATELET BLD QL SMEAR: ABNORMAL
PMV BLD AUTO: 12.3 FL (ref 9.2–12.9)
PMV BLD AUTO: 12.3 FL (ref 9.2–12.9)
POIKILOCYTOSIS BLD QL SMEAR: SLIGHT
POLYCHROMASIA BLD QL SMEAR: ABNORMAL
POTASSIUM SERPL-SCNC: 3.4 MMOL/L (ref 3.5–5.1)
POTASSIUM SERPL-SCNC: 3.5 MMOL/L (ref 3.5–5.1)
PROMYELOCYTES NFR BLD MANUAL: 3 %
PROT SERPL-MCNC: 5.8 G/DL (ref 6–8.4)
PROT SERPL-MCNC: 6.1 G/DL (ref 6–8.4)
PROT UR QL STRIP: NEGATIVE
PROTHROMBIN TIME: 11.2 SEC (ref 9–12.5)
RBC # BLD AUTO: 2.29 M/UL (ref 4–5.4)
RBC # BLD AUTO: 2.29 M/UL (ref 4–5.4)
RBC #/AREA URNS AUTO: 12 /HPF (ref 0–4)
SODIUM SERPL-SCNC: 139 MMOL/L (ref 136–145)
SODIUM SERPL-SCNC: 140 MMOL/L (ref 136–145)
SP GR UR STRIP: 1.02 (ref 1–1.03)
SQUAMOUS #/AREA URNS AUTO: 1 /HPF
TRANS ERYTHROCYTES VOL PATIENT: NORMAL ML
URN SPEC COLLECT METH UR: ABNORMAL
WBC # BLD AUTO: 11.41 K/UL (ref 3.9–12.7)
WBC # BLD AUTO: 12.54 K/UL (ref 3.9–12.7)
WBC #/AREA URNS AUTO: 3 /HPF (ref 0–5)

## 2019-04-26 PROCEDURE — 36430 TRANSFUSION BLD/BLD COMPNT: CPT

## 2019-04-26 PROCEDURE — P9021 RED BLOOD CELLS UNIT: HCPCS

## 2019-04-26 PROCEDURE — 99285 PR EMERGENCY DEPT VISIT,LEVEL V: ICD-10-PCS | Mod: ,,, | Performed by: EMERGENCY MEDICINE

## 2019-04-26 PROCEDURE — 99285 EMERGENCY DEPT VISIT HI MDM: CPT | Mod: ,,, | Performed by: EMERGENCY MEDICINE

## 2019-04-26 PROCEDURE — G0378 HOSPITAL OBSERVATION PER HR: HCPCS

## 2019-04-26 PROCEDURE — 25000003 PHARM REV CODE 250: Performed by: INTERNAL MEDICINE

## 2019-04-26 PROCEDURE — 85027 COMPLETE CBC AUTOMATED: CPT

## 2019-04-26 PROCEDURE — 36592 COLLECT BLOOD FROM PICC: CPT

## 2019-04-26 PROCEDURE — 63600175 PHARM REV CODE 636 W HCPCS: Performed by: INTERNAL MEDICINE

## 2019-04-26 PROCEDURE — 85007 BL SMEAR W/DIFF WBC COUNT: CPT

## 2019-04-26 PROCEDURE — 86850 RBC ANTIBODY SCREEN: CPT

## 2019-04-26 PROCEDURE — A4216 STERILE WATER/SALINE, 10 ML: HCPCS | Performed by: INTERNAL MEDICINE

## 2019-04-26 PROCEDURE — A4216 STERILE WATER/SALINE, 10 ML: HCPCS | Performed by: STUDENT IN AN ORGANIZED HEALTH CARE EDUCATION/TRAINING PROGRAM

## 2019-04-26 PROCEDURE — 80053 COMPREHEN METABOLIC PANEL: CPT | Mod: 91

## 2019-04-26 PROCEDURE — 81025 URINE PREGNANCY TEST: CPT | Performed by: STUDENT IN AN ORGANIZED HEALTH CARE EDUCATION/TRAINING PROGRAM

## 2019-04-26 PROCEDURE — 81001 URINALYSIS AUTO W/SCOPE: CPT

## 2019-04-26 PROCEDURE — 99219 PR INITIAL OBSERVATION CARE,LEVL II: CPT | Mod: ,,, | Performed by: INTERNAL MEDICINE

## 2019-04-26 PROCEDURE — 86920 COMPATIBILITY TEST SPIN: CPT

## 2019-04-26 PROCEDURE — 99285 EMERGENCY DEPT VISIT HI MDM: CPT

## 2019-04-26 PROCEDURE — 25000003 PHARM REV CODE 250: Performed by: STUDENT IN AN ORGANIZED HEALTH CARE EDUCATION/TRAINING PROGRAM

## 2019-04-26 PROCEDURE — 85610 PROTHROMBIN TIME: CPT

## 2019-04-26 PROCEDURE — 80053 COMPREHEN METABOLIC PANEL: CPT

## 2019-04-26 PROCEDURE — 85027 COMPLETE CBC AUTOMATED: CPT | Mod: 91

## 2019-04-26 PROCEDURE — 99219 PR INITIAL OBSERVATION CARE,LEVL II: ICD-10-PCS | Mod: ,,, | Performed by: INTERNAL MEDICINE

## 2019-04-26 RX ORDER — PANTOPRAZOLE SODIUM 40 MG/1
40 TABLET, DELAYED RELEASE ORAL DAILY
Status: DISCONTINUED | OUTPATIENT
Start: 2019-04-27 | End: 2019-04-27 | Stop reason: HOSPADM

## 2019-04-26 RX ORDER — VALACYCLOVIR HYDROCHLORIDE 500 MG/1
500 TABLET, FILM COATED ORAL DAILY
Status: DISCONTINUED | OUTPATIENT
Start: 2019-04-27 | End: 2019-04-27 | Stop reason: HOSPADM

## 2019-04-26 RX ORDER — SULFAMETHOXAZOLE AND TRIMETHOPRIM 800; 160 MG/1; MG/1
1 TABLET ORAL
Status: DISCONTINUED | OUTPATIENT
Start: 2019-04-26 | End: 2019-04-27 | Stop reason: HOSPADM

## 2019-04-26 RX ORDER — SODIUM CHLORIDE 0.9 % (FLUSH) 0.9 %
3 SYRINGE (ML) INJECTION EVERY 8 HOURS
Status: DISCONTINUED | OUTPATIENT
Start: 2019-04-26 | End: 2019-04-27 | Stop reason: HOSPADM

## 2019-04-26 RX ORDER — FLUTICASONE PROPIONATE 50 MCG
1 SPRAY, SUSPENSION (ML) NASAL DAILY PRN
Status: DISCONTINUED | OUTPATIENT
Start: 2019-04-26 | End: 2019-04-27 | Stop reason: HOSPADM

## 2019-04-26 RX ORDER — HYDROCODONE BITARTRATE AND ACETAMINOPHEN 500; 5 MG/1; MG/1
TABLET ORAL
Status: DISCONTINUED | OUTPATIENT
Start: 2019-04-26 | End: 2019-04-27 | Stop reason: HOSPADM

## 2019-04-26 RX ORDER — FLUTICASONE PROPIONATE 50 MCG
1 SPRAY, SUSPENSION (ML) NASAL DAILY
Status: DISCONTINUED | OUTPATIENT
Start: 2019-04-27 | End: 2019-04-26

## 2019-04-26 RX ORDER — ACETAMINOPHEN 500 MG
500 TABLET ORAL EVERY 6 HOURS PRN
Status: DISCONTINUED | OUTPATIENT
Start: 2019-04-26 | End: 2019-04-26

## 2019-04-26 RX ORDER — PROCHLORPERAZINE MALEATE 5 MG
10 TABLET ORAL 3 TIMES DAILY PRN
Status: DISCONTINUED | OUTPATIENT
Start: 2019-04-26 | End: 2019-04-27 | Stop reason: HOSPADM

## 2019-04-26 RX ORDER — HEPARIN 100 UNIT/ML
500 SYRINGE INTRAVENOUS
Status: COMPLETED | OUTPATIENT
Start: 2019-04-26 | End: 2019-04-26

## 2019-04-26 RX ORDER — SODIUM CHLORIDE 0.9 % (FLUSH) 0.9 %
10 SYRINGE (ML) INJECTION
Status: COMPLETED | OUTPATIENT
Start: 2019-04-26 | End: 2019-04-26

## 2019-04-26 RX ORDER — AMOXICILLIN 250 MG
1 CAPSULE ORAL DAILY PRN
Status: DISCONTINUED | OUTPATIENT
Start: 2019-04-26 | End: 2019-04-27 | Stop reason: HOSPADM

## 2019-04-26 RX ORDER — ONDANSETRON 8 MG/1
8 TABLET, ORALLY DISINTEGRATING ORAL EVERY 8 HOURS PRN
Status: DISCONTINUED | OUTPATIENT
Start: 2019-04-26 | End: 2019-04-27 | Stop reason: HOSPADM

## 2019-04-26 RX ORDER — DICYCLOMINE HYDROCHLORIDE 10 MG/1
10 CAPSULE ORAL 4 TIMES DAILY PRN
Status: DISCONTINUED | OUTPATIENT
Start: 2019-04-26 | End: 2019-04-27 | Stop reason: HOSPADM

## 2019-04-26 RX ORDER — SODIUM CHLORIDE 0.9 % (FLUSH) 0.9 %
10 SYRINGE (ML) INJECTION
Status: CANCELLED | OUTPATIENT
Start: 2019-04-26

## 2019-04-26 RX ORDER — OXYCODONE HYDROCHLORIDE 5 MG/1
5 TABLET ORAL EVERY 6 HOURS PRN
Status: DISCONTINUED | OUTPATIENT
Start: 2019-04-26 | End: 2019-04-27 | Stop reason: HOSPADM

## 2019-04-26 RX ORDER — POTASSIUM CHLORIDE 750 MG/1
40 CAPSULE, EXTENDED RELEASE ORAL ONCE
Status: COMPLETED | OUTPATIENT
Start: 2019-04-26 | End: 2019-04-26

## 2019-04-26 RX ORDER — HEPARIN 100 UNIT/ML
500 SYRINGE INTRAVENOUS
Status: CANCELLED | OUTPATIENT
Start: 2019-04-26

## 2019-04-26 RX ADMIN — POTASSIUM CHLORIDE 40 MEQ: 750 CAPSULE, EXTENDED RELEASE ORAL at 11:04

## 2019-04-26 RX ADMIN — HEPARIN 500 UNITS: 100 SYRINGE at 03:04

## 2019-04-26 RX ADMIN — Medication 10 ML: at 03:04

## 2019-04-26 RX ADMIN — Medication 3 ML: at 09:04

## 2019-04-26 RX ADMIN — SULFAMETHOXAZOLE AND TRIMETHOPRIM 1 TABLET: 800; 160 TABLET ORAL at 11:04

## 2019-04-26 NOTE — ED NOTES
Patient identifiers verified and correct for Terese Macias.Patient states she has been very tired today.  LOC: The patient is awake, alert and aware of environment with an appropriate affect, the patient is oriented x 3 and speaking appropriately.   APPEARANCE: Patient appears comfortable and in no acute distress, patient is clean and well groomed.  SKIN: The skin is warm and dry, color consistent with ethnicity, patient has normal skin turgor and moist mucus membranes, skin intact, no breakdown or bruising noted.   MUSCULOSKELETAL: Patient moving all extremities spontaneously, no swelling noted.  RESPIRATORY: Airway is open and patent, respirations are spontaneous, patient has a normal effort and rate, no accessory muscle use noted, pt placed on continuous pulse ox with O2 sats noted at 100% on room air.  CARDIAC: Pt placed on cardiac monitor. Patient has a normal rate and regular rhythm, no edema noted, capillary refill < 3 seconds.   GASTRO: Soft and non tender to palpation,  distention noted, normoactive bowel sounds present in all four quadrants. Pt states bowel movements have been regular.  : Pt denies any pain or frequency with urination.  NEURO: Pt opens eyes spontaneously, behavior appropriate to situation, follows commands, facial expression symmetrical, bilateral hand grasp equal and even, purposeful motor response noted, normal sensation in all extremities when touched with a finger.

## 2019-04-26 NOTE — NURSING
1510-Blood specimen collected from right upper arm PICC line using aseptic technique, pt tolerated well. Blood specimen sent to lab. Right arm PICC flushed with normal saline and heparin and Curos caps applied to luer locks. Pt discharged with no distress noted, ambulating independently. RTC 4/30/19, pt verbalized understanding.     1545-CBC resulted, hemoglobin 6.8 and hematocrit 21.7. Notified Shruthi Bhatt, Dr. Verdin's nurse, as he is MD of the day and Dr. Hameed is off today. Shruthi Bhatt said that she will notify Dr. Verdin of pt's low hemoglobin/hematocrit. Will follow-up.    1645-Spoke with Oneida, infusion charge nurse, and she said that Shruthi Bhatt called her around 1600 and asked if she could add on pt to get blood at infusion center, but it is now too late in the day, as infusion center closes at 1900. Oneida said that Shruthi Bhatt called pt and told her to report to ED for transfusion.

## 2019-04-27 VITALS
WEIGHT: 168.19 LBS | RESPIRATION RATE: 18 BRPM | HEART RATE: 104 BPM | TEMPERATURE: 99 F | OXYGEN SATURATION: 96 % | SYSTOLIC BLOOD PRESSURE: 103 MMHG | DIASTOLIC BLOOD PRESSURE: 58 MMHG | BODY MASS INDEX: 30.95 KG/M2 | HEIGHT: 62 IN

## 2019-04-27 PROBLEM — E87.6 HYPOKALEMIA: Status: RESOLVED | Noted: 2019-04-26 | Resolved: 2019-04-27

## 2019-04-27 LAB
ALBUMIN SERPL BCP-MCNC: 3.1 G/DL (ref 3.5–5.2)
ALP SERPL-CCNC: 68 U/L (ref 55–135)
ALT SERPL W/O P-5'-P-CCNC: 15 U/L (ref 10–44)
ANION GAP SERPL CALC-SCNC: 7 MMOL/L (ref 8–16)
ANISOCYTOSIS BLD QL SMEAR: SLIGHT
AST SERPL-CCNC: 13 U/L (ref 10–40)
BASOPHILS # BLD AUTO: ABNORMAL K/UL (ref 0–0.2)
BASOPHILS NFR BLD: 0 % (ref 0–1.9)
BILIRUB SERPL-MCNC: 0.3 MG/DL (ref 0.1–1)
BUN SERPL-MCNC: 4 MG/DL (ref 6–20)
CALCIUM SERPL-MCNC: 8.9 MG/DL (ref 8.7–10.5)
CHLORIDE SERPL-SCNC: 109 MMOL/L (ref 95–110)
CO2 SERPL-SCNC: 25 MMOL/L (ref 23–29)
CREAT SERPL-MCNC: 0.7 MG/DL (ref 0.5–1.4)
DIFFERENTIAL METHOD: ABNORMAL
EOSINOPHIL # BLD AUTO: ABNORMAL K/UL (ref 0–0.5)
EOSINOPHIL NFR BLD: 0 % (ref 0–8)
ERYTHROCYTE [DISTWIDTH] IN BLOOD BY AUTOMATED COUNT: 19.2 % (ref 11.5–14.5)
EST. GFR  (AFRICAN AMERICAN): >60 ML/MIN/1.73 M^2
EST. GFR  (NON AFRICAN AMERICAN): >60 ML/MIN/1.73 M^2
GLUCOSE SERPL-MCNC: 91 MG/DL (ref 70–110)
HCT VFR BLD AUTO: 24.1 % (ref 37–48.5)
HGB BLD-MCNC: 7.9 G/DL (ref 12–16)
HYPOCHROMIA BLD QL SMEAR: ABNORMAL
IMM GRANULOCYTES # BLD AUTO: ABNORMAL K/UL (ref 0–0.04)
IMM GRANULOCYTES NFR BLD AUTO: ABNORMAL % (ref 0–0.5)
LYMPHOCYTES # BLD AUTO: ABNORMAL K/UL (ref 1–4.8)
LYMPHOCYTES NFR BLD: 8 % (ref 18–48)
MAGNESIUM SERPL-MCNC: 1.9 MG/DL (ref 1.6–2.6)
MCH RBC QN AUTO: 30.3 PG (ref 27–31)
MCHC RBC AUTO-ENTMCNC: 32.8 G/DL (ref 32–36)
MCV RBC AUTO: 92 FL (ref 82–98)
MONOCYTES # BLD AUTO: ABNORMAL K/UL (ref 0.3–1)
MONOCYTES NFR BLD: 8 % (ref 4–15)
MYELOCYTES NFR BLD MANUAL: 1 %
NEUTROPHILS NFR BLD: 81 % (ref 38–73)
NEUTS BAND NFR BLD MANUAL: 2 %
NRBC BLD-RTO: 5 /100 WBC
PHOSPHATE SERPL-MCNC: 3.9 MG/DL (ref 2.7–4.5)
PLATELET # BLD AUTO: 50 K/UL (ref 150–350)
PLATELET BLD QL SMEAR: ABNORMAL
PMV BLD AUTO: 12 FL (ref 9.2–12.9)
POLYCHROMASIA BLD QL SMEAR: ABNORMAL
POTASSIUM SERPL-SCNC: 3.9 MMOL/L (ref 3.5–5.1)
PROT SERPL-MCNC: 5.7 G/DL (ref 6–8.4)
RBC # BLD AUTO: 2.61 M/UL (ref 4–5.4)
SODIUM SERPL-SCNC: 141 MMOL/L (ref 136–145)
WBC # BLD AUTO: 13.59 K/UL (ref 3.9–12.7)

## 2019-04-27 PROCEDURE — G0378 HOSPITAL OBSERVATION PER HR: HCPCS

## 2019-04-27 PROCEDURE — 99217 PR OBSERVATION CARE DISCHARGE: ICD-10-PCS | Mod: ,,, | Performed by: INTERNAL MEDICINE

## 2019-04-27 PROCEDURE — 99217 PR OBSERVATION CARE DISCHARGE: CPT | Mod: ,,, | Performed by: INTERNAL MEDICINE

## 2019-04-27 PROCEDURE — 83735 ASSAY OF MAGNESIUM: CPT

## 2019-04-27 PROCEDURE — 85007 BL SMEAR W/DIFF WBC COUNT: CPT

## 2019-04-27 PROCEDURE — 25000003 PHARM REV CODE 250: Performed by: STUDENT IN AN ORGANIZED HEALTH CARE EDUCATION/TRAINING PROGRAM

## 2019-04-27 PROCEDURE — 84100 ASSAY OF PHOSPHORUS: CPT

## 2019-04-27 PROCEDURE — 80053 COMPREHEN METABOLIC PANEL: CPT

## 2019-04-27 PROCEDURE — 85027 COMPLETE CBC AUTOMATED: CPT

## 2019-04-27 RX ADMIN — PANTOPRAZOLE SODIUM 40 MG: 40 TABLET, DELAYED RELEASE ORAL at 08:04

## 2019-04-27 RX ADMIN — VALACYCLOVIR HYDROCHLORIDE 500 MG: 500 TABLET, FILM COATED ORAL at 08:04

## 2019-04-27 RX ADMIN — ONDANSETRON 8 MG: 8 TABLET, ORALLY DISINTEGRATING ORAL at 11:04

## 2019-04-27 NOTE — ED NOTES
Report received from KIMMY Triplett. Assume care of pt. Pt resting comfortably and independently repositioned in stretcher with bed locked in lowest position for safety. NAD noted at this time. Respirations even and unlabored and visible chest rise noted.  Patient assisted to bathroom to obtain UA. Pt noted to have steady gait. Pt instructed to call if assistance is needed. Pt on continuous cardiac, BP, and O2 monitoring. Call light within reach. Family at bedside. No needs at this time. Will continue to monitor.

## 2019-04-27 NOTE — PLAN OF CARE
Problem: Adult Inpatient Plan of Care  Goal: Plan of Care Review  Outcome: Ongoing (interventions implemented as appropriate)  Pt discharged. Left room via wheelchair with transport. AVS given and reviewed.

## 2019-04-27 NOTE — SUBJECTIVE & OBJECTIVE
Patient information was obtained from patient, past medical records and ER records.     Oncology History:   10/2018 - abdominal pain  11/2018 - EGD & colonoscopy: extrinsic compression of the stomach in the gastric fundus & 4mm polyp.  --CT scan 11/14/18- splenic enlargement, hypodensity in the uncinate process of the pancreas.  --12/12/18 FNA bx of lymph node: abnormal lymphocyte proliferation  --PET on 12/14/18  Index prevascular lymph nodes SUV max 10.06.  Index upper pole spleen lateral lesion SUV max 13.49.  Index gastric lesion SUV max 7.3.  Index pancreatic head lymph node SUV max 8.7.  Mass in the uncinate process of the pancreas. 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  Neoplastic cells are positive for CD20, CD30 positive (30% of the tumor cells), BCL-2, BCL-6 (20-30%)  --treated at [facility] with  R-EPOCH (every 21 days)  --R-EPOCH schedule --C1D1 2/6/19, C2D1 2/28/19  IT chemotherapy done on 3/4/19 and tolerated it well. C4 4/11/9 with IT proph, MTX with Neulasta      Medications Prior to Admission   Medication Sig Dispense Refill Last Dose    cetirizine (ZYRTEC) 10 MG tablet Take 10 mg by mouth.   4/26/2019 at Unknown time    dicyclomine (BENTYL) 10 MG capsule Take 10 mg by mouth.   4/26/2019 at Unknown time    fluticasone (FLONASE) 50 mcg/actuation nasal spray 1 spray (50 mcg total) by Each Nare route once daily. (Patient taking differently: 1 spray by Each Nare route daily as needed. ) 1 Bottle 0 Past Month at Unknown time    ondansetron (ZOFRAN) 8 MG tablet Take 8 mg by mouth.   Past Month at Unknown time    oxyCODONE (ROXICODONE) 5 MG immediate release tablet Take 1 tablet (5 mg total) by mouth every 6 (six) hours as needed. 28 tablet 0 Past Month at Unknown time    prochlorperazine (COMPAZINE) 10 MG tablet Take 10 mg by mouth.   Past Month at Unknown time     senna-docusate 8.6-50 mg (PERICOLACE) 8.6-50 mg per tablet Take 2 tablets by mouth once daily.   Past Month at Unknown time    sulfamethoxazole-trimethoprim 800-160mg (BACTRIM DS) 800-160 mg Tab Take 1 tablet by mouth.   Past Week at Unknown time    traMADol (ULTRAM-ER) 100 MG Tb24 Take 100 mg by mouth.   Past Month at Unknown time    valACYclovir (VALTREX) 500 MG tablet Take 500 mg by mouth.   Past Month at Unknown time    albuterol 90 mcg/actuation inhaler Inhale 2 puffs into the lungs every 6 (six) hours as needed for Wheezing. Rescue 18 g 0 Taking    docusate sodium (COLACE) 50 MG capsule Take 1 capsule (50 mg total) by mouth nightly as needed for Constipation. 30 capsule 0 Unknown at Unknown time    linaclotide (LINZESS) 145 mcg Cap capsule Take 1 capsule (145 mcg total) by mouth once daily. 30 capsule 5 Taking    medroxyPROGESTERone (DEPO-PROVERA) 150 mg/mL injection Inject 1 mL (150 mg total) into the muscle every 3 (three) months. 1 mL 3     omeprazole (PRILOSEC) 20 MG capsule Take 1 capsule (20 mg total) by mouth once daily. 30 capsule 2 Taking       Pcn [penicillins]     Past Medical History:   Diagnosis Date    Abnormal Pap smear     Abnormal Pap smear of vagina     Anemia     Depression     Fever blister     GERD (gastroesophageal reflux disease)     Heavy periods 6/8/2015    Hemorrhoids without complication     IBS (irritable colon syndrome)     Joint pain     Left upper quadrant pain 12/19/2018    Pancreatic mass 12/19/2018    Psychiatric problem     Screening for HPV (human papillomavirus)     Sleep difficulties     Splenomegaly 12/19/2018    Thalassemia     Thalassemia     Therapy      Past Surgical History:   Procedure Laterality Date    COLONOSCOPY      COLONOSCOPY N/A 11/8/2018    Performed by [facility] at [facility] ENDO (2ND FLR)    EGD (ESOPHAGOGASTRODUODENOSCOPY) N/A 11/8/2018    Performed by Ba Stewart MD at [facility] ENDO (2ND FLR)     ESOPHAGOGASTRODUODENOSCOPY (EGD) N/A 3/23/2015    Performed by Ba Stewart MD at [facility] ENDO (4TH FLR)    HERNIA REPAIR      IA COLONOSCOPY,DIAGNOSTIC [17904 (CPT®)] N/A 7/2/2014    Performed by Cj Lassiter MD at [facility] ENDO (4TH FLR)    ULTRASOUND-ENDOSCOPIC-UPPER N/A 12/12/2018    Performed by Venu Montiel MD at [facility] ENDO     Family History     Problem Relation (Age of Onset)    Acne Sister    Breast cancer Cousin, Maternal Grandmother, Maternal Aunt    Cancer Cousin, Cousin, Maternal Grandmother, Maternal Aunt    Colon cancer Paternal Uncle (60)    Diabetes Father    Eczema Father, Other, Sister, Brother, Daughter    Heart disease Father    Hypertension Mother    No Known Problems Other, Maternal Uncle, Paternal Aunt, Maternal Grandfather, Paternal Grandmother, Paternal Grandfather    Stomach cancer Cousin (44)        Tobacco Use    Smoking status: Never Smoker    Smokeless tobacco: Never Used   Substance and Sexual Activity    Alcohol use: Yes     Comment: Occasionally    Drug use: No    Sexual activity: Never     Partners: Male     Birth control/protection: Injection       Review of Systems   Constitutional: Positive for fatigue. Negative for activity change, appetite change (decreased), fever and unexpected weight change.   HENT: Negative for trouble swallowing.    Respiratory: Negative for cough, shortness of breath and wheezing.    Cardiovascular: Negative for chest pain and palpitations.   Gastrointestinal: Positive for abdominal pain. Negative for blood in stool, constipation, diarrhea, nausea and vomiting.   Genitourinary: Negative for difficulty urinating and vaginal bleeding.   Musculoskeletal: Negative for back pain.   Skin: Positive for color change (verbalizes skin darkening s/p chemo initiation).   Allergic/Immunologic: Positive for immunocompromised state.   Neurological: Negative for weakness.   Psychiatric/Behavioral: Negative for confusion.     Objective:     Vital Signs (Most  Recent):  Temp: 99.1 °F (37.3 °C) (04/26/19 2150)  Pulse: 102 (04/26/19 2150)  Resp: 19 (04/26/19 2150)  BP: 132/79 (04/26/19 2150)  SpO2: 100 % (04/26/19 2150) Vital Signs (24h Range):  Temp:  [98.4 °F (36.9 °C)-99.1 °F (37.3 °C)] 99.1 °F (37.3 °C)  Pulse:  [] 102  Resp:  [17-19] 19  SpO2:  [96 %-100 %] 100 %  BP: (107-132)/(68-79) 132/79     Weight: 76.3 kg (168 lb 3.4 oz)  Body mass index is 30.77 kg/m².  Body surface area is 1.83 meters squared.    ECOG SCORE         [unfilled]    Lines/Drains/Airways     Peripherally Inserted Central Catheter Line                 PICC Double Lumen -- days          Airway                 Airway - Non-Surgical 12/12/18 1431 Nasal Cannula 135 days          Peripheral Intravenous Line                 Peripheral IV - Single Lumen 01/08/19 2243 Left Antecubital 107 days                Physical Exam   Constitutional: She is oriented to person, place, and time. She appears well-developed and well-nourished.   HENT:   Head: Normocephalic and atraumatic.   Cardiovascular: Normal rate, regular rhythm, normal heart sounds and intact distal pulses.   No murmur heard.  Pulmonary/Chest: Effort normal and breath sounds normal. No respiratory distress.   Abdominal: Soft. Bowel sounds are normal. She exhibits distension.   Lymphadenopathy:     She has no cervical adenopathy.   Neurological: She is alert and oriented to person, place, and time.   Skin: Skin is warm and dry. Capillary refill takes less than 2 seconds.   Psychiatric: She has a normal mood and affect. Her behavior is normal. Judgment and thought content normal.   Vitals reviewed.      Significant Labs:   CBC:   Recent Labs   Lab 04/26/19  1508 04/26/19 1930   WBC 11.41 12.54   HGB 6.8* 6.7*   HCT 21.7* 21.5*   PLT 50* 61*   , CMP:   Recent Labs   Lab 04/26/19  1508 04/26/19 1930    140   K 3.5 3.4*    109   CO2 23 22*   GLU 90 98   BUN 4* 4*   CREATININE 0.7 0.7   CALCIUM 9.3 8.7   PROT 6.1 5.8*   ALBUMIN 3.3* 3.2*    BILITOT 0.4 0.3   ALKPHOS 64 68   AST 14 13   ALT 14 14   ANIONGAP 10 9   EGFRNONAA >60.0 >60.0    and All pertinent labs from the last 24 hours have been reviewed.    Diagnostic Results:  I have reviewed and interpreted all pertinent imaging results/findings within the past 24 hours.     CXR 4/26/19: No acute cardiopulmonary process.

## 2019-04-27 NOTE — ASSESSMENT & PLAN NOTE
- pt of Dr. Hameed  - getting chemotherapy at [facility]  - abdominal cramps: bentyl prn  - abdominal pain: oxycodone 5mg q4h prn for pain control  - nausea: zofran and compazine prn   - constipation: senna-docusate and colace 40mg prn  - continue prophylaxis antibiotics: bactrim (M/W/F), valacylcovir

## 2019-04-27 NOTE — HOSPITAL COURSE
After admission Ms. Macias was given one unit of blood with a good response.  She has had no bleeding since admission and reports feeling much less lethargic since the unit.  She is safe for discharge and will follow up with MD Cortez to resume her chemotherapy.

## 2019-04-27 NOTE — DISCHARGE SUMMARY
[facility]  Hematology  Bone Marrow Transplant  Discharge Summary      Patient Name: Terese Macias  MRN: [id number]  Admission Date: 4/26/2019  Hospital Length of Stay: 0 days  Discharge Date and Time: 4/27/2019 12:20 PM  Attending Physician: No att. providers found   Discharging Provider: Johnathan Barajas MD  Primary Care Provider: Mildred Mohamud MD    HPI: Terese Macias is a 43 y.o. F with anemia, constipation, IBS, B-thalassemia minor and DLBCL (Dx Jan 2019) who was sent to the ER from clinic when routine blood work noted Hgb 6.7.      Pt is currently receiving chemotherapy with R-EPOCH at [facility] evry 21 days. Her last chemo was cycle 4 on 4/11/19, which was C4 of R-EPOCH with IT prophylaxis MTX with Neulasta. Per notes, she is expected to have staging PET/CT after 3 weeks (from 4/11/19)     During her last admission at [facility], she was changed from a nuva ring to a depot shot on 4/15/19 for contraception. However, pt reports getting her menstrual cycles few days later (4/18/19), accompanied by severe abdominal cramps and menorrhagia. She changed a large pad every few hours and her cycle was longer than normal. She was seen by Dr. Moctezuma (Ob/Gyn) on 4/25/19. She denies any vaginal bleeding at this time. She also denies any other bleeding events recently including epistaxis, hematuria, hemoptysis, melenic stools etc.      Symptomatically, she reports generalized fatigue, but denies any chest pain, SOBOE, palpitations, light headedness or dizziness. She has a chronic h/o abdominal pain for which she is prescribed dilaudid 4mg and oxycodone IR. She denies diarrhea and reports last BM was few days ago.     Pt s/p chemotherapy with R-EPOCH in hospital every 21 days. Pending 2 cycles and follow-up with imaging. Pt with nociceptive visceral secondary to tumoral burden.     Patient information was obtained from patient, past medical records and ER records    * No surgery found *     Hospital Course:  After admission Ms. Macias was given one unit of blood with a good response.  She has had no bleeding since admission and reports feeling much less lethargic since the unit.  She is safe for discharge and will follow up with MD Cortez to resume her chemotherapy.    Consults (From admission, onward)        Status Ordering Provider     Inpatient consult to Hematology/Oncology  Once     Provider:  (Not yet assigned)    Completed GREGG GALE        Physical exam  GEN: Middle aged AAF laying in bed in NAD, A&Ox4  CV: RRR w/o m/r/g  Pulm: CTAB w/o w/r/r  GI: S/NT/ND w/ + BS      Significant Diagnostic Studies: Labs:   CBC   Recent Labs   Lab 04/26/19  1508 04/26/19  1930 04/27/19  0433   WBC 11.41 12.54 13.59*   HGB 6.8* 6.7* 7.9*   HCT 21.7* 21.5* 24.1*   PLT 50* 61* 50*       Pending Diagnostic Studies:     None        Final Active Diagnoses:    Diagnosis Date Noted POA    PRINCIPAL PROBLEM:  Symptomatic anemia [D64.9] 04/26/2019 Yes    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    Specialties:  Oncology, Otolaryngology, Hematology and Oncology  Contact information:  [address] [address] [phone]                 Patient Instructions:   No discharge procedures on file.  Medications:  Reconciled Home Medications:      Medication List      CHANGE how you take these medications    fluticasone 50 mcg/actuation nasal spray  Commonly known as:  FLONASE  1 spray (50 mcg total) by Each Nare route once daily.  What changed:    · when to take this  · reasons to  take this        CONTINUE taking these medications    albuterol 90 mcg/actuation inhaler  Commonly known as:  PROVENTIL/VENTOLIN HFA  Inhale 2 puffs into the lungs every 6 (six) hours as needed for Wheezing. Rescue     cetirizine 10 MG tablet  Commonly known as:  ZYRTEC  Take 10 mg by mouth.     dicyclomine 10 MG capsule  Commonly known as:  BENTYL  Take 10 mg by mouth.     docusate sodium 50 MG capsule  Commonly known as:  COLACE  Take 1 capsule (50 mg total) by mouth nightly as needed for Constipation.     linaclotide 145 mcg Cap capsule  Commonly known as:  LINZESS  Take 1 capsule (145 mcg total) by mouth once daily.     medroxyPROGESTERone 150 mg/mL injection  Commonly known as:  DEPO-PROVERA  Inject 1 mL (150 mg total) into the muscle every 3 (three) months.     omeprazole 20 MG capsule  Commonly known as:  PRILOSEC  Take 1 capsule (20 mg total) by mouth once daily.     ondansetron 8 MG tablet  Commonly known as:  ZOFRAN  Take 8 mg by mouth.     oxyCODONE 5 MG immediate release tablet  Commonly known as:  ROXICODONE  Take 1 tablet (5 mg total) by mouth every 6 (six) hours as needed.     prochlorperazine 10 MG tablet  Commonly known as:  COMPAZINE  Take 10 mg by mouth.     senna-docusate 8.6-50 mg 8.6-50 mg per tablet  Commonly known as:  PERICOLACE  Take 2 tablets by mouth once daily.     sulfamethoxazole-trimethoprim 800-160mg 800-160 mg Tab  Commonly known as:  BACTRIM DS  Take 1 tablet by mouth.     traMADol 100 MG Tb24  Commonly known as:  ULTRAM-ER  Take 100 mg by mouth.     valACYclovir 500 MG tablet  Commonly known as:  VALTREX  Take 500 mg by mouth.            Johnathan Barajas MD  Bone Marrow Transplant  [facility]

## 2019-04-27 NOTE — H&P
[facility]  Hematology Bone Marrow Transplant  H&P    Subjective:     Principal Problem: Anemia    HPI:   Terese Macias is a 43 y.o. F with anemia, constipation, IBS, B-thalassemia minor and DLBCL (Dx Jan 2019) who was sent to the ER from clinic when routine blood work noted Hgb 6.7.     Pt is currently receiving chemotherapy with R-EPOCH at [facility] evry 21 days. Her last chemo was cycle 4 on 4/11/19, which was C4 of R-EPOCH with IT prophylaxis MTX with Neulasta. Per notes, she is expected to have staging PET/CT after 3 weeks (from 4/11/19)    During her last admission at [facility], she was changed from a nuva ring to a depot shot on 4/15/19 for contraception. However, pt reports getting her menstrual cycles few days later (4/18/19), accompanied by severe abdominal cramps and menorrhagia. She changed a large pad every few hours and her cycle was longer than normal. She was seen by Dr. Moctezuma (Ob/Gyn) on 4/25/19. She denies any vaginal bleeding at this time. She also denies any other bleeding events recently including epistaxis, hematuria, hemoptysis, melenic stools etc.     Symptomatically, she reports generalized fatigue, but denies any chest pain, SOBOE, palpitations, light headedness or dizziness. She has a chronic h/o abdominal pain for which she is prescribed dilaudid 4mg and oxycodone IR. She denies diarrhea and reports last BM was few days ago.     Pt s/p chemotherapy with R-EPOCH in hospital every 21 days. Pending 2 cycles and follow-up with imaging. Pt with nociceptive visceral secondary to tumoral burden.    Patient information was obtained from patient, past medical records and ER records.     Oncology History:   10/2018 - abdominal pain  11/2018 - EGD & colonoscopy: extrinsic compression of the stomach in the gastric fundus & 4mm polyp  11/14/18 - CT scan: splenic enlargement, hypodensity in the uncinate process of the pancreas.  12/12/18 - FNA bx of lymph node: abnormal lymphocyte  proliferation  12/14/18 - PET scan:   Index prevascular lymph nodes SUV max 10.06.  Index upper pole spleen lateral lesion SUV max 13.49.  Index gastric lesion SUV max 7.3.  Index pancreatic head lymph node SUV max 8.7.  Mass in the uncinate process of the pancreas. EUS guided needle bx was inconclusive  1/25/19 - Biopsy: Anterior mediastinal lymph node biopsy: DIFFUSE LARGE B-CELL LYMPHOMA, WITH A PROLIFERATION INDEX OF 80-90%. Neoplastic cells are positive for CD20, CD30 positive (30% of the tumor cells), BCL-2, BCL-6 (20-30%)  2/5/19 - Bone marrow biopsy: negative  2/7/19 - started chemotherapy at [facility] with  R-EPOCH  (every 21 days)   C1D1 2/7/19   C2D1 3/1/19 with IT chemotherapy MTX  3/20/19 - PET scan: CR L5 score = 2     C3D1 3/22/19 with IT chemotherapy MTX   C4D1 4/11/9 with IT chemotherapy MTX with Neulasta      Medications Prior to Admission   Medication Sig Dispense Refill Last Dose    cetirizine (ZYRTEC) 10 MG tablet Take 10 mg by mouth.   4/26/2019 at Unknown time    dicyclomine (BENTYL) 10 MG capsule Take 10 mg by mouth.   4/26/2019 at Unknown time    fluticasone (FLONASE) 50 mcg/actuation nasal spray 1 spray (50 mcg total) by Each Nare route once daily. (Patient taking differently: 1 spray by Each Nare route daily as needed. ) 1 Bottle 0 Past Month at Unknown time    ondansetron (ZOFRAN) 8 MG tablet Take 8 mg by mouth.   Past Month at Unknown time    oxyCODONE (ROXICODONE) 5 MG immediate release tablet Take 1 tablet (5 mg total) by mouth every 6 (six) hours as needed. 28 tablet 0 Past Month at Unknown time    prochlorperazine (COMPAZINE) 10 MG tablet Take 10 mg by mouth.   Past Month at Unknown time    senna-docusate 8.6-50 mg (PERICOLACE) 8.6-50 mg per tablet Take 2 tablets by mouth once daily.   Past Month at Unknown time    sulfamethoxazole-trimethoprim 800-160mg (BACTRIM DS) 800-160 mg Tab Take 1 tablet by mouth.   Past Week at Unknown time    traMADol (ULTRAM-ER) 100 MG Tb24 Take  100 mg by mouth.   Past Month at Unknown time    valACYclovir (VALTREX) 500 MG tablet Take 500 mg by mouth.   Past Month at Unknown time    albuterol 90 mcg/actuation inhaler Inhale 2 puffs into the lungs every 6 (six) hours as needed for Wheezing. Rescue 18 g 0 Taking    docusate sodium (COLACE) 50 MG capsule Take 1 capsule (50 mg total) by mouth nightly as needed for Constipation. 30 capsule 0 Unknown at Unknown time    linaclotide (LINZESS) 145 mcg Cap capsule Take 1 capsule (145 mcg total) by mouth once daily. 30 capsule 5 Taking    medroxyPROGESTERone (DEPO-PROVERA) 150 mg/mL injection Inject 1 mL (150 mg total) into the muscle every 3 (three) months. 1 mL 3     omeprazole (PRILOSEC) 20 MG capsule Take 1 capsule (20 mg total) by mouth once daily. 30 capsule 2 Taking       Pcn [penicillins]     Past Medical History:   Diagnosis Date    Abnormal Pap smear     Abnormal Pap smear of vagina     Anemia     Depression     Fever blister     GERD (gastroesophageal reflux disease)     Heavy periods 6/8/2015    Hemorrhoids without complication     IBS (irritable colon syndrome)     Joint pain     Left upper quadrant pain 12/19/2018    Pancreatic mass 12/19/2018    Psychiatric problem     Screening for HPV (human papillomavirus)     Sleep difficulties     Splenomegaly 12/19/2018    Thalassemia     Thalassemia     Therapy      Past Surgical History:   Procedure Laterality Date    COLONOSCOPY      COLONOSCOPY N/A 11/8/2018    Performed by Ba Stewart [facility] at [facility] (2ND FLR)    EGD (ESOPHAGOGASTRODUODENOSCOPY) N/A 11/8/2018    Performed by Ba Stewart [facility] at [facility] (2ND FLR)    ESOPHAGOGASTRODUODENOSCOPY (EGD) N/A 3/23/2015    Performed by Ba Stewart [facility] at [facility] (4TH FLR)    HERNIA REPAIR      [place] COLONOSCOPY,DIAGNOSTIC [64063 (CPT®)] N/A 7/2/2014    Performed by [facility]iter [facility] at [facility] (4[facility] F[facility])    ULTRASOUND-ENDOSCOPIC-UPPER N/A 12/12/2018    Performed by Venu QUEEN  MD Dinesh at [facility]     Family History     Problem Relation (Age of Onset)    Acne Sister    Breast cancer Cousin, Maternal Grandmother, Maternal Aunt    Cancer Cousin, Cousin, Maternal Grandmother, Maternal Aunt    Colon cancer Paternal Uncle (60)    Diabetes Father    Eczema Father, Other, Sister, Brother, Daughter    Heart disease Father    Hypertension Mother    No Known Problems Other, Maternal Uncle, Paternal Aunt, Maternal Grandfather, Paternal Grandmother, Paternal Grandfather    Stomach cancer Cousin (44)        Tobacco Use    Smoking status: Never Smoker    Smokeless tobacco: Never Used   Substance and Sexual Activity    Alcohol use: Yes     Comment: Occasionally    Drug use: No    Sexual activity: Never     Partners: Male     Birth control/protection: Injection       Review of Systems   Constitutional: Positive for fatigue. Negative for activity change, appetite change (decreased), fever and unexpected weight change.   HENT: Negative for trouble swallowing.    Respiratory: Negative for cough, shortness of breath and wheezing.    Cardiovascular: Negative for chest pain and palpitations.   Gastrointestinal: Positive for abdominal pain. Negative for blood in stool, constipation, diarrhea, nausea and vomiting.   Genitourinary: Negative for difficulty urinating and vaginal bleeding.   Musculoskeletal: Negative for back pain.   Skin: Positive for color change (verbalizes skin darkening s/p chemo initiation).   Allergic/Immunologic: Positive for immunocompromised state.   Neurological: Negative for weakness.   Psychiatric/Behavioral: Negative for confusion.     Objective:     Vital Signs (Most Recent):  Temp: 99.1 °F (37.3 °C) (04/26/19 2150)  Pulse: 102 (04/26/19 2150)  Resp: 19 (04/26/19 2150)  BP: 132/79 (04/26/19 2150)  SpO2: 100 % (04/26/19 2150) Vital Signs (24h Range):  Temp:  [98.4 °F (36.9 °C)-99.1 °F (37.3 °C)] 99.1 °F (37.3 °C)  Pulse:  [] 102  Resp:  [17-19] 19  SpO2:  [96 %-100 %] 100  %  BP: (107-132)/(68-79) 132/79     Weight: 76.3 kg (168 lb 3.4 oz)  Body mass index is 30.77 kg/m².  Body surface area is 1.83 meters squared.    Lines/Drains/Airways     Peripherally Inserted Central Catheter Line                 PICC Double Lumen -- days          Airway                 Airway - Non-Surgical 12/12/18 1431 Nasal Cannula 135 days          Peripheral Intravenous Line                 Peripheral IV - Single Lumen 01/08/19 2243 Left Antecubital 107 days                Physical Exam   Constitutional: She is oriented to person, place, and time. She appears well-developed and well-nourished.   HENT:   Head: Normocephalic and atraumatic.   Cardiovascular: Normal rate, regular rhythm, normal heart sounds and intact distal pulses.   No murmur heard.  Pulmonary/Chest: Effort normal and breath sounds normal. No respiratory distress.   Abdominal: Soft. Bowel sounds are normal. She exhibits distension.   Lymphadenopathy:     She has no cervical adenopathy.   Neurological: She is alert and oriented to person, place, and time.   Skin: Skin is warm and dry. Capillary refill takes less than 2 seconds.   Psychiatric: She has a normal mood and affect. Her behavior is normal. Judgment and thought content normal.   Vitals reviewed.      Significant Labs:   CBC:   Recent Labs   Lab 04/26/19  1508 04/26/19  1930   WBC 11.41 12.54   HGB 6.8* 6.7*   HCT 21.7* 21.5*   PLT 50* 61*   , CMP:   Recent Labs   Lab 04/26/19  1508 04/26/19  1930    140   K 3.5 3.4*    109   CO2 23 22*   GLU 90 98   BUN 4* 4*   CREATININE 0.7 0.7   CALCIUM 9.3 8.7   PROT 6.1 5.8*   ALBUMIN 3.3* 3.2*   BILITOT 0.4 0.3   ALKPHOS 64 68   AST 14 13   ALT 14 14   ANIONGAP 10 9   EGFRNONAA >60.0 >60.0    and All pertinent labs from the last 24 hours have been reviewed.    Diagnostic Results:  I have reviewed and interpreted all pertinent imaging results/findings within the past 24 hours.     CXR 4/26/19: No acute cardiopulmonary  process.    Assessment/Plan:     * Anemia  - likely due to chemotherapy and recent h/o menorrhagia during last period when she was changing pads every few hours.   - symptomatic of generalized fatigue  - 4/26/19: presented with Hgb 6.7 -> will receive 1 U RBC  - will continue to monitor; transfuse to keep Hgb > 7    Diffuse large B-cell lymphoma of spleen  - stage IVB (LNs, spleen, gastric lesion), IPI 3 on diagnosis   - pt of Dr. Hameed at [facility] & sees Dr. Contreras at [facility]  - getting chemotherapy at [facility]  - abdominal cramps: bentyl prn  - abdominal pain: oxy 5mg q4h prn and dilaudid 2mg q4h for moderate and severe pain, respectively  - nausea: zofran and compazine prn   - constipation: senna-docusate and colace 40mg prn  - continue prophylaxis antibiotics: bactrim (M/W/F), valacylcovir    Thrombocytopenia  - platelets 67K on admission  - no longer actively bleeding  - continue to monitor; transfuse to keep platelets > 10 K unless actively bleeding.    Hypokalemia  - unclear etiology: decreased oral intake vs. selective po intake (?). Pt reports she hasn't been able to tolerate citrus-based and lactose-based foods  - presented with K 3.1 -> will replace PO  - follow up in AM; goal > 3.5    GERD (gastroesophageal reflux disease)  - continue PPI daily    IBS, constipation   - continue linzess and senna prn    VTE Risk Mitigation (From admission, onward)        Ordered     Place sequential compression device  Until discontinued      04/26/19 2021     IP VTE HIGH RISK PATIENT  Once      04/26/19 2021          Disposition: Acute management of symptomatic anemia    Eveline Barriga MD  Bone Marrow Transplant  Hematology  [facility]

## 2019-04-27 NOTE — ED PROVIDER NOTES
Encounter Date: 4/26/2019       History     Chief Complaint   Patient presents with    Anemia     non-hodgkins lymphoma, on chemo     Terese Macias is a 43 year old lady who [place] [facility] [place] for abnormal hemoglobin discovered at routine lab visit.  She was at a routine lab follow up for her lymphona (diagnosed Jan 2019, currently on active chemo).  She has required around 4 RBC transfusion since beginning chemo regiment.  Symptoms include severe fatigue, shortness of breath, N/V (most recent vomiting this afternoon), and headache relieved w/ allergy medications.  Her only known source of recent bleed has been menstrual bleeds, she recently switched from nuva-ring to depo shot (4/17/2019) and reports heavy menstrual bleeding since then.  Denies melena, hematemesis, hematuria.  Patient consented for RBC transfusion in ED.          Review of patient's allergies indicates:   Allergen Reactions    Pcn [penicillins] Other (See Comments)     Pt had reaction to PCN as an infant.     Past Medical History:   Diagnosis Date    Abnormal Pap smear     Abnormal Pap smear of vagina     Anemia     Depression     Fever blister     GERD (gastroesophageal reflux disease)     Heavy periods 6/8/2015    Hemorrhoids without complication     IBS (irritable colon syndrome)     Joint pain     Left upper quadrant pain 12/19/2018    Pancreatic mass 12/19/2018    Psychiatric problem     Screening for HPV (human papillomavirus)     Sleep difficulties     Splenomegaly 12/19/2018    Thalassemia     Thalassemia     Therapy      Past Surgical History:   Procedure Laterality Date    COLONOSCOPY      COLONOSCOPY N/A 11/8/2018    Performed by [facility] (2ND FLR)    EGD (ESOPHAGOGASTRODUODENOSCOPY) N/A 11/8/2018    [facility] (2ND FLR)    ESOPHAGOGASTRODUODENOSCOPY (EGD) N/A 3/23/2015    [facility] by [facility] (4TH FLR)    HERNIA  REPAIR      SC COLONOSCOPY,DIAGNOSTIC [92132 (CPT®)] N/A 7/2/2014    Performed by [facility]iter [facility] (4TH FLR)    ULTRASOUND-ENDOSCOPIC-UPPER N/A 12/12/2018    [facility]     Family History   Problem Relation Age of Onset    Hypertension Mother     Diabetes Father     Heart disease Father     Eczema Father     Eczema Other     Colon cancer Paternal Uncle 60        colon cancer    Cancer Cousin         breast    Breast cancer Cousin     No Known Problems Other     Cancer Cousin         stomach cancer    Breast cancer Maternal Grandmother     Cancer Maternal Grandmother         breast    Eczema Sister     Acne Sister     Eczema Brother     Eczema Daughter     Cancer Maternal Aunt         breast    Breast cancer Maternal Aunt     No Known Problems Maternal Uncle     No Known Problems Paternal Aunt     No Known Problems Maternal Grandfather     No Known Problems Paternal Grandmother     No Known Problems Paternal Grandfather     Stomach cancer Cousin 44    Melanoma Neg Hx     Psoriasis Neg Hx     Lupus Neg Hx     Ovarian cancer Neg Hx     Amblyopia Neg Hx     Blindness Neg Hx     Cataracts Neg Hx     Glaucoma Neg Hx     Macular degeneration Neg Hx     Retinal detachment Neg Hx     Strabismus Neg Hx     Stroke Neg Hx     Thyroid disease Neg Hx     Esophageal cancer Neg Hx     Irritable bowel syndrome Neg Hx     Crohn's disease Neg Hx     Ulcerative colitis Neg Hx     Celiac disease Neg Hx      Social History     Tobacco Use    Smoking status: Never Smoker    Smokeless tobacco: Never Used   Substance Use Topics    Alcohol use: Yes     Comment: Occasionally    Drug use: No     Review of Systems   Constitutional: Positive for fatigue. Negative for chills, diaphoresis and fever.   HENT: Positive for sinus pain. Negative for congestion and sore throat.    Eyes: Negative for photophobia and visual disturbance.   Respiratory: Positive  for shortness of breath. Negative for cough and wheezing.    Cardiovascular: Negative for chest pain, palpitations and leg swelling.   Gastrointestinal: Positive for abdominal distention, nausea and vomiting. Negative for abdominal pain and blood in stool.   Genitourinary: Negative for dysuria, frequency and hematuria.   Musculoskeletal: Negative for arthralgias and myalgias.   Skin: Negative for rash.   Neurological: Positive for headaches. Negative for dizziness, weakness and light-headedness.   Psychiatric/Behavioral: Negative for agitation and confusion. The patient is not nervous/anxious.        Physical Exam     Initial Vitals [04/26/19 1706]   BP Pulse Resp Temp SpO2   118/68 102 17 98.4 °F (36.9 °C) 99 %      MAP       --         Physical Exam    Constitutional: She appears well-developed and well-nourished. She is not diaphoretic. No distress.   HENT:   Head: Normocephalic and atraumatic.   Right Ear: External ear normal.   Left Ear: External ear normal.   Eyes: Conjunctivae and EOM are normal. Right eye exhibits no discharge. Left eye exhibits no discharge. No scleral icterus.   Neck: Normal range of motion. Neck supple. No JVD present.   Cardiovascular: Regular rhythm and normal heart sounds. Tachycardia present.    No murmur heard.  Pulmonary/Chest: Breath sounds normal. No respiratory distress. She has no wheezes. She has no rhonchi. She has no rales.   Abdominal: Soft. Bowel sounds are normal. She exhibits distension. There is no tenderness.   Musculoskeletal: Normal range of motion. She exhibits no edema or tenderness.   Neurological: She is alert and oriented to person, place, and time.   Skin: Skin is warm and dry. No rash noted. No erythema. No pallor.   Psychiatric: She has a normal mood and affect. Thought content normal.         ED Course   Procedures  Labs Reviewed   CBC W/ AUTO DIFFERENTIAL - Abnormal; Notable for the following components:       Result Value    RBC 2.29 (*)     Hemoglobin 6.7  (*)     Hematocrit 21.5 (*)     MCHC 31.2 (*)     RDW 19.8 (*)     Platelets 61 (*)     nRBC 4 (*)     Gran% 79.0 (*)     Lymph% 13.0 (*)     Mono% 2.0 (*)     Platelet Estimate Decreased (*)     All other components within normal limits    Narrative:     ADD-ON PT-INR #[id number] PER YOHANA THOMPSON MD 20:36  04/26/2019    COMPREHENSIVE METABOLIC PANEL - Abnormal; Notable for the following components:    Potassium 3.4 (*)     CO2 22 (*)     BUN, Bld 4 (*)     Total Protein 5.8 (*)     Albumin 3.2 (*)     All other components within normal limits   URINALYSIS, REFLEX TO URINE CULTURE - Abnormal; Notable for the following components:    Appearance, UA Hazy (*)     Occult Blood UA 2+ (*)     All other components within normal limits    Narrative:     Preferred Collection Type->Urine, Clean Catch   URINALYSIS MICROSCOPIC - Abnormal; Notable for the following components:    RBC, UA 12 (*)     All other components within normal limits    Narrative:     Preferred Collection Type->Urine, Clean Catch   PROTIME-INR   PROTIME-INR    Narrative:     ADD-ON PT-INR #[id number] PER YOHANA THOMPSON MD 20:36  04/26/2019    POCT URINE PREGNANCY   TYPE & SCREEN   PREPARE RBC SOFT          Imaging Results          X-Ray Chest PA And Lateral (Final result)  Result time 04/26/19 20:09:05    Final result by Greg Ramirez MD (04/26/19 20:09:05)                 Impression:      No acute cardiopulmonary process.      Electronically signed by: Greg Ramirez MD  Date:    04/26/2019  Time:    20:09             Narrative:    EXAMINATION:  XR CHEST PA AND LATERAL    CLINICAL HISTORY:  Shortness of breath    TECHNIQUE:  PA and lateral views of the chest were performed.    COMPARISON:  None.    FINDINGS:  Right PICC line tip overlies the SVC.    There is no consolidation, effusion, or pneumothorax.    Cardiomediastinal silhouette is unremarkable.    Regional osseous structures are unremarkable.                                 Medical Decision  Making:   History:   Old Medical Records: I decided to obtain old medical records.  Old Records Summarized: records from previous admission(s) and records from clinic visits.       <> Summary of Records: 43F w/ recently diagnosed NHL (Jan 2019) and currently on active chemo.  Has required multiple RBC transfusions since beginning chemo.  Initial Assessment:   Differential includes anemia due to menstrual blood loss, GI bleed, chemotherapy, and chronic disease.  Initiating workup w/ basic labs, CXR, type and screen for blood transfusion.  Patient refuses rectal exam, she believes her anemia is more likely due to blood loss from recent transition to depo shot from [facility].  Discussed case w/ hematology/oncology, plan is to admit patient to their service.              Attending Attestation:   Physician Attestation Statement for Resident:  As the supervising MD   Physician Attestation Statement: I have personally seen and examined this patient.   I agree with the above history. -:   As the supervising MD I agree with the above PE.   -: Acute on chronic anemia.  Patient denied melanotic stools or grossly bloody stools and refused rectal exam.  She attributes her anemia to chemo as well as menses.  On exam she is nontoxic.  Will transfuse and place in obs.   As the supervising MD I agree with the above treatment, course, plan, and disposition.                       Clinical Impression:       ICD-10-CM ICD-9-CM   1. Anemia, unspecified type D64.9 285.9   2. SOB (shortness of breath) R06.02 786.05                                Calin Cardenas MD  Resident  04/26/19 2413

## 2019-04-27 NOTE — ASSESSMENT & PLAN NOTE
- unclear etiology: decreased oral intake vs. selective po intake (?). Pt reports she hasn't been able to tolerate citrus-based and lactose-based foods  - presented with K 3.1 -> will replace PO  - follow up in AM; goal > 3.5

## 2019-04-27 NOTE — CONSULTS
Pt will be admitted to observation for blood transfusion under BMT primary service.     Eveline Barriga MD  Internal Medicine, PGY-3  [phone]

## 2019-04-27 NOTE — PLAN OF CARE
Problem: Adult Inpatient Plan of Care  Goal: Plan of Care Review  Outcome: Ongoing (interventions implemented as appropriate)  POC reviewed with patient; understanding verbalized. Pt received 1 unit of blood, tolerated well, on this shift. Pt is on a regular diet with fair appetite. Pt with nonskid footwear on, bed in lowest position, and locked with bed rails up x2. Pt instructed to call prior to getting OOB. Pt has call light and person items within reach. Patient ambulates in room independently. VSS and afebrile this shift. All questions and concerns addressed at this time. Will continue to monitor. Pt's sister to remain at bedside.

## 2019-04-27 NOTE — HPI
Terese Macias is a 43 y.o. F with anemia, constipation, IBS, B-thalassemia minor and DLBCL (Dx Jan 2019) who was sent to the ER from clinic when routine blood work noted Hgb 6.7.      Pt is currently receiving chemotherapy with R-EPOCH at [facility] evry 21 days. Her last chemo was cycle 4 on 4/11/19, which was C4 of R-EPOCH with IT prophylaxis MTX with Neulasta. Per notes, she is expected to have staging PET/CT after 3 weeks (from 4/11/19)     During her last admission at [facility], she was changed from a nuva ring to a depot shot on 4/15/19 for contraception. However, pt reports getting her menstrual cycles few days later (4/18/19), accompanied by severe abdominal cramps and menorrhagia. She changed a large pad every few hours and her cycle was longer than normal. She was seen by Dr. Moctezuma (Ob/Gyn) on 4/25/19. She denies any vaginal bleeding at this time. She also denies any other bleeding events recently including epistaxis, hematuria, hemoptysis, melenic stools etc.      Symptomatically, she reports generalized fatigue, but denies any chest pain, SOBOE, palpitations, light headedness or dizziness. She has a chronic h/o abdominal pain for which she is prescribed dilaudid 4mg and oxycodone IR. She denies diarrhea and reports last BM was few days ago.     Pt s/p chemotherapy with R-EPOCH in hospital every 21 days. Pending 2 cycles and follow-up with imaging. Pt with nociceptive visceral secondary to tumoral burden.     Patient information was obtained from patient, past medical records and ER records

## 2019-04-27 NOTE — ASSESSMENT & PLAN NOTE
- h/o menorrhagia during last period when she was changing pads every few hours.   - symptomatic of generalized fatigue  - 4/26/19: presented with Hgb 6.7 -> will receive 1 U RBC  - will continue to monitor; transfuse to keep Hgb > 7

## 2019-04-27 NOTE — ED NOTES
at bedside discussing risk and benefits of blood transfusion to pt. Pt verbalized understanding. Pt consent for transfusion signed and dated by MD and pt. Awaiting Type & Screen result. Will continue to monitor.

## 2019-04-30 ENCOUNTER — INFUSION (OUTPATIENT)
Dept: INFUSION THERAPY | Facility: HOSPITAL | Age: 44
End: 2019-04-30
Attending: INTERNAL MEDICINE
Payer: COMMERCIAL

## 2019-04-30 DIAGNOSIS — C83.37 DIFFUSE LARGE B-CELL LYMPHOMA OF SPLEEN: Primary | ICD-10-CM

## 2019-04-30 LAB
ALBUMIN SERPL BCP-MCNC: 3.6 G/DL (ref 3.5–5.2)
ALP SERPL-CCNC: 80 U/L (ref 55–135)
ALT SERPL W/O P-5'-P-CCNC: 13 U/L (ref 10–44)
ANION GAP SERPL CALC-SCNC: 9 MMOL/L (ref 8–16)
AST SERPL-CCNC: 14 U/L (ref 10–40)
BILIRUB SERPL-MCNC: 0.5 MG/DL (ref 0.1–1)
BUN SERPL-MCNC: 6 MG/DL (ref 6–20)
CALCIUM SERPL-MCNC: 9.7 MG/DL (ref 8.7–10.5)
CHLORIDE SERPL-SCNC: 105 MMOL/L (ref 95–110)
CO2 SERPL-SCNC: 26 MMOL/L (ref 23–29)
CREAT SERPL-MCNC: 0.8 MG/DL (ref 0.5–1.4)
ERYTHROCYTE [DISTWIDTH] IN BLOOD BY AUTOMATED COUNT: 20.4 % (ref 11.5–14.5)
EST. GFR  (AFRICAN AMERICAN): >60 ML/MIN/1.73 M^2
EST. GFR  (NON AFRICAN AMERICAN): >60 ML/MIN/1.73 M^2
GLUCOSE SERPL-MCNC: 86 MG/DL (ref 70–110)
HCT VFR BLD AUTO: 27.2 % (ref 37–48.5)
HGB BLD-MCNC: 8.9 G/DL (ref 12–16)
IMM GRANULOCYTES # BLD AUTO: 0.74 K/UL (ref 0–0.04)
MCH RBC QN AUTO: 29.9 PG (ref 27–31)
MCHC RBC AUTO-ENTMCNC: 32.7 G/DL (ref 32–36)
MCV RBC AUTO: 91 FL (ref 82–98)
NEUTROPHILS # BLD AUTO: 13.2 K/UL (ref 1.8–7.7)
PLATELET # BLD AUTO: 162 K/UL (ref 150–350)
PMV BLD AUTO: 11.5 FL (ref 9.2–12.9)
POTASSIUM SERPL-SCNC: 3.9 MMOL/L (ref 3.5–5.1)
PROT SERPL-MCNC: 6.7 G/DL (ref 6–8.4)
RBC # BLD AUTO: 2.98 M/UL (ref 4–5.4)
SODIUM SERPL-SCNC: 140 MMOL/L (ref 136–145)
WBC # BLD AUTO: 16.55 K/UL (ref 3.9–12.7)

## 2019-04-30 PROCEDURE — 80053 COMPREHEN METABOLIC PANEL: CPT

## 2019-04-30 PROCEDURE — 36592 COLLECT BLOOD FROM PICC: CPT

## 2019-04-30 PROCEDURE — A4216 STERILE WATER/SALINE, 10 ML: HCPCS | Performed by: INTERNAL MEDICINE

## 2019-04-30 PROCEDURE — 85027 COMPLETE CBC AUTOMATED: CPT

## 2019-04-30 PROCEDURE — 63600175 PHARM REV CODE 636 W HCPCS: Performed by: INTERNAL MEDICINE

## 2019-04-30 PROCEDURE — 25000003 PHARM REV CODE 250: Performed by: INTERNAL MEDICINE

## 2019-04-30 RX ORDER — HEPARIN 100 UNIT/ML
500 SYRINGE INTRAVENOUS
Status: CANCELLED | OUTPATIENT
Start: 2019-04-30

## 2019-04-30 RX ORDER — SODIUM CHLORIDE 0.9 % (FLUSH) 0.9 %
10 SYRINGE (ML) INJECTION
Status: CANCELLED | OUTPATIENT
Start: 2019-04-30

## 2019-04-30 RX ORDER — HEPARIN 100 UNIT/ML
500 SYRINGE INTRAVENOUS
Status: COMPLETED | OUTPATIENT
Start: 2019-04-30 | End: 2019-04-30

## 2019-04-30 RX ORDER — SODIUM CHLORIDE 0.9 % (FLUSH) 0.9 %
10 SYRINGE (ML) INJECTION
Status: COMPLETED | OUTPATIENT
Start: 2019-04-30 | End: 2019-04-30

## 2019-04-30 RX ADMIN — HEPARIN 500 UNITS: 100 SYRINGE at 03:04

## 2019-04-30 RX ADMIN — Medication 10 ML: at 03:04

## 2019-04-30 NOTE — NURSING
Patient here for blood draw and dressing change on right arm PICC line-good blood return from both lines-blood to lab-lines flushed-old dressing removed-no redness at insertion site-area cleaned and new dressing applied over site-patient tolerated well.

## 2019-05-03 LAB
HPV HR 12 DNA CVX QL NAA+PROBE: NEGATIVE
HPV16 AG SPEC QL: NEGATIVE
HPV18 DNA SPEC QL NAA+PROBE: NEGATIVE

## 2019-05-10 ENCOUNTER — INFUSION (OUTPATIENT)
Dept: INFUSION THERAPY | Facility: HOSPITAL | Age: 44
End: 2019-05-10
Attending: INTERNAL MEDICINE
Payer: COMMERCIAL

## 2019-05-10 DIAGNOSIS — C83.37 DIFFUSE LARGE B-CELL LYMPHOMA OF SPLEEN: Primary | ICD-10-CM

## 2019-05-10 LAB
ALBUMIN SERPL BCP-MCNC: 3.6 G/DL (ref 3.5–5.2)
ALP SERPL-CCNC: 83 U/L (ref 55–135)
ALT SERPL W/O P-5'-P-CCNC: 13 U/L (ref 10–44)
ANION GAP SERPL CALC-SCNC: 8 MMOL/L (ref 8–16)
AST SERPL-CCNC: 9 U/L (ref 10–40)
BILIRUB SERPL-MCNC: 0.5 MG/DL (ref 0.1–1)
BUN SERPL-MCNC: 15 MG/DL (ref 6–20)
CALCIUM SERPL-MCNC: 9.3 MG/DL (ref 8.7–10.5)
CHLORIDE SERPL-SCNC: 106 MMOL/L (ref 95–110)
CO2 SERPL-SCNC: 26 MMOL/L (ref 23–29)
CREAT SERPL-MCNC: 0.7 MG/DL (ref 0.5–1.4)
ERYTHROCYTE [DISTWIDTH] IN BLOOD BY AUTOMATED COUNT: 18.2 % (ref 11.5–14.5)
EST. GFR  (AFRICAN AMERICAN): >60 ML/MIN/1.73 M^2
EST. GFR  (NON AFRICAN AMERICAN): >60 ML/MIN/1.73 M^2
GLUCOSE SERPL-MCNC: 107 MG/DL (ref 70–110)
HCT VFR BLD AUTO: 29 % (ref 37–48.5)
HGB BLD-MCNC: 9.3 G/DL (ref 12–16)
IMM GRANULOCYTES # BLD AUTO: 3.64 K/UL (ref 0–0.04)
MCH RBC QN AUTO: 29.7 PG (ref 27–31)
MCHC RBC AUTO-ENTMCNC: 32.1 G/DL (ref 32–36)
MCV RBC AUTO: 93 FL (ref 82–98)
NEUTROPHILS # BLD AUTO: 22.3 K/UL (ref 1.8–7.7)
PLATELET # BLD AUTO: 122 K/UL (ref 150–350)
PMV BLD AUTO: 10.7 FL (ref 9.2–12.9)
POTASSIUM SERPL-SCNC: 3.5 MMOL/L (ref 3.5–5.1)
PROT SERPL-MCNC: 6.5 G/DL (ref 6–8.4)
RBC # BLD AUTO: 3.13 M/UL (ref 4–5.4)
SODIUM SERPL-SCNC: 140 MMOL/L (ref 136–145)
WBC # BLD AUTO: 26.75 K/UL (ref 3.9–12.7)

## 2019-05-10 PROCEDURE — 80053 COMPREHEN METABOLIC PANEL: CPT

## 2019-05-10 PROCEDURE — 63600175 PHARM REV CODE 636 W HCPCS: Performed by: INTERNAL MEDICINE

## 2019-05-10 PROCEDURE — 36592 COLLECT BLOOD FROM PICC: CPT

## 2019-05-10 PROCEDURE — 85027 COMPLETE CBC AUTOMATED: CPT

## 2019-05-10 PROCEDURE — A4216 STERILE WATER/SALINE, 10 ML: HCPCS | Performed by: INTERNAL MEDICINE

## 2019-05-10 PROCEDURE — 25000003 PHARM REV CODE 250: Performed by: INTERNAL MEDICINE

## 2019-05-10 RX ORDER — SODIUM CHLORIDE 0.9 % (FLUSH) 0.9 %
10 SYRINGE (ML) INJECTION
Status: COMPLETED | OUTPATIENT
Start: 2019-05-10 | End: 2019-05-10

## 2019-05-10 RX ORDER — DICYCLOMINE HYDROCHLORIDE 10 MG/1
10 CAPSULE ORAL
COMMUNITY
Start: 2019-03-26 | End: 2019-09-17

## 2019-05-10 RX ORDER — BUTALBITAL, ACETAMINOPHEN AND CAFFEINE 50; 325; 40 MG/1; MG/1; MG/1
1 TABLET ORAL
COMMUNITY
Start: 2019-05-07 | End: 2020-03-31 | Stop reason: CLARIF

## 2019-05-10 RX ORDER — SIMETHICONE 80 MG
80 TABLET,CHEWABLE ORAL
COMMUNITY
Start: 2019-05-07 | End: 2019-09-17

## 2019-05-10 RX ORDER — PANTOPRAZOLE SODIUM 40 MG/1
40 TABLET, DELAYED RELEASE ORAL
COMMUNITY
Start: 2019-05-07 | End: 2019-09-17

## 2019-05-10 RX ORDER — HYDROMORPHONE HYDROCHLORIDE 2 MG/1
TABLET ORAL
COMMUNITY
Start: 2019-04-16 | End: 2019-09-17

## 2019-05-10 RX ORDER — SODIUM CHLORIDE 0.9 % (FLUSH) 0.9 %
10 SYRINGE (ML) INJECTION
Status: CANCELLED | OUTPATIENT
Start: 2019-05-10

## 2019-05-10 RX ORDER — HEPARIN 100 UNIT/ML
500 SYRINGE INTRAVENOUS
Status: COMPLETED | OUTPATIENT
Start: 2019-05-10 | End: 2019-05-10

## 2019-05-10 RX ORDER — SULFAMETHOXAZOLE AND TRIMETHOPRIM 800; 160 MG/1; MG/1
1 TABLET ORAL
COMMUNITY
Start: 2019-03-27 | End: 2019-09-17

## 2019-05-10 RX ORDER — HEPARIN 100 UNIT/ML
500 SYRINGE INTRAVENOUS
Status: CANCELLED | OUTPATIENT
Start: 2019-05-10

## 2019-05-10 RX ADMIN — HEPARIN 500 UNITS: 100 SYRINGE at 03:05

## 2019-05-10 RX ADMIN — Medication 10 ML: at 03:05

## 2019-05-10 NOTE — NURSING
Pt arrived for labs from PICC.  Red lumen with good blood return, labs sent.  Pt discharged to home.

## 2019-05-14 ENCOUNTER — TELEPHONE (OUTPATIENT)
Dept: HEMATOLOGY/ONCOLOGY | Facility: CLINIC | Age: 44
End: 2019-05-14

## 2019-05-14 ENCOUNTER — INFUSION (OUTPATIENT)
Dept: INFUSION THERAPY | Facility: HOSPITAL | Age: 44
End: 2019-05-14
Attending: INTERNAL MEDICINE
Payer: COMMERCIAL

## 2019-05-14 DIAGNOSIS — C83.37 DIFFUSE LARGE B-CELL LYMPHOMA OF SPLEEN: Primary | ICD-10-CM

## 2019-05-14 LAB
ALBUMIN SERPL BCP-MCNC: 3.6 G/DL (ref 3.5–5.2)
ALP SERPL-CCNC: 74 U/L (ref 55–135)
ALT SERPL W/O P-5'-P-CCNC: 13 U/L (ref 10–44)
ANION GAP SERPL CALC-SCNC: 8 MMOL/L (ref 8–16)
AST SERPL-CCNC: 12 U/L (ref 10–40)
BILIRUB SERPL-MCNC: 0.2 MG/DL (ref 0.1–1)
BUN SERPL-MCNC: 7 MG/DL (ref 6–20)
CALCIUM SERPL-MCNC: 9.4 MG/DL (ref 8.7–10.5)
CHLORIDE SERPL-SCNC: 107 MMOL/L (ref 95–110)
CO2 SERPL-SCNC: 24 MMOL/L (ref 23–29)
CREAT SERPL-MCNC: 0.7 MG/DL (ref 0.5–1.4)
ERYTHROCYTE [DISTWIDTH] IN BLOOD BY AUTOMATED COUNT: 17.2 % (ref 11.5–14.5)
EST. GFR  (AFRICAN AMERICAN): >60 ML/MIN/1.73 M^2
EST. GFR  (NON AFRICAN AMERICAN): >60 ML/MIN/1.73 M^2
GLUCOSE SERPL-MCNC: 84 MG/DL (ref 70–110)
HCT VFR BLD AUTO: 25 % (ref 37–48.5)
HGB BLD-MCNC: 8.2 G/DL (ref 12–16)
IMM GRANULOCYTES # BLD AUTO: 0.1 K/UL (ref 0–0.04)
MCH RBC QN AUTO: 29.9 PG (ref 27–31)
MCHC RBC AUTO-ENTMCNC: 32.8 G/DL (ref 32–36)
MCV RBC AUTO: 91 FL (ref 82–98)
NEUTROPHILS # BLD AUTO: 0.2 K/UL (ref 1.8–7.7)
PLATELET # BLD AUTO: 30 K/UL (ref 150–350)
PMV BLD AUTO: ABNORMAL FL (ref 9.2–12.9)
POTASSIUM SERPL-SCNC: 3.8 MMOL/L (ref 3.5–5.1)
PROT SERPL-MCNC: 6.6 G/DL (ref 6–8.4)
RBC # BLD AUTO: 2.74 M/UL (ref 4–5.4)
SODIUM SERPL-SCNC: 139 MMOL/L (ref 136–145)
WBC # BLD AUTO: 1.58 K/UL (ref 3.9–12.7)

## 2019-05-14 PROCEDURE — A4216 STERILE WATER/SALINE, 10 ML: HCPCS | Performed by: INTERNAL MEDICINE

## 2019-05-14 PROCEDURE — 85027 COMPLETE CBC AUTOMATED: CPT

## 2019-05-14 PROCEDURE — 63600175 PHARM REV CODE 636 W HCPCS: Performed by: INTERNAL MEDICINE

## 2019-05-14 PROCEDURE — 25000003 PHARM REV CODE 250: Performed by: INTERNAL MEDICINE

## 2019-05-14 PROCEDURE — 36592 COLLECT BLOOD FROM PICC: CPT

## 2019-05-14 PROCEDURE — 80053 COMPREHEN METABOLIC PANEL: CPT

## 2019-05-14 RX ORDER — HEPARIN 100 UNIT/ML
500 SYRINGE INTRAVENOUS
Status: CANCELLED | OUTPATIENT
Start: 2019-05-14

## 2019-05-14 RX ORDER — HEPARIN 100 UNIT/ML
500 SYRINGE INTRAVENOUS
Status: COMPLETED | OUTPATIENT
Start: 2019-05-14 | End: 2019-05-14

## 2019-05-14 RX ORDER — SODIUM CHLORIDE 0.9 % (FLUSH) 0.9 %
10 SYRINGE (ML) INJECTION
Status: COMPLETED | OUTPATIENT
Start: 2019-05-14 | End: 2019-05-14

## 2019-05-14 RX ORDER — SODIUM CHLORIDE 0.9 % (FLUSH) 0.9 %
10 SYRINGE (ML) INJECTION
Status: CANCELLED | OUTPATIENT
Start: 2019-05-14

## 2019-05-14 RX ADMIN — HEPARIN 500 UNITS: 100 SYRINGE at 03:05

## 2019-05-14 RX ADMIN — Medication 10 ML: at 03:05

## 2019-05-14 NOTE — NURSING
Patient here for lab draw and dressing change on right arm PICC line-both lines with good blood return-innner line blood drawn and sent to lab-old dressing removed-no redness at site - area cleaned and new dressing applied over site-patient tolerated well.

## 2019-05-17 ENCOUNTER — INFUSION (OUTPATIENT)
Dept: INFUSION THERAPY | Facility: HOSPITAL | Age: 44
End: 2019-05-17
Attending: INTERNAL MEDICINE
Payer: COMMERCIAL

## 2019-05-17 VITALS — HEIGHT: 62 IN | WEIGHT: 168 LBS | BODY MASS INDEX: 30.91 KG/M2

## 2019-05-17 DIAGNOSIS — C83.37 DIFFUSE LARGE B-CELL LYMPHOMA OF SPLEEN: Primary | ICD-10-CM

## 2019-05-17 LAB
ALBUMIN SERPL BCP-MCNC: 4 G/DL (ref 3.5–5.2)
ALP SERPL-CCNC: 81 U/L (ref 55–135)
ALT SERPL W/O P-5'-P-CCNC: 13 U/L (ref 10–44)
ANION GAP SERPL CALC-SCNC: 8 MMOL/L (ref 8–16)
AST SERPL-CCNC: 19 U/L (ref 10–40)
BILIRUB SERPL-MCNC: 0.3 MG/DL (ref 0.1–1)
BUN SERPL-MCNC: 5 MG/DL (ref 6–20)
CALCIUM SERPL-MCNC: 10 MG/DL (ref 8.7–10.5)
CHLORIDE SERPL-SCNC: 107 MMOL/L (ref 95–110)
CO2 SERPL-SCNC: 25 MMOL/L (ref 23–29)
CREAT SERPL-MCNC: 0.7 MG/DL (ref 0.5–1.4)
ERYTHROCYTE [DISTWIDTH] IN BLOOD BY AUTOMATED COUNT: 18.4 % (ref 11.5–14.5)
EST. GFR  (AFRICAN AMERICAN): >60 ML/MIN/1.73 M^2
EST. GFR  (NON AFRICAN AMERICAN): >60 ML/MIN/1.73 M^2
GLUCOSE SERPL-MCNC: 84 MG/DL (ref 70–110)
HCT VFR BLD AUTO: 27.3 % (ref 37–48.5)
HGB BLD-MCNC: 8.7 G/DL (ref 12–16)
IMM GRANULOCYTES # BLD AUTO: 0.62 K/UL (ref 0–0.04)
MCH RBC QN AUTO: 30 PG (ref 27–31)
MCHC RBC AUTO-ENTMCNC: 31.9 G/DL (ref 32–36)
MCV RBC AUTO: 94 FL (ref 82–98)
NEUTROPHILS # BLD AUTO: 2.8 K/UL (ref 1.8–7.7)
PLATELET # BLD AUTO: 49 K/UL (ref 150–350)
PMV BLD AUTO: 12.6 FL (ref 9.2–12.9)
POTASSIUM SERPL-SCNC: 3.6 MMOL/L (ref 3.5–5.1)
PROT SERPL-MCNC: 7 G/DL (ref 6–8.4)
RBC # BLD AUTO: 2.9 M/UL (ref 4–5.4)
SODIUM SERPL-SCNC: 140 MMOL/L (ref 136–145)
WBC # BLD AUTO: 5.6 K/UL (ref 3.9–12.7)

## 2019-05-17 PROCEDURE — 80053 COMPREHEN METABOLIC PANEL: CPT

## 2019-05-17 PROCEDURE — 85027 COMPLETE CBC AUTOMATED: CPT

## 2019-05-17 PROCEDURE — 36592 COLLECT BLOOD FROM PICC: CPT

## 2019-05-17 PROCEDURE — A4216 STERILE WATER/SALINE, 10 ML: HCPCS | Performed by: INTERNAL MEDICINE

## 2019-05-17 PROCEDURE — 25000003 PHARM REV CODE 250: Performed by: INTERNAL MEDICINE

## 2019-05-17 PROCEDURE — 63600175 PHARM REV CODE 636 W HCPCS: Performed by: INTERNAL MEDICINE

## 2019-05-17 RX ORDER — SODIUM CHLORIDE 0.9 % (FLUSH) 0.9 %
10 SYRINGE (ML) INJECTION
Status: CANCELLED | OUTPATIENT
Start: 2019-05-17

## 2019-05-17 RX ORDER — SODIUM CHLORIDE 0.9 % (FLUSH) 0.9 %
10 SYRINGE (ML) INJECTION
Status: COMPLETED | OUTPATIENT
Start: 2019-05-17 | End: 2019-05-17

## 2019-05-17 RX ORDER — HEPARIN 100 UNIT/ML
500 SYRINGE INTRAVENOUS
Status: CANCELLED | OUTPATIENT
Start: 2019-05-17

## 2019-05-17 RX ORDER — HEPARIN 100 UNIT/ML
500 SYRINGE INTRAVENOUS
Status: COMPLETED | OUTPATIENT
Start: 2019-05-17 | End: 2019-05-17

## 2019-05-17 RX ADMIN — Medication 10 ML: at 01:05

## 2019-05-17 RX ADMIN — HEPARIN 500 UNITS: 100 SYRINGE at 01:05

## 2019-05-17 NOTE — NURSING
1335 pt here for lab draw from picc, specimen drawn from red port without issue and specimen sent to lab, picc line flushed and hep locked, no distress noted upon d/c to home

## 2019-05-21 ENCOUNTER — INFUSION (OUTPATIENT)
Dept: INFUSION THERAPY | Facility: HOSPITAL | Age: 44
End: 2019-05-21
Attending: INTERNAL MEDICINE
Payer: COMMERCIAL

## 2019-05-21 DIAGNOSIS — C83.37 DIFFUSE LARGE B-CELL LYMPHOMA OF SPLEEN: Primary | ICD-10-CM

## 2019-05-21 LAB
ALBUMIN SERPL BCP-MCNC: 4.2 G/DL (ref 3.5–5.2)
ALP SERPL-CCNC: 74 U/L (ref 55–135)
ALT SERPL W/O P-5'-P-CCNC: 19 U/L (ref 10–44)
ANION GAP SERPL CALC-SCNC: 8 MMOL/L (ref 8–16)
AST SERPL-CCNC: 21 U/L (ref 10–40)
BILIRUB SERPL-MCNC: 0.3 MG/DL (ref 0.1–1)
BUN SERPL-MCNC: 9 MG/DL (ref 6–20)
CALCIUM SERPL-MCNC: 10.2 MG/DL (ref 8.7–10.5)
CHLORIDE SERPL-SCNC: 106 MMOL/L (ref 95–110)
CO2 SERPL-SCNC: 25 MMOL/L (ref 23–29)
CREAT SERPL-MCNC: 0.9 MG/DL (ref 0.5–1.4)
ERYTHROCYTE [DISTWIDTH] IN BLOOD BY AUTOMATED COUNT: 19.9 % (ref 11.5–14.5)
EST. GFR  (AFRICAN AMERICAN): >60 ML/MIN/1.73 M^2
EST. GFR  (NON AFRICAN AMERICAN): >60 ML/MIN/1.73 M^2
GLUCOSE SERPL-MCNC: 99 MG/DL (ref 70–110)
HCT VFR BLD AUTO: 28.6 % (ref 37–48.5)
HGB BLD-MCNC: 9.2 G/DL (ref 12–16)
IMM GRANULOCYTES # BLD AUTO: 0.2 K/UL (ref 0–0.04)
MCH RBC QN AUTO: 30.6 PG (ref 27–31)
MCHC RBC AUTO-ENTMCNC: 32.2 G/DL (ref 32–36)
MCV RBC AUTO: 95 FL (ref 82–98)
NEUTROPHILS # BLD AUTO: 5.7 K/UL (ref 1.8–7.7)
PLATELET # BLD AUTO: 141 K/UL (ref 150–350)
PMV BLD AUTO: 11.1 FL (ref 9.2–12.9)
POTASSIUM SERPL-SCNC: 3.7 MMOL/L (ref 3.5–5.1)
PROT SERPL-MCNC: 7.4 G/DL (ref 6–8.4)
RBC # BLD AUTO: 3.01 M/UL (ref 4–5.4)
SODIUM SERPL-SCNC: 139 MMOL/L (ref 136–145)
WBC # BLD AUTO: 7.55 K/UL (ref 3.9–12.7)

## 2019-05-21 PROCEDURE — 63600175 PHARM REV CODE 636 W HCPCS: Performed by: INTERNAL MEDICINE

## 2019-05-21 PROCEDURE — A4216 STERILE WATER/SALINE, 10 ML: HCPCS | Performed by: INTERNAL MEDICINE

## 2019-05-21 PROCEDURE — 85027 COMPLETE CBC AUTOMATED: CPT

## 2019-05-21 PROCEDURE — 80053 COMPREHEN METABOLIC PANEL: CPT

## 2019-05-21 PROCEDURE — 25000003 PHARM REV CODE 250: Performed by: INTERNAL MEDICINE

## 2019-05-21 PROCEDURE — 36592 COLLECT BLOOD FROM PICC: CPT

## 2019-05-21 RX ORDER — SODIUM CHLORIDE 0.9 % (FLUSH) 0.9 %
10 SYRINGE (ML) INJECTION
Status: DISCONTINUED | OUTPATIENT
Start: 2019-05-21 | End: 2019-05-21 | Stop reason: HOSPADM

## 2019-05-21 RX ORDER — HEPARIN 100 UNIT/ML
500 SYRINGE INTRAVENOUS
Status: CANCELLED | OUTPATIENT
Start: 2019-05-21

## 2019-05-21 RX ORDER — SODIUM CHLORIDE 0.9 % (FLUSH) 0.9 %
10 SYRINGE (ML) INJECTION
Status: COMPLETED | OUTPATIENT
Start: 2019-05-21 | End: 2019-05-21

## 2019-05-21 RX ORDER — HEPARIN 100 UNIT/ML
500 SYRINGE INTRAVENOUS
Status: DISCONTINUED | OUTPATIENT
Start: 2019-05-21 | End: 2019-05-21 | Stop reason: HOSPADM

## 2019-05-21 RX ORDER — HEPARIN 100 UNIT/ML
500 SYRINGE INTRAVENOUS
Status: COMPLETED | OUTPATIENT
Start: 2019-05-21 | End: 2019-05-21

## 2019-05-21 RX ORDER — SODIUM CHLORIDE 0.9 % (FLUSH) 0.9 %
10 SYRINGE (ML) INJECTION
Status: CANCELLED | OUTPATIENT
Start: 2019-05-21

## 2019-05-21 RX ADMIN — HEPARIN 500 UNITS: 100 SYRINGE at 02:05

## 2019-05-21 RX ADMIN — SODIUM CHLORIDE, PRESERVATIVE FREE 10 ML: 5 INJECTION INTRAVENOUS at 02:05

## 2019-05-21 NOTE — NURSING
1407  Pt here for lab draw, PICC dressing change, no new complaints or concerns at present; labs drawn per order, PICC dressing changed per protocol, pt tolerated well; pt instructed to contact MD for any needs or concerns; pt declined printed AVS, verbalized understanding of all discussed and when to report next

## 2019-05-24 ENCOUNTER — INFUSION (OUTPATIENT)
Dept: INFUSION THERAPY | Facility: HOSPITAL | Age: 44
End: 2019-05-24
Attending: INTERNAL MEDICINE
Payer: COMMERCIAL

## 2019-05-24 VITALS — BODY MASS INDEX: 30.91 KG/M2 | WEIGHT: 168 LBS | HEIGHT: 62 IN

## 2019-05-24 DIAGNOSIS — C83.37 DIFFUSE LARGE B-CELL LYMPHOMA OF SPLEEN: Primary | ICD-10-CM

## 2019-05-24 LAB
ALBUMIN SERPL BCP-MCNC: 4.2 G/DL (ref 3.5–5.2)
ALP SERPL-CCNC: 77 U/L (ref 55–135)
ALT SERPL W/O P-5'-P-CCNC: 18 U/L (ref 10–44)
ANION GAP SERPL CALC-SCNC: 7 MMOL/L (ref 8–16)
AST SERPL-CCNC: 21 U/L (ref 10–40)
BILIRUB SERPL-MCNC: 0.3 MG/DL (ref 0.1–1)
BUN SERPL-MCNC: 9 MG/DL (ref 6–20)
CALCIUM SERPL-MCNC: 10.7 MG/DL (ref 8.7–10.5)
CHLORIDE SERPL-SCNC: 105 MMOL/L (ref 95–110)
CO2 SERPL-SCNC: 25 MMOL/L (ref 23–29)
CREAT SERPL-MCNC: 0.8 MG/DL (ref 0.5–1.4)
ERYTHROCYTE [DISTWIDTH] IN BLOOD BY AUTOMATED COUNT: 19.2 % (ref 11.5–14.5)
EST. GFR  (AFRICAN AMERICAN): >60 ML/MIN/1.73 M^2
EST. GFR  (NON AFRICAN AMERICAN): >60 ML/MIN/1.73 M^2
GLUCOSE SERPL-MCNC: 97 MG/DL (ref 70–110)
HCT VFR BLD AUTO: 30.9 % (ref 37–48.5)
HGB BLD-MCNC: 9.9 G/DL (ref 12–16)
IMM GRANULOCYTES # BLD AUTO: 0.09 K/UL (ref 0–0.04)
MCH RBC QN AUTO: 30.2 PG (ref 27–31)
MCHC RBC AUTO-ENTMCNC: 32 G/DL (ref 32–36)
MCV RBC AUTO: 94 FL (ref 82–98)
NEUTROPHILS # BLD AUTO: 5.6 K/UL (ref 1.8–7.7)
PLATELET # BLD AUTO: 253 K/UL (ref 150–350)
PMV BLD AUTO: 10.8 FL (ref 9.2–12.9)
POTASSIUM SERPL-SCNC: 3.7 MMOL/L (ref 3.5–5.1)
PROT SERPL-MCNC: 7.6 G/DL (ref 6–8.4)
RBC # BLD AUTO: 3.28 M/UL (ref 4–5.4)
SODIUM SERPL-SCNC: 137 MMOL/L (ref 136–145)
WBC # BLD AUTO: 7.53 K/UL (ref 3.9–12.7)

## 2019-05-24 PROCEDURE — 80053 COMPREHEN METABOLIC PANEL: CPT

## 2019-05-24 PROCEDURE — 85027 COMPLETE CBC AUTOMATED: CPT

## 2019-05-24 PROCEDURE — A4216 STERILE WATER/SALINE, 10 ML: HCPCS | Performed by: INTERNAL MEDICINE

## 2019-05-24 PROCEDURE — 36592 COLLECT BLOOD FROM PICC: CPT

## 2019-05-24 PROCEDURE — 25000003 PHARM REV CODE 250: Performed by: INTERNAL MEDICINE

## 2019-05-24 PROCEDURE — 63600175 PHARM REV CODE 636 W HCPCS: Performed by: INTERNAL MEDICINE

## 2019-05-24 RX ORDER — SODIUM CHLORIDE 0.9 % (FLUSH) 0.9 %
10 SYRINGE (ML) INJECTION
Status: COMPLETED | OUTPATIENT
Start: 2019-05-24 | End: 2019-05-24

## 2019-05-24 RX ORDER — HEPARIN 100 UNIT/ML
500 SYRINGE INTRAVENOUS
Status: CANCELLED | OUTPATIENT
Start: 2019-05-24

## 2019-05-24 RX ORDER — SODIUM CHLORIDE 0.9 % (FLUSH) 0.9 %
10 SYRINGE (ML) INJECTION
Status: CANCELLED | OUTPATIENT
Start: 2019-05-24

## 2019-05-24 RX ORDER — HEPARIN 100 UNIT/ML
500 SYRINGE INTRAVENOUS
Status: COMPLETED | OUTPATIENT
Start: 2019-05-24 | End: 2019-05-24

## 2019-05-24 RX ADMIN — HEPARIN 500 UNITS: 100 SYRINGE at 02:05

## 2019-05-24 RX ADMIN — Medication 10 ML: at 02:05

## 2019-05-24 NOTE — NURSING
1410 labs drawn from right arm picc line, flushed per policy without issue, no distress noted upon d/c to home

## 2019-05-28 ENCOUNTER — INFUSION (OUTPATIENT)
Dept: INFUSION THERAPY | Facility: HOSPITAL | Age: 44
End: 2019-05-28
Attending: INTERNAL MEDICINE
Payer: COMMERCIAL

## 2019-05-28 DIAGNOSIS — C83.37 DIFFUSE LARGE B-CELL LYMPHOMA OF SPLEEN: Primary | ICD-10-CM

## 2019-05-28 LAB
ALBUMIN SERPL BCP-MCNC: 4 G/DL (ref 3.5–5.2)
ALP SERPL-CCNC: 67 U/L (ref 55–135)
ALT SERPL W/O P-5'-P-CCNC: 18 U/L (ref 10–44)
ANION GAP SERPL CALC-SCNC: 8 MMOL/L (ref 8–16)
AST SERPL-CCNC: 21 U/L (ref 10–40)
BILIRUB SERPL-MCNC: 0.4 MG/DL (ref 0.1–1)
BUN SERPL-MCNC: 10 MG/DL (ref 6–20)
CALCIUM SERPL-MCNC: 10.2 MG/DL (ref 8.7–10.5)
CHLORIDE SERPL-SCNC: 106 MMOL/L (ref 95–110)
CO2 SERPL-SCNC: 24 MMOL/L (ref 23–29)
CREAT SERPL-MCNC: 0.8 MG/DL (ref 0.5–1.4)
ERYTHROCYTE [DISTWIDTH] IN BLOOD BY AUTOMATED COUNT: 18.5 % (ref 11.5–14.5)
EST. GFR  (AFRICAN AMERICAN): >60 ML/MIN/1.73 M^2
EST. GFR  (NON AFRICAN AMERICAN): >60 ML/MIN/1.73 M^2
GLUCOSE SERPL-MCNC: 101 MG/DL (ref 70–110)
HCT VFR BLD AUTO: 30 % (ref 37–48.5)
HGB BLD-MCNC: 9.7 G/DL (ref 12–16)
IMM GRANULOCYTES # BLD AUTO: 0.1 K/UL (ref 0–0.04)
MCH RBC QN AUTO: 30.3 PG (ref 27–31)
MCHC RBC AUTO-ENTMCNC: 32.3 G/DL (ref 32–36)
MCV RBC AUTO: 94 FL (ref 82–98)
NEUTROPHILS # BLD AUTO: 4.2 K/UL (ref 1.8–7.7)
PLATELET # BLD AUTO: 271 K/UL (ref 150–350)
PMV BLD AUTO: 10.7 FL (ref 9.2–12.9)
POTASSIUM SERPL-SCNC: 3.5 MMOL/L (ref 3.5–5.1)
PROT SERPL-MCNC: 7.2 G/DL (ref 6–8.4)
RBC # BLD AUTO: 3.2 M/UL (ref 4–5.4)
SODIUM SERPL-SCNC: 138 MMOL/L (ref 136–145)
WBC # BLD AUTO: 6.33 K/UL (ref 3.9–12.7)

## 2019-05-28 PROCEDURE — 63600175 PHARM REV CODE 636 W HCPCS: Performed by: INTERNAL MEDICINE

## 2019-05-28 PROCEDURE — 85027 COMPLETE CBC AUTOMATED: CPT

## 2019-05-28 PROCEDURE — 36592 COLLECT BLOOD FROM PICC: CPT

## 2019-05-28 PROCEDURE — 80053 COMPREHEN METABOLIC PANEL: CPT

## 2019-05-28 PROCEDURE — A4216 STERILE WATER/SALINE, 10 ML: HCPCS | Performed by: INTERNAL MEDICINE

## 2019-05-28 PROCEDURE — 25000003 PHARM REV CODE 250: Performed by: INTERNAL MEDICINE

## 2019-05-28 RX ORDER — SODIUM CHLORIDE 0.9 % (FLUSH) 0.9 %
10 SYRINGE (ML) INJECTION
Status: COMPLETED | OUTPATIENT
Start: 2019-05-28 | End: 2019-05-28

## 2019-05-28 RX ORDER — HEPARIN 100 UNIT/ML
500 SYRINGE INTRAVENOUS
Status: COMPLETED | OUTPATIENT
Start: 2019-05-28 | End: 2019-05-28

## 2019-05-28 RX ORDER — SODIUM CHLORIDE 0.9 % (FLUSH) 0.9 %
10 SYRINGE (ML) INJECTION
Status: CANCELLED | OUTPATIENT
Start: 2019-05-28

## 2019-05-28 RX ORDER — HEPARIN 100 UNIT/ML
500 SYRINGE INTRAVENOUS
Status: CANCELLED | OUTPATIENT
Start: 2019-05-28

## 2019-05-28 RX ADMIN — Medication 10 ML: at 02:05

## 2019-05-28 RX ADMIN — HEPARIN 500 UNITS: 100 SYRINGE at 02:05

## 2019-05-28 NOTE — NURSING
Patient here for blood draw and dressing change on right arm PICC line-inside line with good blood return-blood to lab-line flushed-old dressing removed-no redness at site-area cleaned and new dressing applied over site-patient tolerated well.

## 2019-06-07 ENCOUNTER — INFUSION (OUTPATIENT)
Dept: INFUSION THERAPY | Facility: HOSPITAL | Age: 44
End: 2019-06-07
Attending: INTERNAL MEDICINE
Payer: COMMERCIAL

## 2019-06-07 DIAGNOSIS — C83.37 DIFFUSE LARGE B-CELL LYMPHOMA OF SPLEEN: Primary | ICD-10-CM

## 2019-06-07 LAB
ALBUMIN SERPL BCP-MCNC: 3.7 G/DL (ref 3.5–5.2)
ALP SERPL-CCNC: 82 U/L (ref 55–135)
ALT SERPL W/O P-5'-P-CCNC: 22 U/L (ref 10–44)
ANION GAP SERPL CALC-SCNC: 7 MMOL/L (ref 8–16)
AST SERPL-CCNC: 13 U/L (ref 10–40)
BILIRUB SERPL-MCNC: 0.4 MG/DL (ref 0.1–1)
BUN SERPL-MCNC: 15 MG/DL (ref 6–20)
CALCIUM SERPL-MCNC: 9.6 MG/DL (ref 8.7–10.5)
CHLORIDE SERPL-SCNC: 104 MMOL/L (ref 95–110)
CO2 SERPL-SCNC: 26 MMOL/L (ref 23–29)
CREAT SERPL-MCNC: 0.7 MG/DL (ref 0.5–1.4)
ERYTHROCYTE [DISTWIDTH] IN BLOOD BY AUTOMATED COUNT: 16.6 % (ref 11.5–14.5)
EST. GFR  (AFRICAN AMERICAN): >60 ML/MIN/1.73 M^2
EST. GFR  (NON AFRICAN AMERICAN): >60 ML/MIN/1.73 M^2
GLUCOSE SERPL-MCNC: 109 MG/DL (ref 70–110)
HCT VFR BLD AUTO: 29.3 % (ref 37–48.5)
HGB BLD-MCNC: 9.4 G/DL (ref 12–16)
IMM GRANULOCYTES # BLD AUTO: 4.13 K/UL (ref 0–0.04)
MCH RBC QN AUTO: 31 PG (ref 27–31)
MCHC RBC AUTO-ENTMCNC: 32.1 G/DL (ref 32–36)
MCV RBC AUTO: 97 FL (ref 82–98)
NEUTROPHILS # BLD AUTO: 14.8 K/UL (ref 1.8–7.7)
PLATELET # BLD AUTO: 61 K/UL (ref 150–350)
PMV BLD AUTO: 12.8 FL (ref 9.2–12.9)
POTASSIUM SERPL-SCNC: 3.4 MMOL/L (ref 3.5–5.1)
PROT SERPL-MCNC: 6.7 G/DL (ref 6–8.4)
RBC # BLD AUTO: 3.03 M/UL (ref 4–5.4)
SODIUM SERPL-SCNC: 137 MMOL/L (ref 136–145)
WBC # BLD AUTO: 20.13 K/UL (ref 3.9–12.7)

## 2019-06-07 PROCEDURE — 36592 COLLECT BLOOD FROM PICC: CPT

## 2019-06-07 PROCEDURE — 85027 COMPLETE CBC AUTOMATED: CPT

## 2019-06-07 PROCEDURE — 63600175 PHARM REV CODE 636 W HCPCS: Performed by: INTERNAL MEDICINE

## 2019-06-07 PROCEDURE — A4216 STERILE WATER/SALINE, 10 ML: HCPCS | Performed by: INTERNAL MEDICINE

## 2019-06-07 PROCEDURE — 80053 COMPREHEN METABOLIC PANEL: CPT

## 2019-06-07 PROCEDURE — 25000003 PHARM REV CODE 250: Performed by: INTERNAL MEDICINE

## 2019-06-07 RX ORDER — HEPARIN 100 UNIT/ML
500 SYRINGE INTRAVENOUS
Status: CANCELLED | OUTPATIENT
Start: 2019-06-07

## 2019-06-07 RX ORDER — HEPARIN 100 UNIT/ML
500 SYRINGE INTRAVENOUS
Status: COMPLETED | OUTPATIENT
Start: 2019-06-07 | End: 2019-06-07

## 2019-06-07 RX ORDER — SODIUM CHLORIDE 0.9 % (FLUSH) 0.9 %
10 SYRINGE (ML) INJECTION
Status: CANCELLED | OUTPATIENT
Start: 2019-06-07

## 2019-06-07 RX ORDER — SODIUM CHLORIDE 0.9 % (FLUSH) 0.9 %
10 SYRINGE (ML) INJECTION
Status: COMPLETED | OUTPATIENT
Start: 2019-06-07 | End: 2019-06-07

## 2019-06-07 RX ADMIN — HEPARIN 500 UNITS: 100 SYRINGE at 03:06

## 2019-06-07 RX ADMIN — Medication 10 ML: at 03:06

## 2019-06-07 NOTE — NURSING
Patient present for labs drawn from right arm PICC. PICC caps changed; blood return present. Labs collected and sent. Patient discharged home.

## 2019-06-11 ENCOUNTER — TELEPHONE (OUTPATIENT)
Dept: HEMATOLOGY/ONCOLOGY | Facility: CLINIC | Age: 44
End: 2019-06-11

## 2019-06-11 ENCOUNTER — INFUSION (OUTPATIENT)
Dept: INFUSION THERAPY | Facility: HOSPITAL | Age: 44
End: 2019-06-11
Attending: INTERNAL MEDICINE
Payer: COMMERCIAL

## 2019-06-11 DIAGNOSIS — C83.37 DIFFUSE LARGE B-CELL LYMPHOMA OF SPLEEN: Primary | ICD-10-CM

## 2019-06-11 LAB
ALBUMIN SERPL BCP-MCNC: 3.7 G/DL (ref 3.5–5.2)
ALP SERPL-CCNC: 82 U/L (ref 55–135)
ALT SERPL W/O P-5'-P-CCNC: 22 U/L (ref 10–44)
ANION GAP SERPL CALC-SCNC: 7 MMOL/L (ref 8–16)
AST SERPL-CCNC: 14 U/L (ref 10–40)
BILIRUB SERPL-MCNC: 0.2 MG/DL (ref 0.1–1)
BUN SERPL-MCNC: 8 MG/DL (ref 6–20)
CALCIUM SERPL-MCNC: 10.1 MG/DL (ref 8.7–10.5)
CHLORIDE SERPL-SCNC: 104 MMOL/L (ref 95–110)
CO2 SERPL-SCNC: 26 MMOL/L (ref 23–29)
CREAT SERPL-MCNC: 0.7 MG/DL (ref 0.5–1.4)
ERYTHROCYTE [DISTWIDTH] IN BLOOD BY AUTOMATED COUNT: 16.1 % (ref 11.5–14.5)
EST. GFR  (AFRICAN AMERICAN): >60 ML/MIN/1.73 M^2
EST. GFR  (NON AFRICAN AMERICAN): >60 ML/MIN/1.73 M^2
GLUCOSE SERPL-MCNC: 110 MG/DL (ref 70–110)
HCT VFR BLD AUTO: 26.4 % (ref 37–48.5)
HGB BLD-MCNC: 8.8 G/DL (ref 12–16)
IMM GRANULOCYTES # BLD AUTO: 0.01 K/UL (ref 0–0.04)
MCH RBC QN AUTO: 31.1 PG (ref 27–31)
MCHC RBC AUTO-ENTMCNC: 33.3 G/DL (ref 32–36)
MCV RBC AUTO: 93 FL (ref 82–98)
NEUTROPHILS # BLD AUTO: 1 K/UL (ref 1.8–7.7)
PLATELET # BLD AUTO: 46 K/UL (ref 150–350)
PMV BLD AUTO: 13.9 FL (ref 9.2–12.9)
POTASSIUM SERPL-SCNC: 3.4 MMOL/L (ref 3.5–5.1)
PROT SERPL-MCNC: 6.9 G/DL (ref 6–8.4)
RBC # BLD AUTO: 2.83 M/UL (ref 4–5.4)
SODIUM SERPL-SCNC: 137 MMOL/L (ref 136–145)
WBC # BLD AUTO: 2.14 K/UL (ref 3.9–12.7)

## 2019-06-11 PROCEDURE — A4216 STERILE WATER/SALINE, 10 ML: HCPCS | Performed by: INTERNAL MEDICINE

## 2019-06-11 PROCEDURE — 80053 COMPREHEN METABOLIC PANEL: CPT

## 2019-06-11 PROCEDURE — 36592 COLLECT BLOOD FROM PICC: CPT

## 2019-06-11 PROCEDURE — 85027 COMPLETE CBC AUTOMATED: CPT

## 2019-06-11 PROCEDURE — 25000003 PHARM REV CODE 250: Performed by: INTERNAL MEDICINE

## 2019-06-11 PROCEDURE — 63600175 PHARM REV CODE 636 W HCPCS: Performed by: INTERNAL MEDICINE

## 2019-06-11 RX ORDER — HEPARIN 100 UNIT/ML
500 SYRINGE INTRAVENOUS
Status: CANCELLED | OUTPATIENT
Start: 2019-06-11

## 2019-06-11 RX ORDER — HEPARIN 100 UNIT/ML
500 SYRINGE INTRAVENOUS
Status: COMPLETED | OUTPATIENT
Start: 2019-06-11 | End: 2019-06-11

## 2019-06-11 RX ORDER — SODIUM CHLORIDE 0.9 % (FLUSH) 0.9 %
10 SYRINGE (ML) INJECTION
Status: CANCELLED | OUTPATIENT
Start: 2019-06-11

## 2019-06-11 RX ORDER — SODIUM CHLORIDE 0.9 % (FLUSH) 0.9 %
10 SYRINGE (ML) INJECTION
Status: COMPLETED | OUTPATIENT
Start: 2019-06-11 | End: 2019-06-11

## 2019-06-11 RX ADMIN — Medication 10 ML: at 03:06

## 2019-06-11 RX ADMIN — HEPARIN 500 UNITS: 100 SYRINGE at 03:06

## 2019-06-11 NOTE — TELEPHONE ENCOUNTER
"----- Message from Mis Agarwal sent at 6/11/2019  1:32 PM CDT -----  Contact: [phone]  Pt calling to schedule next lab appt, but she would like to come in today since its Tuesday.  "Thank you for all that you do for out patients'"    Patient notified that appointment for lab was reschedule for today. She verbalized understanding.      "

## 2019-06-14 ENCOUNTER — INFUSION (OUTPATIENT)
Dept: INFUSION THERAPY | Facility: HOSPITAL | Age: 44
End: 2019-06-14
Attending: INTERNAL MEDICINE
Payer: COMMERCIAL

## 2019-06-14 DIAGNOSIS — C83.37 DIFFUSE LARGE B-CELL LYMPHOMA OF SPLEEN: Primary | ICD-10-CM

## 2019-06-14 LAB
ALBUMIN SERPL BCP-MCNC: 4 G/DL (ref 3.5–5.2)
ALP SERPL-CCNC: 86 U/L (ref 55–135)
ALT SERPL W/O P-5'-P-CCNC: 24 U/L (ref 10–44)
ANION GAP SERPL CALC-SCNC: 9 MMOL/L (ref 8–16)
AST SERPL-CCNC: 20 U/L (ref 10–40)
BILIRUB SERPL-MCNC: 0.3 MG/DL (ref 0.1–1)
BUN SERPL-MCNC: 7 MG/DL (ref 6–20)
CALCIUM SERPL-MCNC: 10.5 MG/DL (ref 8.7–10.5)
CHLORIDE SERPL-SCNC: 106 MMOL/L (ref 95–110)
CO2 SERPL-SCNC: 26 MMOL/L (ref 23–29)
CREAT SERPL-MCNC: 0.7 MG/DL (ref 0.5–1.4)
ERYTHROCYTE [DISTWIDTH] IN BLOOD BY AUTOMATED COUNT: 17.6 % (ref 11.5–14.5)
EST. GFR  (AFRICAN AMERICAN): >60 ML/MIN/1.73 M^2
EST. GFR  (NON AFRICAN AMERICAN): >60 ML/MIN/1.73 M^2
GLUCOSE SERPL-MCNC: 98 MG/DL (ref 70–110)
HCT VFR BLD AUTO: 29 % (ref 37–48.5)
HGB BLD-MCNC: 9.3 G/DL (ref 12–16)
IMM GRANULOCYTES # BLD AUTO: 0.28 K/UL (ref 0–0.04)
MCH RBC QN AUTO: 30.6 PG (ref 27–31)
MCHC RBC AUTO-ENTMCNC: 32.1 G/DL (ref 32–36)
MCV RBC AUTO: 95 FL (ref 82–98)
NEUTROPHILS # BLD AUTO: 2.7 K/UL (ref 1.8–7.7)
PLATELET # BLD AUTO: 109 K/UL (ref 150–350)
PMV BLD AUTO: 12 FL (ref 9.2–12.9)
POTASSIUM SERPL-SCNC: 3.5 MMOL/L (ref 3.5–5.1)
PROT SERPL-MCNC: 7.3 G/DL (ref 6–8.4)
RBC # BLD AUTO: 3.04 M/UL (ref 4–5.4)
SODIUM SERPL-SCNC: 141 MMOL/L (ref 136–145)
WBC # BLD AUTO: 4.76 K/UL (ref 3.9–12.7)

## 2019-06-14 PROCEDURE — 36592 COLLECT BLOOD FROM PICC: CPT

## 2019-06-14 PROCEDURE — 85027 COMPLETE CBC AUTOMATED: CPT

## 2019-06-14 PROCEDURE — 80053 COMPREHEN METABOLIC PANEL: CPT

## 2019-06-14 PROCEDURE — 25000003 PHARM REV CODE 250: Performed by: INTERNAL MEDICINE

## 2019-06-14 PROCEDURE — 63600175 PHARM REV CODE 636 W HCPCS: Performed by: INTERNAL MEDICINE

## 2019-06-14 PROCEDURE — A4216 STERILE WATER/SALINE, 10 ML: HCPCS | Performed by: INTERNAL MEDICINE

## 2019-06-14 RX ORDER — SODIUM CHLORIDE 0.9 % (FLUSH) 0.9 %
10 SYRINGE (ML) INJECTION
Status: CANCELLED | OUTPATIENT
Start: 2019-06-14

## 2019-06-14 RX ORDER — HEPARIN 100 UNIT/ML
500 SYRINGE INTRAVENOUS
Status: CANCELLED | OUTPATIENT
Start: 2019-06-14

## 2019-06-14 RX ORDER — HEPARIN 100 UNIT/ML
500 SYRINGE INTRAVENOUS
Status: COMPLETED | OUTPATIENT
Start: 2019-06-14 | End: 2019-06-14

## 2019-06-14 RX ORDER — SODIUM CHLORIDE 0.9 % (FLUSH) 0.9 %
10 SYRINGE (ML) INJECTION
Status: COMPLETED | OUTPATIENT
Start: 2019-06-14 | End: 2019-06-14

## 2019-06-14 RX ADMIN — Medication 10 ML: at 02:06

## 2019-06-14 RX ADMIN — HEPARIN 500 UNITS: 100 SYRINGE at 02:06

## 2019-06-14 NOTE — NURSING
Arrived for lab draw via PICC. Denies any complaints. Tolerated well. DC home ambulating independently. Verbalized understanding of s/s to report to MD.

## 2019-06-18 ENCOUNTER — INFUSION (OUTPATIENT)
Dept: INFUSION THERAPY | Facility: HOSPITAL | Age: 44
End: 2019-06-18
Attending: INTERNAL MEDICINE
Payer: COMMERCIAL

## 2019-06-18 DIAGNOSIS — C83.30 DIFFUSE LARGE B-CELL LYMPHOMA, UNSPECIFIED BODY REGION: Primary | ICD-10-CM

## 2019-06-18 DIAGNOSIS — C83.37 DIFFUSE LARGE B-CELL LYMPHOMA OF SPLEEN: ICD-10-CM

## 2019-06-18 LAB
ALBUMIN SERPL BCP-MCNC: 3.8 G/DL (ref 3.5–5.2)
ALP SERPL-CCNC: 83 U/L (ref 55–135)
ALT SERPL W/O P-5'-P-CCNC: 20 U/L (ref 10–44)
ANION GAP SERPL CALC-SCNC: 7 MMOL/L (ref 8–16)
AST SERPL-CCNC: 23 U/L (ref 10–40)
BILIRUB SERPL-MCNC: 0.3 MG/DL (ref 0.1–1)
BUN SERPL-MCNC: 11 MG/DL (ref 6–20)
CALCIUM SERPL-MCNC: 9.7 MG/DL (ref 8.7–10.5)
CHLORIDE SERPL-SCNC: 105 MMOL/L (ref 95–110)
CO2 SERPL-SCNC: 26 MMOL/L (ref 23–29)
CREAT SERPL-MCNC: 0.8 MG/DL (ref 0.5–1.4)
ERYTHROCYTE [DISTWIDTH] IN BLOOD BY AUTOMATED COUNT: 17.6 % (ref 11.5–14.5)
EST. GFR  (AFRICAN AMERICAN): >60 ML/MIN/1.73 M^2
EST. GFR  (NON AFRICAN AMERICAN): >60 ML/MIN/1.73 M^2
GLUCOSE SERPL-MCNC: 78 MG/DL (ref 70–110)
HCT VFR BLD AUTO: 29 % (ref 37–48.5)
HGB BLD-MCNC: 9.3 G/DL (ref 12–16)
IMM GRANULOCYTES # BLD AUTO: 0.1 K/UL (ref 0–0.04)
MCH RBC QN AUTO: 30.5 PG (ref 27–31)
MCHC RBC AUTO-ENTMCNC: 32.1 G/DL (ref 32–36)
MCV RBC AUTO: 95 FL (ref 82–98)
NEUTROPHILS # BLD AUTO: 4.9 K/UL (ref 1.8–7.7)
PLATELET # BLD AUTO: 235 K/UL (ref 150–350)
PMV BLD AUTO: 10.5 FL (ref 9.2–12.9)
POTASSIUM SERPL-SCNC: 3.8 MMOL/L (ref 3.5–5.1)
PROT SERPL-MCNC: 7 G/DL (ref 6–8.4)
RBC # BLD AUTO: 3.05 M/UL (ref 4–5.4)
SODIUM SERPL-SCNC: 138 MMOL/L (ref 136–145)
WBC # BLD AUTO: 7.25 K/UL (ref 3.9–12.7)

## 2019-06-18 PROCEDURE — 36592 COLLECT BLOOD FROM PICC: CPT

## 2019-06-18 PROCEDURE — 25000003 PHARM REV CODE 250: Performed by: INTERNAL MEDICINE

## 2019-06-18 PROCEDURE — 80053 COMPREHEN METABOLIC PANEL: CPT

## 2019-06-18 PROCEDURE — 63600175 PHARM REV CODE 636 W HCPCS: Performed by: INTERNAL MEDICINE

## 2019-06-18 PROCEDURE — 85027 COMPLETE CBC AUTOMATED: CPT

## 2019-06-18 PROCEDURE — A4216 STERILE WATER/SALINE, 10 ML: HCPCS | Performed by: INTERNAL MEDICINE

## 2019-06-18 RX ORDER — SODIUM CHLORIDE 0.9 % (FLUSH) 0.9 %
10 SYRINGE (ML) INJECTION
Status: CANCELLED | OUTPATIENT
Start: 2019-06-18

## 2019-06-18 RX ORDER — HEPARIN 100 UNIT/ML
500 SYRINGE INTRAVENOUS
Status: CANCELLED | OUTPATIENT
Start: 2019-06-18

## 2019-06-18 RX ORDER — SODIUM CHLORIDE 0.9 % (FLUSH) 0.9 %
10 SYRINGE (ML) INJECTION
Status: COMPLETED | OUTPATIENT
Start: 2019-06-18 | End: 2019-06-18

## 2019-06-18 RX ORDER — HEPARIN 100 UNIT/ML
500 SYRINGE INTRAVENOUS
Status: COMPLETED | OUTPATIENT
Start: 2019-06-18 | End: 2019-06-18

## 2019-06-18 RX ADMIN — Medication 10 ML: at 03:06

## 2019-06-18 RX ADMIN — HEPARIN 500 UNITS: 100 SYRINGE at 03:06

## 2019-06-18 NOTE — NURSING
Patient here for blood draw and dressing change on right arm PICC line-red line with good blood return-blood to lab-line flushed-old dressing removed-no redness at insertion site-area cleaned and new dressing applied over site-patient tolerated well.

## 2019-06-21 ENCOUNTER — INFUSION (OUTPATIENT)
Dept: INFUSION THERAPY | Facility: HOSPITAL | Age: 44
End: 2019-06-21
Attending: INTERNAL MEDICINE
Payer: COMMERCIAL

## 2019-06-21 DIAGNOSIS — C83.37 DIFFUSE LARGE B-CELL LYMPHOMA OF SPLEEN: Primary | ICD-10-CM

## 2019-06-21 LAB
ALBUMIN SERPL BCP-MCNC: 3.9 G/DL (ref 3.5–5.2)
ALP SERPL-CCNC: 77 U/L (ref 55–135)
ALT SERPL W/O P-5'-P-CCNC: 28 U/L (ref 10–44)
ANION GAP SERPL CALC-SCNC: 8 MMOL/L (ref 8–16)
AST SERPL-CCNC: 25 U/L (ref 10–40)
BILIRUB SERPL-MCNC: 0.2 MG/DL (ref 0.1–1)
BUN SERPL-MCNC: 13 MG/DL (ref 6–20)
CALCIUM SERPL-MCNC: 10.3 MG/DL (ref 8.7–10.5)
CHLORIDE SERPL-SCNC: 107 MMOL/L (ref 95–110)
CO2 SERPL-SCNC: 25 MMOL/L (ref 23–29)
CREAT SERPL-MCNC: 0.8 MG/DL (ref 0.5–1.4)
ERYTHROCYTE [DISTWIDTH] IN BLOOD BY AUTOMATED COUNT: 17.4 % (ref 11.5–14.5)
EST. GFR  (AFRICAN AMERICAN): >60 ML/MIN/1.73 M^2
EST. GFR  (NON AFRICAN AMERICAN): >60 ML/MIN/1.73 M^2
GLUCOSE SERPL-MCNC: 93 MG/DL (ref 70–110)
HCT VFR BLD AUTO: 31.2 % (ref 37–48.5)
HGB BLD-MCNC: 10 G/DL (ref 12–16)
IMM GRANULOCYTES # BLD AUTO: 0.1 K/UL (ref 0–0.04)
MCH RBC QN AUTO: 30.9 PG (ref 27–31)
MCHC RBC AUTO-ENTMCNC: 32.1 G/DL (ref 32–36)
MCV RBC AUTO: 96 FL (ref 82–98)
NEUTROPHILS # BLD AUTO: 4.2 K/UL (ref 1.8–7.7)
PLATELET # BLD AUTO: 329 K/UL (ref 150–350)
PMV BLD AUTO: 10.5 FL (ref 9.2–12.9)
POTASSIUM SERPL-SCNC: 3.8 MMOL/L (ref 3.5–5.1)
PROT SERPL-MCNC: 7.3 G/DL (ref 6–8.4)
RBC # BLD AUTO: 3.24 M/UL (ref 4–5.4)
SODIUM SERPL-SCNC: 140 MMOL/L (ref 136–145)
WBC # BLD AUTO: 6.24 K/UL (ref 3.9–12.7)

## 2019-06-21 PROCEDURE — 25000003 PHARM REV CODE 250: Performed by: INTERNAL MEDICINE

## 2019-06-21 PROCEDURE — 63600175 PHARM REV CODE 636 W HCPCS: Performed by: INTERNAL MEDICINE

## 2019-06-21 PROCEDURE — 85027 COMPLETE CBC AUTOMATED: CPT

## 2019-06-21 PROCEDURE — A4216 STERILE WATER/SALINE, 10 ML: HCPCS | Performed by: INTERNAL MEDICINE

## 2019-06-21 PROCEDURE — 36592 COLLECT BLOOD FROM PICC: CPT

## 2019-06-21 PROCEDURE — 80053 COMPREHEN METABOLIC PANEL: CPT

## 2019-06-21 RX ORDER — HEPARIN 100 UNIT/ML
500 SYRINGE INTRAVENOUS
OUTPATIENT
Start: 2019-06-21

## 2019-06-21 RX ORDER — SODIUM CHLORIDE 0.9 % (FLUSH) 0.9 %
10 SYRINGE (ML) INJECTION
Status: CANCELLED | OUTPATIENT
Start: 2019-06-21

## 2019-06-21 RX ORDER — HEPARIN 100 UNIT/ML
500 SYRINGE INTRAVENOUS
Status: CANCELLED | OUTPATIENT
Start: 2019-06-21

## 2019-06-21 RX ORDER — SODIUM CHLORIDE 0.9 % (FLUSH) 0.9 %
10 SYRINGE (ML) INJECTION
OUTPATIENT
Start: 2019-06-21

## 2019-06-21 RX ORDER — SODIUM CHLORIDE 0.9 % (FLUSH) 0.9 %
10 SYRINGE (ML) INJECTION
Status: COMPLETED | OUTPATIENT
Start: 2019-06-21 | End: 2019-06-21

## 2019-06-21 RX ORDER — HEPARIN 100 UNIT/ML
500 SYRINGE INTRAVENOUS
Status: COMPLETED | OUTPATIENT
Start: 2019-06-21 | End: 2019-06-21

## 2019-06-21 RX ADMIN — Medication 10 ML: at 03:06

## 2019-06-21 RX ADMIN — HEPARIN 500 UNITS: 100 SYRINGE at 03:06

## 2019-07-02 ENCOUNTER — CLINICAL SUPPORT (OUTPATIENT)
Dept: OBSTETRICS AND GYNECOLOGY | Facility: CLINIC | Age: 44
End: 2019-07-02
Payer: COMMERCIAL

## 2019-07-02 DIAGNOSIS — Z30.9 ENCOUNTER FOR CONTRACEPTIVE MANAGEMENT, UNSPECIFIED TYPE: Primary | ICD-10-CM

## 2019-07-02 PROCEDURE — 99999 PR PBB SHADOW E&M-EST. PATIENT-LVL I: CPT | Mod: PBBFAC,,,

## 2019-07-02 PROCEDURE — 99999 PR PBB SHADOW E&M-EST. PATIENT-LVL I: ICD-10-PCS | Mod: PBBFAC,,,

## 2019-07-02 PROCEDURE — 96372 THER/PROPH/DIAG INJ SC/IM: CPT | Mod: S$GLB,,, | Performed by: OBSTETRICS & GYNECOLOGY

## 2019-07-02 PROCEDURE — 96372 PR INJECTION,THERAP/PROPH/DIAG2ST, IM OR SUBCUT: ICD-10-PCS | Mod: S$GLB,,, | Performed by: OBSTETRICS & GYNECOLOGY

## 2019-07-02 RX ORDER — MEDROXYPROGESTERONE ACETATE 150 MG/ML
150 INJECTION, SUSPENSION INTRAMUSCULAR
Status: SHIPPED | OUTPATIENT
Start: 2019-07-02 | End: 2020-03-28

## 2019-07-02 RX ADMIN — MEDROXYPROGESTERONE ACETATE 150 MG: 150 INJECTION, SUSPENSION INTRAMUSCULAR at 02:07

## 2019-07-02 NOTE — PROGRESS NOTES
Here for Depo Provera Injection, date due 07/01-07/15/2019 . Last injection given 04/15/2019 . Patient with no current complaints of pain prior to or after injection. Advised to wait 5 minutes. Return between         09/17-10/01/2019 for next injection.      PATIENT SUPPLIED MEDICATION.    See medication Card for RX information    Order verified, inspected package,storage verified,expiration verified        Site - RB

## 2019-07-19 ENCOUNTER — PATIENT MESSAGE (OUTPATIENT)
Dept: INTERNAL MEDICINE | Facility: CLINIC | Age: 44
End: 2019-07-19

## 2019-07-19 ENCOUNTER — OFFICE VISIT (OUTPATIENT)
Dept: INTERNAL MEDICINE | Facility: CLINIC | Age: 44
End: 2019-07-19
Payer: COMMERCIAL

## 2019-07-19 VITALS
WEIGHT: 159.19 LBS | TEMPERATURE: 99 F | HEIGHT: 62 IN | HEART RATE: 99 BPM | BODY MASS INDEX: 29.3 KG/M2 | OXYGEN SATURATION: 99 % | SYSTOLIC BLOOD PRESSURE: 110 MMHG | DIASTOLIC BLOOD PRESSURE: 82 MMHG

## 2019-07-19 DIAGNOSIS — F41.9 ANXIETY: Primary | ICD-10-CM

## 2019-07-19 DIAGNOSIS — F32.A DEPRESSION, UNSPECIFIED DEPRESSION TYPE: ICD-10-CM

## 2019-07-19 DIAGNOSIS — C83.38 DIFFUSE LARGE B-CELL LYMPHOMA OF LYMPH NODES OF MULTIPLE REGIONS: ICD-10-CM

## 2019-07-19 PROCEDURE — 99999 PR PBB SHADOW E&M-EST. PATIENT-LVL III: ICD-10-PCS | Mod: PBBFAC,,, | Performed by: INTERNAL MEDICINE

## 2019-07-19 PROCEDURE — 3008F PR BODY MASS INDEX (BMI) DOCUMENTED: ICD-10-PCS | Mod: CPTII,S$GLB,, | Performed by: INTERNAL MEDICINE

## 2019-07-19 PROCEDURE — 99999 PR PBB SHADOW E&M-EST. PATIENT-LVL III: CPT | Mod: PBBFAC,,, | Performed by: INTERNAL MEDICINE

## 2019-07-19 PROCEDURE — 99214 PR OFFICE/OUTPT VISIT, EST, LEVL IV, 30-39 MIN: ICD-10-PCS | Mod: S$GLB,,, | Performed by: INTERNAL MEDICINE

## 2019-07-19 PROCEDURE — 99214 OFFICE O/P EST MOD 30 MIN: CPT | Mod: S$GLB,,, | Performed by: INTERNAL MEDICINE

## 2019-07-19 PROCEDURE — 3008F BODY MASS INDEX DOCD: CPT | Mod: CPTII,S$GLB,, | Performed by: INTERNAL MEDICINE

## 2019-07-19 RX ORDER — VENLAFAXINE 37.5 MG/1
37.5 TABLET ORAL DAILY
COMMUNITY
Start: 2019-06-04 | End: 2019-07-19 | Stop reason: SDUPTHER

## 2019-07-19 RX ORDER — VENLAFAXINE 75 MG/1
75 TABLET ORAL DAILY
Qty: 90 TABLET | Refills: 4 | Status: SHIPPED | OUTPATIENT
Start: 2019-07-19 | End: 2020-04-14

## 2019-07-19 RX ORDER — PROPRANOLOL HYDROCHLORIDE 10 MG/1
10 TABLET ORAL 3 TIMES DAILY PRN
Qty: 90 TABLET | Refills: 1 | Status: SHIPPED | OUTPATIENT
Start: 2019-07-19 | End: 2019-12-06 | Stop reason: SDUPTHER

## 2019-07-19 RX ORDER — TRAZODONE HYDROCHLORIDE 50 MG/1
TABLET ORAL
Qty: 30 TABLET | Refills: 3 | Status: SHIPPED | OUTPATIENT
Start: 2019-07-19 | End: 2020-01-10

## 2019-07-19 NOTE — PROGRESS NOTES
Chief Complaint:anxiety    HPI: This is a 43 year old woman who presents due to anxiety. 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 [facility] is being [facility].  She was officially diagnosed in February.  She started to have abdominal pain last fall which started the work up that eventually let to her diagnosis.    She has had a tough year. She has been feeling nervous.  Symptoms have been severe. She cannot sleep. She not want to get out of bed. She has been depressed.  SHe has trouble falling asleep or staying asleep. No homicidal or suicidal ideations.  Anxiety has been so severe that she has had a few panic attacks. She was not able to travel on a trip from 7/11/19 to 7/15/19 due to her anxiety.  She was supposed to go to [place] to visit with family and sight see.     She was prescribed venlafaxine 37.5 mg daily b y [facility] for hot flashes. Hot flashes are a little better. SHe takes depo provera 150 mg every 3 months for period regulation -(this is the second dose)    She saw psychiatry 10/2018 - she was prescribed prozac 10 mg daily. She took it sporadically. This was in the middle of her pancreatic mass work up. No problems with the medication.    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 She is considering going back in the fall. 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  Her principal wants to change her to math and science which is causing some anxiety.  She graduated with a masters in education and leadership in May 2019.  She has applied for another position in a different school.     REVIEW OF SYSTEMS: She denies fevers, chills, night sweats, hearing loss, sinus congestion, sore throat, chest pain, shortness of breath, nausea, vomiting, constipation, diarrhea, dysuria, hematuria, polydipsia, polyuria, joint pain, muscle pain, headaches    Occasional hot  "flash and night sweat since the chemo.  She has lost 30 pounds in the chemo. Weight has stabilized.  VIsion is not as clear.  She has a history of constipation. Doing well now.       Past Surgical History:   Procedure Laterality Date    COLONOSCOPY      COLONOSCOPY N/A 11/8/2018    Performed by [facility] at [facility] (2ND FLR)    EGD (ESOPHAGOGASTRODUODENOSCOPY) N/A 11/8/2018    [facility] at [facility] ENDO (2ND FLR)    ESOPHAGOGASTRODUODENOSCOPY (EGD) N/A 3/23/2015    Performed by [facility] at [facility] ENDO (4TH FLR)    HERNIA REPAIR      [place] COLONOSCOPY,DIAGNOSTIC [23431 (CPT®)] N/A 7/2/2014    Performed by Cj L[facility]iter MD at [facility] ENDO (4TH FLR)    ULTRASOUND-ENDOSCOPIC-UPPER N/A 12/12/2018    Performed by Venu Montiel MD at [facility] ENDO         Meds and allergies: updated on EPIC    REVIEW OF SYSTEMS: She denies fevers, chills, night sweats, fatigue, visual change, hearing loss, sinus congestion, sore throat, chest pain, shortness of breath, nausea, vomiting, constipation, diarrhea, dysuria, hematuria, polydipsia, polyuria, joint pain, muscle pain, headaches, anxiety, depression, insomnia.     Physical exam:  /82 (BP Location: Left arm, Patient Position: Sitting, BP Method: Large (Manual))   Pulse 99   Temp 98.9 °F (37.2 °C) (Oral)   Ht 5' 2" (1.575 m)   Wt 72.2 kg (159 lb 2.8 oz)   SpO2 99%   BMI 29.11 kg/m²     General: alert, oriented x 3, no apparent distress.  Affect normal  HEENT: Conjunctivae: anicteric, PERRL, EOMI, TM clear, Oralpharynx clear  Neck: supple, no thyroid enlargement, no cervical lymphadenopathy  Resp: effort normal, lungs clear bilaterally  CV: Regular rate and rhythm without murmurs, gallops or rubs, no lower extremity edema,      Assessment/Plan:  Anxiety and depression - increase venlafaxine 75 mg daily. Propranolol 10 mg three times daily asneeded for anxiety. Trazodone 50 mg 1/2-1 tablet at bedtime  B cell lymphoma- s/p chemo. To " follow up with [facility]  in September.     Spent greater than 25 minutes with the patient, greater than 50% in face to face counseling.

## 2019-07-19 NOTE — TELEPHONE ENCOUNTER
Spoke with patient to inform her that Dr. Mohamud is out of the office until next Wednesday, and I offered patient an same day appointment and she said yes. Pt is scheduled for today at 2:30 pm.

## 2019-07-23 ENCOUNTER — PATIENT MESSAGE (OUTPATIENT)
Dept: INTERNAL MEDICINE | Facility: CLINIC | Age: 44
End: 2019-07-23

## 2019-07-25 ENCOUNTER — PATIENT MESSAGE (OUTPATIENT)
Dept: INTERNAL MEDICINE | Facility: CLINIC | Age: 44
End: 2019-07-25

## 2019-07-30 ENCOUNTER — OFFICE VISIT (OUTPATIENT)
Dept: INTERNAL MEDICINE | Facility: CLINIC | Age: 44
End: 2019-07-30
Payer: COMMERCIAL

## 2019-07-30 VITALS
HEART RATE: 89 BPM | WEIGHT: 159.81 LBS | BODY MASS INDEX: 29.41 KG/M2 | HEIGHT: 62 IN | OXYGEN SATURATION: 99 % | SYSTOLIC BLOOD PRESSURE: 112 MMHG | DIASTOLIC BLOOD PRESSURE: 80 MMHG | TEMPERATURE: 99 F

## 2019-07-30 DIAGNOSIS — F41.9 ANXIETY: ICD-10-CM

## 2019-07-30 DIAGNOSIS — K58.1 IRRITABLE BOWEL SYNDROME WITH CONSTIPATION: ICD-10-CM

## 2019-07-30 DIAGNOSIS — C83.37 DIFFUSE LARGE B-CELL LYMPHOMA OF SPLEEN: Primary | ICD-10-CM

## 2019-07-30 PROCEDURE — 99215 OFFICE O/P EST HI 40 MIN: CPT | Mod: S$GLB,,, | Performed by: INTERNAL MEDICINE

## 2019-07-30 PROCEDURE — 3008F BODY MASS INDEX DOCD: CPT | Mod: CPTII,S$GLB,, | Performed by: INTERNAL MEDICINE

## 2019-07-30 PROCEDURE — 99215 PR OFFICE/OUTPT VISIT, EST, LEVL V, 40-54 MIN: ICD-10-PCS | Mod: S$GLB,,, | Performed by: INTERNAL MEDICINE

## 2019-07-30 PROCEDURE — 3008F PR BODY MASS INDEX (BMI) DOCUMENTED: ICD-10-PCS | Mod: CPTII,S$GLB,, | Performed by: INTERNAL MEDICINE

## 2019-07-30 PROCEDURE — 99999 PR PBB SHADOW E&M-EST. PATIENT-LVL IV: CPT | Mod: PBBFAC,,, | Performed by: INTERNAL MEDICINE

## 2019-07-30 PROCEDURE — 99999 PR PBB SHADOW E&M-EST. PATIENT-LVL IV: ICD-10-PCS | Mod: PBBFAC,,, | Performed by: INTERNAL MEDICINE

## 2019-07-30 NOTE — PROGRESS NOTES
Chief Complaint: Follow up of anxiety     HPI: This is a 43 year old woman who presents for follow up of above. She has been taking venlafaxine 75 mg daily.  Her mood has been better. She is calmer.  She is anxious at times. She took  propranolol 10 mg for anxiety once. She has not taken it more. She has not taken the trazodone.  She does not have trouble falling asleep. She continues to wake up intermittently at night but not as much.   She feels that she can concentrate a little better but she is not ready to return to work. She would like to take a sabbatical this next semester.  Energy level is slowly improving since her chemo is finished. She is still very tired at times. 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  She has had difficulty sleeping since she has had chemo.     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 She is being [facility] at [facility].  She was officially diagnosed in February 4, 2019.  She started to have abdominal pain 9/2018 which started the work up that eventually let to her diagnosis.      Hot flashes are a little better. Night sweats are still occurring but not as bad. SHe takes depo provera 150 mg every 3 months for period regulation -(this is the second dose)     She saw psychiatry 10/2018 - she was prescribed prozac 10 mg daily. She took it sporadically. This was in the middle of her pancreatic mass work up. No problems with the medication.      REVIEW OF SYSTEMS: She denies fevers, chills,  hearing loss, sinus congestion, sore throat, chest pain, shortness of breath, nausea, vomiting, diarrhea, dysuria, hematuria, polydipsia, polyuria, joint pain, muscle pain, headaches    She can feel dizzy at times since her chemo.  She has constipation. She as on Linzess in the past. She ran out.                Past Surgical History:   Procedure Laterality Date    COLONOSCOPY         "COLONOSCOPY N/A 11/8/2018     Performed by Ba Stewart MD at [facility] ENDO (2ND FLR)    EGD (ESOPHAGOGASTRODUODENOSCOPY) N/A 11/8/2018     Performed by Ba [facility] MD at [facility] ENDO (2ND FLR)    ESOPHAGOGASTRODUODENOSCOPY (EGD) N/A 3/23/2015     Performed by Ba Stewart MD at [facility] ENDO (4TH FLR)    HERNIA REPAIR        MA COLONOSCOPY,DIAGNOSTIC [93069 (CPT®)] N/A 7/2/2014     Performed by Cj Lassiter MD at [facility] ENDO (4TH FLR)    ULTRASOUND-ENDOSCOPIC-UPPER N/A 12/12/2018     Performed by [facility]iel [facility] at [facility] ENDO            Meds and allergies: updated on EPIC     REVIEW OF SYSTEMS: She denies fevers, chills, night sweats, fatigue, visual change, hearing loss, sinus congestion, sore throat, chest pain, shortness of breath, nausea, vomiting, constipation, diarrhea, dysuria, hematuria, polydipsia, polyuria, joint pain, muscle pain, headaches, anxiety, depression, insomnia.      Physical exam:  /80 (BP Location: Right arm, Patient Position: Sitting, BP Method: Large (Manual))   Pulse 89   Temp 98.9 °F (37.2 °C) (Oral)   Ht 5' 2" (1.575 m)   Wt 72.5 kg (159 lb 13.3 oz)   SpO2 99%   BMI 29.23 kg/m²        General: alert, oriented x 3, no apparent distress.  Affect normal  HEENT: Conjunctivae: anicteric, PERRL, EOMI, TM clear, Oralpharynx clear  Neck: supple, no thyroid enlargement, no cervical lymphadenopathy  Resp: effort normal, lungs clear bilaterally  CV: Regular rate and rhythm without murmurs, gallops or rubs, no lower extremity edema,        Assessment/Plan:    Stage 4 large B cell lymphoma - finished chemo 6/26/19. Still having side effects from chemo - fatigue, immunosuppression, anemia, difficulty concentrating, sleep disturbance.  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     Anxiety and depression - continue venlafaxine 75 mg daily. Take Propranolol 10 mg twice  times daily routinely. Take a 3rd tablet as needed.  " Take Trazodone 50 mg 1/2-1 tablet at bedtime routinely    I will see her back in 1-2 months sooner if problems arise.      Spent greater than 40 minutes with the patient, greater than 50% in face to face counseling.

## 2019-09-09 ENCOUNTER — TELEPHONE (OUTPATIENT)
Dept: INTERNAL MEDICINE | Facility: CLINIC | Age: 44
End: 2019-09-09

## 2019-09-09 NOTE — TELEPHONE ENCOUNTER
----- Message from Sonia Lynch sent at 9/9/2019  2:41 PM CDT -----  Contact: Donell with [facility]   Donell is calling for verbal clearance for the patient to fly. Donell states he is sending a fax for clearance for the doctor to sign. Donell states the verbal clearance enables him to start searching for a flight for the patient's appointment at [facility] on 9/26. Please call and advise

## 2019-09-10 NOTE — TELEPHONE ENCOUNTER
----- Message from Camille Molina sent at 9/10/2019 11:49 AM CDT -----  Contact: Cancer treatment transportation - [facility] [phone]  Cinda will like to know if the pt can fly without oxygen for her treatment, please advise.

## 2019-09-10 NOTE — TELEPHONE ENCOUNTER
Can pt walk, without oxygen and climb stairs without an IV. Pt needs clearance to fly. Please advise.

## 2019-09-12 NOTE — TELEPHONE ENCOUNTER
Spoke with Re from [facility], gave verbal that pt is able to fly and also will fax orders once it is signed

## 2019-09-17 ENCOUNTER — OFFICE VISIT (OUTPATIENT)
Dept: INTERNAL MEDICINE | Facility: CLINIC | Age: 44
End: 2019-09-17
Payer: COMMERCIAL

## 2019-09-17 VITALS
SYSTOLIC BLOOD PRESSURE: 108 MMHG | DIASTOLIC BLOOD PRESSURE: 70 MMHG | WEIGHT: 167.44 LBS | HEART RATE: 84 BPM | BODY MASS INDEX: 30.81 KG/M2 | OXYGEN SATURATION: 99 % | HEIGHT: 62 IN

## 2019-09-17 DIAGNOSIS — F32.A DEPRESSION, UNSPECIFIED DEPRESSION TYPE: ICD-10-CM

## 2019-09-17 DIAGNOSIS — C83.37 DIFFUSE LARGE B-CELL LYMPHOMA OF SPLEEN: Primary | ICD-10-CM

## 2019-09-17 DIAGNOSIS — F41.9 ANXIETY: ICD-10-CM

## 2019-09-17 DIAGNOSIS — C83.38 DIFFUSE LARGE B-CELL LYMPHOMA OF LYMPH NODES OF MULTIPLE REGIONS: ICD-10-CM

## 2019-09-17 PROBLEM — K86.89 MASS OF PANCREAS: Status: RESOLVED | Noted: 2018-12-12 | Resolved: 2019-09-17

## 2019-09-17 PROBLEM — R06.02 SOB (SHORTNESS OF BREATH): Status: RESOLVED | Noted: 2019-04-26 | Resolved: 2019-09-17

## 2019-09-17 PROBLEM — E86.9 VOLUME DEPLETION, GASTROINTESTINAL LOSS: Status: RESOLVED | Noted: 2019-01-09 | Resolved: 2019-09-17

## 2019-09-17 PROBLEM — R10.12 LEFT UPPER QUADRANT PAIN: Status: RESOLVED | Noted: 2018-12-19 | Resolved: 2019-09-17

## 2019-09-17 PROBLEM — K86.89 PANCREATIC MASS: Status: RESOLVED | Noted: 2018-12-19 | Resolved: 2019-09-17

## 2019-09-17 PROBLEM — R16.1 SPLENOMEGALY: Status: RESOLVED | Noted: 2018-12-19 | Resolved: 2019-09-17

## 2019-09-17 PROBLEM — D64.9 SYMPTOMATIC ANEMIA: Status: RESOLVED | Noted: 2019-04-26 | Resolved: 2019-09-17

## 2019-09-17 PROCEDURE — 99214 OFFICE O/P EST MOD 30 MIN: CPT | Mod: S$GLB,,, | Performed by: INTERNAL MEDICINE

## 2019-09-17 PROCEDURE — 3008F PR BODY MASS INDEX (BMI) DOCUMENTED: ICD-10-PCS | Mod: CPTII,S$GLB,, | Performed by: INTERNAL MEDICINE

## 2019-09-17 PROCEDURE — 99999 PR PBB SHADOW E&M-EST. PATIENT-LVL IV: CPT | Mod: PBBFAC,,, | Performed by: INTERNAL MEDICINE

## 2019-09-17 PROCEDURE — 99214 PR OFFICE/OUTPT VISIT, EST, LEVL IV, 30-39 MIN: ICD-10-PCS | Mod: S$GLB,,, | Performed by: INTERNAL MEDICINE

## 2019-09-17 PROCEDURE — 99999 PR PBB SHADOW E&M-EST. PATIENT-LVL IV: ICD-10-PCS | Mod: PBBFAC,,, | Performed by: INTERNAL MEDICINE

## 2019-09-17 PROCEDURE — 3008F BODY MASS INDEX DOCD: CPT | Mod: CPTII,S$GLB,, | Performed by: INTERNAL MEDICINE

## 2019-09-17 NOTE — PROGRESS NOTES
Chief Complaint: Follow up of anxiety     HPI: This is a 44 year old woman who presents for follow up of above. Mood overall is better.  She has been taking venlafaxine 75 mg daily.  She is taking propranolol 10 mg twice daily which has also helped her mood.  She does not feel jittery since she is taking propranolol.  She takes trazodone 50 mg 1 tablet at bedtime. She is sleeping well.  She is not waking up feeling medicated. She is taking a sabbatical this semester.        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 She is being treated at [facility]. She will be going to [facility] next week.  She was officially diagnosed in February 4, 2019.  She started to have abdominal pain 9/2018 which started the work up that eventually let to her diagnosis. She gets hot alot     Hot flashes are a little better. Night sweats are still occurring but not as bad. SHe takes depo provera 150 mg every 3 months for period regulation -(this is the second dose)    She has occasional sinus congestion. She takes zyrtec as needed.     She is taking Linzess for constipation.  She has a BM daily.     She is taking Bactrim DS Monday Wednesday and Friday and Vlacyclovir 500 mg daily due to immunosupression. SHe has numbness in her hands since her chemo        REVIEW OF SYSTEMS: She denies fevers, chills,  hearing loss, sinus congestion, sore throat, chest pain, shortness of breath, nausea, vomiting, diarrhea, dysuria, hematuria, polydipsia, polyuria, joint pain, muscle pain, headaches                    Past Surgical History:   Procedure Laterality Date    COLONOSCOPY        COLONOSCOPY N/A 11/8/2018     Performed by [facility] at [facility] (2ND FLR)    EGD (ESOPHAGOGASTRODUODENOSCOPY) N/A 11/8/2018     Performed by [facility] at [facility] ENDO (2ND FLR)    ESOPHAGOGASTRODUODENOSCOPY (EGD) N/A 3/23/2015     Performed by [facility] Stewart MD at [facility]  "ENDO (4TH FLR)    HERNIA REPAIR        [place] COLONOSCOPY,DIAGNOSTIC [68975 (CPT®)] N/A 7/2/2014     Per[facility] by [facility] at [facility] END[facility] (4TH FLR)    ULTRASOUND-ENDOSCOPIC-UPPER N/A 12/12/2018     Performed by [facility]iel [facility] at [facility] ENDO            Meds and allergies: updated on EPIC     REVIEW OF SYSTEMS: She denies fevers, chills, night sweats, fatigue, visual change, hearing loss, sinus congestion, sore throat, chest pain, shortness of breath, nausea, vomiting, constipation, diarrhea, dysuria, hematuria, polydipsia, polyuria, joint pain, muscle pain, headaches, anxiety, depression, insomnia.      Physical exam:  /70 (BP Location: Right arm, Patient Position: Sitting, BP Method: Large (Manual))   Pulse 84   Ht 5' 2" (1.575 m)   Wt 76 kg (167 lb 7 oz)   SpO2 99%   BMI 30.63 kg/m²     General: alert, oriented x 3, no apparent distress.  Affect normal  HEENT: Conjunctivae: anicteric, PERRL, EOMI, TM clear, Oralpharynx clear  Neck: supple, no thyroid enlargement, no cervical lymphadenopathy  Resp: effort normal, lungs clear bilaterally  CV: Regular rate and rhythm without murmurs, gallops or rubs, no lower extremity edema,        Assessment/Plan:     Stage 4 large B cell lymphoma - finished chemo 6/26/19. Follow up with [facility] next week     Anxiety and depression - continue venlafaxine 75 mg daily,  Propranolol 10 mg twice times daily, and  Trazodone 50 mg 1/2-1 tablet at bedtime. TO get counseling in psychiatry  GERD - asx     I will see her back in 3 months sooner if problems arise.     "

## 2019-09-23 ENCOUNTER — TELEPHONE (OUTPATIENT)
Dept: OBSTETRICS AND GYNECOLOGY | Facility: CLINIC | Age: 44
End: 2019-09-23

## 2019-09-23 NOTE — TELEPHONE ENCOUNTER
----- Message from Peg Aggarwal sent at 9/23/2019 11:57 AM CDT -----  Contact: MOSHE JOE [[id number]]   Name of Who is Calling:     What is the request in detail: patient request call back in reference to moving or changing injection date or time  Please contact to further discuss and advise      Can the clinic reply by [facility]: no     What Number to Call Back if not in [facility]:

## 2019-09-23 NOTE — TELEPHONE ENCOUNTER
Pt wants to change her appt for her depo but she will come in and be seen if she cannot come she will call and reschedule.

## 2019-09-24 ENCOUNTER — PATIENT MESSAGE (OUTPATIENT)
Dept: OBSTETRICS AND GYNECOLOGY | Facility: CLINIC | Age: 44
End: 2019-09-24

## 2019-10-07 DIAGNOSIS — Z30.9 ENCOUNTER FOR CONTRACEPTIVE MANAGEMENT, UNSPECIFIED TYPE: ICD-10-CM

## 2019-10-08 ENCOUNTER — DOCUMENTATION ONLY (OUTPATIENT)
Dept: PHARMACY | Facility: CLINIC | Age: 44
End: 2019-10-08

## 2019-10-08 ENCOUNTER — CLINICAL SUPPORT (OUTPATIENT)
Dept: OBSTETRICS AND GYNECOLOGY | Facility: CLINIC | Age: 44
End: 2019-10-08
Payer: COMMERCIAL

## 2019-10-08 DIAGNOSIS — Z30.9 ENCOUNTER FOR CONTRACEPTIVE MANAGEMENT, UNSPECIFIED TYPE: Primary | ICD-10-CM

## 2019-10-08 LAB
B-HCG UR QL: NEGATIVE
CTP QC/QA: YES

## 2019-10-08 PROCEDURE — 81025 POCT URINE PREGNANCY: ICD-10-PCS | Mod: S$GLB,,, | Performed by: OBSTETRICS & GYNECOLOGY

## 2019-10-08 PROCEDURE — 81025 URINE PREGNANCY TEST: CPT | Mod: S$GLB,,, | Performed by: OBSTETRICS & GYNECOLOGY

## 2019-10-08 NOTE — PROGRESS NOTES
Patient received IM Depo Provera to the upper outer side of left buttock on 10/08/2019     The patient was instructed to get the next injection on 12/24-01/07/2019     Lot Number: [id number]  Expiration Date: 07/21  BY AMINA LIN

## 2019-10-09 RX ORDER — MEDROXYPROGESTERONE ACETATE 150 MG/ML
INJECTION, SUSPENSION INTRAMUSCULAR
Qty: 1 ML | Refills: 3 | Status: SHIPPED | OUTPATIENT
Start: 2019-10-09 | End: 2021-03-10

## 2019-12-05 ENCOUNTER — TELEPHONE (OUTPATIENT)
Dept: HEMATOLOGY/ONCOLOGY | Facility: CLINIC | Age: 44
End: 2019-12-05

## 2019-12-05 NOTE — TELEPHONE ENCOUNTER
----- Message from Meagan Deng sent at 12/5/2019  4:03 PM CST -----  Contact: pt  Reason: Calling to ask if she have a pathology report?    Communication;  [phone]

## 2019-12-06 NOTE — TELEPHONE ENCOUNTER
Tried leaving message for office but ph kept ringing. Will contact again later today regarding patient.

## 2019-12-06 NOTE — TELEPHONE ENCOUNTER
----- Message from Radha Rivas sent at 12/6/2019  9:21 AM CST -----  [facility] [phone]  States that she is calling to confirm that pt is able to fly and that she does not need oxygen, wheelchair or IV. States that pt has an appt at [facility] on 12/18/19 for testing. Please call back at [phone]//thank you acc

## 2019-12-09 RX ORDER — PROPRANOLOL HYDROCHLORIDE 10 MG/1
TABLET ORAL
Qty: 180 TABLET | Refills: 3 | Status: SHIPPED | OUTPATIENT
Start: 2019-12-09 | End: 2020-07-14

## 2019-12-09 NOTE — PROGRESS NOTES
Refill Authorization Note     is requesting a refill authorization.    Brief assessment and rationale for refill: APPROVE: prr             Comments:   Requested Prescriptions   Signed Prescriptions Disp Refills    propranolol (INDERAL) 10 MG tablet 180 tablet 3     Sig: TAKE 1 TABLET BY MOUTH THREE TIMES A DAY AS NEEDED       Cardiovascular:  Beta Blockers Passed - 12/6/2019 11:09 AM        Passed - Patient is at least 18 years old        Passed - Negative Pregnancy Status Check        Passed - Last BP in normal range within 360 days     BP Readings from Last 3 Encounters:   09/17/19 108/70   07/30/19 112/80   07/19/19 110/82              Passed - Last Heart Rate in normal range within 360 days     Pulse Readings from Last 3 Encounters:   09/17/19 84   07/30/19 89   07/19/19 99             Passed - Office visit in past 12 months or future 90 days     Recent Outpatient Visits            2 months 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

## 2019-12-09 NOTE — TELEPHONE ENCOUNTER
Refill Authorization Note     is requesting a refill authorization.    Brief assessment and rationale for refill: APPROVE: prr                                         Comments:   Requested Prescriptions   Signed Prescriptions Disp Refills    propranolol (INDERAL) 10 MG tablet 180 tablet 3     Sig: TAKE 1 TABLET BY MOUTH THREE TIMES A DAY AS NEEDED       Cardiovascular:  Beta Blockers Passed - 12/6/2019 11:09 AM        Passed - Patient is at least 18 years old        Passed - Negative Pregnancy Status Check        Passed - Last BP in normal range within 360 days     BP Readings from Last 3 Encounters:   09/17/19 108/70   07/30/19 112/80   07/19/19 110/82              Passed - Last Heart Rate in normal range within 360 days     Pulse Readings from Last 3 Encounters:   09/17/19 84   07/30/19 89   07/19/19 99             Passed - Office visit in past 12 months or future 90 days     Recent Outpatient Visits            2 months ago Diffuse large B-cell lymphoma of spleen    [facility] Internal Medicine Stacy Frye MD    4 months ago Diffuse large B-cell lymphoma of spleen    [facility] Internal Medicine Stacy rFye MD    4 months ago Anxiety    [facility] - Internal Medicine Stacy Frye MD    7 months ago Encounter for gynecological examination without abnormal finding    [facility] 5th Floor Ellen Moctezuma MD    11 months ago     [facility]          Future Appointments              In 1 week [facility] - [facility], [facility]                  Appointments  past 15m or future 3m with PCP    Date Provider   Last Visit   9/17/2019 Stacy Frye MD   Next Visit   12/17/2019 Stacy Frye MD

## 2019-12-10 ENCOUNTER — TELEPHONE (OUTPATIENT)
Dept: ENDOSCOPY | Facility: HOSPITAL | Age: 44
End: 2019-12-10

## 2019-12-10 NOTE — TELEPHONE ENCOUNTER
----- Message from Bethany Barbosa sent at 12/10/2019 12:23 PM CST -----  Contact: pt  Pt would like to get a report on her Biopsy that was done on 12/12/19. Pathology report.  Needs this for insurance.

## 2019-12-13 ENCOUNTER — TELEPHONE (OUTPATIENT)
Dept: INTERNAL MEDICINE | Facility: CLINIC | Age: 44
End: 2019-12-13

## 2019-12-13 ENCOUNTER — OFFICE VISIT (OUTPATIENT)
Dept: DERMATOLOGY | Facility: CLINIC | Age: 44
End: 2019-12-13
Payer: COMMERCIAL

## 2019-12-13 VITALS — WEIGHT: 167 LBS | BODY MASS INDEX: 30.54 KG/M2

## 2019-12-13 DIAGNOSIS — L81.9 DARK CIRCLE UNDER EYE, UNSPECIFIED LATERALITY: ICD-10-CM

## 2019-12-13 DIAGNOSIS — L70.0 OPEN COMEDONE: Primary | ICD-10-CM

## 2019-12-13 DIAGNOSIS — L91.8 CUTANEOUS SKIN TAGS: ICD-10-CM

## 2019-12-13 PROCEDURE — 99201 PR OFFICE/OUTPT VISIT,NEW,LEVL I: CPT | Mod: S$GLB,,, | Performed by: DERMATOLOGY

## 2019-12-13 PROCEDURE — 3008F PR BODY MASS INDEX (BMI) DOCUMENTED: ICD-10-PCS | Mod: CPTII,S$GLB,, | Performed by: DERMATOLOGY

## 2019-12-13 PROCEDURE — 99999 PR PBB SHADOW E&M-EST. PATIENT-LVL II: CPT | Mod: PBBFAC,,, | Performed by: DERMATOLOGY

## 2019-12-13 PROCEDURE — 3008F BODY MASS INDEX DOCD: CPT | Mod: CPTII,S$GLB,, | Performed by: DERMATOLOGY

## 2019-12-13 PROCEDURE — 99999 PR PBB SHADOW E&M-EST. PATIENT-LVL II: ICD-10-PCS | Mod: PBBFAC,,, | Performed by: DERMATOLOGY

## 2019-12-13 PROCEDURE — 99201 PR OFFICE/OUTPT VISIT,NEW,LEVL I: ICD-10-PCS | Mod: S$GLB,,, | Performed by: DERMATOLOGY

## 2019-12-13 NOTE — TELEPHONE ENCOUNTER
----- Message from José Miguel Newton sent at 12/13/2019  1:00 PM CST -----  Contact: Patient [phone]  Sooner appointment than the  can schedule.  Did you offer to schedule the next available appointment and put the patient on the wait list?:    When is the first available appointment: 01/09/20  What is the nature of the appointment: F/U  What visit type: EP  Patient preference of timeframe to be scheduled:    Comments: patient stating must return back to work on 01/07/20, but has a form that is needing to be filled out, stating also can not make the appt set for 12/17/19 at 2:30 please call to inform if can be seen sooner thatn 01/09/19    Please call an advise  Thank you

## 2019-12-13 NOTE — PROGRESS NOTES
Subjective:       Patient ID:  Terese Macias is a 44 y.o. female who presents for   Chief Complaint   Patient presents with    Skin Discoloration     face,     Spot     face     Has large pores and few comedones on face would like tx.  Also dark circles under eyes and skin tags neck.    Skin Discoloration  - Initial  Affected locations: face  Aggravated by: nothing  Relieving factors/Treatments tried: nothing    Spot         Review of Systems   Constitutional: Negative for fever, chills, weight loss, weight gain, fatigue, night sweats and malaise.   Skin: Positive for daily sunscreen use and activity-related sunscreen use. Negative for wears hat.   Hematologic/Lymphatic: Bruises/bleeds easily.        Objective:    Physical Exam   Constitutional: She appears well-developed and well-nourished.   Neurological: She is alert and oriented to person, place, and time.   Psychiatric: She has a normal mood and affect.   Skin:   Areas Examined (abnormalities noted in diagram):   Head / Face Inspection Performed  Neck Inspection Performed  Nails and Digits Inspection Performed              Diagram Legend     Erythematous scaling macule/papule c/w actinic keratosis       Vascular papule c/w angioma      Pigmented verrucoid papule/plaque c/w seborrheic keratosis      Yellow umbilicated papule c/w sebaceous hyperplasia      Irregularly shaped tan macule c/w lentigo     1-2 mm smooth white papules consistent with Milia      Movable subcutaneous cyst with punctum c/w epidermal inclusion cyst      Subcutaneous movable cyst c/w pilar cyst      Firm pink to brown papule c/w dermatofibroma      Pedunculated fleshy papule(s) c/w skin tag(s)      Evenly pigmented macule c/w junctional nevus     Mildly variegated pigmented, slightly irregular-bordered macule c/w mildly atypical nevus      Flesh colored to evenly pigmented papule c/w intradermal nevus       Pink pearly papule/plaque c/w basal cell carcinoma      Erythematous  hyperkeratotic cursted plaque c/w SCC      Surgical scar with no sign of skin cancer recurrence      Open and closed comedones      Inflammatory papules and pustules      Verrucoid papule consistent consistent with wart     Erythematous eczematous patches and plaques     Dystrophic onycholytic nail with subungual debris c/w onychomycosis     Umbilicated papule    Erythematous-base heme-crusted tan verrucoid plaque consistent with inflamed seborrheic keratosis     Erythematous Silvery Scaling Plaque c/w Psoriasis     See annotation      Assessment / Plan:        Open comedones  Try finacea hs  neutrogena acne wash    Dark [place] under eye, unspecified laterality  No tx    Cutaneous skin tags  reassurance                 Follow up in about 1 year (around 12/13/2020).

## 2019-12-27 ENCOUNTER — OFFICE VISIT (OUTPATIENT)
Dept: INTERNAL MEDICINE | Facility: CLINIC | Age: 44
End: 2019-12-27
Payer: COMMERCIAL

## 2019-12-27 VITALS
TEMPERATURE: 99 F | OXYGEN SATURATION: 99 % | DIASTOLIC BLOOD PRESSURE: 86 MMHG | HEART RATE: 78 BPM | BODY MASS INDEX: 32.54 KG/M2 | WEIGHT: 176.81 LBS | SYSTOLIC BLOOD PRESSURE: 118 MMHG | HEIGHT: 62 IN

## 2019-12-27 DIAGNOSIS — F41.9 ANXIETY: ICD-10-CM

## 2019-12-27 DIAGNOSIS — Z12.39 BREAST CANCER SCREENING: ICD-10-CM

## 2019-12-27 DIAGNOSIS — G47.09 OTHER INSOMNIA: ICD-10-CM

## 2019-12-27 DIAGNOSIS — C83.38 DIFFUSE LARGE B-CELL LYMPHOMA OF LYMPH NODES OF MULTIPLE REGIONS: Primary | ICD-10-CM

## 2019-12-27 PROCEDURE — 99999 PR PBB SHADOW E&M-EST. PATIENT-LVL IV: CPT | Mod: PBBFAC,,, | Performed by: INTERNAL MEDICINE

## 2019-12-27 PROCEDURE — 99999 PR PBB SHADOW E&M-EST. PATIENT-LVL IV: ICD-10-PCS | Mod: PBBFAC,,, | Performed by: INTERNAL MEDICINE

## 2019-12-27 PROCEDURE — 99214 PR OFFICE/OUTPT VISIT, EST, LEVL IV, 30-39 MIN: ICD-10-PCS | Mod: S$GLB,,, | Performed by: INTERNAL MEDICINE

## 2019-12-27 PROCEDURE — 99214 OFFICE O/P EST MOD 30 MIN: CPT | Mod: S$GLB,,, | Performed by: INTERNAL MEDICINE

## 2019-12-27 PROCEDURE — 3008F PR BODY MASS INDEX (BMI) DOCUMENTED: ICD-10-PCS | Mod: CPTII,S$GLB,, | Performed by: INTERNAL MEDICINE

## 2019-12-27 PROCEDURE — 3008F BODY MASS INDEX DOCD: CPT | Mod: CPTII,S$GLB,, | Performed by: INTERNAL MEDICINE

## 2019-12-27 NOTE — PROGRESS NOTES
Chief Complaint: Follow up of anxiety, lymphoma, constipation.      HPI: This is a 44 year old woman who presents for follow up of above. Mood has been goo. Anxiety has been better on venlafaxine 75 mg daily.  She is taking propranolol 10 mg twice daily which has also helped her mood.  She does not feel jittery since she is taking propranolol.  She takes trazodone 50 mg 1 tablet at bedtime. She still has some difficulty sleeping.  She does not feel medicated.  She is finishing sabbatical this fall semester.   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 She has not been working for the last year.  She feels that she is ready to return to work. She is nervous about returning.      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 [facility] on 12/18/19 and is in complete remission status. will be going to [facility] next week.  She was officially diagnosed in February 4, 2019.  She started to have abdominal pain 9/2018 which started the work up that eventually let to her diagnosis. She gets hot alot     Hot flashes come and go every now and then. Not as bad.  She has an occasional night sweat. Not as bad.   SHe takes depo provera 150 mg every 3 months. No periods now.     She has occasional sinus congestion. She takes zyrtec as needed.      She is taking Linzess as needed for constipation.  She has a BM daily.      She is taking Bactrim DS Monday Wednesday and Friday and Vlacyclovir 500 mg daily due to immunosupression. SHe has numbness in her hands since her chemo    She is now taking vitamin D 50,000 units once  A week for vitamin D deficiency        REVIEW OF SYSTEMS: She denies fevers, chills,  hearing loss, sinus congestion, sore throat, chest pain, shortness of breath, nausea, vomiting, diarrhea, dysuria, hematuria, polydipsia, polyuria, joint pain, muscle pain, headaches                     Past  "Surgical History:   Procedure Laterality Date    COLONOSCOPY        COLONOSCOPY N/A 11/8/2018     Performed [facility] ENDO (2ND FLR)    EGD (ESOPHAGOGASTRODUODENOSCOPY) N/A 11/8/2018     Per[facility] by [facility] at [facility] ENDO (2ND FLR)    ESOPHAGOGASTRODUODENOSCOPY (EGD) N/A 3/23/2015     Performed by [facility] at [facility] ENDO (4TH FLR)    HERNIA REPAIR        AR COLONOSCOPY,DIAGNOSTIC [03366 (CPT®)] N/A 7/2/2014     Performed by [facility]j [facility]iter MD at [facility] ENDO (4TH FLR)    ULTRASOUND-ENDOSCOPIC-UPPER N/A 12/12/2018     Performed by [facility]enu [facility] at [facility] ENDO            Meds and allergies: updated on [facility]       Physical exam:  /86 (BP Location: Right arm, Patient Position: Sitting, BP Method: Large (Manual))   Pulse 78   Temp 99.2 °F (37.3 °C) (Oral)   Ht 5' 2" (1.575 m)   Wt 80.2 kg (176 lb 12.9 oz)   SpO2 99%   BMI 32.34 kg/m²     General: alert, oriented x 3, no apparent distress.  Affect normal  HEENT: Conjunctivae: anicteric, PERRL, EOMI, TM clear, Oralpharynx clear  Neck: supple, no thyroid enlargement, no cervical lymphadenopathy  Resp: effort normal, lungs clear bilaterally  CV: Regular rate and rhythm without murmurs, gallops or rubs, no lower extremity edema,        Assessment/Plan:     Stage 4 large B cell lymphoma - finished chemo 6/26/19. Follow up with [facility]. Note written to return to work.    Anxiety and depression/insomnia continue venlafaxine 75 mg daily, propranolol 10 mg twice daily and  Trazodone 50 mg1 tablet at bedtime.   GERD - asx     I will see her back in 3 months sooner if problems arise.     "

## 2020-01-06 DIAGNOSIS — Z30.9 ENCOUNTER FOR CONTRACEPTIVE MANAGEMENT, UNSPECIFIED TYPE: ICD-10-CM

## 2020-01-10 ENCOUNTER — TELEPHONE (OUTPATIENT)
Dept: OBSTETRICS AND GYNECOLOGY | Facility: CLINIC | Age: 45
End: 2020-01-10

## 2020-01-10 DIAGNOSIS — Z30.9 ENCOUNTER FOR CONTRACEPTIVE MANAGEMENT, UNSPECIFIED TYPE: Primary | ICD-10-CM

## 2020-01-10 RX ORDER — MEDROXYPROGESTERONE ACETATE 150 MG/ML
150 INJECTION, SUSPENSION INTRAMUSCULAR
Qty: 1 ML | Refills: 3 | Status: SHIPPED | OUTPATIENT
Start: 2020-01-10 | End: 2020-03-31 | Stop reason: CLARIF

## 2020-01-10 RX ORDER — MEDROXYPROGESTERONE ACETATE 150 MG/ML
150 INJECTION, SUSPENSION INTRAMUSCULAR
Qty: 1 ML | Refills: 3 | Status: SHIPPED | OUTPATIENT
Start: 2020-01-10 | End: 2020-04-09

## 2020-01-10 RX ORDER — TRAZODONE HYDROCHLORIDE 50 MG/1
TABLET ORAL
Qty: 90 TABLET | Refills: 1 | Status: SHIPPED | OUTPATIENT
Start: 2020-01-10 | End: 2020-04-27

## 2020-01-10 NOTE — PROGRESS NOTES
Refill Authorization Note     is requesting a refill authorization.    Brief assessment and rationale for refill: APPROVE: prr                Medication reconciliation completed: No                         Comments:   Requested Prescriptions   Pending Prescriptions Disp Refills    traZODone (DESYREL) 50 MG tablet [Pharmacy Med Name: TRAZODONE 50 MG TABLET] 90 tablet 1     Sig: TAKE 1/2-1 TABLET AT BEDTIME FOR SLEEP       Psychiatry: Antidepressants - Serotonin Modulator Passed - 1/10/2020  1:58 AM        Passed - Patient is at least 18 years old        Passed - Negative Pregnancy Status Check        Passed - Office visit in past 6 months or future 90 days     Recent Outpatient Visits            2 weeks ago Diffuse large B-cell lymphoma of lymph nodes of multiple regions    [facility] Internal Medicine Stacy Frye MD    4 weeks ago Open comedone    [facility] - Dermatology Paloma Lassiter MD    3 months ago Diffuse large B-cell lymphoma of spleen    [facility] Internal Medicine Stacy Frye MD    5 months ago Diffuse large B-cell lymphoma of spleen    [facility] Internal Medicine Stacy Frye MD    5 months ago Anxiety    [facility] Internal Medicine Stacy Frye MD          Future Appointments              In 4 days LAB, [facility] Lab [facility] 2 OP Lab, [facility]    In 3 months [facility] Internal Medicine, [facility]

## 2020-01-14 ENCOUNTER — PATIENT MESSAGE (OUTPATIENT)
Dept: OBSTETRICS AND GYNECOLOGY | Facility: CLINIC | Age: 45
End: 2020-01-14

## 2020-01-14 ENCOUNTER — TELEPHONE (OUTPATIENT)
Dept: OBSTETRICS AND GYNECOLOGY | Facility: CLINIC | Age: 45
End: 2020-01-14

## 2020-01-14 DIAGNOSIS — Z12.31 VISIT FOR SCREENING MAMMOGRAM: Primary | ICD-10-CM

## 2020-01-14 NOTE — TELEPHONE ENCOUNTER
Dr Moctezuma spoke to the pt in detail about her stopping the depo injection. She informed the pt that she needs to do her annual exam in April and her MMG this month.

## 2020-03-10 ENCOUNTER — TELEPHONE (OUTPATIENT)
Dept: INTERNAL MEDICINE | Facility: CLINIC | Age: 45
End: 2020-03-10

## 2020-03-10 NOTE — TELEPHONE ENCOUNTER
----- Message from Leyda Bonilla sent at 3/10/2020 10:39 AM CDT -----  Contact: [employer] [phone]  This agency helps people find free flights for their cancer treatment but have to speak with a doctor or a nurse before they can proceed. Patient is tryng to go to [place] for cancer treatment. Please call agency.

## 2020-03-11 NOTE — TELEPHONE ENCOUNTER
----- Message from José Miguel Newton sent at 3/11/2020  7:53 AM CDT -----  Contact: [employer] Julien Douglas, Re [phone]  Patient would like to get medical advice.    Comments: Calling to check the status of fax, is needing to know if patient is able to walk on own, doesn't need Oxygen, nor IV, prior to treatment, respond send fax back. [employer] Julien Douglas, Re [phone]    Please call an advise  Thank you

## 2020-03-17 NOTE — PROVATION PATIENT INSTRUCTIONS
Discharge Summary/Instructions after an Endoscopic Procedure  Patient Name: Terese Macias  Patient MRN: [id number]  Patient YOB: 1975 Wednesday, December 12, 2018  Venu Montiel MD  RESTRICTIONS:  During your procedure today, you received medications for sedation.  These   medications may affect your judgment, balance and coordination.  Therefore,   for 24 hours, you have the following restrictions:   - DO NOT drive a car, operate machinery, make legal/financial decisions,   sign important papers or drink alcohol.    ACTIVITY:  Today: no heavy lifting, straining or running due to procedural   sedation/anesthesia.  The following day: return to full activity including work.  DIET:  Eat and drink normally unless instructed otherwise.     TREATMENT FOR COMMON SIDE EFFECTS:  - Mild abdominal pain, nausea, belching, bloating or excessive gas:  rest,   eat lightly and use a heating pad.  - Sore Throat: treat with throat lozenges and/or gargle with warm salt   water.  - Because air was used during the procedure, expelling large amounts of air   from your rectum or belching is normal.  - If a bowel prep was taken, you may not have a bowel movement for 1-3 days.    This is normal.  SYMPTOMS TO WATCH FOR AND REPORT TO YOUR PHYSICIAN:  1. Abdominal pain or bloating, other than gas cramps.  2. Chest pain.  3. Back pain.  4. Signs of infection such as: chills or fever occurring within 24 hours   after the procedure.  5. Rectal bleeding, which would show as bright red, maroon, or black stools.   (A tablespoon of blood from the rectum is not serious, especially if   hemorrhoids are present.)  6. Vomiting.  7. Weakness or dizziness.  GO DIRECTLY TO THE NEAREST EMERGENCY ROOM IF YOU HAVE ANY OF THE FOLLOWING:      Difficulty breathing              Chills and/or fever over 101 F   Persistent vomiting and/or vomiting blood   Severe abdominal pain   Severe chest pain   Black, tarry stools   Bleeding- more than one  tablespoon   Any other symptom or condition that you feel may need urgent attention  Your doctor recommends these additional instructions:  If any biopsies were taken, your doctors clinic will contact you in 1 to 2   weeks with any results.  - Discharge patient to home (ambulatory).   - Await path results.   - Refer to  Surgical Oncology at appointment to be scheduled.   - Refer to an Oncologist at appointment to be scheduled.  For questions, problems or results please call your physician - Venu Montiel MD at Work:  [phone].  EMERGENCY PHONE NUMBER: [phone],  LAB RESULTS: [phone]  IF A COMPLICATION OR EMERGENCY SITUATION ARISES AND YOU ARE UNABLE TO REACH   YOUR PHYSICIAN - GO DIRECTLY TO THE EMERGENCY ROOM.  Venu Montiel MD  12/12/2018 3:30:20 PM  This report has been verified and signed electronically.  PROVATION   Patient Needs Assistance to Leave Residence...

## 2020-03-27 ENCOUNTER — OFFICE VISIT (OUTPATIENT)
Dept: URGENT CARE | Facility: CLINIC | Age: 45
End: 2020-03-27
Payer: COMMERCIAL

## 2020-03-27 VITALS
HEART RATE: 90 BPM | SYSTOLIC BLOOD PRESSURE: 124 MMHG | TEMPERATURE: 98 F | WEIGHT: 176.81 LBS | BODY MASS INDEX: 32.54 KG/M2 | RESPIRATION RATE: 18 BRPM | OXYGEN SATURATION: 98 % | HEIGHT: 62 IN | DIASTOLIC BLOOD PRESSURE: 80 MMHG

## 2020-03-27 DIAGNOSIS — R05.9 COUGH: ICD-10-CM

## 2020-03-27 PROCEDURE — 99213 OFFICE O/P EST LOW 20 MIN: CPT | Mod: S$GLB,,, | Performed by: NURSE PRACTITIONER

## 2020-03-27 PROCEDURE — 99213 PR OFFICE/OUTPT VISIT, EST, LEVL III, 20-29 MIN: ICD-10-PCS | Mod: S$GLB,,, | Performed by: NURSE PRACTITIONER

## 2020-03-27 NOTE — PATIENT INSTRUCTIONS
Understanding the Cold Virus  Colds are the most common illness that people get. Most adults get 2 or 3 colds per year, and most children get 5 to 7 colds per year. Colds may be caused by over 200 types of viruses. The most common of these are rhinoviruses (rhino refers to the nose).  What causes a cold virus?  All colds start with infection by a virus. You can be infected by more than one cold virus at a time. Infection with cold viruses happens when:  · You breathe in a virus from the air. This can happen when someone with a cold sneezes or coughs near you.  · You touch your eyes, nose, or mouth when your hand has a cold virus on it. This can happen if you touch an object that has the cold virus on it.  What are the symptoms of a cold virus?  Almost all colds involve a stuffy nose. Other common symptoms include:  · Runny nose  · Sneezing  · Sore throat  · Headache  · Cough  How is a cold treated?  Colds usually last 5 to 10 days. Treatment focuses on relieving symptoms. Treatments may include:  · Decongestant medicines. Several types of decongestants are available without prescription. These may help reduce stuffy or runny nose symptoms.  · Prescription or over-the-counter nasal sprays. These may help reduce nasal symptoms, including stuffiness.  · Prescription or over-the-counter pain medicines. These can help with headaches and sore throat.  · Self-care. This includes extra rest, using humidifiers, and drinking more fluids. These help you feel better while you are getting over a cold.  Antibiotics are not helpful for a cold. They do not make a cold shorter or relieve symptoms. Taking antibiotics when you dont need them can make them work less well when you need them for another illness.  Follow all directions for using medicines, especially when giving them to children. Contact your healthcare provider if you have any questions about using cold medicines safely.  Can a cold be prevented?  You can help  reduce the spread of cold viruses. This can help both you and others avoid getting colds. Follow these tips:  · Wash your hands well anytime you may have come into contact with cold viruses. Wash your hands for at least 20 seconds. When you cant wash with soap and water, use an alcohol-based hand .  · Dont touch your nose, eyes, or mouth, especially after touching something that may have a cold virus on it.  · Cover your mouth and nose when you cough or sneeze. Throw away tissues after using them.  · Disinfect things you touch often, such as phones and keyboards.    · Stay home when you have a cold.  What are the possible complications of a cold virus?  Colds usually go away by themselves. But its not unusual to get another type of infection while you have a cold. These can include:  · Sinus infection  · Lung infection, such as bronchitis or pneumonia  · Ear infection  If you have asthma or chronic bronchitis, a cold can make your condition worse.     When should I call my healthcare provider?  Call your healthcare provider right away if you have any of these:  · Fever of 100.4°F (38°C) or higher, or as directed  · Cough, chest pain, or shortness of breath that gets worse  · Symptoms dont get better or get worse after about 10 days  · Headache, sleepiness, or confusion that gets worse   Date Last Reviewed: 3/28/2016  © 3842-1894 [employer]. [address] [place] [address], [place] [address]. All rights reserved. This information is not intended as a substitute for professional medical care. Always follow your healthcare professional's instructions.

## 2020-03-27 NOTE — PROGRESS NOTES
"Subjective:       Patient ID: Treese Macias is a 44 y.o. female.    Vitals:  height is 5' 2" (1.575 m) and weight is 80.2 kg (176 lb 12.9 oz). Her oral temperature is 98.2 °F (36.8 °C). Her blood pressure is 124/80 and her pulse is 90. Her respiration is 18 and oxygen saturation is 98%.     Chief Complaint: Cough ([phone])    Cough x's 1 week, loss of smell and taste x' 4 days.      This visit was conducted remotely per [facility].        Cough   This is a new problem. The current episode started in the past 7 days. The problem has been unchanged. The cough is productive of sputum. Associated symptoms include postnasal drip. Pertinent negatives include no chest pain, chills, ear congestion, ear pain, eye redness, fever, headaches, heartburn, hemoptysis, myalgias, nasal congestion, rash, rhinorrhea, sore throat, shortness of breath, sweats, weight loss or wheezing. Nothing aggravates the symptoms. She has tried nothing for the symptoms. The treatment provided no relief. There is no history of asthma, bronchiectasis, bronchitis, COPD, emphysema, environmental allergies or pneumonia.       Constitution: Negative for chills, sweating, fatigue and fever.   HENT: Positive for postnasal drip. Negative for ear pain, congestion, sinus pain, sinus pressure, sore throat and voice change.    Neck: Negative for painful lymph nodes.   Cardiovascular: Negative for chest pain.   Eyes: Negative for eye redness.   Respiratory: Positive for cough. Negative for chest tightness, sputum production, bloody sputum, COPD, shortness of breath, stridor, wheezing and asthma.    Gastrointestinal: Negative for nausea, vomiting and heartburn.   Musculoskeletal: Negative for muscle ache.   Skin: Negative for rash.   Allergic/Immunologic: Negative for environmental allergies, seasonal allergies and asthma.   Neurological: Negative for headaches.   Hematologic/Lymphatic: Negative for swollen lymph nodes.       Objective:    "   Physical Exam      Assessment:       1. Cough        Plan:         Cough    Symptomatic care discussed.  Return to clinic for worsening symptoms.

## 2020-03-31 ENCOUNTER — HOSPITAL ENCOUNTER (EMERGENCY)
Facility: HOSPITAL | Age: 45
Discharge: HOME | End: 2020-03-31
Attending: EMERGENCY MEDICINE
Payer: COMMERCIAL

## 2020-03-31 VITALS
TEMPERATURE: 99 F | DIASTOLIC BLOOD PRESSURE: 70 MMHG | OXYGEN SATURATION: 100 % | HEIGHT: 62 IN | RESPIRATION RATE: 18 BRPM | WEIGHT: 165 LBS | BODY MASS INDEX: 30.36 KG/M2 | SYSTOLIC BLOOD PRESSURE: 118 MMHG | HEART RATE: 84 BPM

## 2020-03-31 DIAGNOSIS — R43.0 ANOSMIA: Primary | ICD-10-CM

## 2020-03-31 DIAGNOSIS — Z20.822 SUSPECTED COVID-19 VIRUS INFECTION: ICD-10-CM

## 2020-03-31 PROCEDURE — 99283 EMERGENCY DEPT VISIT LOW MDM: CPT | Mod: ,,, | Performed by: EMERGENCY MEDICINE

## 2020-03-31 PROCEDURE — U0002 COVID-19 LAB TEST NON-CDC: HCPCS

## 2020-03-31 PROCEDURE — 99283 PR EMERGENCY DEPT VISIT,LEVEL III: ICD-10-PCS | Mod: ,,, | Performed by: EMERGENCY MEDICINE

## 2020-03-31 PROCEDURE — 99284 EMERGENCY DEPT VISIT MOD MDM: CPT

## 2020-03-31 RX ORDER — ONDANSETRON 4 MG/1
4 TABLET, ORALLY DISINTEGRATING ORAL EVERY 6 HOURS PRN
Qty: 20 TABLET | Refills: 0 | Status: SHIPPED | OUTPATIENT
Start: 2020-03-31 | End: 2022-10-17 | Stop reason: SDUPTHER

## 2020-03-31 RX ORDER — BENZONATATE 100 MG/1
100 CAPSULE ORAL 3 TIMES DAILY PRN
Qty: 20 CAPSULE | Refills: 0 | Status: SHIPPED | OUTPATIENT
Start: 2020-03-31 | End: 2020-04-10

## 2020-04-01 LAB — SARS-COV-2 RNA RESP QL NAA+PROBE: DETECTED

## 2020-04-01 NOTE — DISCHARGE INSTRUCTIONS
Please take Tylenol 500 mg every 4 hr as needed for fever and muscle aches.   Return to the emergency department if you develop shortness of breath or chest pain or any other concerns  Please respect the isolation guide

## 2020-04-01 NOTE — ED NOTES
Patient identifiers for Terese Macias 44 y.o. female checked and correct.  Chief Complaint   Patient presents with    no taste or smell     no taste or smell and cough, parents are positive for COVID, [phone]     Past Medical History:   Diagnosis Date    Abnormal Pap smear     Abnormal Pap smear of vagina     Anemia     Calcified mesenteric mass 7/14/2015    Depression     Diffuse large B cell lymphoma     Diffuse large B-cell lymphoma of spleen 3/13/2019    Fever blister     GERD (gastroesophageal reflux disease)     Heavy periods 6/8/2015    Hemorrhoids without complication     IBS (irritable colon syndrome)     Joint pain     Left upper quadrant pain 12/19/2018    Pancreatic mass 12/19/2018    Psychiatric problem     Screening for HPV (human papillomavirus)     Sleep difficulties     Splenomegaly 12/19/2018    Thalassemia     Thalassemia     Therapy      Allergies reported:   Review of patient's allergies indicates:   Allergen Reactions    Penicillins Other (See Comments) and Rash     Pt had reaction to PCN as an infant.         LOC: Patient is awake, alert, and aware of environment with an appropriate affect. Patient is oriented x 3 and speaking appropriately.  APPEARANCE: Patient resting comfortably and in no acute distress. Patient is clean and well groomed, patient's clothing is properly fastened.  HEENT: pt reports loss of smell and taste.  SKIN: The skin is warm and dry.   MUSKULOSKELETAL: Patient is moving all extremities well,.  RESPIRATORY: Airway is open and patent. Respirations are spontaneous and non-labored with normal effort and rate, BBS=clear, cough noted

## 2020-04-01 NOTE — ED PROVIDER NOTES
CC: no taste or smell (no taste or smell and cough, parents are positive for COVID, [phone])      History provided by:   Patient     HPI: Terese Macias is a 44 y.o. year old female who presents to the ED complaining of loss of taste and smell x couple of days.  No fever, no cough, no chest pain, no rhinorrhea sore throat, no shortness of breath, no abdominal pain, no diarrhea  Recent close contacts with her grandparents that are COVID-19 positive.  History of lymphoma currently not on chemotherapy            Past Medical History:   Diagnosis Date    Abnormal Pap smear     Abnormal Pap smear of vagina     Anemia     Calcified mesenteric mass 7/14/2015    Depression     Diffuse large B cell lymphoma     Diffuse large B-cell lymphoma of spleen 3/13/2019    Fever blister     GERD (gastroesophageal reflux disease)     Heavy periods 6/8/2015    Hemorrhoids without complication     IBS (irritable colon syndrome)     Joint pain     Left upper quadrant pain 12/19/2018    Pancreatic mass 12/19/2018    Psychiatric problem     Screening for HPV (human papillomavirus)     Sleep difficulties     Splenomegaly 12/19/2018    Thalassemia     Thalassemia     Therapy      Past Surgical History:   Procedure Laterality Date    COLONOSCOPY      COLONOSCOPY N/A 11/8/2018    Procedure: COLONOSCOPY;  Surgeon: Ba Stewart MD;  Location: [address]);  Service: Endoscopy;  Laterality: N/A;  non diabetic constipation prep      ok to schedule per [facility]    ENDOSCOPIC ULTRASOUND OF UPPER GASTROINTESTINAL TRACT N/A 12/12/2018    Procedure: ULTRASOUND-ENDOSCOPIC-UPPER;  Surgeon: Venu Motniel MD;  Location: [facility];  Service: Endoscopy;  Laterality: N/A;    ESOPHAGOGASTRODUODENOSCOPY N/A 11/8/2018    Procedure: EGD (ESOPHAGOGASTRODUODENOSCOPY);  Surgeon: Ba Stewart MD;  Location: [address]);  Service: Endoscopy;  Laterality: N/A;  ok to schedule per [facility]    HERNIA REPAIR        Family History   Problem Relation Age of Onset    Hypertension Mother     Diabetes Father     Heart disease Father     Eczema Father     Eczema Other     Colon cancer Paternal Uncle 60        colon cancer    Cancer Cousin         breast    Breast cancer Cousin     No Known Problems Other     Cancer Cousin         stomach cancer    Breast cancer Maternal Grandmother     Cancer Maternal Grandmother         breast    Eczema Sister     Acne Sister     Eczema Brother     Eczema Daughter     Cancer Maternal Aunt         breast    Breast cancer Maternal Aunt     No Known Problems Maternal Uncle     No Known Problems Paternal Aunt     No Known Problems Maternal Grandfather     No Known Problems Paternal Grandmother     No Known Problems Paternal Grandfather     Stomach cancer Cousin 44    Melanoma Neg Hx     Psoriasis Neg Hx     Lupus Neg Hx     Ovarian cancer Neg Hx     Amblyopia Neg Hx     Blindness Neg Hx     Cataracts Neg Hx     Glaucoma Neg Hx     Macular degeneration Neg Hx     Retinal detachment Neg Hx     Strabismus Neg Hx     Stroke Neg Hx     Thyroid disease Neg Hx     Esophageal cancer Neg Hx     Irritable bowel syndrome Neg Hx     Crohn's disease Neg Hx     Ulcerative colitis Neg Hx     Celiac disease Neg Hx      No current facility-administered medications on file prior to encounter.      Current Outpatient Medications on File Prior to Encounter   Medication Sig Dispense Refill    cetirizine (ZYRTEC) 10 MG tablet Take 10 mg by mouth.      linaCLOtide (LINZESS) 145 mcg Cap capsule Take 1 capsule (145 mcg total) by mouth once daily. 30 capsule 5    propranolol (INDERAL) 10 MG tablet TAKE 1 TABLET BY MOUTH THREE TIMES A DAY AS NEEDED 180 tablet 3    venlafaxine (EFFEXOR) 75 MG tablet Take 1 tablet (75 mg total) by mouth once daily. 90 tablet 4    albuterol 90 mcg/actuation inhaler Inhale 2 puffs into the lungs every 6 (six) hours as needed for Wheezing. Rescue 18  g 0    fluticasone (FLONASE) 50 mcg/actuation nasal spray 1 spray (50 mcg total) by Each Nare route once daily. 1 Bottle 0    medroxyPROGESTERone (DEPO-PROVERA) 150 mg/mL injection INJECT 1 ML (150 MG TOTAL) INTO THE MUSCLE EVERY 3 (THREE) MONTHS. 1 mL 3    medroxyPROGESTERone (DEPO-PROVERA) 150 mg/mL injection Inject 1 mL (150 mg total) into the muscle every 3 (three) months. 1 mL 3    traZODone (DESYREL) 50 MG tablet TAKE 1/2-1 TABLET AT BEDTIME FOR SLEEP 90 tablet 1    [DISCONTINUED] butalbital-acetaminophen-caffeine -40 mg (FIORICET, ESGIC) -40 mg per tablet Take 1 tablet by mouth.      [DISCONTINUED] medroxyPROGESTERone (DEPO-PROVERA) 150 mg/mL Syrg Inject 1 mL (150 mg total) into the muscle every 3 (three) months. 1 mL 3    [DISCONTINUED] ondansetron (ZOFRAN) 8 MG tablet Take 8 mg by mouth.      [DISCONTINUED] sulfamethoxazole-trimethoprim 800-160mg (BACTRIM DS) 800-160 mg Tab Take 1 tablet by mouth. MOnday, Wednesday, Friday      [DISCONTINUED] valACYclovir (VALTREX) 500 MG tablet Take 500 mg by mouth once daily.        Penicillins  Social History     Socioeconomic History    Marital status: Single     Spouse name: Not on file    Number of children: 1    Years of education: Not on file    Highest education level: Not on file   Occupational History    Occupation: Teacher     Employer: [employer]   Social Needs    Financial resource strain: Not on file    Food insecurity:     Worry: Not on file     Inability: Not on file    Transportation needs:     Medical: Not on file     Non-medical: Not on file   Tobacco Use    Smoking status: Never Smoker    Smokeless tobacco: Never Used   Substance and Sexual Activity    Alcohol use: Yes     Comment: Occasionally    Drug use: No    Sexual activity: Never     Partners: Male     Birth control/protection: Injection   Lifestyle    Physical activity:     Days per week: Not on file     Minutes per session: Not on file     Stress: Not on file   Relationships    Social connections:     Talks on phone: Not on file     Gets together: Not on file     Attends Confucianist service: Not on file     Active member of club or organization: Not on file     Attends meetings of clubs or organizations: Not on file     Relationship status: Not on file   Other Topics Concern    Are you pregnant or think you may be? No    Breast-feeding No    Patient feels they ought to cut down on drinking/drug use No    Patient annoyed by others criticizing their drinking/drug use No    Patient has felt bad or guilty about drinking/drug use No    Patient has had a drink/used drugs as an eye opener in the AM No   Social History Narrative    Teacher, [employer]. Single, 1 child, daughter    Lives with parents at present.       ROS:     Constitutional : neg for fever, neg for weakness  HENT neg for head injury, neg for sore throat, positive for loss of taste and smell  Eyes: neg for visual changes, neg for eye pain  Resp neg for SOB, neg for cough  Cardiac  neg for chest pain, neg for palpitations  GI neg for abd pain, neg for nausea, neg for vomiting   neg for urinary changes  Neuro neg for focal weakness or numbness  Skin neg for skin rash  MSK: neg for myalgia  ALL: Penicillins    PHYSICAL EXAM:  Vitals:    03/31/20 2013   BP: 118/70   Pulse: 84   Resp: 18   Temp:          PHYSICAL EXAM:     general: comfortable, in no acute distress, pleasant, well nourished  VS: triage VS reviewed  HENT: NC/AT  Resp: comfortable breathing, speaks in full sentences  Neuro: AAO x 3, face symmetric, speech normal              DATA & INTERVENTIONS:    LABS reviewed:  Labs Reviewed   SARS-COV-2 (COVID-19) QUALITATIVE PCR       RADIOLOGY reviewed:  Imaging Results    None         MEDICATIONS/FLUIDS:  Medications - No data to display      MDM:  Terese Macias is a 44 y.o. year old female who presents to the ED complaining of anosmia, ageusia.  No respiratory symptoms, no fever,  positive contact with COVID-19 family member.  In the picture of history of B-cell lymphoma, will test for SARS CoV 2.  Initially tachycardic in triage however on repeat vitals heart rate within normal limits.  Patient looks well, stable for discharge.  Discussed supportive measures for symptomatic management of viral syndrome.  Advised to return if chest pain or shortness of breath.  Isolation measures discussed with the patient and also given instructions in writing      IMPRESSION:  1.)  Anosmia, ageusia r/o SARS Cov 2      Dispo: Discharge    Critical Care Time: N/A       Cheryl Fernandez MD  04/01/20 0003

## 2020-04-03 ENCOUNTER — TELEPHONE (OUTPATIENT)
Dept: INTERNAL MEDICINE | Facility: CLINIC | Age: 45
End: 2020-04-03

## 2020-04-03 DIAGNOSIS — U07.1 COVID-19 VIRUS DETECTED: Primary | ICD-10-CM

## 2020-04-03 NOTE — TELEPHONE ENCOUNTER
Pt tested pos, she feels fine, however she is unable to taste or smell. She lives in the same house with her parents who also tested positive. Pt wants to know what is the next step since she has a underlying issue

## 2020-04-03 NOTE — TELEPHONE ENCOUNTER
----- Message from Ok Mclaughlin sent at 4/3/2020 11:12 AM CDT -----  Contact: self   Patient state she want to speak to Dr. Frye about her COVID-19 test results. Please call and advise.

## 2020-04-03 NOTE — TELEPHONE ENCOUNTER
Monitor for fever, chills, nauesea, vomiting, diarrhea, body aches, shortness of breath    WIll put on home monitoring

## 2020-04-14 ENCOUNTER — OFFICE VISIT (OUTPATIENT)
Dept: INTERNAL MEDICINE | Facility: CLINIC | Age: 45
End: 2020-04-14
Payer: COMMERCIAL

## 2020-04-14 VITALS
DIASTOLIC BLOOD PRESSURE: 80 MMHG | HEART RATE: 89 BPM | OXYGEN SATURATION: 98 % | HEIGHT: 62 IN | WEIGHT: 177.69 LBS | BODY MASS INDEX: 32.7 KG/M2 | SYSTOLIC BLOOD PRESSURE: 110 MMHG

## 2020-04-14 DIAGNOSIS — C83.38 DIFFUSE LARGE B-CELL LYMPHOMA OF LYMPH NODES OF MULTIPLE REGIONS: ICD-10-CM

## 2020-04-14 DIAGNOSIS — K21.9 GASTROESOPHAGEAL REFLUX DISEASE WITHOUT ESOPHAGITIS: ICD-10-CM

## 2020-04-14 DIAGNOSIS — F43.21 GRIEF: ICD-10-CM

## 2020-04-14 DIAGNOSIS — F41.9 ANXIETY: Primary | ICD-10-CM

## 2020-04-14 DIAGNOSIS — G47.09 OTHER INSOMNIA: ICD-10-CM

## 2020-04-14 PROCEDURE — 99999 PR PBB SHADOW E&M-EST. PATIENT-LVL III: CPT | Mod: PBBFAC,,, | Performed by: INTERNAL MEDICINE

## 2020-04-14 PROCEDURE — 99214 PR OFFICE/OUTPT VISIT, EST, LEVL IV, 30-39 MIN: ICD-10-PCS | Mod: S$GLB,,, | Performed by: INTERNAL MEDICINE

## 2020-04-14 PROCEDURE — 99999 PR PBB SHADOW E&M-EST. PATIENT-LVL III: ICD-10-PCS | Mod: PBBFAC,,, | Performed by: INTERNAL MEDICINE

## 2020-04-14 PROCEDURE — 99214 OFFICE O/P EST MOD 30 MIN: CPT | Mod: S$GLB,,, | Performed by: INTERNAL MEDICINE

## 2020-04-14 PROCEDURE — 3008F BODY MASS INDEX DOCD: CPT | Mod: CPTII,S$GLB,, | Performed by: INTERNAL MEDICINE

## 2020-04-14 PROCEDURE — 3008F PR BODY MASS INDEX (BMI) DOCUMENTED: ICD-10-PCS | Mod: CPTII,S$GLB,, | Performed by: INTERNAL MEDICINE

## 2020-04-14 RX ORDER — VENLAFAXINE HYDROCHLORIDE 150 MG/1
150 CAPSULE, EXTENDED RELEASE ORAL DAILY
Qty: 30 CAPSULE | Refills: 3 | Status: SHIPPED | OUTPATIENT
Start: 2020-04-14 | End: 2020-07-14

## 2020-04-14 NOTE — PROGRESS NOTES
Chief Complaint: Follow up of anxiety, lymphoma, constipation.      HPI: This is a 44 year old woman who presents for follow up of above.     She has been very stressed.  Her father  at age 72 last week of COVID 19. Her mother is currently hospitalized with COVID 19 and is getting better. She is taking things one day at a time.  She is not sleeping well the last 2 weeks.  She has trazodone 50 mg at home which she has not taken.  She continues to take venlafaxine 75 mg daily.  She is taking propranolol 10 mg twice daily which has also helps her anxiety. 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  SHe was doing ok until the COVID pandemic. She was working up until  until school closed.      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 [facility] and is in complete remission status. She was to see [facility] in March- this apt was cancelled due to COVID pandemic.     Hot flashes come and go every now and then. Not as bad.  She has an occasional night sweat. Not as bad. Her last shot of Depo Provera was in December. She is trying to see if she would get a cycle off the Depo Provera. No cycle yet.      She has occasional sinus congestion. She takes zyrtec as needed.      She is taking Linzess as needed for constipation.  She has a BM daily. She has not needed the Linzess.      She is no longer taking Bactrim DS  and Friday and Valcyclovir 500 mg daily due to immunosupression. SHe has numbness in her hands since her chemo - this has since resolved.      She is now taking vitamin D 50,000 units once  A week for vitamin D deficiency          REVIEW OF SYSTEMS: She denies fevers, chills,  hearing loss, sinus congestion, sore throat, chest pain, shortness of breath, nausea, vomiting, diarrhea, dysuria, hematuria, polydipsia,  "polyuria, joint pain, muscle pain, headaches                     Past Surgical History:   Procedure Laterality Date    COLONOSCOPY        COLONOSCOPY N/A 11/8/2018     Performed by [facility] at [facility] (2ND FLR)    EGD (ESOPHAGOGASTRODUODENOSCOPY) N/A 11/8/2018     Performed by Ba Stewart MD at [facility] ENDO (2ND FLR)    ESOPHAGOGASTRODUODENOSCOPY (EGD) N/A 3/23/2015     Performed by Ba Stewart MD at [facility] ENDO (4TH FLR)    HERNIA REPAIR        AL COLONOSCOPY,DIAGNOSTIC [02932 (CPT®)] N/A 7/2/2014     Performed by Cj Lassiter MD at [facility] ENDO (4TH FLR)    ULTRASOUND-ENDOSCOPIC-UPPER N/A 12/12/2018     Performed by [facility] at [facility]            Meds and allergies: updated on [facility]        Physical exam:   /80 (BP Location: Right arm, Patient Position: Sitting, BP Method: Large (Manual))   Pulse 89   Ht 5' 2" (1.575 m)   Wt 80.6 kg (177 lb 11.1 oz)   SpO2 98%   BMI 32.50 kg/m²     General: alert, oriented x 3, no apparent distress.  Affect normal  HEENT: Conjunctivae: anicteric, PERRL, EOMI, TM clear, Oralpharynx clear  Neck: supple, no thyroid enlargement, no cervical lymphadenopathy  Resp: effort normal, lungs clear bilaterally  CV: Regular rate and rhythm without murmurs, gallops or rubs, no lower extremity edema,        Assessment/Plan:     Stage 4 large B cell lymphoma - finished chemo 6/26/19. Follow up with [facility]. .    Anxiety and depression/insomnia/grief- increase venlafaxine er to 150 mg daily.  Continue propranolol 10 mg twice daily and  get back on Trazodone 50 mg1 tablet at bedtime. Counseling given in clinic  Menstrual disorder- doing well off Depo Provera  GERD - asx     I will see her back in 3 months sooner if problems arise.     "

## 2020-04-27 RX ORDER — TRAZODONE HYDROCHLORIDE 50 MG/1
TABLET ORAL
Qty: 90 TABLET | Refills: 1 | Status: SHIPPED | OUTPATIENT
Start: 2020-04-27 | End: 2022-03-04 | Stop reason: SDUPTHER

## 2020-04-27 NOTE — PROGRESS NOTES
Refill Routing Note    Medication(s) are not appropriate for processing by [facility]:       Outside of protocol        Medication reconciliation completed: No      Appointments [id number] or future 3m with PCP    Date Provider   Last Visit   4/14/2020 Stacy Frye MD   Next Visit   5/27/2020 Stacy Frye MD     Automatic Epic Protocol Generated Data:    Requested Prescriptions   Pending Prescriptions Disp Refills    traZODone (DESYREL) 50 MG tablet [Pharmacy Med Name: TRAZODONE 50 MG TABLET] 90 tablet 1     Sig: TAKE 1/2-1 TABLET AT BEDTIME FOR SLEEP       Psychiatry: Antidepressants - Serotonin Modulator Passed - 4/25/2020 12:13 PM        Passed - Patient is at least 18 years old        Passed - Negative Pregnancy Status Check        Passed - Office visit in past 6 months or future 90 days.     Recent Outpatient Visits            1 week ago Anxiety    [facility] Stacy Frye MD    1 month ago Cough    [facility] Kenzie Garcia NP    4 months ago Diffuse large B-cell lymphoma of lymph nodes of multiple regions    [facility] Stacy Frye MD    4 months ago Open comedone    [place] - Dermatology Paloma Lassiter MD    7 months ago Diffuse large B-cell lymphoma of spleen    [facility] Stacy Frye MD          Future Appointments              In 1 month MD [facility]                   Note composed:1:42 PM 04/27/2020

## 2020-05-22 ENCOUNTER — PATIENT OUTREACH (OUTPATIENT)
Dept: ADMINISTRATIVE | Facility: OTHER | Age: 45
End: 2020-05-22

## 2020-05-26 ENCOUNTER — OFFICE VISIT (OUTPATIENT)
Dept: PODIATRY | Facility: CLINIC | Age: 45
End: 2020-05-26
Payer: COMMERCIAL

## 2020-05-26 VITALS
HEIGHT: 62 IN | DIASTOLIC BLOOD PRESSURE: 79 MMHG | SYSTOLIC BLOOD PRESSURE: 122 MMHG | BODY MASS INDEX: 32.7 KG/M2 | WEIGHT: 177.69 LBS | HEART RATE: 87 BPM

## 2020-05-26 DIAGNOSIS — M76.62 ACHILLES TENDINITIS OF LEFT LOWER EXTREMITY: ICD-10-CM

## 2020-05-26 DIAGNOSIS — M25.572 LEFT LATERAL ANKLE PAIN: Primary | ICD-10-CM

## 2020-05-26 PROCEDURE — 99203 OFFICE O/P NEW LOW 30 MIN: CPT | Mod: S$GLB,,, | Performed by: PODIATRIST

## 2020-05-26 PROCEDURE — 3008F PR BODY MASS INDEX (BMI) DOCUMENTED: ICD-10-PCS | Mod: CPTII,S$GLB,, | Performed by: PODIATRIST

## 2020-05-26 PROCEDURE — 3008F BODY MASS INDEX DOCD: CPT | Mod: CPTII,S$GLB,, | Performed by: PODIATRIST

## 2020-05-26 PROCEDURE — 99203 PR OFFICE/OUTPT VISIT, NEW, LEVL III, 30-44 MIN: ICD-10-PCS | Mod: S$GLB,,, | Performed by: PODIATRIST

## 2020-05-26 PROCEDURE — 99999 PR PBB SHADOW E&M-EST. PATIENT-LVL III: ICD-10-PCS | Mod: PBBFAC,,, | Performed by: PODIATRIST

## 2020-05-26 PROCEDURE — 99999 PR PBB SHADOW E&M-EST. PATIENT-LVL III: CPT | Mod: PBBFAC,,, | Performed by: PODIATRIST

## 2020-05-26 RX ORDER — TRAMADOL HYDROCHLORIDE 50 MG/1
50 TABLET ORAL EVERY 6 HOURS PRN
Qty: 30 TABLET | Refills: 0 | Status: SHIPPED | OUTPATIENT
Start: 2020-05-26 | End: 2020-06-05

## 2020-05-26 RX ORDER — METHYLPREDNISOLONE 4 MG/1
4 TABLET ORAL DAILY
Qty: 1 TABLET | Refills: 0 | Status: SHIPPED | OUTPATIENT
Start: 2020-05-26 | End: 2021-03-10

## 2020-05-27 ENCOUNTER — TELEPHONE (OUTPATIENT)
Dept: PODIATRY | Facility: CLINIC | Age: 45
End: 2020-05-27

## 2020-05-27 NOTE — TELEPHONE ENCOUNTER
----- Message from Alice Figueroa sent at 5/27/2020  2:48 PM CDT -----  Contact: self   Pt is asking for a call back in regards to medication that was prescribed    She would like to know how much of the medication she is supposed to take.  She stated the package and the bag that the medicine came in has two different direction.       Contact info-  [phone]

## 2020-05-27 NOTE — TELEPHONE ENCOUNTER
Nurse called pt regarding medication questions. Pt has concern about directions of medrol dose pack. Pt is instructed to follow the directions on the actual package and disregard the pharmacy paperwork that says take one daily. Pt states she understands

## 2020-05-29 NOTE — PROGRESS NOTES
Subjective:      Patient ID: Terese Macias is a 44 y.o. female.    Chief Complaint: Foot Pain (left foot pain radiating up leg)    Terese is a 44 y.o. female who presents to the podiatry clinic  with complaint of  left foot pain. Onset of the symptoms was several weeks ago. Precipitating event: none known. Current symptoms include: ability to bear weight, but with some pain. Aggravating factors: walking. Symptoms have waxed and waned but are worse overall. Patient has had no prior foot problems. Evaluation to date: none. Treatment to date: none.      Review of Systems   Constitution: Negative for chills, decreased appetite and fever.   Cardiovascular: Negative for leg swelling.   Musculoskeletal: Negative for arthritis, joint pain, joint swelling and myalgias.   Gastrointestinal: Negative for nausea and vomiting.   Neurological: Negative for loss of balance, numbness and paresthesias.         Patient Active Problem List   Diagnosis    GERD (gastroesophageal reflux disease)    Hemorrhoids without complication    Thalassemia minor    DUSTIN (iron deficiency anemia)    Diffuse large B-cell lymphoma of spleen    Diffuse large B-cell lymphoma of lymph nodes of multiple regions    Anxiety    Other insomnia    Cough    Grief       Current Outpatient Medications on File Prior to Visit   Medication Sig Dispense Refill    albuterol 90 mcg/actuation inhaler Inhale 2 puffs into the lungs every 6 (six) hours as needed for Wheezing. Rescue 18 g 0    cetirizine (ZYRTEC) 10 MG tablet Take 10 mg by mouth.      fluticasone (FLONASE) 50 mcg/actuation nasal spray 1 spray (50 mcg total) by Each Nare route once daily. 1 Bottle 0    linaCLOtide (LINZESS) 145 mcg Cap capsule Take 1 capsule (145 mcg total) by mouth once daily. 30 capsule 5    medroxyPROGESTERone (DEPO-PROVERA) 150 mg/mL injection INJECT 1 ML (150 MG TOTAL) INTO THE MUSCLE EVERY 3 (THREE) MONTHS. 1 mL 3    ondansetron (ZOFRAN-ODT) 4 MG TbDL Take 1 tablet (4 mg  total) by mouth every 6 (six) hours as needed (nausea or vomiting). 20 tablet 0    propranolol (INDERAL) 10 MG tablet TAKE 1 TABLET BY MOUTH THREE TIMES A DAY AS NEEDED 180 tablet 3    traZODone (DESYREL) 50 MG tablet TAKE 1/2-1 TABLET AT BEDTIME FOR SLEEP 90 tablet 1    venlafaxine (EFFEXOR-XR) 150 MG Cp24 Take 1 capsule (150 mg total) by mouth once daily. 30 capsule 3     No current facility-administered medications on file prior to visit.        Review of patient's allergies indicates:   Allergen Reactions    Penicillins Other (See Comments) and Rash     Pt had reaction to PCN as an infant.       Past Surgical History:   Procedure Laterality Date    COLONOSCOPY      COLONOSCOPY N/A 11/8/2018    Procedure: COLONOSCOPY;  Surgeon: Ba Stewart MD;  Location: [facility] ([address] [place] [address]);  Service: Endoscopy;  Laterality: N/A;  non diabetic constipation prep      ok to schedule per [facility]    ENDOSCOPIC ULTRASOUND OF UPPER GASTROINTESTINAL TRACT N/A 12/12/2018    Procedure: ULTRASOUND-ENDOSCOPIC-UPPER;  Surgeon: Venu Montiel MD;  Location: [facility];  Service: Endoscopy;  Laterality: N/A;    ESOPHAGOGASTRODUODENOSCOPY N/A 11/8/2018    Procedure: EGD (ESOPHAGOGASTRODUODENOSCOPY);  Surgeon: Ba Stewart MD;  Location: [facility] ([address] [place] [address]);  Service: Endoscopy;  Laterality: N/A;  ok to schedule per [facility]    HERNIA REPAIR         Family History   Problem Relation Age of Onset    Hypertension Mother     Diabetes Father     Heart disease Father     Eczema Father     Eczema Other     Colon cancer Paternal Uncle 60        colon cancer    Cancer Cousin         breast    Breast cancer Cousin     No Known Problems Other     Cancer Cousin         stomach cancer    Breast cancer Maternal Grandmother     Cancer Maternal Grandmother         breast    Eczema Sister     Acne Sister     Eczema Brother     Eczema Daughter     Cancer Maternal Aunt         breast    Breast cancer Maternal Aunt     No Known  Problems Maternal Uncle     No Known Problems Paternal Aunt     No Known Problems Maternal Grandfather     No Known Problems Paternal Grandmother     No Known Problems Paternal Grandfather     Stomach cancer Cousin 44    Melanoma Neg Hx     Psoriasis Neg Hx     Lupus Neg Hx     Ovarian cancer Neg Hx     Amblyopia Neg Hx     Blindness Neg Hx     Cataracts Neg Hx     Glaucoma Neg Hx     Macular degeneration Neg Hx     Retinal detachment Neg Hx     Strabismus Neg Hx     Stroke Neg Hx     Thyroid disease Neg Hx     Esophageal cancer Neg Hx     Irritable bowel syndrome Neg Hx     Crohn's disease Neg Hx     Ulcerative colitis Neg Hx     Celiac disease Neg Hx        Social History     Socioeconomic History    Marital status: Single     Spouse name: Not on file    Number of children: 1    Years of education: Not on file    Highest education level: Not on file   Occupational History    Occupation: Teacher     Employer: [employer]   Social Needs    Financial resource strain: Not on file    Food insecurity:     Worry: Not on file     Inability: Not on file    Transportation needs:     Medical: Not on file     Non-medical: Not on file   Tobacco Use    Smoking status: Never Smoker    Smokeless tobacco: Never Used   Substance and Sexual Activity    Alcohol use: Yes     Comment: Occasionally    Drug use: No    Sexual activity: Never     Partners: Male     Birth control/protection: Injection   Lifestyle    Physical activity:     Days per week: Not on file     Minutes per session: Not on file    Stress: Not on file   Relationships    Social connections:     Talks on phone: Not on file     Gets together: Not on file     Attends Latter-day service: Not on file     Active member of club or organization: Not on file     Attends meetings of clubs or organizations: Not on file     Relationship status: Not on file   Other Topics Concern    Are you pregnant or think you may be? No  "   Breast-feeding No    Patient feels they ought to cut down on drinking/drug use No    Patient annoyed by others criticizing their drinking/drug use No    Patient has felt bad or guilty about drinking/drug use No    Patient has had a drink/used drugs as an eye opener in the AM No   Social History Narrative    Teacher, Kevin arce. Single, 1 child, daughter    Lives with parents at present.               Objective:       Vitals:    05/26/20 1449   BP: 122/79   Pulse: 87   Weight: 80.6 kg (177 lb 11.1 oz)   Height: 5' 2" (1.575 m)   PainSc:   7        Physical Exam   Constitutional: She is oriented to person, place, and time. She appears well-developed and well-nourished.   Cardiovascular:   Pulses:       Dorsalis pedis pulses are 2+ on the right side, and 2+ on the left side.        Posterior tibial pulses are 2+ on the right side, and 2+ on the left side.   Musculoskeletal: She exhibits tenderness (L AT ). She exhibits no edema.        Right ankle: Normal.        Left ankle: Normal.        Right foot: There is no swelling, no crepitus and no deformity.        Left foot: There is no swelling, no crepitus and no deformity.   Adequate joint range of motion without pain, limitation, nor crepitation Bilateral feet and ankle joints. Muscle strength is 5/5 in all groups bilaterally.      Decreased L ankle joint ROM, pain with palpation of posterior 1/3 calcaneus at region of Achilles tendon insertion. Mild  pain with ankle joint ROM      Lymphadenopathy:   No palpable lymph nodes   Neurological: She is alert and oriented to person, place, and time. She has normal strength.   Skin: Skin is warm, dry and intact. No rash noted. No erythema. Nails show no clubbing.   Psychiatric: She has a normal mood and affect. Her behavior is normal.             Assessment:       Encounter Diagnoses   Name Primary?    Left lateral ankle pain Yes    Achilles tendinitis of left lower extremity          Plan:       Terese was seen " today for foot pain.    Diagnoses and all orders for this visit:    Left lateral ankle pain    Achilles tendinitis of left lower extremity    Other orders  -     methylPREDNISolone (MEDROL DOSEPACK) 4 mg tablet; Take 1 tablet (4 mg total) by mouth once daily. Use as instructed on dose pack  -     traMADoL (ULTRAM) 50 mg tablet; Take 1 tablet (50 mg total) by mouth every 6 (six) hours as needed for Pain.      I counseled the patient on her conditions, their implications and medical management.    Patient instructed on adequate icing techniques. Patient should ice the affected area at least once per day x 10 minutes for 10 days . I advised the  patient that extra icing would also be beneficial to ensure adequate anti inflammatory effect     Medrol dose jet prescribed, advised patient to take meds as directed. Patient should complete medication. If problems occur notify the clinic    Tramadol prn pain     .

## 2020-06-14 ENCOUNTER — PATIENT OUTREACH (OUTPATIENT)
Dept: ADMINISTRATIVE | Facility: OTHER | Age: 45
End: 2020-06-14

## 2020-06-16 ENCOUNTER — OFFICE VISIT (OUTPATIENT)
Dept: PODIATRY | Facility: CLINIC | Age: 45
End: 2020-06-16
Payer: COMMERCIAL

## 2020-06-16 VITALS
RESPIRATION RATE: 17 BRPM | HEART RATE: 75 BPM | HEIGHT: 62 IN | BODY MASS INDEX: 33.31 KG/M2 | SYSTOLIC BLOOD PRESSURE: 116 MMHG | DIASTOLIC BLOOD PRESSURE: 80 MMHG | WEIGHT: 181 LBS

## 2020-06-16 DIAGNOSIS — M25.572 LEFT LATERAL ANKLE PAIN: Primary | ICD-10-CM

## 2020-06-16 PROCEDURE — 99213 OFFICE O/P EST LOW 20 MIN: CPT | Mod: S$GLB,,, | Performed by: PODIATRIST

## 2020-06-16 PROCEDURE — 3008F PR BODY MASS INDEX (BMI) DOCUMENTED: ICD-10-PCS | Mod: CPTII,S$GLB,, | Performed by: PODIATRIST

## 2020-06-16 PROCEDURE — 99213 PR OFFICE/OUTPT VISIT, EST, LEVL III, 20-29 MIN: ICD-10-PCS | Mod: S$GLB,,, | Performed by: PODIATRIST

## 2020-06-16 PROCEDURE — 3008F BODY MASS INDEX DOCD: CPT | Mod: CPTII,S$GLB,, | Performed by: PODIATRIST

## 2020-06-16 PROCEDURE — 99999 PR PBB SHADOW E&M-EST. PATIENT-LVL III: CPT | Mod: PBBFAC,,, | Performed by: PODIATRIST

## 2020-06-16 PROCEDURE — 99999 PR PBB SHADOW E&M-EST. PATIENT-LVL III: ICD-10-PCS | Mod: PBBFAC,,, | Performed by: PODIATRIST

## 2020-06-16 NOTE — PATIENT INSTRUCTIONS
Shoe recommendations: (try [email or web address], [email or web address] , [email or web address], or shoes.com for discounted prices) you can visit DSW shoes in [place] as well    Asics (GT 1000 or gel foundations), new balance, saucony (stabil c3),  Artem (transcend), Gabriella (One)  (tennis shoe)     naot, aerosoles,SAS, ecco, yasemin, meredith yee (dress shoes)    Vionic, volitiles, burkenstocks, fitflops, naot, (sandals)    Nike comfort thong sandals, crocs,dr comfort  (house shoes)

## 2020-06-16 NOTE — PROGRESS NOTES
Subjective:      Patient ID: Terese Macias is a 44 y.o. female.    Chief Complaint: Follow-up (left ankle pain ) and Foot Pain (ball of foot )    Terese is a 44 y.o. female who presents to the podiatry clinic  with complaint of  left foot pain. Onset of the symptoms was several weeks ago. Precipitating event: none known. Current symptoms include: ability to bear weight, but with some pain. Aggravating factors: walking. Symptoms have waxed and waned but are worse overall. Patient has had no prior foot problems. Evaluation to date: none. Treatment to date: none.    6.16.20- Terese Macias presents to the podiatry for three week follow up L foot pain. States overall  The pain feels improved . Has treated aforementioned foot pain with brace, medrol dose jet, tramadol prn pain.         Review of Systems   Constitution: Negative for chills, decreased appetite and fever.   Cardiovascular: Negative for leg swelling.   Skin: Negative for dry skin.   Musculoskeletal: Negative for arthritis, joint pain, joint swelling and myalgias.   Gastrointestinal: Negative for nausea and vomiting.   Neurological: Negative for loss of balance, numbness and paresthesias.         Patient Active Problem List   Diagnosis    GERD (gastroesophageal reflux disease)    Hemorrhoids without complication    Thalassemia minor    DUSTIN (iron deficiency anemia)    Diffuse large B-cell lymphoma of spleen    Diffuse large B-cell lymphoma of lymph nodes of multiple regions    Anxiety    Other insomnia    Cough    Grief       Current Outpatient Medications on File Prior to Visit   Medication Sig Dispense Refill    fluticasone (FLONASE) 50 mcg/actuation nasal spray 1 spray (50 mcg total) by Each Nare route once daily. 1 Bottle 0    linaCLOtide (LINZESS) 145 mcg Cap capsule Take 1 capsule (145 mcg total) by mouth once daily. 30 capsule 5    ondansetron (ZOFRAN-ODT) 4 MG TbDL Take 1 tablet (4 mg total) by mouth every 6 (six) hours as needed (nausea  or vomiting). 20 tablet 0    propranolol (INDERAL) 10 MG tablet TAKE 1 TABLET BY MOUTH THREE TIMES A DAY AS NEEDED 180 tablet 3    traZODone (DESYREL) 50 MG tablet TAKE 1/2-1 TABLET AT BEDTIME FOR SLEEP 90 tablet 1    venlafaxine (EFFEXOR-XR) 150 MG Cp24 Take 1 capsule (150 mg total) by mouth once daily. 30 capsule 3    albuterol 90 mcg/actuation inhaler Inhale 2 puffs into the lungs every 6 (six) hours as needed for Wheezing. Rescue 18 g 0    cetirizine (ZYRTEC) 10 MG tablet Take 10 mg by mouth.      medroxyPROGESTERone (DEPO-PROVERA) 150 mg/mL injection INJECT 1 ML (150 MG TOTAL) INTO THE MUSCLE EVERY 3 (THREE) MONTHS. (Patient not taking: Reported on 6/16/2020) 1 mL 3    methylPREDNISolone (MEDROL DOSEPACK) 4 mg tablet Take 1 tablet (4 mg total) by mouth once daily. Use as instructed on dose pack (Patient not taking: Reported on 6/16/2020) 1 tablet 0     No current facility-administered medications on file prior to visit.        Review of patient's allergies indicates:   Allergen Reactions    Penicillins Other (See Comments) and Rash     Pt had reaction to PCN as an infant.       Past Surgical History:   Procedure Laterality Date    COLONOSCOPY      COLONOSCOPY N/A 11/8/2018    Procedure: COLONOSCOPY;  Surgeon: Ba Stewart MD;  Location: [address]);  Service: Endoscopy;  Laterality: N/A;  non diabetic constipation prep      ok to schedule per [facility]    ENDOSCOPIC ULTRASOUND OF UPPER GASTROINTESTINAL TRACT N/A 12/12/2018    Procedure: ULTRASOUND-ENDOSCOPIC-UPPER;  Surgeon: Venu Montiel MD;  Location: [facility];  Service: Endoscopy;  Laterality: N/A;    ESOPHAGOGASTRODUODENOSCOPY N/A 11/8/2018    Procedure: EGD (ESOPHAGOGASTRODUODENOSCOPY);  Surgeon: Ba Stewart MD;  Location: [address]);  Service: Endoscopy;  Laterality: N/A;  ok to schedule per [facility]    HERNIA REPAIR         Family History   Problem Relation Age of Onset    Hypertension Mother     Diabetes Father      Heart disease Father     Eczema Father     Eczema Other     Colon cancer Paternal Uncle 60        colon cancer    Cancer Cousin         breast    Breast cancer Cousin     No Known Problems Other     Cancer Cousin         stomach cancer    Breast cancer Maternal Grandmother     Cancer Maternal Grandmother         breast    Eczema Sister     Acne Sister     Eczema Brother     Eczema Daughter     Cancer Maternal Aunt         breast    Breast cancer Maternal Aunt     No Known Problems Maternal Uncle     No Known Problems Paternal Aunt     No Known Problems Maternal Grandfather     No Known Problems Paternal Grandmother     No Known Problems Paternal Grandfather     Stomach cancer Cousin 44    Melanoma Neg Hx     Psoriasis Neg Hx     Lupus Neg Hx     Ovarian cancer Neg Hx     Amblyopia Neg Hx     Blindness Neg Hx     Cataracts Neg Hx     Glaucoma Neg Hx     Macular degeneration Neg Hx     Retinal detachment Neg Hx     Strabismus Neg Hx     Stroke Neg Hx     Thyroid disease Neg Hx     Esophageal cancer Neg Hx     Irritable bowel syndrome Neg Hx     Crohn's disease Neg Hx     Ulcerative colitis Neg Hx     Celiac disease Neg Hx        Social History     Socioeconomic History    Marital status: Single     Spouse name: Not on file    Number of children: 1    Years of education: Not on file    Highest education level: Not on file   Occupational History    Occupation: Teacher     Employer: [employer]   Social Needs    Financial resource strain: Not on file    Food insecurity     Worry: Not on file     Inability: Not on file    Transportation needs     Medical: Not on file     Non-medical: Not on file   Tobacco Use    Smoking status: Never Smoker    Smokeless tobacco: Never Used   Substance and Sexual Activity    Alcohol use: Yes     Comment: Occasionally    Drug use: No    Sexual activity: Never     Partners: Male     Birth control/protection: Injection  "  Lifestyle    Physical activity     Days per week: Not on file     Minutes per session: Not on file    Stress: Not on file   Relationships    Social connections     Talks on phone: Not on file     Gets together: Not on file     Attends Restorationism service: Not on file     Active member of club or organization: Not on file     Attends meetings of clubs or organizations: Not on file     Relationship status: Not on file   Other Topics Concern    Are you pregnant or think you may be? No    Breast-feeding No    Patient feels they ought to cut down on drinking/drug use No    Patient annoyed by others criticizing their drinking/drug use No    Patient has felt bad or guilty about drinking/drug use No    Patient has had a drink/used drugs as an eye opener in the AM No   Social History Narrative    Teacher, Kevin arce. Single, 1 child, daughter    Lives with parents at present.               Objective:       Vitals:    06/16/20 1419   BP: 116/80   Pulse: 75   Resp: 17   Weight: 82.1 kg (181 lb)   Height: 5' 2" (1.575 m)   PainSc:   3   PainLoc: Foot        Physical Exam  Constitutional:       Appearance: She is well-developed.   Cardiovascular:      Pulses:           Dorsalis pedis pulses are 2+ on the right side and 2+ on the left side.        Posterior tibial pulses are 2+ on the right side and 2+ on the left side.   Musculoskeletal:      Right ankle: Normal.      Left ankle: Normal.      Right foot: No swelling, crepitus or deformity.      Left foot: No swelling, crepitus or deformity.      Comments: Adequate joint range of motion without pain, limitation, nor crepitation Bilateral feet and ankle joints. Muscle strength is 5/5 in all groups bilaterally.      Decreased L ankle joint ROM, pain with palpation of posterior 1/3 calcaneus at region of Achilles tendon insertion. No   pain with ankle joint ROM     No pain to aj     Lymphadenopathy:      Comments: No palpable lymph nodes   Skin:     General: Skin is warm " and dry.      Findings: No erythema or rash.      Nails: There is no clubbing.     Neurological:      Mental Status: She is alert and oriented to person, place, and time.   Psychiatric:         Behavior: Behavior normal.               Assessment:       Encounter Diagnosis   Name Primary?    Left lateral ankle pain Yes         Plan:       Terese was seen today for follow-up and foot pain.    Diagnoses and all orders for this visit:    Left lateral ankle pain      I counseled the patient on her conditions, their implications and medical management.  Advised patient to begin a slow transition into tennis shoes w/o brace . Patient  will use brace for long distances and prolonged standing x 2 weeks. Tennis shoes for short distances and walking around the home   .

## 2020-07-13 NOTE — PROGRESS NOTES
Refill Routing Note   Medication(s) are not appropriate for processing by [facility]:       Drug-Drug Interaction (venlafaxine 75mg and 150mg)     Medication-related problems identified: Drug-drug interaction  Medication Therapy Plan: LCO -increase venlafaxine er to 150 mg daily(4/14/20) by PCP; Drug drug interaction: venlafaxine 75mg and 150mg; Defer to you  Medication reconciliation completed: No      Automatic Epic Generated Protocol Data:    Requested Prescriptions   Pending Prescriptions Disp Refills    venlafaxine (EFFEXOR-XR) 150 MG Cp24 [Pharmacy Med Name: VENLAFAXINE HCL  MG CAP] 90 capsule 1     Sig: TAKE 1 CAPSULE BY MOUTH EVERY DAY       Psychiatry: Antidepressants - SNRI - desvenlafaxine & venlafaxine Failed - 7/12/2020 12:19 AM        Failed - Cr is 1.3 or below and within 360 days     Creatinine   Date Value Ref Range Status   06/21/2019 0.8 0.5 - 1.4 mg/dL Final   06/18/2019 0.8 0.5 - 1.4 mg/dL Final   06/14/2019 0.7 0.5 - 1.4 mg/dL Final              Failed - eGFR within 360 days     eGFR if non    Date Value Ref Range Status   06/21/2019 >60.0 >60 mL/min/1.73 m^2 Final     Comment:     Calculation used to obtain the estimated glomerular filtration  rate (eGFR) is the CKD-EPI equation.      06/18/2019 >60.0 >60 mL/min/1.73 m^2 Final     Comment:     Calculation used to obtain the estimated glomerular filtration  rate (eGFR) is the CKD-EPI equation.      06/14/2019 >60.0 >60 mL/min/1.73 m^2 Final     Comment:     Calculation used to obtain the estimated glomerular filtration  rate (eGFR) is the CKD-EPI equation.        eGFR if    Date Value Ref Range Status   06/21/2019 >60.0 >60 mL/min/1.73 m^2 Final   06/18/2019 >60.0 >60 mL/min/1.73 m^2 Final   06/14/2019 >60.0 >60 mL/min/1.73 m^2 Final              Passed - Patient is at least 18 years old        Passed - Negative Pregnancy Status Check        Passed - Last BP in normal range within 360 days.      BP Readings from Last 3 Encounters:   06/16/20 116/80   05/26/20 122/79   04/14/20 110/80              Passed - Office visit in past 6 months or future 90 days.     Recent Outpatient Visits            3 weeks ago Left lateral ankle pain    [facility]    1 month ago Left lateral ankle pain    [facility]    3 months ago Anxiety    [facility] Stacy Frye MD    3 months ago Cough    [facility] Kenzie Garcia NP    6 months ago Diffuse large B-cell lymphoma of lymph nodes of multiple regions    [facility] Stacy Frye MD                          Appointments  past 12m or future 3m with PCP    Date Provider   Last Visit   4/14/2020 Stacy Frye MD   Next Visit   Visit date not found Stacy Frye MD   ED visits in past 90 days: 0     Note composed:11:43 AM 07/13/2020

## 2020-07-14 DIAGNOSIS — F41.9 ANXIETY: Primary | ICD-10-CM

## 2020-07-14 RX ORDER — PROPRANOLOL HYDROCHLORIDE 10 MG/1
TABLET ORAL
Qty: 270 TABLET | Refills: 3 | Status: SHIPPED | OUTPATIENT
Start: 2020-07-14 | End: 2022-12-01

## 2020-07-14 RX ORDER — VENLAFAXINE HYDROCHLORIDE 150 MG/1
CAPSULE, EXTENDED RELEASE ORAL
Qty: 90 CAPSULE | Refills: 1 | Status: SHIPPED | OUTPATIENT
Start: 2020-07-14 | End: 2021-02-23 | Stop reason: ALTCHOICE

## 2020-07-15 NOTE — PROGRESS NOTES
Refill Routing Note   Medication(s) are not appropriate for processing by [facility]:       - Outside of protocol        Medication Therapy Plan: Pt using for anxiety; route to you  Medication reconciliation completed: No      Automatic Epic Generated Protocol Data:    Requested Prescriptions   Pending Prescriptions Disp Refills    propranoloL (INDERAL) 10 MG tablet [Pharmacy Med Name: PROPRANOLOL 10 MG TABLET] 270 tablet 3     Sig: TAKE 1 TABLET BY MOUTH THREE TIMES A DAY AS NEEDED       Cardiovascular:  Beta Blockers Passed - 7/14/2020 10:33 AM        Passed - Patient is at least 18 years old        Passed - Negative Pregnancy Status Check        Passed - Last BP in normal range within 360 days.     BP Readings from Last 3 Encounters:   06/16/20 116/80   05/26/20 122/79   04/14/20 110/80              Passed - Last Heart Rate in normal range within 360 days.     Pulse Readings from Last 3 Encounters:   06/16/20 75   05/26/20 87   04/14/20 89             Passed - Office visit in past 12 months or future 90 days.     Recent Outpatient Visits            4 weeks ago Left lateral ankle pain    [facility] Tiesha Jones DPM    1 month ago Left lateral ankle pain    [facility]    3 months ago Anxiety    [facility] Stacy Frye MD    3 months ago Cough    [facility] Kenzie Garcia NP    6 months ago Diffuse large B-cell lymphoma of lymph nodes of multiple regions    [facility] Stacy Frye MD                          Appointments  past 12m or future 3m with PCP    Date Provider   Last Visit   4/14/2020 Stacy Frye MD   Next Visit   Visit date not found Stacy Frye MD   ED visits in past 90 days: 0     Note composed:7:32 PM 07/14/2020

## 2020-10-05 ENCOUNTER — PATIENT MESSAGE (OUTPATIENT)
Dept: ADMINISTRATIVE | Facility: HOSPITAL | Age: 45
End: 2020-10-05

## 2020-10-07 NOTE — TELEPHONE ENCOUNTER
Care Due:                  Date            Visit Type   Department     Provider  --------------------------------------------------------------------------------                                ESTABLISHED   [facility] INTERNAL  Last Visit: 04-      PATIENT      MEDICINE       [facility]  Next Visit: None Scheduled  None         None Found                                                            Last  Test          Frequency    Reason                     Performed    Due Date  --------------------------------------------------------------------------------    Cr..........  12 months..  venlafaxine..............  06-   06-    Powered by [employer]. Reference number: [id number]. 10/07/2020 7:32:02 AM   CDT

## 2020-10-08 NOTE — PROGRESS NOTES
Refill Routing Note   Medication(s) are not appropriate for processing by [facility] for the following reason(s):     - Medication not active on medication list    [facility] actions taken in this encounter: Defer       Medication Therapy Plan: Venlafaxine 75mg dc'd (7/24/20); Not on active med list; Defer to PCP  Medication reconciliation completed: No   Automatic Epic Generated Protocol Data:        Requested Prescriptions   Pending Prescriptions Disp Refills    venlafaxine (EFFEXOR) 75 MG tablet [Pharmacy Med Name: VENLAFAXINE HCL 75 MG TABLET] 90 tablet 0     Sig: TAKE 1 TABLET BY MOUTH EVERY DAY       Psychiatry: Antidepressants - SNRI - desvenlafaxine & venlafaxine Failed - 10/7/2020  7:31 AM        Failed - Cr is 1.3 or below and within 360 days     Creatinine   Date Value Ref Range Status   06/21/2019 0.8 0.5 - 1.4 mg/dL Final   06/18/2019 0.8 0.5 - 1.4 mg/dL Final   06/14/2019 0.7 0.5 - 1.4 mg/dL Final              Failed - eGFR within 360 days     eGFR if non    Date Value Ref Range Status   06/21/2019 >60.0 >60 mL/min/1.73 m^2 Final     Comment:     Calculation used to obtain the estimated glomerular filtration  rate (eGFR) is the CKD-EPI equation.      06/18/2019 >60.0 >60 mL/min/1.73 m^2 Final     Comment:     Calculation used to obtain the estimated glomerular filtration  rate (eGFR) is the CKD-EPI equation.      06/14/2019 >60.0 >60 mL/min/1.73 m^2 Final     Comment:     Calculation used to obtain the estimated glomerular filtration  rate (eGFR) is the CKD-EPI equation.        eGFR if    Date Value Ref Range Status   06/21/2019 >60.0 >60 mL/min/1.73 m^2 Final   06/18/2019 >60.0 >60 mL/min/1.73 m^2 Final   06/14/2019 >60.0 >60 mL/min/1.73 m^2 Final              Passed - Patient is at least 18 years old        Passed - Negative Pregnancy Status Check        Passed - Last BP in normal range within 360 days     BP Readings from Last 3 Encounters:   06/16/20 116/80    05/26/20 122/79   04/14/20 110/80              Passed - Office Visit within last 12 months or future 90 days.     Recent Outpatient Visits            3 months ago Left lateral ankle pain    [id number] Tiesha Jones, DPM    4 months ago Left lateral ankle pain    [id number] Tiesha Jones, DPM    5 months ago Anxiety    [facility] denise Frye MD    6 months ago Cough    [facility] Kenzie Garcia NP    9 months ago Diffuse large B-cell lymphoma of lymph nodes of multiple regions    [facility] Stacy Frye MD                          Appointments  past 12m or future 3m with PCP    Date Provider   Last Visit   4/14/2020 Stacy Frye MD   Next Visit   Visit date not found Stacy Frye MD   ED visits in past 90 days: 0        Note composed:12:35 PM 10/08/2020

## 2020-10-09 RX ORDER — VENLAFAXINE 75 MG/1
TABLET ORAL
Qty: 90 TABLET | Refills: 0 | OUTPATIENT
Start: 2020-10-09

## 2020-10-11 NOTE — TELEPHONE ENCOUNTER
No new care gaps identified.  Powered by [employer]. Reference number: [id number]. 10/11/2020 10:11:33 AM   SHONT

## 2020-10-12 RX ORDER — VENLAFAXINE 75 MG/1
TABLET ORAL
Qty: 90 TABLET | Refills: 0 | OUTPATIENT
Start: 2020-10-12

## 2020-10-12 NOTE — PROGRESS NOTES
Quick DC. Inappropriate Request    Refill Authorization Note   Terese Macias is requesting a refill authorization.    Brief assessment and rationale for refill: Quick Discontinue       Medication Therapy Plan: CDMR: Therapy change  Medication reconciliation completed: No    Comments:   Pended Medication(s)       Requested Prescriptions     Refused Prescriptions Disp Refills    venlafaxine (EFFEXOR) 75 MG tablet [Pharmacy Med Name: VENLAFAXINE HCL 75 MG TABLET] 90 tablet 0     Sig: TAKE 1 TABLET BY MOUTH EVERY DAY     Refused By: TERESA ONEILL     Reason for Refusal: Refill not appropriate        Duplicate Pended Encounter(s)/ Last Prescribed Details:    Ordering Encounter Report    Associated Reports   View Encounter          Note composed:3:03 PM 10/12/2020

## 2020-11-18 DIAGNOSIS — F41.9 ANXIETY: Primary | ICD-10-CM

## 2020-11-19 RX ORDER — VENLAFAXINE 75 MG/1
TABLET ORAL
Qty: 90 TABLET | Refills: 0 | Status: SHIPPED | OUTPATIENT
Start: 2020-11-19 | End: 2021-02-23 | Stop reason: ALTCHOICE

## 2020-11-19 NOTE — TELEPHONE ENCOUNTER
No new care gaps identified.  Powered by [employer]. Reference number: [id number]. 11/18/2020 7:05:44 PM   CST

## 2020-11-19 NOTE — TELEPHONE ENCOUNTER
Provider Staff:     Action is required for this patient.     Please schedule patient for Labs (CMP)    Thanks!  [facility]     Appointments  past 12m or future 3m with PCP    Date Provider   Last Visit   4/14/2020 Stacy Frye MD   Next Visit   Visit date not found Stacy Frye MD     Note composed:6:09 AM 11/19/2020

## 2020-11-19 NOTE — PROGRESS NOTES
Refill Authorization Note   Terese Macias is requesting a refill authorization.  Brief assessment and rationale for refill: Approve    -Medication-related problems identified:   Requires labs  Therapeutic duplication  Medication Therapy Plan: CDMR. labs(CMP); Acceptable use per OR protocol;  approve     Medication reconciliation completed: No   Comments:   Orders Placed This Encounter    Comprehensive Metabolic Panel    venlafaxine (EFFEXOR) 75 MG tablet      Requested Prescriptions   Signed Prescriptions Disp Refills    venlafaxine (EFFEXOR) 75 MG tablet 90 tablet 0     Sig: TAKE 1 TABLET BY MOUTH EVERY DAY       Psychiatry: Antidepressants - SNRI - desvenlafaxine & venlafaxine Failed - 11/19/2020  6:04 AM        Failed - Cr is 1.4 or below and within 360 days     Creatinine   Date Value Ref Range Status   06/21/2019 0.8 0.5 - 1.4 mg/dL Final   06/18/2019 0.8 0.5 - 1.4 mg/dL Final   06/14/2019 0.7 0.5 - 1.4 mg/dL Final              Failed - eGFR within 360 days     eGFR if non    Date Value Ref Range Status   06/21/2019 >60.0 >60 mL/min/1.73 m^2 Final     Comment:     Calculation used to obtain the estimated glomerular filtration  rate (eGFR) is the CKD-EPI equation.      06/18/2019 >60.0 >60 mL/min/1.73 m^2 Final     Comment:     Calculation used to obtain the estimated glomerular filtration  rate (eGFR) is the CKD-EPI equation.      06/14/2019 >60.0 >60 mL/min/1.73 m^2 Final     Comment:     Calculation used to obtain the estimated glomerular filtration  rate (eGFR) is the CKD-EPI equation.        eGFR if    Date Value Ref Range Status   06/21/2019 >60.0 >60 mL/min/1.73 m^2 Final   06/18/2019 >60.0 >60 mL/min/1.73 m^2 Final   06/14/2019 >60.0 >60 mL/min/1.73 m^2 Final              Passed - Patient is at least 18 years old        Passed - Negative Pregnancy Status Check        Passed - Last BP in normal range within 360 days.     BP Readings from Last 3 Encounters:   06/16/20  116/80   05/26/20 122/79   04/14/20 110/80              Passed - Office visit in past 12 months or future 90 days     Recent Outpatient Visits            5 months ago Left lateral ankle pain    [id number] Tiesha Jones DPM    5 months ago Left lateral ankle pain    [id number] Tiesha Jones DPM    7 months ago Anxiety    [facility] denise Frye MD    7 months ago Cough    [facility] Kenzie Garcia NP    10 months ago Diffuse large B-cell lymphoma of lymph nodes of multiple regions    [facility] Stacy Frye MD                        Appointments  past 12m or future 3m with PCP    Date Provider   Last Visit   4/14/2020 Stacy Frye MD   Next Visit   Visit date not found Stacy Frye MD   ED visits in past 90 days: 0     Note composed:6:06 AM 11/19/2020

## 2021-01-04 ENCOUNTER — PATIENT MESSAGE (OUTPATIENT)
Dept: ADMINISTRATIVE | Facility: HOSPITAL | Age: 46
End: 2021-01-04

## 2021-01-11 ENCOUNTER — CLINICAL SUPPORT (OUTPATIENT)
Dept: URGENT CARE | Facility: CLINIC | Age: 46
End: 2021-01-11
Payer: COMMERCIAL

## 2021-01-11 ENCOUNTER — TELEPHONE (OUTPATIENT)
Dept: INTERNAL MEDICINE | Facility: CLINIC | Age: 46
End: 2021-01-11

## 2021-01-11 DIAGNOSIS — Z11.9 ENCOUNTER FOR SCREENING EXAMINATION FOR INFECTIOUS DISEASE: Primary | ICD-10-CM

## 2021-01-11 LAB
CTP QC/QA: YES
SARS-COV-2 RDRP RESP QL NAA+PROBE: NEGATIVE

## 2021-01-11 PROCEDURE — U0002 COVID-19 LAB TEST NON-CDC: HCPCS | Mod: QW,S$GLB,, | Performed by: PHYSICIAN ASSISTANT

## 2021-01-11 PROCEDURE — U0002: ICD-10-PCS | Mod: QW,S$GLB,, | Performed by: PHYSICIAN ASSISTANT

## 2021-01-28 ENCOUNTER — CLINICAL SUPPORT (OUTPATIENT)
Dept: URGENT CARE | Facility: CLINIC | Age: 46
End: 2021-01-28
Payer: COMMERCIAL

## 2021-01-28 DIAGNOSIS — Z11.9 ENCOUNTER FOR SCREENING EXAMINATION FOR INFECTIOUS DISEASE: Primary | ICD-10-CM

## 2021-01-28 LAB
CTP QC/QA: YES
SARS-COV-2 RDRP RESP QL NAA+PROBE: NEGATIVE

## 2021-01-28 PROCEDURE — U0002 COVID-19 LAB TEST NON-CDC: HCPCS | Mod: QW,S$GLB,, | Performed by: PHYSICIAN ASSISTANT

## 2021-01-28 PROCEDURE — U0002: ICD-10-PCS | Mod: QW,S$GLB,, | Performed by: PHYSICIAN ASSISTANT

## 2021-03-10 ENCOUNTER — HOSPITAL ENCOUNTER (OUTPATIENT)
Dept: RADIOLOGY | Facility: HOSPITAL | Age: 46
Discharge: HOME | End: 2021-03-10
Attending: OBSTETRICS & GYNECOLOGY
Payer: COMMERCIAL

## 2021-03-10 ENCOUNTER — OFFICE VISIT (OUTPATIENT)
Dept: INTERNAL MEDICINE | Facility: CLINIC | Age: 46
End: 2021-03-10
Payer: COMMERCIAL

## 2021-03-10 VITALS
DIASTOLIC BLOOD PRESSURE: 80 MMHG | HEIGHT: 62 IN | HEART RATE: 84 BPM | SYSTOLIC BLOOD PRESSURE: 118 MMHG | OXYGEN SATURATION: 99 % | WEIGHT: 194 LBS | WEIGHT: 194.56 LBS | BODY MASS INDEX: 35.48 KG/M2 | BODY MASS INDEX: 35.8 KG/M2

## 2021-03-10 DIAGNOSIS — Z12.31 VISIT FOR SCREENING MAMMOGRAM: ICD-10-CM

## 2021-03-10 DIAGNOSIS — Z00.00 ROUTINE GENERAL MEDICAL EXAMINATION AT A HEALTH CARE FACILITY: Primary | ICD-10-CM

## 2021-03-10 DIAGNOSIS — R10.9 ABDOMINAL PAIN, UNSPECIFIED ABDOMINAL LOCATION: ICD-10-CM

## 2021-03-10 PROCEDURE — 87086 URINE CULTURE/COLONY COUNT: CPT | Performed by: INTERNAL MEDICINE

## 2021-03-10 PROCEDURE — 3008F PR BODY MASS INDEX (BMI) DOCUMENTED: ICD-10-PCS | Mod: CPTII,S$GLB,, | Performed by: INTERNAL MEDICINE

## 2021-03-10 PROCEDURE — 99999 PR PBB SHADOW E&M-EST. PATIENT-LVL II: ICD-10-PCS | Mod: PBBFAC,,, | Performed by: INTERNAL MEDICINE

## 2021-03-10 PROCEDURE — 3008F BODY MASS INDEX DOCD: CPT | Mod: CPTII,S$GLB,, | Performed by: INTERNAL MEDICINE

## 2021-03-10 PROCEDURE — 77067 SCR MAMMO BI INCL CAD: CPT | Mod: 26,,, | Performed by: RADIOLOGY

## 2021-03-10 PROCEDURE — 77067 MAMMO DIGITAL SCREENING BILAT WITH TOMO: ICD-10-PCS | Mod: 26,,, | Performed by: RADIOLOGY

## 2021-03-10 PROCEDURE — 77063 BREAST TOMOSYNTHESIS BI: CPT | Mod: 26,,, | Performed by: RADIOLOGY

## 2021-03-10 PROCEDURE — 77067 SCR MAMMO BI INCL CAD: CPT | Mod: TC

## 2021-03-10 PROCEDURE — 99396 PREV VISIT EST AGE 40-64: CPT | Mod: S$GLB,,, | Performed by: INTERNAL MEDICINE

## 2021-03-10 PROCEDURE — 81003 URINALYSIS AUTO W/O SCOPE: CPT | Performed by: INTERNAL MEDICINE

## 2021-03-10 PROCEDURE — 99396 PR PREVENTIVE VISIT,EST,40-64: ICD-10-PCS | Mod: S$GLB,,, | Performed by: INTERNAL MEDICINE

## 2021-03-10 PROCEDURE — 99999 PR PBB SHADOW E&M-EST. PATIENT-LVL II: CPT | Mod: PBBFAC,,, | Performed by: INTERNAL MEDICINE

## 2021-03-10 PROCEDURE — 77063 MAMMO DIGITAL SCREENING BILAT WITH TOMO: ICD-10-PCS | Mod: 26,,, | Performed by: RADIOLOGY

## 2021-03-11 ENCOUNTER — PATIENT MESSAGE (OUTPATIENT)
Dept: INTERNAL MEDICINE | Facility: CLINIC | Age: 46
End: 2021-03-11

## 2021-03-11 LAB
BILIRUB UR QL STRIP: NEGATIVE
CLARITY UR REFRACT.AUTO: CLEAR
COLOR UR AUTO: YELLOW
GLUCOSE UR QL STRIP: NEGATIVE
HGB UR QL STRIP: NEGATIVE
KETONES UR QL STRIP: NEGATIVE
LEUKOCYTE ESTERASE UR QL STRIP: NEGATIVE
NITRITE UR QL STRIP: NEGATIVE
PH UR STRIP: 7 [PH] (ref 5–8)
PROT UR QL STRIP: NEGATIVE
SP GR UR STRIP: 1.02 (ref 1–1.03)
URN SPEC COLLECT METH UR: NORMAL

## 2021-03-12 LAB — BACTERIA UR CULT: NO GROWTH

## 2021-06-20 ENCOUNTER — PATIENT MESSAGE (OUTPATIENT)
Dept: INTERNAL MEDICINE | Facility: CLINIC | Age: 46
End: 2021-06-20

## 2021-06-20 ENCOUNTER — OFFICE VISIT (OUTPATIENT)
Dept: URGENT CARE | Facility: CLINIC | Age: 46
End: 2021-06-20
Payer: COMMERCIAL

## 2021-06-20 VITALS
DIASTOLIC BLOOD PRESSURE: 75 MMHG | HEART RATE: 87 BPM | BODY MASS INDEX: 35.7 KG/M2 | WEIGHT: 194 LBS | TEMPERATURE: 98 F | SYSTOLIC BLOOD PRESSURE: 112 MMHG | RESPIRATION RATE: 18 BRPM | HEIGHT: 62 IN | OXYGEN SATURATION: 98 %

## 2021-06-20 DIAGNOSIS — K21.9 GASTROESOPHAGEAL REFLUX DISEASE, UNSPECIFIED WHETHER ESOPHAGITIS PRESENT: ICD-10-CM

## 2021-06-20 DIAGNOSIS — R07.9 CHEST PAIN, UNSPECIFIED TYPE: Primary | ICD-10-CM

## 2021-06-20 LAB
CTP QC/QA: YES
SARS-COV-2 RDRP RESP QL NAA+PROBE: NEGATIVE

## 2021-06-20 PROCEDURE — 99214 OFFICE O/P EST MOD 30 MIN: CPT | Mod: S$GLB,,, | Performed by: NURSE PRACTITIONER

## 2021-06-20 PROCEDURE — 93005 EKG 12-LEAD: ICD-10-PCS | Mod: S$GLB,,, | Performed by: NURSE PRACTITIONER

## 2021-06-20 PROCEDURE — 3008F PR BODY MASS INDEX (BMI) DOCUMENTED: ICD-10-PCS | Mod: CPTII,S$GLB,, | Performed by: NURSE PRACTITIONER

## 2021-06-20 PROCEDURE — 93005 ELECTROCARDIOGRAM TRACING: CPT | Mod: S$GLB,,, | Performed by: NURSE PRACTITIONER

## 2021-06-20 PROCEDURE — 93010 EKG 12-LEAD: ICD-10-PCS | Mod: S$GLB,,, | Performed by: INTERNAL MEDICINE

## 2021-06-20 PROCEDURE — 71046 XR CHEST PA AND LATERAL: ICD-10-PCS | Mod: S$GLB,,, | Performed by: RADIOLOGY

## 2021-06-20 PROCEDURE — 99214 PR OFFICE/OUTPT VISIT, EST, LEVL IV, 30-39 MIN: ICD-10-PCS | Mod: S$GLB,,, | Performed by: NURSE PRACTITIONER

## 2021-06-20 PROCEDURE — U0002 COVID-19 LAB TEST NON-CDC: HCPCS | Mod: QW,S$GLB,, | Performed by: NURSE PRACTITIONER

## 2021-06-20 PROCEDURE — U0002: ICD-10-PCS | Mod: QW,S$GLB,, | Performed by: NURSE PRACTITIONER

## 2021-06-20 PROCEDURE — 93010 ELECTROCARDIOGRAM REPORT: CPT | Mod: S$GLB,,, | Performed by: INTERNAL MEDICINE

## 2021-06-20 PROCEDURE — 3008F BODY MASS INDEX DOCD: CPT | Mod: CPTII,S$GLB,, | Performed by: NURSE PRACTITIONER

## 2021-06-20 PROCEDURE — 71046 X-RAY EXAM CHEST 2 VIEWS: CPT | Mod: S$GLB,,, | Performed by: RADIOLOGY

## 2021-06-20 RX ORDER — ASPIRIN 325 MG
50000 TABLET, DELAYED RELEASE (ENTERIC COATED) ORAL
COMMUNITY
Start: 2021-04-01 | End: 2021-11-02 | Stop reason: SDUPTHER

## 2021-06-21 RX ORDER — METHOCARBAMOL 500 MG/1
500 TABLET, FILM COATED ORAL 3 TIMES DAILY PRN
Qty: 40 TABLET | Refills: 0 | Status: SHIPPED | OUTPATIENT
Start: 2021-06-21 | End: 2021-08-14 | Stop reason: SDUPTHER

## 2021-06-21 RX ORDER — MELOXICAM 15 MG/1
15 TABLET ORAL DAILY
Qty: 30 TABLET | Refills: 1 | Status: SHIPPED | OUTPATIENT
Start: 2021-06-21 | End: 2021-11-02

## 2021-06-22 ENCOUNTER — PATIENT MESSAGE (OUTPATIENT)
Dept: INTERNAL MEDICINE | Facility: CLINIC | Age: 46
End: 2021-06-22

## 2021-07-20 ENCOUNTER — TELEPHONE (OUTPATIENT)
Dept: INTERNAL MEDICINE | Facility: CLINIC | Age: 46
End: 2021-07-20

## 2021-07-20 ENCOUNTER — OFFICE VISIT (OUTPATIENT)
Dept: URGENT CARE | Facility: CLINIC | Age: 46
End: 2021-07-20
Payer: COMMERCIAL

## 2021-07-20 VITALS
OXYGEN SATURATION: 96 % | WEIGHT: 194 LBS | RESPIRATION RATE: 16 BRPM | HEART RATE: 87 BPM | BODY MASS INDEX: 35.7 KG/M2 | HEIGHT: 62 IN | TEMPERATURE: 97 F | SYSTOLIC BLOOD PRESSURE: 124 MMHG | DIASTOLIC BLOOD PRESSURE: 79 MMHG

## 2021-07-20 DIAGNOSIS — J06.9 UPPER RESPIRATORY TRACT INFECTION, UNSPECIFIED TYPE: Primary | ICD-10-CM

## 2021-07-20 LAB
CTP QC/QA: YES
SARS-COV-2 RDRP RESP QL NAA+PROBE: NEGATIVE

## 2021-07-20 PROCEDURE — 99214 PR OFFICE/OUTPT VISIT, EST, LEVL IV, 30-39 MIN: ICD-10-PCS | Mod: S$GLB,,, | Performed by: FAMILY MEDICINE

## 2021-07-20 PROCEDURE — 99214 OFFICE O/P EST MOD 30 MIN: CPT | Mod: S$GLB,,, | Performed by: FAMILY MEDICINE

## 2021-07-20 PROCEDURE — 3008F BODY MASS INDEX DOCD: CPT | Mod: CPTII,S$GLB,, | Performed by: FAMILY MEDICINE

## 2021-07-20 PROCEDURE — U0002 COVID-19 LAB TEST NON-CDC: HCPCS | Mod: QW,S$GLB,, | Performed by: FAMILY MEDICINE

## 2021-07-20 PROCEDURE — U0002: ICD-10-PCS | Mod: QW,S$GLB,, | Performed by: FAMILY MEDICINE

## 2021-07-20 PROCEDURE — 3008F PR BODY MASS INDEX (BMI) DOCUMENTED: ICD-10-PCS | Mod: CPTII,S$GLB,, | Performed by: FAMILY MEDICINE

## 2021-07-21 ENCOUNTER — TELEPHONE (OUTPATIENT)
Dept: INTERNAL MEDICINE | Facility: CLINIC | Age: 46
End: 2021-07-21

## 2021-07-30 ENCOUNTER — TELEPHONE (OUTPATIENT)
Dept: INTERNAL MEDICINE | Facility: CLINIC | Age: 46
End: 2021-07-30

## 2021-07-30 ENCOUNTER — OFFICE VISIT (OUTPATIENT)
Dept: INTERNAL MEDICINE | Facility: CLINIC | Age: 46
End: 2021-07-30
Payer: COMMERCIAL

## 2021-07-30 DIAGNOSIS — K21.9 GASTROESOPHAGEAL REFLUX DISEASE WITHOUT ESOPHAGITIS: ICD-10-CM

## 2021-07-30 DIAGNOSIS — N64.59 ABNORMAL BREAST TISSUE: ICD-10-CM

## 2021-07-30 DIAGNOSIS — D64.9 ANEMIA, UNSPECIFIED TYPE: Primary | ICD-10-CM

## 2021-07-30 DIAGNOSIS — N63.0 BREAST MASS: ICD-10-CM

## 2021-07-30 DIAGNOSIS — C83.38 DIFFUSE LARGE B-CELL LYMPHOMA OF LYMPH NODES OF MULTIPLE REGIONS: ICD-10-CM

## 2021-07-30 PROCEDURE — 99213 PR OFFICE/OUTPT VISIT, EST, LEVL III, 20-29 MIN: ICD-10-PCS | Mod: 95,,, | Performed by: INTERNAL MEDICINE

## 2021-07-30 PROCEDURE — 99213 OFFICE O/P EST LOW 20 MIN: CPT | Mod: 95,,, | Performed by: INTERNAL MEDICINE

## 2021-08-03 ENCOUNTER — PATIENT OUTREACH (OUTPATIENT)
Dept: ADMINISTRATIVE | Facility: OTHER | Age: 46
End: 2021-08-03

## 2021-08-05 ENCOUNTER — PATIENT MESSAGE (OUTPATIENT)
Dept: INTERNAL MEDICINE | Facility: CLINIC | Age: 46
End: 2021-08-05

## 2021-08-13 ENCOUNTER — PATIENT MESSAGE (OUTPATIENT)
Dept: INTERNAL MEDICINE | Facility: CLINIC | Age: 46
End: 2021-08-13

## 2021-08-14 ENCOUNTER — PATIENT MESSAGE (OUTPATIENT)
Dept: INTERNAL MEDICINE | Facility: CLINIC | Age: 46
End: 2021-08-14

## 2021-08-14 RX ORDER — METHOCARBAMOL 500 MG/1
500 TABLET, FILM COATED ORAL 3 TIMES DAILY PRN
Qty: 40 TABLET | Refills: 0 | Status: SHIPPED | OUTPATIENT
Start: 2021-08-14 | End: 2022-08-16 | Stop reason: SDUPTHER

## 2021-08-16 ENCOUNTER — HOSPITAL ENCOUNTER (OUTPATIENT)
Dept: RADIOLOGY | Facility: HOSPITAL | Age: 46
Discharge: HOME | End: 2021-08-16
Attending: INTERNAL MEDICINE
Payer: COMMERCIAL

## 2021-08-16 DIAGNOSIS — N63.0 BREAST MASS: ICD-10-CM

## 2021-08-16 DIAGNOSIS — N64.59 ABNORMAL BREAST TISSUE: ICD-10-CM

## 2021-08-16 PROCEDURE — 77061 BREAST TOMOSYNTHESIS UNI: CPT | Mod: 26,LT,, | Performed by: RADIOLOGY

## 2021-08-16 PROCEDURE — 77065 MAMMO DIGITAL DIAGNOSTIC LEFT WITH TOMO: ICD-10-PCS | Mod: 26,LT,, | Performed by: RADIOLOGY

## 2021-08-16 PROCEDURE — 77061 MAMMO DIGITAL DIAGNOSTIC LEFT WITH TOMO: ICD-10-PCS | Mod: 26,LT,, | Performed by: RADIOLOGY

## 2021-08-16 PROCEDURE — 77065 DX MAMMO INCL CAD UNI: CPT | Mod: 26,LT,, | Performed by: RADIOLOGY

## 2021-08-16 PROCEDURE — 77061 BREAST TOMOSYNTHESIS UNI: CPT | Mod: TC,LT

## 2021-09-01 ENCOUNTER — PATIENT MESSAGE (OUTPATIENT)
Dept: INTERNAL MEDICINE | Facility: CLINIC | Age: 46
End: 2021-09-01

## 2021-09-01 DIAGNOSIS — M54.9 BACK PAIN, UNSPECIFIED BACK LOCATION, UNSPECIFIED BACK PAIN LATERALITY, UNSPECIFIED CHRONICITY: Primary | ICD-10-CM

## 2021-09-10 ENCOUNTER — PATIENT MESSAGE (OUTPATIENT)
Dept: INTERNAL MEDICINE | Facility: CLINIC | Age: 46
End: 2021-09-10

## 2021-10-24 ENCOUNTER — PATIENT MESSAGE (OUTPATIENT)
Dept: PODIATRY | Facility: CLINIC | Age: 46
End: 2021-10-24
Payer: COMMERCIAL

## 2021-11-02 ENCOUNTER — IMMUNIZATION (OUTPATIENT)
Dept: INTERNAL MEDICINE | Facility: CLINIC | Age: 46
End: 2021-11-02
Payer: COMMERCIAL

## 2021-11-02 ENCOUNTER — OFFICE VISIT (OUTPATIENT)
Dept: INTERNAL MEDICINE | Facility: CLINIC | Age: 46
End: 2021-11-02
Payer: COMMERCIAL

## 2021-11-02 ENCOUNTER — LAB VISIT (OUTPATIENT)
Dept: LAB | Facility: HOSPITAL | Age: 46
End: 2021-11-02
Attending: INTERNAL MEDICINE
Payer: COMMERCIAL

## 2021-11-02 VITALS
HEART RATE: 65 BPM | DIASTOLIC BLOOD PRESSURE: 78 MMHG | OXYGEN SATURATION: 99 % | BODY MASS INDEX: 37 KG/M2 | SYSTOLIC BLOOD PRESSURE: 108 MMHG | HEIGHT: 62 IN | WEIGHT: 201.06 LBS

## 2021-11-02 DIAGNOSIS — R79.9 ABNORMAL BLOOD CHEMISTRY: ICD-10-CM

## 2021-11-02 DIAGNOSIS — F39 MOOD DISORDER: ICD-10-CM

## 2021-11-02 DIAGNOSIS — D64.9 ANEMIA, UNSPECIFIED TYPE: ICD-10-CM

## 2021-11-02 DIAGNOSIS — D50.8 OTHER IRON DEFICIENCY ANEMIA: ICD-10-CM

## 2021-11-02 DIAGNOSIS — K21.9 GASTROESOPHAGEAL REFLUX DISEASE WITHOUT ESOPHAGITIS: ICD-10-CM

## 2021-11-02 DIAGNOSIS — C83.38 DIFFUSE LARGE B-CELL LYMPHOMA OF LYMPH NODES OF MULTIPLE REGIONS: Primary | ICD-10-CM

## 2021-11-02 LAB
ALBUMIN SERPL BCP-MCNC: 4 G/DL (ref 3.5–5.2)
ALP SERPL-CCNC: 91 U/L (ref 55–135)
ALT SERPL W/O P-5'-P-CCNC: 22 U/L (ref 10–44)
ANION GAP SERPL CALC-SCNC: 11 MMOL/L (ref 8–16)
AST SERPL-CCNC: 22 U/L (ref 10–40)
BILIRUB SERPL-MCNC: 0.2 MG/DL (ref 0.1–1)
BUN SERPL-MCNC: 14 MG/DL (ref 6–20)
CALCIUM SERPL-MCNC: 10 MG/DL (ref 8.7–10.5)
CHLORIDE SERPL-SCNC: 102 MMOL/L (ref 95–110)
CO2 SERPL-SCNC: 25 MMOL/L (ref 23–29)
CREAT SERPL-MCNC: 0.8 MG/DL (ref 0.5–1.4)
EST. GFR  (AFRICAN AMERICAN): >60 ML/MIN/1.73 M^2
EST. GFR  (NON AFRICAN AMERICAN): >60 ML/MIN/1.73 M^2
ESTIMATED AVG GLUCOSE: 111 MG/DL (ref 68–131)
FERRITIN SERPL-MCNC: 281 NG/ML (ref 20–300)
GLUCOSE SERPL-MCNC: 87 MG/DL (ref 70–110)
HBA1C MFR BLD: 5.5 % (ref 4–5.6)
INSULIN COLLECTION INTERVAL: NORMAL
INSULIN SERPL-ACNC: 5.7 UU/ML
IRON SERPL-MCNC: 55 UG/DL (ref 30–160)
POTASSIUM SERPL-SCNC: 3.7 MMOL/L (ref 3.5–5.1)
PROT SERPL-MCNC: 8 G/DL (ref 6–8.4)
SATURATED IRON: 16 % (ref 20–50)
SODIUM SERPL-SCNC: 138 MMOL/L (ref 136–145)
TOTAL IRON BINDING CAPACITY: 334 UG/DL (ref 250–450)
TRANSFERRIN SERPL-MCNC: 226 MG/DL (ref 200–375)
TSH SERPL DL<=0.005 MIU/L-ACNC: 0.97 UIU/ML (ref 0.4–4)

## 2021-11-02 PROCEDURE — 99214 PR OFFICE/OUTPT VISIT, EST, LEVL IV, 30-39 MIN: ICD-10-PCS | Mod: 25,S$GLB,, | Performed by: INTERNAL MEDICINE

## 2021-11-02 PROCEDURE — 3008F PR BODY MASS INDEX (BMI) DOCUMENTED: ICD-10-PCS | Mod: CPTII,S$GLB,, | Performed by: INTERNAL MEDICINE

## 2021-11-02 PROCEDURE — 82728 ASSAY OF FERRITIN: CPT | Performed by: INTERNAL MEDICINE

## 2021-11-02 PROCEDURE — 83036 HEMOGLOBIN GLYCOSYLATED A1C: CPT | Performed by: INTERNAL MEDICINE

## 2021-11-02 PROCEDURE — 90471 FLU VACCINE (QUAD) GREATER THAN OR EQUAL TO 3YO PRESERVATIVE FREE IM: ICD-10-PCS | Mod: S$GLB,,, | Performed by: INTERNAL MEDICINE

## 2021-11-02 PROCEDURE — 90686 FLU VACCINE (QUAD) GREATER THAN OR EQUAL TO 3YO PRESERVATIVE FREE IM: ICD-10-PCS | Mod: S$GLB,,, | Performed by: INTERNAL MEDICINE

## 2021-11-02 PROCEDURE — 3074F PR MOST RECENT SYSTOLIC BLOOD PRESSURE < 130 MM HG: ICD-10-PCS | Mod: CPTII,S$GLB,, | Performed by: INTERNAL MEDICINE

## 2021-11-02 PROCEDURE — 1159F PR MEDICATION LIST DOCUMENTED IN MEDICAL RECORD: ICD-10-PCS | Mod: CPTII,S$GLB,, | Performed by: INTERNAL MEDICINE

## 2021-11-02 PROCEDURE — 80053 COMPREHEN METABOLIC PANEL: CPT | Performed by: INTERNAL MEDICINE

## 2021-11-02 PROCEDURE — 99999 PR PBB SHADOW E&M-EST. PATIENT-LVL IV: CPT | Mod: PBBFAC,,, | Performed by: INTERNAL MEDICINE

## 2021-11-02 PROCEDURE — 90471 IMMUNIZATION ADMIN: CPT | Mod: S$GLB,,, | Performed by: INTERNAL MEDICINE

## 2021-11-02 PROCEDURE — 99999 PR PBB SHADOW E&M-EST. PATIENT-LVL IV: ICD-10-PCS | Mod: PBBFAC,,, | Performed by: INTERNAL MEDICINE

## 2021-11-02 PROCEDURE — 36415 COLL VENOUS BLD VENIPUNCTURE: CPT | Performed by: INTERNAL MEDICINE

## 2021-11-02 PROCEDURE — 84443 ASSAY THYROID STIM HORMONE: CPT | Performed by: INTERNAL MEDICINE

## 2021-11-02 PROCEDURE — 3074F SYST BP LT 130 MM HG: CPT | Mod: CPTII,S$GLB,, | Performed by: INTERNAL MEDICINE

## 2021-11-02 PROCEDURE — 90686 IIV4 VACC NO PRSV 0.5 ML IM: CPT | Mod: S$GLB,,, | Performed by: INTERNAL MEDICINE

## 2021-11-02 PROCEDURE — 83525 ASSAY OF INSULIN: CPT | Performed by: INTERNAL MEDICINE

## 2021-11-02 PROCEDURE — 3008F BODY MASS INDEX DOCD: CPT | Mod: CPTII,S$GLB,, | Performed by: INTERNAL MEDICINE

## 2021-11-02 PROCEDURE — 3078F PR MOST RECENT DIASTOLIC BLOOD PRESSURE < 80 MM HG: ICD-10-PCS | Mod: CPTII,S$GLB,, | Performed by: INTERNAL MEDICINE

## 2021-11-02 PROCEDURE — 85025 COMPLETE CBC W/AUTO DIFF WBC: CPT | Performed by: INTERNAL MEDICINE

## 2021-11-02 PROCEDURE — 99214 OFFICE O/P EST MOD 30 MIN: CPT | Mod: 25,S$GLB,, | Performed by: INTERNAL MEDICINE

## 2021-11-02 PROCEDURE — 84466 ASSAY OF TRANSFERRIN: CPT | Performed by: INTERNAL MEDICINE

## 2021-11-02 PROCEDURE — 1159F MED LIST DOCD IN RCRD: CPT | Mod: CPTII,S$GLB,, | Performed by: INTERNAL MEDICINE

## 2021-11-02 PROCEDURE — 3078F DIAST BP <80 MM HG: CPT | Mod: CPTII,S$GLB,, | Performed by: INTERNAL MEDICINE

## 2021-11-02 RX ORDER — ASPIRIN 325 MG
50000 TABLET, DELAYED RELEASE (ENTERIC COATED) ORAL WEEKLY
Qty: 12 CAPSULE | Refills: 6 | Status: SHIPPED | OUTPATIENT
Start: 2021-11-02 | End: 2022-08-16 | Stop reason: SDUPTHER

## 2021-11-02 RX ORDER — FLUOXETINE HYDROCHLORIDE 20 MG/1
20 CAPSULE ORAL DAILY
Qty: 30 CAPSULE | Refills: 11 | Status: SHIPPED | OUTPATIENT
Start: 2021-11-02 | End: 2022-03-09 | Stop reason: SDUPTHER

## 2021-11-02 RX ORDER — VENLAFAXINE HYDROCHLORIDE 75 MG/1
75 CAPSULE, EXTENDED RELEASE ORAL DAILY
Start: 2021-11-02 | End: 2021-11-02

## 2021-11-03 LAB
BASOPHILS # BLD AUTO: 0.03 K/UL (ref 0–0.2)
BASOPHILS NFR BLD: 0.3 % (ref 0–1.9)
DIFFERENTIAL METHOD: ABNORMAL
EOSINOPHIL # BLD AUTO: 0.1 K/UL (ref 0–0.5)
EOSINOPHIL NFR BLD: 0.9 % (ref 0–8)
ERYTHROCYTE [DISTWIDTH] IN BLOOD BY AUTOMATED COUNT: 13.8 % (ref 11.5–14.5)
HCT VFR BLD AUTO: 39 % (ref 37–48.5)
HGB BLD-MCNC: 12.1 G/DL (ref 12–16)
IMM GRANULOCYTES # BLD AUTO: 0.02 K/UL (ref 0–0.04)
IMM GRANULOCYTES NFR BLD AUTO: 0.2 % (ref 0–0.5)
LYMPHOCYTES # BLD AUTO: 5.5 K/UL (ref 1–4.8)
LYMPHOCYTES NFR BLD: 55.6 % (ref 18–48)
MCH RBC QN AUTO: 28 PG (ref 27–31)
MCHC RBC AUTO-ENTMCNC: 31 G/DL (ref 32–36)
MCV RBC AUTO: 90 FL (ref 82–98)
MONOCYTES # BLD AUTO: 0.6 K/UL (ref 0.3–1)
MONOCYTES NFR BLD: 6.2 % (ref 4–15)
NEUTROPHILS # BLD AUTO: 3.7 K/UL (ref 1.8–7.7)
NEUTROPHILS NFR BLD: 36.8 % (ref 38–73)
NRBC BLD-RTO: 0 /100 WBC
PLATELET # BLD AUTO: 226 K/UL (ref 150–450)
PMV BLD AUTO: 11.4 FL (ref 9.2–12.9)
RBC # BLD AUTO: 4.32 M/UL (ref 4–5.4)
WBC # BLD AUTO: 9.9 K/UL (ref 3.9–12.7)

## 2021-11-10 ENCOUNTER — TELEPHONE (OUTPATIENT)
Dept: BEHAVIORAL HEALTH | Facility: CLINIC | Age: 46
End: 2021-11-10
Payer: COMMERCIAL

## 2021-11-10 ENCOUNTER — OFFICE VISIT (OUTPATIENT)
Dept: URGENT CARE | Facility: CLINIC | Age: 46
End: 2021-11-10
Payer: COMMERCIAL

## 2021-11-10 ENCOUNTER — PATIENT MESSAGE (OUTPATIENT)
Dept: BEHAVIORAL HEALTH | Facility: CLINIC | Age: 46
End: 2021-11-10
Payer: COMMERCIAL

## 2021-11-10 VITALS
BODY MASS INDEX: 36.76 KG/M2 | DIASTOLIC BLOOD PRESSURE: 70 MMHG | RESPIRATION RATE: 16 BRPM | WEIGHT: 201 LBS | HEART RATE: 88 BPM | TEMPERATURE: 98 F | OXYGEN SATURATION: 100 % | SYSTOLIC BLOOD PRESSURE: 108 MMHG

## 2021-11-10 DIAGNOSIS — J00 ACUTE NASOPHARYNGITIS: Primary | ICD-10-CM

## 2021-11-10 DIAGNOSIS — Z71.89 EDUCATED ABOUT COVID-19 VIRUS INFECTION: ICD-10-CM

## 2021-11-10 DIAGNOSIS — Z11.59 SCREENING FOR VIRAL DISEASE: ICD-10-CM

## 2021-11-10 DIAGNOSIS — R05.8 COUGH WITH CONGESTION OF PARANASAL SINUS: ICD-10-CM

## 2021-11-10 DIAGNOSIS — R09.81 COUGH WITH CONGESTION OF PARANASAL SINUS: ICD-10-CM

## 2021-11-10 LAB
CTP QC/QA: YES
SARS-COV-2 RDRP RESP QL NAA+PROBE: NEGATIVE

## 2021-11-10 PROCEDURE — U0002: ICD-10-PCS | Mod: QW,S$GLB,, | Performed by: PHYSICIAN ASSISTANT

## 2021-11-10 PROCEDURE — 3044F PR MOST RECENT HEMOGLOBIN A1C LEVEL <7.0%: ICD-10-PCS | Mod: CPTII,S$GLB,, | Performed by: PHYSICIAN ASSISTANT

## 2021-11-10 PROCEDURE — 99214 OFFICE O/P EST MOD 30 MIN: CPT | Mod: S$GLB,,, | Performed by: PHYSICIAN ASSISTANT

## 2021-11-10 PROCEDURE — 3044F HG A1C LEVEL LT 7.0%: CPT | Mod: CPTII,S$GLB,, | Performed by: PHYSICIAN ASSISTANT

## 2021-11-10 PROCEDURE — 1159F MED LIST DOCD IN RCRD: CPT | Mod: CPTII,S$GLB,, | Performed by: PHYSICIAN ASSISTANT

## 2021-11-10 PROCEDURE — 3074F SYST BP LT 130 MM HG: CPT | Mod: CPTII,S$GLB,, | Performed by: PHYSICIAN ASSISTANT

## 2021-11-10 PROCEDURE — 3078F PR MOST RECENT DIASTOLIC BLOOD PRESSURE < 80 MM HG: ICD-10-PCS | Mod: CPTII,S$GLB,, | Performed by: PHYSICIAN ASSISTANT

## 2021-11-10 PROCEDURE — U0002 COVID-19 LAB TEST NON-CDC: HCPCS | Mod: QW,S$GLB,, | Performed by: PHYSICIAN ASSISTANT

## 2021-11-10 PROCEDURE — 3074F PR MOST RECENT SYSTOLIC BLOOD PRESSURE < 130 MM HG: ICD-10-PCS | Mod: CPTII,S$GLB,, | Performed by: PHYSICIAN ASSISTANT

## 2021-11-10 PROCEDURE — 99214 PR OFFICE/OUTPT VISIT, EST, LEVL IV, 30-39 MIN: ICD-10-PCS | Mod: S$GLB,,, | Performed by: PHYSICIAN ASSISTANT

## 2021-11-10 PROCEDURE — 1159F PR MEDICATION LIST DOCUMENTED IN MEDICAL RECORD: ICD-10-PCS | Mod: CPTII,S$GLB,, | Performed by: PHYSICIAN ASSISTANT

## 2021-11-10 PROCEDURE — 3008F BODY MASS INDEX DOCD: CPT | Mod: CPTII,S$GLB,, | Performed by: PHYSICIAN ASSISTANT

## 2021-11-10 PROCEDURE — 3008F PR BODY MASS INDEX (BMI) DOCUMENTED: ICD-10-PCS | Mod: CPTII,S$GLB,, | Performed by: PHYSICIAN ASSISTANT

## 2021-11-10 PROCEDURE — 3078F DIAST BP <80 MM HG: CPT | Mod: CPTII,S$GLB,, | Performed by: PHYSICIAN ASSISTANT

## 2021-11-16 ENCOUNTER — PATIENT MESSAGE (OUTPATIENT)
Dept: INTERNAL MEDICINE | Facility: CLINIC | Age: 46
End: 2021-11-16
Payer: COMMERCIAL

## 2021-11-23 ENCOUNTER — TELEPHONE (OUTPATIENT)
Dept: BEHAVIORAL HEALTH | Facility: CLINIC | Age: 46
End: 2021-11-23
Payer: COMMERCIAL

## 2021-12-24 ENCOUNTER — PATIENT MESSAGE (OUTPATIENT)
Dept: INTERNAL MEDICINE | Facility: CLINIC | Age: 46
End: 2021-12-24
Payer: COMMERCIAL

## 2021-12-26 RX ORDER — DOXYCYCLINE HYCLATE 100 MG
100 TABLET ORAL 2 TIMES DAILY
Qty: 20 TABLET | Refills: 0 | Status: SHIPPED | OUTPATIENT
Start: 2021-12-26 | End: 2022-01-05

## 2021-12-27 ENCOUNTER — PATIENT OUTREACH (OUTPATIENT)
Dept: ADMINISTRATIVE | Facility: OTHER | Age: 46
End: 2021-12-27
Payer: COMMERCIAL

## 2021-12-27 ENCOUNTER — PATIENT MESSAGE (OUTPATIENT)
Dept: INTERNAL MEDICINE | Facility: CLINIC | Age: 46
End: 2021-12-27
Payer: COMMERCIAL

## 2021-12-29 ENCOUNTER — OFFICE VISIT (OUTPATIENT)
Dept: INTERNAL MEDICINE | Facility: CLINIC | Age: 46
End: 2021-12-29
Payer: COMMERCIAL

## 2021-12-29 VITALS
DIASTOLIC BLOOD PRESSURE: 80 MMHG | BODY MASS INDEX: 36.35 KG/M2 | OXYGEN SATURATION: 98 % | HEART RATE: 69 BPM | SYSTOLIC BLOOD PRESSURE: 124 MMHG | HEIGHT: 62 IN | WEIGHT: 197.56 LBS

## 2021-12-29 DIAGNOSIS — R05.9 COUGH: ICD-10-CM

## 2021-12-29 DIAGNOSIS — F39 MOOD DISORDER: ICD-10-CM

## 2021-12-29 DIAGNOSIS — H91.90 HEARING LOSS, UNSPECIFIED HEARING LOSS TYPE, UNSPECIFIED LATERALITY: ICD-10-CM

## 2021-12-29 DIAGNOSIS — C83.37 DIFFUSE LARGE B-CELL LYMPHOMA OF SPLEEN: ICD-10-CM

## 2021-12-29 DIAGNOSIS — R06.83 SNORING: Primary | ICD-10-CM

## 2021-12-29 DIAGNOSIS — K21.9 GASTROESOPHAGEAL REFLUX DISEASE WITHOUT ESOPHAGITIS: ICD-10-CM

## 2021-12-29 PROCEDURE — 1159F PR MEDICATION LIST DOCUMENTED IN MEDICAL RECORD: ICD-10-PCS | Mod: CPTII,S$GLB,, | Performed by: INTERNAL MEDICINE

## 2021-12-29 PROCEDURE — 1159F MED LIST DOCD IN RCRD: CPT | Mod: CPTII,S$GLB,, | Performed by: INTERNAL MEDICINE

## 2021-12-29 PROCEDURE — 3008F PR BODY MASS INDEX (BMI) DOCUMENTED: ICD-10-PCS | Mod: CPTII,S$GLB,, | Performed by: INTERNAL MEDICINE

## 2021-12-29 PROCEDURE — 99214 OFFICE O/P EST MOD 30 MIN: CPT | Mod: S$GLB,,, | Performed by: INTERNAL MEDICINE

## 2021-12-29 PROCEDURE — 3074F PR MOST RECENT SYSTOLIC BLOOD PRESSURE < 130 MM HG: ICD-10-PCS | Mod: CPTII,S$GLB,, | Performed by: INTERNAL MEDICINE

## 2021-12-29 PROCEDURE — 3008F BODY MASS INDEX DOCD: CPT | Mod: CPTII,S$GLB,, | Performed by: INTERNAL MEDICINE

## 2021-12-29 PROCEDURE — U0003 INFECTIOUS AGENT DETECTION BY NUCLEIC ACID (DNA OR RNA); SEVERE ACUTE RESPIRATORY SYNDROME CORONAVIRUS 2 (SARS-COV-2) (CORONAVIRUS DISEASE [COVID-19]), AMPLIFIED PROBE TECHNIQUE, MAKING USE OF HIGH THROUGHPUT TECHNOLOGIES AS DESCRIBED BY CMS-2020-01-R: HCPCS | Performed by: INTERNAL MEDICINE

## 2021-12-29 PROCEDURE — U0005 INFEC AGEN DETEC AMPLI PROBE: HCPCS | Performed by: INTERNAL MEDICINE

## 2021-12-29 PROCEDURE — 3044F HG A1C LEVEL LT 7.0%: CPT | Mod: CPTII,S$GLB,, | Performed by: INTERNAL MEDICINE

## 2021-12-29 PROCEDURE — 3044F PR MOST RECENT HEMOGLOBIN A1C LEVEL <7.0%: ICD-10-PCS | Mod: CPTII,S$GLB,, | Performed by: INTERNAL MEDICINE

## 2021-12-29 PROCEDURE — 3074F SYST BP LT 130 MM HG: CPT | Mod: CPTII,S$GLB,, | Performed by: INTERNAL MEDICINE

## 2021-12-29 PROCEDURE — 3079F DIAST BP 80-89 MM HG: CPT | Mod: CPTII,S$GLB,, | Performed by: INTERNAL MEDICINE

## 2021-12-29 PROCEDURE — 99999 PR PBB SHADOW E&M-EST. PATIENT-LVL IV: ICD-10-PCS | Mod: PBBFAC,,, | Performed by: INTERNAL MEDICINE

## 2021-12-29 PROCEDURE — 3079F PR MOST RECENT DIASTOLIC BLOOD PRESSURE 80-89 MM HG: ICD-10-PCS | Mod: CPTII,S$GLB,, | Performed by: INTERNAL MEDICINE

## 2021-12-29 PROCEDURE — 99214 PR OFFICE/OUTPT VISIT, EST, LEVL IV, 30-39 MIN: ICD-10-PCS | Mod: S$GLB,,, | Performed by: INTERNAL MEDICINE

## 2021-12-29 PROCEDURE — 99999 PR PBB SHADOW E&M-EST. PATIENT-LVL IV: CPT | Mod: PBBFAC,,, | Performed by: INTERNAL MEDICINE

## 2021-12-30 ENCOUNTER — PATIENT MESSAGE (OUTPATIENT)
Dept: INTERNAL MEDICINE | Facility: CLINIC | Age: 46
End: 2021-12-30
Payer: COMMERCIAL

## 2021-12-31 LAB
SARS-COV-2 RNA RESP QL NAA+PROBE: NOT DETECTED
SARS-COV-2- CYCLE NUMBER: NORMAL

## 2022-01-12 ENCOUNTER — OFFICE VISIT (OUTPATIENT)
Dept: SLEEP MEDICINE | Facility: CLINIC | Age: 47
End: 2022-01-12
Payer: COMMERCIAL

## 2022-01-12 VITALS
DIASTOLIC BLOOD PRESSURE: 74 MMHG | BODY MASS INDEX: 36.41 KG/M2 | SYSTOLIC BLOOD PRESSURE: 113 MMHG | HEART RATE: 61 BPM | WEIGHT: 199.06 LBS

## 2022-01-12 DIAGNOSIS — G47.10 HYPERSOMNOLENCE: Primary | ICD-10-CM

## 2022-01-12 DIAGNOSIS — R06.83 SNORING: ICD-10-CM

## 2022-01-12 PROCEDURE — 99999 PR PBB SHADOW E&M-EST. PATIENT-LVL III: CPT | Mod: PBBFAC,,, | Performed by: INTERNAL MEDICINE

## 2022-01-12 PROCEDURE — 3008F PR BODY MASS INDEX (BMI) DOCUMENTED: ICD-10-PCS | Mod: CPTII,S$GLB,, | Performed by: INTERNAL MEDICINE

## 2022-01-12 PROCEDURE — 3078F DIAST BP <80 MM HG: CPT | Mod: CPTII,S$GLB,, | Performed by: INTERNAL MEDICINE

## 2022-01-12 PROCEDURE — 99204 PR OFFICE/OUTPT VISIT, NEW, LEVL IV, 45-59 MIN: ICD-10-PCS | Mod: S$GLB,,, | Performed by: INTERNAL MEDICINE

## 2022-01-12 PROCEDURE — 3074F SYST BP LT 130 MM HG: CPT | Mod: CPTII,S$GLB,, | Performed by: INTERNAL MEDICINE

## 2022-01-12 PROCEDURE — 3078F PR MOST RECENT DIASTOLIC BLOOD PRESSURE < 80 MM HG: ICD-10-PCS | Mod: CPTII,S$GLB,, | Performed by: INTERNAL MEDICINE

## 2022-01-12 PROCEDURE — 1159F MED LIST DOCD IN RCRD: CPT | Mod: CPTII,S$GLB,, | Performed by: INTERNAL MEDICINE

## 2022-01-12 PROCEDURE — 99999 PR PBB SHADOW E&M-EST. PATIENT-LVL III: ICD-10-PCS | Mod: PBBFAC,,, | Performed by: INTERNAL MEDICINE

## 2022-01-12 PROCEDURE — 1159F PR MEDICATION LIST DOCUMENTED IN MEDICAL RECORD: ICD-10-PCS | Mod: CPTII,S$GLB,, | Performed by: INTERNAL MEDICINE

## 2022-01-12 PROCEDURE — 3008F BODY MASS INDEX DOCD: CPT | Mod: CPTII,S$GLB,, | Performed by: INTERNAL MEDICINE

## 2022-01-12 PROCEDURE — 3074F PR MOST RECENT SYSTOLIC BLOOD PRESSURE < 130 MM HG: ICD-10-PCS | Mod: CPTII,S$GLB,, | Performed by: INTERNAL MEDICINE

## 2022-01-12 PROCEDURE — 99204 OFFICE O/P NEW MOD 45 MIN: CPT | Mod: S$GLB,,, | Performed by: INTERNAL MEDICINE

## 2022-01-12 NOTE — PROGRESS NOTES
Referred by Stacy Frye MD     NEW PATIENT VISIT    Terese Macias  is a pleasant 46 y.o. female  with PMH significant for  DUSTIN, DLBCL, GERD,  who presents today of snoring    SLEEP SCHEDULE   Bed Time 10pm   Sleep Latency 30min   Arousals Once or twice   Nocturia 0-1   Back to sleep    Wake time 6:45 AM   Naps denies   Work      No flowsheet data found.  No flowsheet data found.      Vitals:    01/12/22 1537   BP: 113/74   BP Location: Right arm   Patient Position: Sitting   BP Method: Medium (Automatic)   Pulse: 61   Weight: 90.3 kg (199 lb 1.2 oz)     Physical Exam:    GEN:   Well-appearing  Psych:  Appropriate affect, demonstrates insight  SKIN:  No rash on the face or bridge of the nose    LABS:   Lab Results   Component Value Date    HGB 12.1 11/02/2021    CO2 25 11/02/2021       RECORDS REVIEWED PREVIOUSLY:    No prior sleep testing.    ASSESSMENT    No flowsheet data found.  PROBLEM DESCRIPTION  STATUS   possible YOLIE   + witnessed snoring by her family for at least the past year, +snoring arousals, , her mother has been concerned that she might not be breathing in her sleep  HEENT: MP3, + narrow pharyngeal opening  New   Daytime Sx   Denies sleepiness when inactive (after lunch, hard to get up in the morning)  rare sleepiness when driving home from work.  ESS 16/24 on intake  New   Other issues:     PLAN     -will proceed with sleep testing   -discussed trial of PAP therapy if YOLIE present   -driving precautions were discussed with the patient    RTC          The patient was given open opportunity to ask questions and/or express concerns about treatment plan.   All questions/concerns were discussed.     Two patient identifiers used prior to evaluation.

## 2022-01-14 ENCOUNTER — TELEPHONE (OUTPATIENT)
Dept: SLEEP MEDICINE | Facility: OTHER | Age: 47
End: 2022-01-14
Payer: COMMERCIAL

## 2022-01-14 ENCOUNTER — IMMUNIZATION (OUTPATIENT)
Dept: PRIMARY CARE CLINIC | Facility: CLINIC | Age: 47
End: 2022-01-14
Payer: COMMERCIAL

## 2022-01-14 DIAGNOSIS — Z23 NEED FOR VACCINATION: Primary | ICD-10-CM

## 2022-01-14 PROCEDURE — 0064A COVID-19, MRNA, LNP-S, PF, 100 MCG/0.25 ML DOSE VACCINE (MODERNA BOOSTER): CPT | Mod: CV19,PBBFAC | Performed by: INTERNAL MEDICINE

## 2022-01-14 PROCEDURE — 91306 COVID-19, MRNA, LNP-S, PF, 100 MCG/0.25 ML DOSE VACCINE (MODERNA BOOSTER): CPT | Mod: PBBFAC | Performed by: INTERNAL MEDICINE

## 2022-01-19 ENCOUNTER — TELEPHONE (OUTPATIENT)
Dept: SLEEP MEDICINE | Facility: OTHER | Age: 47
End: 2022-01-19
Payer: COMMERCIAL

## 2022-01-24 ENCOUNTER — TELEPHONE (OUTPATIENT)
Dept: SLEEP MEDICINE | Facility: OTHER | Age: 47
End: 2022-01-24
Payer: COMMERCIAL

## 2022-01-25 ENCOUNTER — TELEPHONE (OUTPATIENT)
Dept: SLEEP MEDICINE | Facility: OTHER | Age: 47
End: 2022-01-25
Payer: COMMERCIAL

## 2022-01-25 NOTE — TELEPHONE ENCOUNTER
Patient rescheduled her home sleep study on Jan 31st.   Zanaflex      Last Written Prescription Date:  3/8/2021  Last Fill Quantity: 30,   # refills: 0  Last Office Visit: 3/11/2021  Future Office visit:    Next 5 appointments (look out 90 days)    Apr 29, 2021  2:20 PM  SHORT with Chance Trent DO  [facility] ([address] [id number]  [phone]           Routing refill request to provider for review/approval because:  Drug not on the [facility], [facility] or [facility] refill protocol or controlled substance    Robaxin      Last Written Prescription Date:  2/17/2021  Last Fill Quantity: 60,   # refills: 0  Last Office Visit: 3/11/2021  Future Office visit:    Next 5 appointments (look out 90 days)    Apr 29, 2021  2:20 PM  SHORT with Chance Trent DO  [facility] ([facility] ) [address] [place] [id number]  [phone]           Routing refill request to provider for review/approval because:  Drug not on the [facility], [facility] or [facility] refill protocol or controlled substance

## 2022-01-26 ENCOUNTER — TELEPHONE (OUTPATIENT)
Dept: PODIATRY | Facility: CLINIC | Age: 47
End: 2022-01-26
Payer: COMMERCIAL

## 2022-01-28 ENCOUNTER — TELEPHONE (OUTPATIENT)
Dept: SLEEP MEDICINE | Facility: OTHER | Age: 47
End: 2022-01-28
Payer: COMMERCIAL

## 2022-01-31 ENCOUNTER — TELEPHONE (OUTPATIENT)
Dept: SLEEP MEDICINE | Facility: OTHER | Age: 47
End: 2022-01-31
Payer: COMMERCIAL

## 2022-01-31 ENCOUNTER — TELEPHONE (OUTPATIENT)
Dept: INTERNAL MEDICINE | Facility: CLINIC | Age: 47
End: 2022-01-31
Payer: COMMERCIAL

## 2022-01-31 NOTE — TELEPHONE ENCOUNTER
----- Message from Lydia Woods sent at 1/31/2022  1:28 PM CST -----  Contact: Ed   Ed From [facility] would like a call back about questions about free flight info

## 2022-02-07 ENCOUNTER — TELEPHONE (OUTPATIENT)
Dept: SLEEP MEDICINE | Facility: OTHER | Age: 47
End: 2022-02-07
Payer: COMMERCIAL

## 2022-02-07 NOTE — TELEPHONE ENCOUNTER
Patient canceled her home sleep study on JAN 31st,talked to patient about rescheduling.  No response.  Sent out a message through [employer] to reschedule.

## 2022-02-15 ENCOUNTER — OFFICE VISIT (OUTPATIENT)
Dept: OTOLARYNGOLOGY | Facility: CLINIC | Age: 47
End: 2022-02-15
Payer: COMMERCIAL

## 2022-02-15 ENCOUNTER — CLINICAL SUPPORT (OUTPATIENT)
Dept: AUDIOLOGY | Facility: CLINIC | Age: 47
End: 2022-02-15
Payer: COMMERCIAL

## 2022-02-15 VITALS — WEIGHT: 199.06 LBS | BODY MASS INDEX: 36.41 KG/M2

## 2022-02-15 DIAGNOSIS — Z01.10 ENCOUNTER FOR EXAMINATION OF HEARING WITHOUT ABNORMAL FINDINGS: Primary | ICD-10-CM

## 2022-02-15 DIAGNOSIS — H93.293 ABNORMAL AUDITORY PERCEPTION OF BOTH EARS: Primary | ICD-10-CM

## 2022-02-15 DIAGNOSIS — H91.90 HEARING LOSS, UNSPECIFIED HEARING LOSS TYPE, UNSPECIFIED LATERALITY: ICD-10-CM

## 2022-02-15 PROCEDURE — 99999 PR PBB SHADOW E&M-EST. PATIENT-LVL III: CPT | Mod: PBBFAC,,, | Performed by: OTOLARYNGOLOGY

## 2022-02-15 PROCEDURE — 99999 PR PBB SHADOW E&M-EST. PATIENT-LVL III: ICD-10-PCS | Mod: PBBFAC,,, | Performed by: OTOLARYNGOLOGY

## 2022-02-15 PROCEDURE — 99203 PR OFFICE/OUTPT VISIT, NEW, LEVL III, 30-44 MIN: ICD-10-PCS | Mod: S$GLB,,, | Performed by: OTOLARYNGOLOGY

## 2022-02-15 PROCEDURE — 1159F MED LIST DOCD IN RCRD: CPT | Mod: CPTII,S$GLB,, | Performed by: OTOLARYNGOLOGY

## 2022-02-15 PROCEDURE — 3008F BODY MASS INDEX DOCD: CPT | Mod: CPTII,S$GLB,, | Performed by: OTOLARYNGOLOGY

## 2022-02-15 PROCEDURE — 3008F PR BODY MASS INDEX (BMI) DOCUMENTED: ICD-10-PCS | Mod: CPTII,S$GLB,, | Performed by: OTOLARYNGOLOGY

## 2022-02-15 PROCEDURE — 1159F PR MEDICATION LIST DOCUMENTED IN MEDICAL RECORD: ICD-10-PCS | Mod: CPTII,S$GLB,, | Performed by: OTOLARYNGOLOGY

## 2022-02-15 PROCEDURE — 92557 COMPREHENSIVE HEARING TEST: CPT | Mod: S$GLB,,,

## 2022-02-15 PROCEDURE — 92567 PR TYMPA2METRY: ICD-10-PCS | Mod: S$GLB,,,

## 2022-02-15 PROCEDURE — 99999 PR PBB SHADOW E&M-EST. PATIENT-LVL I: CPT | Mod: PBBFAC,,,

## 2022-02-15 PROCEDURE — 92557 PR COMPREHENSIVE HEARING TEST: ICD-10-PCS | Mod: S$GLB,,,

## 2022-02-15 PROCEDURE — 92567 TYMPANOMETRY: CPT | Mod: S$GLB,,,

## 2022-02-15 PROCEDURE — 99999 PR PBB SHADOW E&M-EST. PATIENT-LVL I: ICD-10-PCS | Mod: PBBFAC,,,

## 2022-02-15 PROCEDURE — 99203 OFFICE O/P NEW LOW 30 MIN: CPT | Mod: S$GLB,,, | Performed by: OTOLARYNGOLOGY

## 2022-02-15 NOTE — PROGRESS NOTES
Subjective:       Patient ID: Terese Macias is a 46 y.o. female.    Chief Complaint: Hearing Loss    Hearing Loss:   Chronicity:  Chronic  Onset:  More than 1 month ago  Progression since onset:  Unchanged  Frequency:  Constantly  Severity:  MildNo vertigo, no ear pain, no tinnitus and no otalgia.No noise exposure, no ear tubes and no ear infections.    Review of Systems   HENT: Positive for hearing loss. Negative for ear pain and tinnitus.    Neurological: Negative for vertigo.         Objective:      Physical Exam  Vitals and nursing note reviewed.   Constitutional:       Appearance: Normal appearance.   HENT:      Head: Normocephalic and atraumatic.      Right Ear: Tympanic membrane, ear canal and external ear normal. There is no impacted cerumen.      Left Ear: Tympanic membrane, ear canal and external ear normal. There is no impacted cerumen.   Neurological:      Mental Status: She is alert.       Data Reviewed:        Audiogram tracings independently reviewed and discussed with patient shows normal thresholds, WRS, tympanograms, and reflexes AU      Assessment:       Problem List Items Addressed This Visit    None     Visit Diagnoses     Encounter for examination of hearing without abnormal findings    -  Primary    Hearing loss, unspecified hearing loss type, unspecified laterality              Plan:        reassured patient of normal audiogram  F/u prn

## 2022-02-15 NOTE — PROGRESS NOTES
Terese Macias was seen today in the clinic for an audiologic evaluation.  Patient's main complaint was decreased hearing. Ms. Macias reported noticing difficulty hearing in certain situations. She reported in the past reading lips helped her, but this is now difficult with the face masks. Mrs. Macias denied tinnitus, otalgia, aural fullness, true vertigo and history of noise exposure.    Tympanometry revealed Type A tympanograms, bilaterally.    Audiogram results revealed normal hearing sensitivity, bilaterally.    Speech reception thresholds were noted at 10 dB in the right ear and 10 dB in the left ear.    Speech discrimination scores were 100% in the right ear and 100% in the left ear.    Recommendations:  1. Otologic evaluation  2. Repeat audiogram as needed or sooner if change perceived  3. Hearing protection in noise

## 2022-02-16 ENCOUNTER — PATIENT MESSAGE (OUTPATIENT)
Dept: RESEARCH | Facility: HOSPITAL | Age: 47
End: 2022-02-16
Payer: COMMERCIAL

## 2022-02-21 ENCOUNTER — TELEPHONE (OUTPATIENT)
Dept: SLEEP MEDICINE | Facility: OTHER | Age: 47
End: 2022-02-21
Payer: COMMERCIAL

## 2022-02-21 NOTE — TELEPHONE ENCOUNTER
Patient canceled the home sleep study twice,we called to reschedule patient and sent out a message through [employer].  No response.

## 2022-02-22 DIAGNOSIS — D84.9 IMMUNOSUPPRESSED STATUS: ICD-10-CM

## 2022-03-04 ENCOUNTER — PATIENT MESSAGE (OUTPATIENT)
Dept: INTERNAL MEDICINE | Facility: CLINIC | Age: 47
End: 2022-03-04
Payer: COMMERCIAL

## 2022-03-04 NOTE — TELEPHONE ENCOUNTER
No new care gaps identified.  Powered by [employer]. Reference number: [id number].   3/04/2022 4:49:34 PM CST

## 2022-03-06 RX ORDER — TRAZODONE HYDROCHLORIDE 50 MG/1
50 TABLET ORAL NIGHTLY PRN
Qty: 90 TABLET | Refills: 1 | Status: SHIPPED | OUTPATIENT
Start: 2022-03-06

## 2022-03-09 RX ORDER — FLUOXETINE HYDROCHLORIDE 20 MG/1
20 CAPSULE ORAL DAILY
Qty: 30 CAPSULE | Refills: 11 | Status: SHIPPED | OUTPATIENT
Start: 2022-03-09 | End: 2022-04-08 | Stop reason: SDUPTHER

## 2022-03-24 NOTE — TELEPHONE ENCOUNTER
Patient informed   Duration Of Freeze Thaw-Cycle (Seconds): 3 Post-Care Instructions: I reviewed with the patient in detail post-care instructions. Patient is to wear sunprotection, and avoid picking at any of the treated lesions. Pt may apply Vaseline to crusted or scabbing areas. Number Of Freeze-Thaw Cycles: 1 freeze-thaw cycle Render Post-Care Instructions In Note?: yes Detail Level: Detailed Consent: The patient's consent was obtained including but not limited to risks of crusting, scabbing, blistering, scarring, darker or lighter pigmentary change, recurrence, incomplete removal and infection. Aperture Size (Optional): A Render Note In Bullet Format When Appropriate: No

## 2022-04-08 ENCOUNTER — PATIENT MESSAGE (OUTPATIENT)
Dept: INTERNAL MEDICINE | Facility: CLINIC | Age: 47
End: 2022-04-08
Payer: COMMERCIAL

## 2022-04-08 RX ORDER — FLUOXETINE HYDROCHLORIDE 20 MG/1
20 CAPSULE ORAL DAILY
Qty: 30 CAPSULE | Refills: 11 | Status: SHIPPED | OUTPATIENT
Start: 2022-04-08 | End: 2022-08-30 | Stop reason: SDUPTHER

## 2022-04-08 NOTE — TELEPHONE ENCOUNTER
No new care gaps identified.  Powered by [employer]. Reference number: [id number].   4/08/2022 4:29:38 PM CDT

## 2022-05-14 ENCOUNTER — HOSPITAL ENCOUNTER (EMERGENCY)
Facility: HOSPITAL | Age: 47
Discharge: HOME | End: 2022-05-14
Attending: EMERGENCY MEDICINE
Payer: COMMERCIAL

## 2022-05-14 VITALS
OXYGEN SATURATION: 100 % | HEART RATE: 71 BPM | WEIGHT: 190 LBS | SYSTOLIC BLOOD PRESSURE: 122 MMHG | DIASTOLIC BLOOD PRESSURE: 81 MMHG | RESPIRATION RATE: 17 BRPM | BODY MASS INDEX: 34.96 KG/M2 | TEMPERATURE: 99 F | HEIGHT: 62 IN

## 2022-05-14 DIAGNOSIS — S93.402A SPRAIN OF LEFT ANKLE, UNSPECIFIED LIGAMENT, INITIAL ENCOUNTER: Primary | ICD-10-CM
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
## 2022-05-14 PROCEDURE — 25000003 PHARM REV CODE 250: Performed by: EMERGENCY MEDICINE

## 2022-05-14 PROCEDURE — 96372 THER/PROPH/DIAG INJ SC/IM: CPT | Performed by: EMERGENCY MEDICINE

## 2022-05-14 PROCEDURE — 99284 EMERGENCY DEPT VISIT MOD MDM: CPT

## 2022-05-14 PROCEDURE — 63600175 PHARM REV CODE 636 W HCPCS: Performed by: EMERGENCY MEDICINE

## 2022-05-14 RX ORDER — KETOROLAC TROMETHAMINE 30 MG/ML
30 INJECTION, SOLUTION INTRAMUSCULAR; INTRAVENOUS
Status: COMPLETED | OUTPATIENT
Start: 2022-05-14 | End: 2022-05-14

## 2022-05-14 RX ORDER — ACETAMINOPHEN 325 MG/1
650 TABLET ORAL
Status: COMPLETED | OUTPATIENT
Start: 2022-05-14 | End: 2022-05-14

## 2022-05-14 RX ADMIN — ACETAMINOPHEN 650 MG: 325 TABLET ORAL at 09:05

## 2022-05-14 RX ADMIN — KETOROLAC TROMETHAMINE 30 MG: 30 INJECTION, SOLUTION INTRAMUSCULAR at 09:05

## 2022-05-15 NOTE — ED PROVIDER NOTES
Encounter Date: 5/14/2022       History     Chief Complaint   Patient presents with    Ankle Injury     Patient states she fell in the parking lot and turned her left ankle. Patient states no loss of consciousness. Patients ankle is swelling and painful to touch.      46-year-old female presents emergency department complaining of left ankle pain.  States she tripped and fell, twisting her left ankle.  States she has the small abrasion to her right knee but mostly her concern is her left ankle.  Describes a constant aching and throbbing pain that is prohibit her from walking secondary to pain.  Pain is constant, worse with certain movements and positions and without alleviating factors.  Has not taken any medications for the pain.  No other injury or symptoms reported at this time.        Review of patient's allergies indicates:   Allergen Reactions    Penicillins Other (See Comments) and Rash     Pt had reaction to PCN as an infant.     Past Medical History:   Diagnosis Date    Abnormal Pap smear     Abnormal Pap smear of vagina     Anemia     Calcified mesenteric mass 7/14/2015    Depression     Diffuse large B cell lymphoma     Diffuse large B-cell lymphoma of spleen 3/13/2019    Fever blister     GERD (gastroesophageal reflux disease)     Heavy periods 6/8/2015    Hemorrhoids without complication     IBS (irritable colon syndrome)     Joint pain     Left upper quadrant pain 12/19/2018    Pancreatic mass 12/19/2018    Psychiatric problem     Screening for HPV (human papillomavirus)     Sleep difficulties     Splenomegaly 12/19/2018    Thalassemia     Thalassemia     Therapy      Past Surgical History:   Procedure Laterality Date    COLONOSCOPY      COLONOSCOPY N/A 11/8/2018    Procedure: COLONOSCOPY;  Surgeon: Ba Stewart MD;  Location: [address]);  Service: Endoscopy;  Laterality: N/A;  non diabetic constipation prep      ok to schedule per [place]    ENDOSCOPIC ULTRASOUND  OF UPPER GASTROINTESTINAL TRACT N/A 12/12/2018    Procedure: ULTRASOUND-ENDOSCOPIC-UPPER;  Surgeon: Venu Monitel MD;  Location: [facility];  Service: Endoscopy;  Laterality: N/A;    ESOPHAGOGASTRODUODENOSCOPY N/A 11/8/2018    Procedure: EGD (ESOPHAGOGASTRODUODENOSCOPY);  Surgeon: Ba Stewart MD;  Location: [facility] ENDO ([address] [place] [address]);  Service: Endoscopy;  Laterality: N/A;  ok to schedule per [place]    HERNIA REPAIR       Family History   Problem Relation Age of Onset    Hypertension Mother     Diabetes Father     Heart disease Father     Eczema Father     Eczema Other     Colon cancer Paternal Uncle 60        colon cancer    Cancer Cousin         breast    Breast cancer Cousin     No Known Problems Other     Cancer Cousin         stomach cancer    Breast cancer Maternal Grandmother     Cancer Maternal Grandmother         breast    Eczema Sister     Acne Sister     Eczema Brother     Eczema Daughter     Cancer Maternal Aunt         breast    Breast cancer Maternal Aunt     No Known Problems Maternal Uncle     No Known Problems Paternal Aunt     No Known Problems Maternal Grandfather     No Known Problems Paternal Grandmother     No Known Problems Paternal Grandfather     Stomach cancer Cousin 44    Melanoma Neg Hx     Psoriasis Neg Hx     Lupus Neg Hx     Ovarian cancer Neg Hx     Amblyopia Neg Hx     Blindness Neg Hx     Cataracts Neg Hx     Glaucoma Neg Hx     Macular degeneration Neg Hx     Retinal detachment Neg Hx     Strabismus Neg Hx     Stroke Neg Hx     Thyroid disease Neg Hx     Esophageal cancer Neg Hx     Irritable bowel syndrome Neg Hx     Crohn's disease Neg Hx     Ulcerative colitis Neg Hx     Celiac disease Neg Hx      Social History     Tobacco Use    Smoking status: Never Smoker    Smokeless tobacco: Never Used   Substance Use Topics    Alcohol use: Yes     Comment: Occasionally    Drug use: No     Review of Systems   Constitutional: Negative for  chills, fatigue and fever.   HENT: Negative for congestion and sore throat.    Eyes: Negative for photophobia and visual disturbance.   Respiratory: Negative for cough and shortness of breath.    Cardiovascular: Negative for chest pain and palpitations.   Gastrointestinal: Negative for abdominal pain, diarrhea and vomiting.   Musculoskeletal: Negative for back pain, neck pain and neck stiffness.   Neurological: Negative for light-headedness, numbness and headaches.       Physical Exam     Initial Vitals [05/14/22 1949]   BP Pulse Resp Temp SpO2   119/73 73 18 98.9 °F (37.2 °C) 98 %      MAP       --         Physical Exam    Nursing note and vitals reviewed.  Constitutional: She appears well-developed and well-nourished. No distress.   HENT:   Head: Normocephalic and atraumatic.   Eyes: Conjunctivae and EOM are normal. Pupils are equal, round, and reactive to light.   Neck: Neck supple. No tracheal deviation present.   Normal range of motion.  Cardiovascular: Normal rate and intact distal pulses.   Pulmonary/Chest: No respiratory distress.   Musculoskeletal:         General: Tenderness (Diffuse to left ankle, including bilateral malleoli, with mild swelling) present. No edema. Normal range of motion.      Cervical back: Normal range of motion and neck supple.     Neurological: She is alert and oriented to person, place, and time. She has normal strength. No cranial nerve deficit. GCS score is 15. GCS eye subscore is 4. GCS verbal subscore is 5. GCS motor subscore is 6.   Skin: Skin is warm and dry.         ED Course   Procedures  Labs Reviewed - No data to display         X-Rays:   Independently Interpreted Readings:   Other Readings:  Imaging interpreted by radiologist and visualized by me:    Imaging Results          X-Ray Ankle Complete Left (Final result)  Result time 05/14/22 21:21:46    Final result by Ba Epps MD (05/14/22 21:21:46)                 Impression:      No radiographic evidence of acute  displaced fracture or dislocation of the left ankle.      Electronically signed by: Ba Epps MD  Date:    05/14/2022  Time:    21:21             Narrative:    EXAMINATION:  XR ANKLE COMPLETE 3 VIEW LEFT    CLINICAL HISTORY:  Unspecified fall, initial encounter    TECHNIQUE:  AP, lateral and oblique views of the left ankle were performed.    COMPARISON:  None    FINDINGS:  There is no radiographic evidence of acute displaced fracture or dislocation.  Ankle mortise appears maintained and symmetric.  There is mild soft tissue edema overlying the lateral malleolus.                                Medications   ketorolac injection 30 mg (30 mg Intramuscular Given 5/14/22 2106)   acetaminophen tablet 650 mg (650 mg Oral Given 5/14/22 2106)     Medical Decision Making:   Initial Assessment:   46-year-old female presents emergency department complaining of left ankle pain after trip and fall  Differential Diagnosis:   Fracture, dislocation, contusion, sprain, strain  Independently Interpreted Test(s):   I have ordered and independently interpreted X-rays - see prior notes.  ED Management:  Patient given some Toradol and Tylenol and placed in an Ace wrap.  Informed her of results as well as plan to discharge with instructions on home management, follow up with primary care physician, strict return precautions given.                      Clinical Impression:   Final diagnoses:  [W19.XXXA] Fall  [S93.402A] Sprain of left ankle, unspecified ligament, initial encounter (Primary)          ED Disposition Condition    Discharge Stable        ED Prescriptions     None        Follow-up Information     Follow up With Specialties Details Why Contact Info    Stacy Frye MD Internal Medicine Schedule an appointment as soon as possible for a visit   [address]  [place] [address]  [phone]             Du Jeffers MD  05/14/22 2126

## 2022-05-15 NOTE — DISCHARGE INSTRUCTIONS
I recommend taking ibuprofen 600 mg every 8 hours with food and/or Tylenol 650 mg every 8 hours as needed for pain.  I recommend applying ice packs 15 minutes on, 15 minutes off, and keeping your ankle elevated above the level of your heart as much as possible over the next day or 2 to help with swelling.  Please follow-up with your primary care physician for further evaluation and management.  Please return with any new or worsening symptoms.

## 2022-06-08 ENCOUNTER — PATIENT MESSAGE (OUTPATIENT)
Dept: INTERNAL MEDICINE | Facility: CLINIC | Age: 47
End: 2022-06-08
Payer: COMMERCIAL

## 2022-06-08 DIAGNOSIS — F32.A DEPRESSION, UNSPECIFIED DEPRESSION TYPE: Primary | ICD-10-CM

## 2022-06-22 ENCOUNTER — TELEPHONE (OUTPATIENT)
Dept: BEHAVIORAL HEALTH | Facility: CLINIC | Age: 47
End: 2022-06-22
Payer: COMMERCIAL

## 2022-06-22 ENCOUNTER — PATIENT MESSAGE (OUTPATIENT)
Dept: BEHAVIORAL HEALTH | Facility: CLINIC | Age: 47
End: 2022-06-22
Payer: COMMERCIAL

## 2022-06-23 ENCOUNTER — TELEPHONE (OUTPATIENT)
Dept: INTERNAL MEDICINE | Facility: CLINIC | Age: 47
End: 2022-06-23
Payer: COMMERCIAL

## 2022-06-23 ENCOUNTER — PATIENT MESSAGE (OUTPATIENT)
Dept: INTERNAL MEDICINE | Facility: CLINIC | Age: 47
End: 2022-06-23
Payer: COMMERCIAL

## 2022-06-23 DIAGNOSIS — Z12.39 ENCOUNTER FOR SCREENING FOR MALIGNANT NEOPLASM OF BREAST, UNSPECIFIED SCREENING MODALITY: Primary | ICD-10-CM

## 2022-06-23 NOTE — TELEPHONE ENCOUNTER
----- Message from Wily Wright LPN sent at 6/23/2022 10:24 AM CDT -----  Regarding: order for mammogram  Patient is requesting an order be entered for her screening mammogram patient was due in March 2022. Thank you.

## 2022-07-07 ENCOUNTER — PATIENT MESSAGE (OUTPATIENT)
Dept: BEHAVIORAL HEALTH | Facility: CLINIC | Age: 47
End: 2022-07-07

## 2022-07-07 ENCOUNTER — OFFICE VISIT (OUTPATIENT)
Dept: BEHAVIORAL HEALTH | Facility: CLINIC | Age: 47
End: 2022-07-07
Payer: COMMERCIAL

## 2022-07-07 DIAGNOSIS — F33.1 DEPRESSION, MAJOR, RECURRENT, MODERATE: Primary | ICD-10-CM

## 2022-07-07 DIAGNOSIS — F41.9 ANXIETY: ICD-10-CM

## 2022-07-07 PROCEDURE — 90791 PR PSYCHIATRIC DIAGNOSTIC EVALUATION: ICD-10-PCS | Mod: 95,,, | Performed by: SOCIAL WORKER

## 2022-07-07 PROCEDURE — 90791 PSYCH DIAGNOSTIC EVALUATION: CPT | Mod: 95,,, | Performed by: SOCIAL WORKER
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
## 2022-07-15 ENCOUNTER — TELEPHONE (OUTPATIENT)
Dept: BEHAVIORAL HEALTH | Facility: CLINIC | Age: 47
End: 2022-07-15
Payer: COMMERCIAL

## 2022-07-15 PROBLEM — F33.1 DEPRESSION, MAJOR, RECURRENT, MODERATE: Status: ACTIVE | Noted: 2022-07-15

## 2022-07-18 ENCOUNTER — HOSPITAL ENCOUNTER (OUTPATIENT)
Dept: RADIOLOGY | Facility: HOSPITAL | Age: 47
Discharge: HOME | End: 2022-07-18
Attending: INTERNAL MEDICINE
Payer: COMMERCIAL

## 2022-07-18 DIAGNOSIS — Z12.39 ENCOUNTER FOR SCREENING FOR MALIGNANT NEOPLASM OF BREAST, UNSPECIFIED SCREENING MODALITY: ICD-10-CM

## 2022-07-18 PROCEDURE — 77063 BREAST TOMOSYNTHESIS BI: CPT | Mod: 26,,, | Performed by: RADIOLOGY

## 2022-07-18 PROCEDURE — 77063 MAMMO DIGITAL SCREENING BILAT WITH TOMO: ICD-10-PCS | Mod: 26,,, | Performed by: RADIOLOGY

## 2022-07-18 PROCEDURE — 77067 SCR MAMMO BI INCL CAD: CPT | Mod: 26,,, | Performed by: RADIOLOGY

## 2022-07-18 PROCEDURE — 77067 SCR MAMMO BI INCL CAD: CPT | Mod: TC

## 2022-07-18 PROCEDURE — 77067 MAMMO DIGITAL SCREENING BILAT WITH TOMO: ICD-10-PCS | Mod: 26,,, | Performed by: RADIOLOGY

## 2022-07-21 ENCOUNTER — TELEPHONE (OUTPATIENT)
Dept: BEHAVIORAL HEALTH | Facility: CLINIC | Age: 47
End: 2022-07-21
Payer: COMMERCIAL

## 2022-07-21 ENCOUNTER — PATIENT MESSAGE (OUTPATIENT)
Dept: BEHAVIORAL HEALTH | Facility: CLINIC | Age: 47
End: 2022-07-21
Payer: COMMERCIAL

## 2022-07-21 NOTE — PROGRESS NOTES
Behavioral Health Community Health Worker  Follow-Up  Completed by:  Linsey Moreno    Date:  7/21/2022    Patient Enrollment in Behavioral Health Program:  · Terese Macias was enrolled in the Behavioral Health Program on 6/29/22    Assessments     Promis 10:  No flowsheet data found.    Depression PHQ:  PHQ9 7/21/2022   Total Score 7       Generalized Anxiety Disorder 7-Item Scale:  GAD7 7/21/2022   1. Feeling nervous, anxious, or on edge? 3   2. Not being able to stop or control worrying? 3   3. Worrying too much about different things? 3   4. Trouble relaxing? 3   5. Being so restless that it is hard to sit still? 2   6. Becoming easily annoyed or irritable? 1   7. Feeling afraid as if something awful might happen? 3   8. If you checked off any problems, how difficult have these problems made it for you to do your work, take care of things at home, or get along with other people? 1   DARYL-7 Score 18       Patients' Global Impression of Change (PGIC) Scale:  Since beginning treatment at this clinic, how would you describe the change (if any) in ACTIVITY LIMITATIONS, SYMPTOMS, EMOTIONS, and OVERALL QUALITY OF LIFE, related to your painful condition?  No Value exists for the : [facility]#86695      In a similar way, please check the number below that matches your degree of change since beginning care at this clinic (Much better (0) - Much Worse (10)): No Value exists for the : [facility]#41533        Much Better                                     No Change                                    Much Worse                        -----------------------------------------------------------------------------                        0       1       2       3       4       5       6       7      8       9      10                     Call Summary     Assessments updated

## 2022-07-28 ENCOUNTER — OFFICE VISIT (OUTPATIENT)
Dept: BEHAVIORAL HEALTH | Facility: CLINIC | Age: 47
End: 2022-07-28
Payer: COMMERCIAL

## 2022-07-28 DIAGNOSIS — F43.21 GRIEF: ICD-10-CM

## 2022-07-28 DIAGNOSIS — F41.9 ANXIETY: Primary | ICD-10-CM

## 2022-07-28 DIAGNOSIS — F33.1 DEPRESSION, MAJOR, RECURRENT, MODERATE: ICD-10-CM

## 2022-07-28 PROCEDURE — 90832 PR PSYCHOTHERAPY W/PATIENT, 30 MIN: ICD-10-PCS | Mod: 95,,, | Performed by: SOCIAL WORKER

## 2022-07-28 PROCEDURE — 90832 PSYTX W PT 30 MINUTES: CPT | Mod: 95,,, | Performed by: SOCIAL WORKER

## 2022-07-28 NOTE — PROGRESS NOTES
The patient location is: [place]  The chief complaint leading to consultation is: depression, anxiety, grief    Visit type: audiovisual    Face to Face time with patient: 30  32 minutes of total time spent on the encounter, which includes face to face time and non-face to face time preparing to see the patient (eg, review of tests), Obtaining and/or reviewing separately obtained history, Documenting clinical information in the electronic or other health record, Independently interpreting results (not separately reported) and communicating results to the patient/family/caregiver, or Care coordination (not separately reported).     Individual Psychotherapy (LCSW/PhD)  Terese Macias,  2022    Site:  Telemed         Therapeutic Intervention: Met with patient for individual psychotherapy.    Chief complaint/reason for encounter: depression, anxiety and grief     Interval history and content of current session: Pt was on summer break for 3 weeks. PT's grandmother passed on . She was 95 years old. Grandfather just turned 97 on Monday. Her grandmother had dementia and was getting physically weaker. She feels guilty she didn't visit her grandmother before she .Pt has a lot of cousins who have posted about her grandmother. It's hard for her to read the tributes they post. Pt is worried about her mom who lost pt's dad, her dog, her first cousin and now her mom.Pt is having pain in her back. She has been going to chiro and massage. Pt worries her shoulder pain may be related to her lymphoma because that was one of her first symptoms. She has thought about going to the gym. Discussed pt possibly asking for pt referral for her back pain. 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     Treatment plan:  · Target symptoms: depression, anxiety , grief  · Why chosen therapy is appropriate versus another modality: relevant to diagnosis, patient responds to this modality  · Outcome monitoring  methods: self-report, observation  · Therapeutic intervention type: behavior modifying psychotherapy, supportive psychotherapy    Risk parameters:  Patient reports no suicidal ideation  Patient reports no homicidal ideation  Patient reports no self-injurious behavior  Patient reports no violent behavior    Verbal deficits: None    Patient's response to intervention:  The patient's response to intervention is accepting.    Progress toward goals and other mental status changes:  The patient's progress toward goals is fair .    Diagnosis:     ICD-10-CM ICD-9-CM   1. Anxiety  F41.9 300.00   2. Grief  F43.21 309.0   3. Depression, major, recurrent, moderate  F33.1 296.32       Plan: Pt plans to continue individual psychotherapy    Return to clinic: 3 weeks    Length of Service (minutes): 30        Each patient to whom he or she provides medical services by telemedicine is:  (1) informed of the relationship between the physician and patient and the respective role of any other health care provider with respect to management of the patient; and (2) notified that he or she may decline to receive medical services by telemedicine and may withdraw from such care at any time.

## 2022-08-15 ENCOUNTER — TELEPHONE (OUTPATIENT)
Dept: BEHAVIORAL HEALTH | Facility: CLINIC | Age: 47
End: 2022-08-15
Payer: COMMERCIAL

## 2022-08-15 NOTE — PROGRESS NOTES
Behavioral Health Community Health Worker  Follow-Up  Completed by: Linsey Moreno    Date:  8/15/2022    Patient Enrollment in Behavioral Health Program:  · Terese Macias was enrolled in the Behavioral Health Program on 6/29/22    Assessments     Promis 10:  No flowsheet data found.    Depression PHQ:  PHQ9 8/15/2022   Total Score 8       Generalized Anxiety Disorder 7-Item Scale:  GAD7 8/15/2022   1. Feeling nervous, anxious, or on edge? 2   2. Not being able to stop or control worrying? 2   3. Worrying too much about different things? 1   4. Trouble relaxing? 3   5. Being so restless that it is hard to sit still? 2   6. Becoming easily annoyed or irritable? 1   7. Feeling afraid as if something awful might happen? 2   8. If you checked off any problems, how difficult have these problems made it for you to do your work, take care of things at home, or get along with other people? 1   DARYL-7 Score 13       Patients' Global Impression of Change (PGIC) Scale:  Since beginning treatment at this clinic, how would you describe the change (if any) in ACTIVITY LIMITATIONS, SYMPTOMS, EMOTIONS, and OVERALL QUALITY OF LIFE, related to your painful condition?  No Value exists for the : [facility]#08976      In a similar way, please check the number below that matches your degree of change since beginning care at this clinic (Much better (0) - Much Worse (10)): No Value exists for the : [facility]#07062        Much Better                                     No Change                                    Much Worse                        -----------------------------------------------------------------------------                        0       1       2       3       4       5       6       7      8       9      10                     Call Summary     Assessment updated

## 2022-08-16 ENCOUNTER — OFFICE VISIT (OUTPATIENT)
Dept: INTERNAL MEDICINE | Facility: CLINIC | Age: 47
End: 2022-08-16
Payer: COMMERCIAL

## 2022-08-16 ENCOUNTER — LAB VISIT (OUTPATIENT)
Dept: LAB | Facility: HOSPITAL | Age: 47
End: 2022-08-16
Attending: INTERNAL MEDICINE
Payer: COMMERCIAL

## 2022-08-16 VITALS
SYSTOLIC BLOOD PRESSURE: 114 MMHG | HEART RATE: 75 BPM | HEIGHT: 62 IN | OXYGEN SATURATION: 97 % | DIASTOLIC BLOOD PRESSURE: 80 MMHG | BODY MASS INDEX: 36.31 KG/M2 | WEIGHT: 197.31 LBS

## 2022-08-16 DIAGNOSIS — Z00.00 ROUTINE GENERAL MEDICAL EXAMINATION AT A HEALTH CARE FACILITY: Primary | ICD-10-CM

## 2022-08-16 DIAGNOSIS — J02.9 SORE THROAT: ICD-10-CM

## 2022-08-16 DIAGNOSIS — K58.1 IRRITABLE BOWEL SYNDROME WITH CONSTIPATION: ICD-10-CM

## 2022-08-16 DIAGNOSIS — Z00.00 ROUTINE GENERAL MEDICAL EXAMINATION AT A HEALTH CARE FACILITY: ICD-10-CM

## 2022-08-16 LAB
ALBUMIN SERPL BCP-MCNC: 3.9 G/DL (ref 3.5–5.2)
ALP SERPL-CCNC: 86 U/L (ref 55–135)
ALT SERPL W/O P-5'-P-CCNC: 15 U/L (ref 10–44)
ANION GAP SERPL CALC-SCNC: 8 MMOL/L (ref 8–16)
AST SERPL-CCNC: 18 U/L (ref 10–40)
BASOPHILS # BLD AUTO: 0.02 K/UL (ref 0–0.2)
BASOPHILS NFR BLD: 0.2 % (ref 0–1.9)
BILIRUB SERPL-MCNC: 0.5 MG/DL (ref 0.1–1)
BUN SERPL-MCNC: 12 MG/DL (ref 6–20)
CALCIUM SERPL-MCNC: 9.4 MG/DL (ref 8.7–10.5)
CHLORIDE SERPL-SCNC: 107 MMOL/L (ref 95–110)
CHOLEST SERPL-MCNC: 179 MG/DL (ref 120–199)
CHOLEST/HDLC SERPL: 2.9 {RATIO} (ref 2–5)
CO2 SERPL-SCNC: 27 MMOL/L (ref 23–29)
CREAT SERPL-MCNC: 0.7 MG/DL (ref 0.5–1.4)
CTP QC/QA: YES
DIFFERENTIAL METHOD: ABNORMAL
EOSINOPHIL # BLD AUTO: 0.2 K/UL (ref 0–0.5)
EOSINOPHIL NFR BLD: 2.2 % (ref 0–8)
ERYTHROCYTE [DISTWIDTH] IN BLOOD BY AUTOMATED COUNT: 13.9 % (ref 11.5–14.5)
EST. GFR  (NO RACE VARIABLE): >60 ML/MIN/1.73 M^2
ESTIMATED AVG GLUCOSE: 111 MG/DL (ref 68–131)
GLUCOSE SERPL-MCNC: 84 MG/DL (ref 70–110)
HBA1C MFR BLD: 5.5 % (ref 4–5.6)
HCT VFR BLD AUTO: 35.9 % (ref 37–48.5)
HDLC SERPL-MCNC: 62 MG/DL (ref 40–75)
HDLC SERPL: 34.6 % (ref 20–50)
HGB BLD-MCNC: 11.5 G/DL (ref 12–16)
IMM GRANULOCYTES # BLD AUTO: 0.02 K/UL (ref 0–0.04)
IMM GRANULOCYTES NFR BLD AUTO: 0.2 % (ref 0–0.5)
INSULIN COLLECTION INTERVAL: NORMAL
INSULIN SERPL-ACNC: 5.6 UU/ML
LDLC SERPL CALC-MCNC: 105 MG/DL (ref 63–159)
LYMPHOCYTES # BLD AUTO: 3.1 K/UL (ref 1–4.8)
LYMPHOCYTES NFR BLD: 34.9 % (ref 18–48)
MCH RBC QN AUTO: 29 PG (ref 27–31)
MCHC RBC AUTO-ENTMCNC: 32 G/DL (ref 32–36)
MCV RBC AUTO: 90 FL (ref 82–98)
MONOCYTES # BLD AUTO: 0.6 K/UL (ref 0.3–1)
MONOCYTES NFR BLD: 7.2 % (ref 4–15)
NEUTROPHILS # BLD AUTO: 4.9 K/UL (ref 1.8–7.7)
NEUTROPHILS NFR BLD: 55.3 % (ref 38–73)
NONHDLC SERPL-MCNC: 117 MG/DL
NRBC BLD-RTO: 0 /100 WBC
PLATELET # BLD AUTO: 221 K/UL (ref 150–450)
PMV BLD AUTO: 10.9 FL (ref 9.2–12.9)
POTASSIUM SERPL-SCNC: 4.2 MMOL/L (ref 3.5–5.1)
PROT SERPL-MCNC: 7.4 G/DL (ref 6–8.4)
RBC # BLD AUTO: 3.97 M/UL (ref 4–5.4)
SARS-COV-2 RDRP RESP QL NAA+PROBE: NEGATIVE
SODIUM SERPL-SCNC: 142 MMOL/L (ref 136–145)
TRIGL SERPL-MCNC: 60 MG/DL (ref 30–150)
TSH SERPL DL<=0.005 MIU/L-ACNC: 1.27 UIU/ML (ref 0.4–4)
WBC # BLD AUTO: 8.89 K/UL (ref 3.9–12.7)

## 2022-08-16 PROCEDURE — 1159F MED LIST DOCD IN RCRD: CPT | Mod: CPTII,S$GLB,, | Performed by: INTERNAL MEDICINE

## 2022-08-16 PROCEDURE — 99999 PR PBB SHADOW E&M-EST. PATIENT-LVL III: CPT | Mod: PBBFAC,,, | Performed by: INTERNAL MEDICINE

## 2022-08-16 PROCEDURE — 80061 LIPID PANEL: CPT | Performed by: INTERNAL MEDICINE

## 2022-08-16 PROCEDURE — U0002: ICD-10-PCS | Mod: QW,S$GLB,, | Performed by: INTERNAL MEDICINE

## 2022-08-16 PROCEDURE — 3074F SYST BP LT 130 MM HG: CPT | Mod: CPTII,S$GLB,, | Performed by: INTERNAL MEDICINE

## 2022-08-16 PROCEDURE — U0002 COVID-19 LAB TEST NON-CDC: HCPCS | Mod: QW,S$GLB,, | Performed by: INTERNAL MEDICINE

## 2022-08-16 PROCEDURE — 3008F PR BODY MASS INDEX (BMI) DOCUMENTED: ICD-10-PCS | Mod: CPTII,S$GLB,, | Performed by: INTERNAL MEDICINE

## 2022-08-16 PROCEDURE — 83036 HEMOGLOBIN GLYCOSYLATED A1C: CPT | Performed by: INTERNAL MEDICINE

## 2022-08-16 PROCEDURE — 99396 PR PREVENTIVE VISIT,EST,40-64: ICD-10-PCS | Mod: S$GLB,,, | Performed by: INTERNAL MEDICINE

## 2022-08-16 PROCEDURE — 83525 ASSAY OF INSULIN: CPT | Performed by: INTERNAL MEDICINE

## 2022-08-16 PROCEDURE — 3008F BODY MASS INDEX DOCD: CPT | Mod: CPTII,S$GLB,, | Performed by: INTERNAL MEDICINE

## 2022-08-16 PROCEDURE — 99396 PREV VISIT EST AGE 40-64: CPT | Mod: S$GLB,,, | Performed by: INTERNAL MEDICINE

## 2022-08-16 PROCEDURE — 99999 PR PBB SHADOW E&M-EST. PATIENT-LVL III: ICD-10-PCS | Mod: PBBFAC,,, | Performed by: INTERNAL MEDICINE

## 2022-08-16 PROCEDURE — 36415 COLL VENOUS BLD VENIPUNCTURE: CPT | Performed by: INTERNAL MEDICINE

## 2022-08-16 PROCEDURE — 3079F DIAST BP 80-89 MM HG: CPT | Mod: CPTII,S$GLB,, | Performed by: INTERNAL MEDICINE

## 2022-08-16 PROCEDURE — 84443 ASSAY THYROID STIM HORMONE: CPT | Performed by: INTERNAL MEDICINE

## 2022-08-16 PROCEDURE — 80053 COMPREHEN METABOLIC PANEL: CPT | Performed by: INTERNAL MEDICINE

## 2022-08-16 PROCEDURE — 85025 COMPLETE CBC W/AUTO DIFF WBC: CPT | Performed by: INTERNAL MEDICINE

## 2022-08-16 PROCEDURE — 1159F PR MEDICATION LIST DOCUMENTED IN MEDICAL RECORD: ICD-10-PCS | Mod: CPTII,S$GLB,, | Performed by: INTERNAL MEDICINE

## 2022-08-16 PROCEDURE — 3074F PR MOST RECENT SYSTOLIC BLOOD PRESSURE < 130 MM HG: ICD-10-PCS | Mod: CPTII,S$GLB,, | Performed by: INTERNAL MEDICINE

## 2022-08-16 PROCEDURE — 3079F PR MOST RECENT DIASTOLIC BLOOD PRESSURE 80-89 MM HG: ICD-10-PCS | Mod: CPTII,S$GLB,, | Performed by: INTERNAL MEDICINE

## 2022-08-16 RX ORDER — ASPIRIN 325 MG
50000 TABLET, DELAYED RELEASE (ENTERIC COATED) ORAL WEEKLY
Qty: 12 CAPSULE | Refills: 6 | Status: SHIPPED | OUTPATIENT
Start: 2022-08-16 | End: 2023-05-16 | Stop reason: SDUPTHER

## 2022-08-16 RX ORDER — METHOCARBAMOL 500 MG/1
500 TABLET, FILM COATED ORAL 3 TIMES DAILY PRN
Qty: 40 TABLET | Refills: 0 | Status: SHIPPED | OUTPATIENT
Start: 2022-08-16 | End: 2023-11-14 | Stop reason: SDUPTHER

## 2022-08-16 RX ORDER — METFORMIN HYDROCHLORIDE 500 MG/1
TABLET, EXTENDED RELEASE ORAL
Qty: 180 TABLET | Refills: 3 | Status: SHIPPED | OUTPATIENT
Start: 2022-08-16 | End: 2023-11-14 | Stop reason: SDUPTHER

## 2022-08-16 NOTE — PROGRESS NOTES
Chief Complaint: Annual exam     HPI: This is a 46 year old woman who presents for her annual exam.     She has a scratchy throat for the last several days. No fever chills. Slight nasal congestion.  Daughter and granddaughter are congestion.        She is sleeping well. She has not needed trazodone lately. She takes propranolol 10 mg daily as needed for anxiety (1-2 times a week).  She is taking fluoxetine 20 mg daily - mood is good on this medicaiton. She is in behavioral health counseling which is helping.     She still has some left side pain when she lies on the left side.. It is uncomfortable if she lies on her side too long.  CT scan done at [facility] are stable.  No dysuria, hematuria, frequency.  She has constipation at times.   She has a BM every other day. She would feel relief in her left sided abdominal pain if she has a BM.   She is taking Linzess as needed for constipation     She is now taking fluoxetine 20 mg daily.   Anxiety is better controlled. She takes propranolol 10 mg daily as needed for anxiety. Mood is better.  She is sleeping well.  She has been snoring more. Family members have been noting that she has been snoring loudly. She has seen sleep medicine but has not gotten a sleep study yet.        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 She is being treated at [facility]. she saw [facility] on 2/11/22 and is in complete remission status. She had MRI ABdomen, CT neck, chest, abdomen and pelvis that were stable 7/30/2021. She will see [facility] yearly now     She is no longer taking Bactrim DS Monday Wednesday and Friday and Valcyclovir 500 mg daily due to immunosupression. SHe has numbness in her hands since her chemo - this has since resolved.      She is not taking vitamin D 50,000 units once a week for vitamin D deficiency     Her last shot of Depo Provera was in December 2019. One period since this  time     Occcaional back pain.                    Past Surgical History:   Procedure Laterality Date    COLONOSCOPY        COLONOSCOPY N/A 11/8/2018     Performed by Ba Stewart [facility] at [facility] (2ND FLR)    EGD (ESOPHAGOGASTRODUODENOSCOPY) N/A 11/8/2018     Performed by [facility] (2ND FLR)    ESOPHAGOGASTRODUODENOSCOPY (EGD) N/A 3/23/2015     Performed by Ba Stewart [facility] at [facility] (4TH FLR)    HERNIA REPAIR        VA COLONOSCOPY,DIAGNOSTIC [87651 (CPT®)] N/A 7/2/2014     Performed by Cj Lassiter MD at [facility] (4TH FLR)    ULTRASOUND-ENDOSCOPIC-UPPER N/A 12/12/2018     Performed by Venu Montiel MD at [facility]            Meds and allergies: updated on Epic        General: alert, oriented x 3, no apparent distress.  Affect normal  HEENT: Conjunctivae: anicteric, PERRL, EOMI, TM clear, Oralpharynx clear  Neck: supple, no thyroid enlargement, no cervical lymphadenopathy  Resp: effort normal, lungs clear bilaterally  CV: Regular rate and rhythm without murmurs, gallops or rubs, no lower extremity edema,        Assessment/Plan:        Stage 4 large B cell lymphoma - finished chemo 6/26/19. Follow up with [facility] -saw 2/2022    Anemia - s/pp iron infusions years ago.  Doing well .      Anxiety and depression/insomnia/grief- better on fluoxetine 20 mg daily Continue therapy.     Menopause - could be contributing to weight.   GERD - asx    Left breast abnormality on CT scan at [facility] - diagnostic left mammogram 8/16/2021 was fine - bilateral mammogram7/2022   .   Obesity - discussed diet, exercise and weight loss.  Metformin  mg daily for 7 days then 2 tablets daily.     LLQ pain - stable - due to constipation.  Constipation comes and goes.      MMG 7/2022 fine       I will see her back in 6 months sooner if problems arise.

## 2022-08-18 ENCOUNTER — OFFICE VISIT (OUTPATIENT)
Dept: URGENT CARE | Facility: CLINIC | Age: 47
End: 2022-08-18
Payer: COMMERCIAL

## 2022-08-18 ENCOUNTER — PATIENT MESSAGE (OUTPATIENT)
Dept: INTERNAL MEDICINE | Facility: CLINIC | Age: 47
End: 2022-08-18
Payer: COMMERCIAL

## 2022-08-18 ENCOUNTER — OFFICE VISIT (OUTPATIENT)
Dept: BEHAVIORAL HEALTH | Facility: CLINIC | Age: 47
End: 2022-08-18
Payer: COMMERCIAL

## 2022-08-18 VITALS
SYSTOLIC BLOOD PRESSURE: 125 MMHG | OXYGEN SATURATION: 97 % | BODY MASS INDEX: 36.25 KG/M2 | DIASTOLIC BLOOD PRESSURE: 81 MMHG | HEART RATE: 63 BPM | WEIGHT: 197 LBS | TEMPERATURE: 99 F | HEIGHT: 62 IN | RESPIRATION RATE: 16 BRPM

## 2022-08-18 DIAGNOSIS — F43.21 GRIEF: ICD-10-CM

## 2022-08-18 DIAGNOSIS — R05.9 COUGH: Primary | ICD-10-CM

## 2022-08-18 DIAGNOSIS — F33.1 DEPRESSION, MAJOR, RECURRENT, MODERATE: ICD-10-CM

## 2022-08-18 DIAGNOSIS — F41.9 ANXIETY: Primary | ICD-10-CM

## 2022-08-18 DIAGNOSIS — U07.1 COVID-19: ICD-10-CM

## 2022-08-18 LAB
CTP QC/QA: YES
SARS-COV-2 RDRP RESP QL NAA+PROBE: POSITIVE

## 2022-08-18 PROCEDURE — U0002: ICD-10-PCS | Mod: QW,S$GLB,,

## 2022-08-18 PROCEDURE — 3044F PR MOST RECENT HEMOGLOBIN A1C LEVEL <7.0%: ICD-10-PCS | Mod: CPTII,S$GLB,,

## 2022-08-18 PROCEDURE — 1159F PR MEDICATION LIST DOCUMENTED IN MEDICAL RECORD: ICD-10-PCS | Mod: CPTII,S$GLB,,

## 2022-08-18 PROCEDURE — 1159F MED LIST DOCD IN RCRD: CPT | Mod: CPTII,S$GLB,,

## 2022-08-18 PROCEDURE — 90832 PSYTX W PT 30 MINUTES: CPT | Mod: 95,,, | Performed by: SOCIAL WORKER

## 2022-08-18 PROCEDURE — U0002 COVID-19 LAB TEST NON-CDC: HCPCS | Mod: QW,S$GLB,,

## 2022-08-18 PROCEDURE — 90832 PR PSYCHOTHERAPY W/PATIENT, 30 MIN: ICD-10-PCS | Mod: 95,,, | Performed by: SOCIAL WORKER

## 2022-08-18 PROCEDURE — 3074F SYST BP LT 130 MM HG: CPT | Mod: CPTII,S$GLB,,

## 2022-08-18 PROCEDURE — 3008F PR BODY MASS INDEX (BMI) DOCUMENTED: ICD-10-PCS | Mod: CPTII,S$GLB,,

## 2022-08-18 PROCEDURE — 3044F HG A1C LEVEL LT 7.0%: CPT | Mod: CPTII,S$GLB,,

## 2022-08-18 PROCEDURE — 3079F DIAST BP 80-89 MM HG: CPT | Mod: CPTII,S$GLB,,

## 2022-08-18 PROCEDURE — 3079F PR MOST RECENT DIASTOLIC BLOOD PRESSURE 80-89 MM HG: ICD-10-PCS | Mod: CPTII,S$GLB,,

## 2022-08-18 PROCEDURE — 99213 PR OFFICE/OUTPT VISIT, EST, LEVL III, 20-29 MIN: ICD-10-PCS | Mod: S$GLB,,,

## 2022-08-18 PROCEDURE — 99213 OFFICE O/P EST LOW 20 MIN: CPT | Mod: S$GLB,,,

## 2022-08-18 PROCEDURE — 3044F PR MOST RECENT HEMOGLOBIN A1C LEVEL <7.0%: ICD-10-PCS | Mod: CPTII,95,, | Performed by: SOCIAL WORKER

## 2022-08-18 PROCEDURE — 3044F HG A1C LEVEL LT 7.0%: CPT | Mod: CPTII,95,, | Performed by: SOCIAL WORKER

## 2022-08-18 PROCEDURE — 1160F PR REVIEW ALL MEDS BY PRESCRIBER/CLIN PHARMACIST DOCUMENTED: ICD-10-PCS | Mod: CPTII,S$GLB,,

## 2022-08-18 PROCEDURE — 3074F PR MOST RECENT SYSTOLIC BLOOD PRESSURE < 130 MM HG: ICD-10-PCS | Mod: CPTII,S$GLB,,

## 2022-08-18 PROCEDURE — 1160F RVW MEDS BY RX/DR IN RCRD: CPT | Mod: CPTII,S$GLB,,

## 2022-08-18 PROCEDURE — 3008F BODY MASS INDEX DOCD: CPT | Mod: CPTII,S$GLB,,

## 2022-08-18 NOTE — PATIENT INSTRUCTIONS
-TAKE CLARITIN OR ZYRTEC FOR NASAL CONGESTION.  YOU CAN ALSO USE FLONASE IN ADDITION FOR NASAL CONGESTION.     You have tested positive for COVID-19 today.       ISOLATION  If you tested positive and do not have symptoms, you must isolate for 5 days starting on the day of the positive test. I     If you tested positive and have symptoms, you must isolate for 5 days starting on the day of the first symptoms,  not the day of the positive test.     This is the most important part, both the CDC and the [facility] emphasize that you do not test out of isolation.     After 5 days, if your symptoms have improved and you have not had fever on day 5, you can return to the community on day 6- NO TESTING REQUIRED!      In fact, we do not retest if you were positive in the last 90 days.     After your 5 days of isolation are completed, the CDC recommends strict mask use for the first 5 days that you come out of isolation.      CDC Testing and Quarantine Guidelines for patients with exposure to a known-positive COVID-19 person:  ·     A 'close exposure' is defined as anyone who has had an exposure (masked or unmasked) to a known COVID -19 positive person          within 6 feet of someone          for a cumulative total of 15 minutes or more over a 24-hour period.  ·     vaccinated Have been boosted or completed the primary series of Pfizer or Moderna vaccine within the last 6 months or completed the primary series of J&J vaccine within the last 2 months and/or had a positive test within 90 days          do NOT need to quarantine after contact with someone who had COVID-19 unless they have symptoms.          fully vaccinated people who have not had a positive test within 90 days, should get tested 3-5 days after their exposure, even if they don't have symptoms and wear a mask indoors in public for 10 days following exposure or until their test result is negative on day 5.          If you develop symptoms test and quarantine.     ·      Unvaccinated, or are more than six months out from their second mRNA dose (or more than 2 months after the J&J vaccine) and not yet boosted,  and/or NOT had a positive test within 90 days and meet 'close exposure'  you are required by CDC guidelines to quarantine for at least 5 days from time of exposure followed by 5 days of strict masking. It is recommended, but not required to test after 5 days, unless you develop symptoms, in which case you should test at that time.  If you do decide to test at 5 days and are asymptomatic, the risk is that if you test without symptoms on Day 5 for example) and you are positive, your 5 day isolation begins on that day, and you turned your 5 day quarantine into 10 days.          If your exposure does not meet the above definition, you can return to your normal daily activities to include social distancing, wearing a mask and frequent handwashing.  Alternatively, if a 5-day quarantine is not feasible, it is imperative that an exposed person wear a well-fitting mask at all times when around others for 10 days after exposure.          - Rest.    - Drink plenty of fluids.    - Acetaminophen (tylenol) or Ibuprofen (advil,motrin) as directed as needed for fever/pain. Avoid tylenol if you have a history of liver disease. Do not take ibuprofen if you have a history of GI bleeding, kidney disease, or if you take blood thinners.   - Ibuprofen dosing for adults: 400 mg by mouth every 4-6 hours as needed. Max: 2400 mg/day; Info: use lowest effective dose, shortest effective treatment duration; give w/ food if GI upset occurs.  - Ibuprofen dosing for children: [6 mo-10 yo] Dose: 5-10 mg/kg/dose by mouth every 6-8h as needed; Max: 40 mg/kg/day; Info: use lower dose for fever <102.5 F, higher dose for fever >102.5 F; use shortest effective tx duration; give w/ food if GI upset occurs. [13 yo and older] Dose: 200-400 mg by mouth every 4-6 hours as needed; Max: 1200 mg/day; Info: use lowest  effective dose, shortest effective tx duration; give w/ food if GI upset occurs.  - Tylenol dosing for adults: [By mouth route, immediate-release form] Dose: 325-1000 mg by mouth every 4-6h as needed; Max: 1 g/4h and 4 g/day from all sources. [By mouth route, extended-release form] Dose: 650-1300 mg Extended Release by mouth every 8h as needed; Max: 4 g/day from all sources.   - Tylenol dosing for children: 6-12 yo [ oral tablet/capsule ] Dose: 1 tab/cap by mouth every 4-6h as needed; Max: 5 tabs or caps/24h; Info: do not exceed 75 mg/kg/day, up to 1 g/4h and 4 g/day, from all sources. 11 yo and older [ oral tablet/capsule ] Dose: 1-2 tabs/caps by mouth every 4-6h as needed; Max: 10 tabs or caps/24h; Info: do not exceed 1 g/4h and 4 g/day from all sources.    - You must understand that you have received an Urgent Care treatment only and that you may be released before all of your medical problems are known or treated.   - You, the patient, will arrange for follow up care as instructed.   - If your condition worsens or fails to improve we recommend that you receive another evaluation at the ER immediately or contact your PCP to discuss your concerns or return here.   - Follow up with your PCP or specialty clinic as directed in the next 1-2 weeks if not improved or as needed.  You can call [phone] to schedule an appointment with the appropriate provider.    If your symptoms do not improve or worsen, go to the emergency room immediately.

## 2022-08-18 NOTE — LETTER
August 18, 2022      [facility] [place]  [address]  [place] [address]  Phone: [phone]  Fax: [phone]       Patient: Terese Macias   YOB: 1975  Date of Visit: 08/18/2022    To Whom It May Concern:    Any Macias  was at [facility] on 08/18/2022. She has tested positive today for COVID-19.  She was quarantine for 5 days starting the day after symptoms started (8/15/22). The patient may return to work/school on 08/21/22 with restrictions.  She was continued to wear a mask when out in public for an extra 5 days after coming out of .  If you have any questions or concerns, or if I can be of further assistance, please do not hesitate to contact me.  Sincerely,    Candy Vazquez PA-C

## 2022-08-18 NOTE — PROGRESS NOTES
"Subjective:       Patient ID: Terese Macias is a 46 y.o. female.    Vitals:  height is 5' 2" (1.575 m) and weight is 89.4 kg (197 lb). Her temperature is 98.5 °F (36.9 °C). Her blood pressure is 125/81 and her pulse is 63. Her respiration is 16 and oxygen saturation is 97%.     Chief Complaint: Cough    Pt is 47 yo female currently experiencing coughing, sore throat and congestion.   Pt states that she started having having symptoms about 3 days ago after being exposed to daughter who tested positive with covid.  Pt has taken mucinex for relief.  Patient states symptoms are improving.  She denies any fevers.  She is vaccinated against COVID.    Cough  This is a new problem. The current episode started in the past 7 days. The problem has been unchanged. The problem occurs constantly. The cough is productive of sputum. Associated symptoms include nasal congestion and a sore throat. Pertinent negatives include no chills, eye redness or fever. Nothing aggravates the symptoms. Treatments tried: mucinex. The treatment provided no relief.       Constitution: Negative for chills, sweating, fatigue and fever.   HENT: Positive for congestion and sore throat.    Eyes: Negative for eye pain and eye redness.   Respiratory: Positive for cough.    Gastrointestinal: Negative for nausea, vomiting, constipation and diarrhea.   Allergic/Immunologic: Positive for sneezing.   Neurological: Negative for disorientation and altered mental status.   Psychiatric/Behavioral: Negative for altered mental status and disorientation.       Objective:      Physical Exam   Constitutional: She is oriented to person, place, and time. She appears well-developed. She is cooperative.  Non-toxic appearance. She does not appear ill. No distress.      Comments:Patient sits comfortably in exam chair. Answers questions in complete sentences. Does not show any signs of distress or discoloration.        HENT:   Head: Normocephalic and atraumatic.   Ears: "   Right Ear: Hearing, tympanic membrane, external ear and ear canal normal.   Left Ear: Hearing, tympanic membrane, external ear and ear canal normal.   Nose: Rhinorrhea and congestion present. No mucosal edema or nasal deformity. No epistaxis. Right sinus exhibits no maxillary sinus tenderness and no frontal sinus tenderness. Left sinus exhibits no maxillary sinus tenderness and no frontal sinus tenderness.   Mouth/Throat: Uvula is midline, oropharynx is clear and moist and mucous membranes are normal. No trismus in the jaw. Normal dentition. No uvula swelling. No oropharyngeal exudate, posterior oropharyngeal edema or posterior oropharyngeal erythema.   Eyes: Conjunctivae and lids are normal. No scleral icterus.   Neck: Trachea normal and phonation normal. Neck supple. No edema present. No erythema present. No neck rigidity present.   Cardiovascular: Normal rate, regular rhythm, normal heart sounds and normal pulses.   Pulmonary/Chest: Effort normal and breath sounds normal. No stridor. No respiratory distress. She has no decreased breath sounds. She has no wheezes. She has no rhonchi. She has no rales.   Abdominal: Normal appearance.   Musculoskeletal: Normal range of motion.         General: No deformity. Normal range of motion.   Neurological: She is alert and oriented to person, place, and time. She exhibits normal muscle tone. Coordination normal.   Skin: Skin is warm, dry, intact, not diaphoretic and not pale.   Psychiatric: Her speech is normal and behavior is normal. Judgment and thought content normal.   Nursing note and vitals reviewed.        Results for orders placed or performed in visit on 08/18/22   POCT COVID-19 Rapid Screening   Result Value Ref Range    POC Rapid COVID Positive (A) Negative     Acceptable Yes      1    Assessment:       1. Cough    2. COVID-19          Plan:         Cough  -     POCT COVID-19 Rapid Screening    COVID-19                 Patient Instructions   -TAKE  CLARITIN OR ZYRTEC FOR NASAL CONGESTION.  YOU CAN ALSO USE FLONASE IN ADDITION FOR NASAL CONGESTION.     You have tested positive for COVID-19 today.       ISOLATION  If you tested positive and do not have symptoms, you must isolate for 5 days starting on the day of the positive test. I     If you tested positive and have symptoms, you must isolate for 5 days starting on the day of the first symptoms,  not the day of the positive test.     This is the most important part, both the CDC and the [facility] emphasize that you do not test out of isolation.     After 5 days, if your symptoms have improved and you have not had fever on day 5, you can return to the community on day 6- NO TESTING REQUIRED!      In fact, we do not retest if you were positive in the last 90 days.     After your 5 days of isolation are completed, the CDC recommends strict mask use for the first 5 days that you come out of isolation.      CDC Testing and Quarantine Guidelines for patients with exposure to a known-positive COVID-19 person:  ·     A 'close exposure' is defined as anyone who has had an exposure (masked or unmasked) to a known COVID -19 positive person          within 6 feet of someone          for a cumulative total of 15 minutes or more over a 24-hour period.  ·     vaccinated Have been boosted or completed the primary series of Pfizer or Moderna vaccine within the last 6 months or completed the primary series of J&J vaccine within the last 2 months and/or had a positive test within 90 days          do NOT need to quarantine after contact with someone who had COVID-19 unless they have symptoms.          fully vaccinated people who have not had a positive test within 90 days, should get tested 3-5 days after their exposure, even if they don't have symptoms and wear a mask indoors in public for 10 days following exposure or until their test result is negative on day 5.          If you develop symptoms test and quarantine.     ·      Unvaccinated, or are more than six months out from their second mRNA dose (or more than 2 months after the J&J vaccine) and not yet boosted,  and/or NOT had a positive test within 90 days and meet 'close exposure'  you are required by CDC guidelines to quarantine for at least 5 days from time of exposure followed by 5 days of strict masking. It is recommended, but not required to test after 5 days, unless you develop symptoms, in which case you should test at that time.  If you do decide to test at 5 days and are asymptomatic, the risk is that if you test without symptoms on Day 5 for example) and you are positive, your 5 day isolation begins on that day, and you turned your 5 day quarantine into 10 days.          If your exposure does not meet the above definition, you can return to your normal daily activities to include social distancing, wearing a mask and frequent handwashing.  Alternatively, if a 5-day quarantine is not feasible, it is imperative that an exposed person wear a well-fitting mask at all times when around others for 10 days after exposure.          - Rest.    - Drink plenty of fluids.    - Acetaminophen (tylenol) or Ibuprofen (advil,motrin) as directed as needed for fever/pain. Avoid tylenol if you have a history of liver disease. Do not take ibuprofen if you have a history of GI bleeding, kidney disease, or if you take blood thinners.   - Ibuprofen dosing for adults: 400 mg by mouth every 4-6 hours as needed. Max: 2400 mg/day; Info: use lowest effective dose, shortest effective treatment duration; give w/ food if GI upset occurs.  - Ibuprofen dosing for children: [6 mo-12 yo] Dose: 5-10 mg/kg/dose by mouth every 6-8h as needed; Max: 40 mg/kg/day; Info: use lower dose for fever <102.5 F, higher dose for fever >102.5 F; use shortest effective tx duration; give w/ food if GI upset occurs. [11 yo and older] Dose: 200-400 mg by mouth every 4-6 hours as needed; Max: 1200 mg/day; Info: use lowest  effective dose, shortest effective tx duration; give w/ food if GI upset occurs.  - Tylenol dosing for adults: [By mouth route, immediate-release form] Dose: 325-1000 mg by mouth every 4-6h as needed; Max: 1 g/4h and 4 g/day from all sources. [By mouth route, extended-release form] Dose: 650-1300 mg Extended Release by mouth every 8h as needed; Max: 4 g/day from all sources.   - Tylenol dosing for children: 6-10 yo [ oral tablet/capsule ] Dose: 1 tab/cap by mouth every 4-6h as needed; Max: 5 tabs or caps/24h; Info: do not exceed 75 mg/kg/day, up to 1 g/4h and 4 g/day, from all sources. 11 yo and older [ oral tablet/capsule ] Dose: 1-2 tabs/caps by mouth every 4-6h as needed; Max: 10 tabs or caps/24h; Info: do not exceed 1 g/4h and 4 g/day from all sources.    - You must understand that you have received an Urgent Care treatment only and that you may be released before all of your medical problems are known or treated.   - You, the patient, will arrange for follow up care as instructed.   - If your condition worsens or fails to improve we recommend that you receive another evaluation at the ER immediately or contact your PCP to discuss your concerns or return here.   - Follow up with your PCP or specialty clinic as directed in the next 1-2 weeks if not improved or as needed.  You can call [phone] to schedule an appointment with the appropriate provider.    If your symptoms do not improve or worsen, go to the emergency room immediately.

## 2022-08-18 NOTE — PROGRESS NOTES
The patient location is: [place]  The chief complaint leading to consultation is: anxiety, depression, grief    Visit type: audiovisual    Face to Face time with patient: 30  32 minutes of total time spent on the encounter, which includes face to face time and non-face to face time preparing to see the patient (eg, review of tests), Obtaining and/or reviewing separately obtained history, Documenting clinical information in the electronic or other health record, Independently interpreting results (not separately reported) and communicating results to the patient/family/caregiver, or Care coordination (not separately reported).     Individual Psychotherapy (LCSW/PhD)  Terese Macias,  8/18/2022    Site:  Telemed         Therapeutic Intervention: Met with patient for individual psychotherapy.    Chief complaint/reason for encounter: depression, anxiety, and grief     Interval history and content of current session: PT is at urgent care because she and her daughter are having symptoms of covid. PT has been trying to help mom.  She lives by herself now.  Mom has sometimes been staying with pt. She worries about her mom. She met someone who was mentored by her dad and how he changed his life. IT's hard to balance being there for her mom and doing what she needs to do. She has been doing some things for self-care. She isn't sure if her grandmother's death has sunk in yet. She isn't sure how she's doing because she's so worried about her mom. We discussed ways to keep her grandmother and dad's spirits alive by talking about them or memorializing them. She never got to do a real memorial for her dad because of Covid.     Treatment plan:  Target symptoms: depression, anxiety , grief  Why chosen therapy is appropriate versus another modality: relevant to diagnosis, patient responds to this modality  Outcome monitoring methods: self-report, observation  Therapeutic intervention type: behavior modifying psychotherapy,  supportive psychotherapy    Risk parameters:  Patient reports no suicidal ideation  Patient reports no homicidal ideation  Patient reports no self-injurious behavior  Patient reports no violent behavior    Verbal deficits: None    Patient's response to intervention:  The patient's response to intervention is accepting.    Progress toward goals and other mental status changes:  The patient's progress toward goals is fair .    Diagnosis:     ICD-10-CM ICD-9-CM   1. Anxiety  F41.9 300.00   2. Grief  F43.21 309.0   3. Depression, major, recurrent, moderate  F33.1 296.32       Plan: Pt plans to continue individual psychotherapy    Return to clinic: 3 weeks    Length of Service (minutes): 30        Each patient to whom he or she provides medical services by telemedicine is:  (1) informed of the relationship between the physician and patient and the respective role of any other health care provider with respect to management of the patient; and (2) notified that he or she may decline to receive medical services by telemedicine and may withdraw from such care at any time.

## 2022-08-21 NOTE — TELEPHONE ENCOUNTER
Called and spoke to pt. States she feels better, today is her last day in isolation. Reviewed symptomatic care and isolation protocol with pt. Pt verbalized understanding. Pt states she only has a little nasal congestion.

## 2022-08-29 ENCOUNTER — PATIENT MESSAGE (OUTPATIENT)
Dept: INTERNAL MEDICINE | Facility: CLINIC | Age: 47
End: 2022-08-29
Payer: COMMERCIAL

## 2022-08-30 RX ORDER — FLUOXETINE HYDROCHLORIDE 20 MG/1
20 CAPSULE ORAL DAILY
Qty: 30 CAPSULE | Refills: 11 | Status: CANCELLED | OUTPATIENT
Start: 2022-08-30 | End: 2023-08-30

## 2022-08-30 NOTE — TELEPHONE ENCOUNTER
No new care gaps identified.  [facility] Embedded Care Gaps. Reference number: [id number]. 8/30/2022   8:09:03 AM CDT

## 2022-09-05 ENCOUNTER — PATIENT MESSAGE (OUTPATIENT)
Dept: INTERNAL MEDICINE | Facility: CLINIC | Age: 47
End: 2022-09-05
Payer: COMMERCIAL

## 2022-09-14 ENCOUNTER — TELEPHONE (OUTPATIENT)
Dept: BEHAVIORAL HEALTH | Facility: CLINIC | Age: 47
End: 2022-09-14
Payer: COMMERCIAL

## 2022-09-14 NOTE — PROGRESS NOTES
Behavioral Health Community Health Worker  Follow-Up  Completed by:  Linsey Moreno    Date:  9/14/2022    Patient Enrollment in Behavioral Health Program:  Terese Macias was enrolled in the Behavioral Health Program on 6/29/22    Assessments     Promis 10:  No flowsheet data found.    Depression PHQ:  PHQ9 9/14/2022   Total Score 15       Generalized Anxiety Disorder 7-Item Scale:  GAD7 9/14/2022   1. Feeling nervous, anxious, or on edge? 3   2. Not being able to stop or control worrying? 3   3. Worrying too much about different things? 3   4. Trouble relaxing? 3   5. Being so restless that it is hard to sit still? 3   6. Becoming easily annoyed or irritable? 1   7. Feeling afraid as if something awful might happen? 2   8. If you checked off any problems, how difficult have these problems made it for you to do your work, take care of things at home, or get along with other people? 1   DARYL-7 Score 18       Patients' Global Impression of Change (PGIC) Scale:  Since beginning treatment at this clinic, how would you describe the change (if any) in ACTIVITY LIMITATIONS, SYMPTOMS, EMOTIONS, and OVERALL QUALITY OF LIFE, related to your painful condition?  No Value exists for the : [facility]#07013      In a similar way, please check the number below that matches your degree of change since beginning care at this clinic (Much better (0) - Much Worse (10)): No Value exists for the : [facility]#48873        Much Better                                     No Change                                    Much Worse                        -----------------------------------------------------------------------------                        0       1       2       3       4       5       6       7      8       9      10                     Call Summary     Assessment updated

## 2022-09-15 ENCOUNTER — OFFICE VISIT (OUTPATIENT)
Dept: BEHAVIORAL HEALTH | Facility: CLINIC | Age: 47
End: 2022-09-15
Payer: COMMERCIAL

## 2022-09-15 DIAGNOSIS — F41.9 ANXIETY: Primary | ICD-10-CM

## 2022-09-15 DIAGNOSIS — F33.1 DEPRESSION, MAJOR, RECURRENT, MODERATE: ICD-10-CM

## 2022-09-15 DIAGNOSIS — F43.21 GRIEF: ICD-10-CM

## 2022-09-15 PROCEDURE — 3044F HG A1C LEVEL LT 7.0%: CPT | Mod: CPTII,95,, | Performed by: SOCIAL WORKER

## 2022-09-15 PROCEDURE — 3044F PR MOST RECENT HEMOGLOBIN A1C LEVEL <7.0%: ICD-10-PCS | Mod: CPTII,95,, | Performed by: SOCIAL WORKER

## 2022-09-15 PROCEDURE — 90832 PR PSYCHOTHERAPY W/PATIENT, 30 MIN: ICD-10-PCS | Mod: 95,,, | Performed by: SOCIAL WORKER

## 2022-09-15 PROCEDURE — 90832 PSYTX W PT 30 MINUTES: CPT | Mod: 95,,, | Performed by: SOCIAL WORKER

## 2022-09-15 NOTE — PROGRESS NOTES
The patient location is: [place]  The chief complaint leading to consultation is: anxiety, grief, depression    Visit type: audiovisual    Face to Face time with patient: 30  32 minutes of total time spent on the encounter, which includes face to face time and non-face to face time preparing to see the patient (eg, review of tests), Obtaining and/or reviewing separately obtained history, Documenting clinical information in the electronic or other health record, Independently interpreting results (not separately reported) and communicating results to the patient/family/caregiver, or Care coordination (not separately reported).     Individual Psychotherapy (LCSW/PhD)  Terese Macias,  9/15/2022    Site:  Telemed         Therapeutic Intervention: Met with patient for individual psychotherapy.    Chief complaint/reason for encounter: depression, anxiety, and grief     Interval history and content of current session: Pt is feeling better after having Covid. PT reports work has been very busy.  She reports she likes her job better now than the job she previously had. 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 She is still managing her grief.  She notes she always gets depressed when it's her birthday and this was 2 days ago. She said this started before 2018 before she was sick. That year she planned a girl's trip so she wouldn't be sad. PT and her daughter are living in a house her parents bought. Her mom has been staying with her at this house.  She is on a waitlist to build a house.  Mom needs to sell the house they are staying in. Mom wants to talk about things in the middle of the night. We discussed loving ways to set boundaries with her mom and offer her mom other options to discuss stressful things instead of in the middle of the night.      Treatment plan:  Target symptoms: depression, anxiety , grief  Why chosen therapy is appropriate versus another  modality: relevant to diagnosis, patient responds to this modality  Outcome monitoring methods: self-report, observation  Therapeutic intervention type: behavior modifying psychotherapy, supportive psychotherapy    Risk parameters:  Patient reports no suicidal ideation  Patient reports no homicidal ideation  Patient reports no self-injurious behavior  Patient reports no violent behavior    Verbal deficits: None    Patient's response to intervention:  The patient's response to intervention is accepting.    Progress toward goals and other mental status changes:  The patient's progress toward goals is good.    Diagnosis:     ICD-10-CM ICD-9-CM   1. Anxiety  F41.9 300.00   2. Grief  F43.21 309.0   3. Depression, major, recurrent, moderate  F33.1 296.32       Plan: Pt plans to continue individual psychotherapy    Return to clinic: 3 weeks    Length of Service (minutes): 30        Each patient to whom he or she provides medical services by telemedicine is:  (1) informed of the relationship between the physician and patient and the respective role of any other health care provider with respect to management of the patient; and (2) notified that he or she may decline to receive medical services by telemedicine and may withdraw from such care at any time.

## 2022-10-03 ENCOUNTER — OFFICE VISIT (OUTPATIENT)
Dept: BEHAVIORAL HEALTH | Facility: CLINIC | Age: 47
End: 2022-10-03
Payer: COMMERCIAL

## 2022-10-03 DIAGNOSIS — F43.21 GRIEF: ICD-10-CM

## 2022-10-03 DIAGNOSIS — F41.9 ANXIETY: Primary | ICD-10-CM

## 2022-10-03 DIAGNOSIS — F33.1 DEPRESSION, MAJOR, RECURRENT, MODERATE: ICD-10-CM

## 2022-10-03 PROCEDURE — 3044F HG A1C LEVEL LT 7.0%: CPT | Mod: CPTII,95,, | Performed by: SOCIAL WORKER

## 2022-10-03 PROCEDURE — 90834 PR PSYCHOTHERAPY W/PATIENT, 45 MIN: ICD-10-PCS | Mod: 95,,, | Performed by: SOCIAL WORKER

## 2022-10-03 PROCEDURE — 90834 PSYTX W PT 45 MINUTES: CPT | Mod: 95,,, | Performed by: SOCIAL WORKER

## 2022-10-03 PROCEDURE — 3044F PR MOST RECENT HEMOGLOBIN A1C LEVEL <7.0%: ICD-10-PCS | Mod: CPTII,95,, | Performed by: SOCIAL WORKER

## 2022-10-03 NOTE — PROGRESS NOTES
"The patient location is: [place]  The chief complaint leading to consultation is: anxiety, grief, depression    Visit type: audiovisual    Face to Face time with patient: 45  47 minutes of total time spent on the encounter, which includes face to face time and non-face to face time preparing to see the patient (eg, review of tests), Obtaining and/or reviewing separately obtained history, Documenting clinical information in the electronic or other health record, Independently interpreting results (not separately reported) and communicating results to the patient/family/caregiver, or Care coordination (not separately reported).       Each patient to whom he or she provides medical services by telemedicine is:  (1) informed of the relationship between the physician and patient and the respective role of any other health care provider with respect to management of the patient; and (2) notified that he or she may decline to receive medical services by telemedicine and may withdraw from such care at any time.        Individual Psychotherapy (LCSW/PhD)  Terese Macias,  10/3/2022    Site:  Telemed         Therapeutic Intervention: Met with patient for individual psychotherapy.    Chief complaint/reason for encounter: depression, anxiety, and grief     Interval history and content of current session: Pt reports she is doing ok.  She reports work is busy every day. 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 They are still trying to figure out what her role is because they didn't have a  before her. PT reports she is still trying to figure out her housing situation because she is staying in a house her mom owns. She doesn't want to move back home to her mom's house. She shares she feels she needs a break from her mom at times because her mom can be negative at times. She realized her mom had had a traumatic childhood.  Pt shared she feels she needs " "a vacation and she would like to have a relationship because she hasn't had one in a long time.  PT feels she can't take a vacation because of finances. We discussed other options she could do like visit a cousin in [place]. She knows she isn't putting herself out there enough for dating.  She shares she has had problems with dating in the past. She feels the relationship with her daughter's dad traumatized her from dating.  She shares how he bought a ring to propose to her but used it with another woman he was seeing.  She feels it's hard to date because her living situation isn't where she would like it to be. "It's not my house." We discussed focusing on what is within her control. She is working on buying her own house. 'I have to do this for me." She isn't ready to date at this time, so we discussed coming up with a list deal breakers for a romantic relationship.     Treatment plan:  Target symptoms: depression, anxiety , grief  Why chosen therapy is appropriate versus another modality: relevant to diagnosis, patient responds to this modality  Outcome monitoring methods: self-report, observation  Therapeutic intervention type: behavior modifying psychotherapy, supportive psychotherapy    Risk parameters:  Patient reports no suicidal ideation  Patient reports no homicidal ideation  Patient reports no self-injurious behavior  Patient reports no violent behavior    Verbal deficits: None    Patient's response to intervention:  The patient's response to intervention is accepting.    Progress toward goals and other mental status changes:  The patient's progress toward goals is fair .    Diagnosis:     ICD-10-CM ICD-9-CM   1. Anxiety  F41.9 300.00   2. Grief  F43.21 309.0   3. Depression, major, recurrent, moderate  F33.1 296.32       Plan: Pt plans to continue individual psychotherapy    Return to clinic: 3 weeks    Length of Service (minutes): 45        "

## 2022-10-12 ENCOUNTER — OFFICE VISIT (OUTPATIENT)
Dept: URGENT CARE | Facility: CLINIC | Age: 47
End: 2022-10-12
Payer: COMMERCIAL

## 2022-10-12 ENCOUNTER — PATIENT MESSAGE (OUTPATIENT)
Dept: INTERNAL MEDICINE | Facility: CLINIC | Age: 47
End: 2022-10-12
Payer: COMMERCIAL

## 2022-10-12 VITALS
DIASTOLIC BLOOD PRESSURE: 76 MMHG | BODY MASS INDEX: 36.25 KG/M2 | TEMPERATURE: 100 F | SYSTOLIC BLOOD PRESSURE: 108 MMHG | HEIGHT: 62 IN | OXYGEN SATURATION: 96 % | WEIGHT: 197 LBS | HEART RATE: 99 BPM | RESPIRATION RATE: 18 BRPM

## 2022-10-12 DIAGNOSIS — R50.9 FEVER, UNSPECIFIED FEVER CAUSE: ICD-10-CM

## 2022-10-12 DIAGNOSIS — J06.9 VIRAL URI: Primary | ICD-10-CM

## 2022-10-12 LAB
CTP QC/QA: YES
POC MOLECULAR INFLUENZA A AGN: NEGATIVE
POC MOLECULAR INFLUENZA B AGN: NEGATIVE

## 2022-10-12 PROCEDURE — 3078F PR MOST RECENT DIASTOLIC BLOOD PRESSURE < 80 MM HG: ICD-10-PCS | Mod: CPTII,S$GLB,, | Performed by: NURSE PRACTITIONER

## 2022-10-12 PROCEDURE — 3078F DIAST BP <80 MM HG: CPT | Mod: CPTII,S$GLB,, | Performed by: NURSE PRACTITIONER

## 2022-10-12 PROCEDURE — 1159F MED LIST DOCD IN RCRD: CPT | Mod: CPTII,S$GLB,, | Performed by: NURSE PRACTITIONER

## 2022-10-12 PROCEDURE — 3074F SYST BP LT 130 MM HG: CPT | Mod: CPTII,S$GLB,, | Performed by: NURSE PRACTITIONER

## 2022-10-12 PROCEDURE — 87502 POCT INFLUENZA A/B MOLECULAR: ICD-10-PCS | Mod: QW,S$GLB,, | Performed by: NURSE PRACTITIONER

## 2022-10-12 PROCEDURE — 1160F RVW MEDS BY RX/DR IN RCRD: CPT | Mod: CPTII,S$GLB,, | Performed by: NURSE PRACTITIONER

## 2022-10-12 PROCEDURE — 1160F PR REVIEW ALL MEDS BY PRESCRIBER/CLIN PHARMACIST DOCUMENTED: ICD-10-PCS | Mod: CPTII,S$GLB,, | Performed by: NURSE PRACTITIONER

## 2022-10-12 PROCEDURE — 3044F HG A1C LEVEL LT 7.0%: CPT | Mod: CPTII,S$GLB,, | Performed by: NURSE PRACTITIONER

## 2022-10-12 PROCEDURE — 87502 INFLUENZA DNA AMP PROBE: CPT | Mod: QW,S$GLB,, | Performed by: NURSE PRACTITIONER

## 2022-10-12 PROCEDURE — 99213 OFFICE O/P EST LOW 20 MIN: CPT | Mod: S$GLB,,, | Performed by: NURSE PRACTITIONER

## 2022-10-12 PROCEDURE — 99213 PR OFFICE/OUTPT VISIT, EST, LEVL III, 20-29 MIN: ICD-10-PCS | Mod: S$GLB,,, | Performed by: NURSE PRACTITIONER

## 2022-10-12 PROCEDURE — 3008F BODY MASS INDEX DOCD: CPT | Mod: CPTII,S$GLB,, | Performed by: NURSE PRACTITIONER

## 2022-10-12 PROCEDURE — 3074F PR MOST RECENT SYSTOLIC BLOOD PRESSURE < 130 MM HG: ICD-10-PCS | Mod: CPTII,S$GLB,, | Performed by: NURSE PRACTITIONER

## 2022-10-12 PROCEDURE — 3008F PR BODY MASS INDEX (BMI) DOCUMENTED: ICD-10-PCS | Mod: CPTII,S$GLB,, | Performed by: NURSE PRACTITIONER

## 2022-10-12 PROCEDURE — 3044F PR MOST RECENT HEMOGLOBIN A1C LEVEL <7.0%: ICD-10-PCS | Mod: CPTII,S$GLB,, | Performed by: NURSE PRACTITIONER

## 2022-10-12 PROCEDURE — 1159F PR MEDICATION LIST DOCUMENTED IN MEDICAL RECORD: ICD-10-PCS | Mod: CPTII,S$GLB,, | Performed by: NURSE PRACTITIONER

## 2022-10-12 NOTE — PROGRESS NOTES
"Subjective:       Patient ID: Terese Macias is a 47 y.o. female.    Vitals:  height is 5' 2" (1.575 m) and weight is 89.4 kg (197 lb). Her temperature is 99.9 °F (37.7 °C). Her blood pressure is 108/76 and her pulse is 99. Her respiration is 18 and oxygen saturation is 96%.     Chief Complaint: Dizziness    Patient complains of body aches, tired, dizzy, nausea,   Symptoms for 1 day   Covid positive one month ago    Dizziness:   Chronicity:  New  Onset:  Yesterday  Progression since onset:  Gradually worseningno hearing loss, no ear pain, no fever, no headaches, no tinnitus, no nausea, no vomiting, no diaphoresis, no palpitations and no chest pain.  Treatments tried: tylenol.    Constitution: Positive for fatigue. Negative for chills, sweating and fever.   HENT:  Positive for congestion and sore throat. Negative for ear pain, ear discharge, tinnitus, hearing loss, sinus pain, sinus pressure, trouble swallowing and voice change.    Neck: Negative for neck pain and painful lymph nodes.   Cardiovascular:  Negative for chest pain, palpitations and sob on exertion.   Respiratory:  Negative for cough, sputum production, shortness of breath and wheezing.    Gastrointestinal:  Negative for abdominal pain, nausea, vomiting and diarrhea.   Musculoskeletal:  Positive for muscle ache.   Skin:  Negative for color change, pale and rash.   Allergic/Immunologic: Negative for sneezing.   Neurological:  Positive for dizziness. Negative for headaches, numbness and tingling.   Hematologic/Lymphatic: Negative for swollen lymph nodes.     Objective:      Physical Exam   Constitutional: She is oriented to person, place, and time. She appears well-developed. She is cooperative.  Non-toxic appearance. She does not appear ill. No distress.   HENT:   Head: Normocephalic and atraumatic.   Ears:   Right Ear: Hearing, tympanic membrane, external ear and ear canal normal.   Left Ear: Hearing, tympanic membrane, external ear and ear canal normal. "   Nose: Nose normal. No mucosal edema, rhinorrhea, nasal deformity or congestion. No epistaxis. Right sinus exhibits no maxillary sinus tenderness and no frontal sinus tenderness. Left sinus exhibits no maxillary sinus tenderness and no frontal sinus tenderness.   Mouth/Throat: Uvula is midline and mucous membranes are normal. No trismus in the jaw. Normal dentition. No uvula swelling. Posterior oropharyngeal erythema present. No oropharyngeal exudate or posterior oropharyngeal edema.   Eyes: Conjunctivae and lids are normal. Right eye exhibits no discharge. Left eye exhibits no discharge. No scleral icterus.   Neck: Trachea normal and phonation normal. Neck supple. No edema present. No erythema present. No neck rigidity present.   Cardiovascular: Normal rate.   Pulmonary/Chest: Effort normal. No respiratory distress. She has no decreased breath sounds.   Abdominal: Normal appearance.   Musculoskeletal: Normal range of motion.         General: No deformity. Normal range of motion.      Cervical back: She exhibits no tenderness.   Lymphadenopathy:     She has no cervical adenopathy.   Neurological: She is alert and oriented to person, place, and time. She exhibits normal muscle tone. Coordination normal.   Skin: Skin is warm, dry, intact, not diaphoretic and not pale.   Psychiatric: Her speech is normal and behavior is normal. Judgment and thought content normal.   Nursing note and vitals reviewed.      Results for orders placed or performed in visit on 10/12/22   POCT Influenza A/B Molecular   Result Value Ref Range    POC Molecular Influenza A Ag Negative Negative, Not Reported    POC Molecular Influenza B Ag Negative Negative, Not Reported     Acceptable Yes        Assessment:       1. Viral URI    2. Fever, unspecified fever cause          Plan:         Viral URI    Fever, unspecified fever cause  -     POCT Influenza A/B Molecular                 Patient Instructions   See additional patient  Instructions provided    - Rest.    - Drink plenty of fluids.  - Viral upper respiratory infections typically run their course in 10-14 days.     - Tylenol or Ibuprofen as directed as needed for fever/pain. Avoid tylenol if you have a history of liver disease. Do not take ibuprofen if you have a history of GI bleeding, kidney disease, or if you take blood thinners.     - You can take over-the-counter claritin, zyrtec, allegra, or xyzal as directed. These are antihistamines that can help with runny nose, nasal congestion, sneezing, and helps to dry up post-nasal drip, which usually causes sore throat and cough.   - If you do NOT have high blood pressure, you may use a decongestant form (D)  of this medication (ie. Claritin- D, zyrtec-D, allegra-D) or if you do not take the D form, you can take sudafed (pseudoephedrine) over the counter, which is a decongestant. Do NOT take two decongestant (D) medications at the same time (such as mucinex-D and claritin-D or plain sudafed and claritin D)    - You can use Flonase (fluticasone) nasal spray as directed for sinus congestion and postnasal drip. This is a steroid nasal spray that works locally over time to decrease the inflammation in your nose/sinuses and help with allergic symptoms. This is not an quick- relief spray like afrin, but it works well if used daily.  Discontinue if you develop nose bleed  - use nasal saline prior to Flonase.  - Use Ocean Spray Nasal Saline 1-3 puffs each nostril every 2-3 hours then blow out onto tissue. This is to irrigate the nasal passage way to clear the sinus openings. Use until sinus problem resolved.    - you can take plain Mucinex (guaifenesin) 1200 mg twice a day to help loosen mucous.     -warm salt water gargles can help with sore throat    - warm tea with honey can help with cough. Honey is a natural cough suppressant.    - Dextromethorphan (DM) is a cough suppressant over the counter (ie. mucinex DM, robitussin, delsym;  jose luisdedrickil/nyquil has DM as well.)    - Follow up with your PCP or specialty clinic as directed in the next 1-2 weeks if not improved or as needed.  You can call [phone] to schedule an appointment with the appropriate provider.      - Go to the ER if you develop new or worsening symptoms.     - You must understand that you have received an Urgent Care treatment only and that you may be released before all of your medical problems are known or treated.   - You, the patient, will arrange for follow up care as instructed.   - If your condition worsens or fails to improve we recommend that you receive another evaluation at the ER immediately or contact your PCP to discuss your concerns or return here.     Patient Instructions   - You must understand that you have received an Urgent Care treatment only and that you may be released before all of your medical problems are known or treated.   - You, the patient, will arrange for follow up care as instructed.   - If your condition worsens or fails to improve we recommend that you receive another evaluation at the ER immediately or contact your PCP to discuss your concerns or return here.     Advised on return/follow-up precautions. Advised on ER precautions. Answered all patient questions. Patient verbalized understanding and voiced agreement with current treatment plan.

## 2022-10-12 NOTE — LETTER
October 12, 2022      [facility] [place]  [address]  [facility] [address]  Phone: [phone]  Fax: [phone]       Patient: Terese Macias   YOB: 1975  Date of Visit: 10/12/2022    To Whom It May Concern:    Any Macias  was at [facility] on 10/12/2022. The patient may return to work/school on 14 October 2022 with no restrictions. If you have any questions or concerns, or if I can be of further assistance, please do not hesitate to contact me.    Sincerely,    Ag Zepeda RT

## 2022-10-12 NOTE — PATIENT INSTRUCTIONS
See additional patient Instructions provided    - Rest.    - Drink plenty of fluids.  - Viral upper respiratory infections typically run their course in 10-14 days.     - Tylenol or Ibuprofen as directed as needed for fever/pain. Avoid tylenol if you have a history of liver disease. Do not take ibuprofen if you have a history of GI bleeding, kidney disease, or if you take blood thinners.     - You can take over-the-counter claritin, zyrtec, allegra, or xyzal as directed. These are antihistamines that can help with runny nose, nasal congestion, sneezing, and helps to dry up post-nasal drip, which usually causes sore throat and cough.   - If you do NOT have high blood pressure, you may use a decongestant form (D)  of this medication (ie. Claritin- D, zyrtec-D, allegra-D) or if you do not take the D form, you can take sudafed (pseudoephedrine) over the counter, which is a decongestant. Do NOT take two decongestant (D) medications at the same time (such as mucinex-D and claritin-D or plain sudafed and claritin D)    - You can use Flonase (fluticasone) nasal spray as directed for sinus congestion and postnasal drip. This is a steroid nasal spray that works locally over time to decrease the inflammation in your nose/sinuses and help with allergic symptoms. This is not an quick- relief spray like afrin, but it works well if used daily.  Discontinue if you develop nose bleed  - use nasal saline prior to Flonase.  - Use Ocean Spray Nasal Saline 1-3 puffs each nostril every 2-3 hours then blow out onto tissue. This is to irrigate the nasal passage way to clear the sinus openings. Use until sinus problem resolved.    - you can take plain Mucinex (guaifenesin) 1200 mg twice a day to help loosen mucous.     -warm salt water gargles can help with sore throat    - warm tea with honey can help with cough. Honey is a natural cough suppressant.    - Dextromethorphan (DM) is a cough suppressant over the counter (ie. mucinex DM,  robitussin, delsym; dayquil/nyquil has DM as well.)    - Follow up with your PCP or specialty clinic as directed in the next 1-2 weeks if not improved or as needed.  You can call [phone] to schedule an appointment with the appropriate provider.      - Go to the ER if you develop new or worsening symptoms.     - You must understand that you have received an Urgent Care treatment only and that you may be released before all of your medical problems are known or treated.   - You, the patient, will arrange for follow up care as instructed.   - If your condition worsens or fails to improve we recommend that you receive another evaluation at the ER immediately or contact your PCP to discuss your concerns or return here.     Patient Instructions   - You must understand that you have received an Urgent Care treatment only and that you may be released before all of your medical problems are known or treated.   - You, the patient, will arrange for follow up care as instructed.   - If your condition worsens or fails to improve we recommend that you receive another evaluation at the ER immediately or contact your PCP to discuss your concerns or return here.     Advised on return/follow-up precautions. Advised on ER precautions. Answered all patient questions. Patient verbalized understanding and voiced agreement with current treatment plan.

## 2022-10-14 ENCOUNTER — PATIENT MESSAGE (OUTPATIENT)
Dept: INTERNAL MEDICINE | Facility: CLINIC | Age: 47
End: 2022-10-14
Payer: COMMERCIAL

## 2022-10-14 DIAGNOSIS — R05.9 COUGH, UNSPECIFIED TYPE: Primary | ICD-10-CM

## 2022-10-14 NOTE — TELEPHONE ENCOUNTER
Called and spoke to pt. Started to triage pt for side pain, pt states she has to call back after work,unfortunately cannot talk at this time.

## 2022-10-17 ENCOUNTER — HOSPITAL ENCOUNTER (OUTPATIENT)
Dept: RADIOLOGY | Facility: HOSPITAL | Age: 47
Discharge: HOME | End: 2022-10-17
Attending: INTERNAL MEDICINE
Payer: COMMERCIAL

## 2022-10-17 ENCOUNTER — OFFICE VISIT (OUTPATIENT)
Dept: INTERNAL MEDICINE | Facility: CLINIC | Age: 47
End: 2022-10-17
Payer: COMMERCIAL

## 2022-10-17 ENCOUNTER — PATIENT MESSAGE (OUTPATIENT)
Dept: BEHAVIORAL HEALTH | Facility: CLINIC | Age: 47
End: 2022-10-17
Payer: COMMERCIAL

## 2022-10-17 ENCOUNTER — PATIENT MESSAGE (OUTPATIENT)
Dept: INTERNAL MEDICINE | Facility: CLINIC | Age: 47
End: 2022-10-17

## 2022-10-17 VITALS
BODY MASS INDEX: 35.62 KG/M2 | SYSTOLIC BLOOD PRESSURE: 112 MMHG | HEART RATE: 78 BPM | HEIGHT: 62 IN | DIASTOLIC BLOOD PRESSURE: 70 MMHG | WEIGHT: 193.56 LBS | OXYGEN SATURATION: 94 %

## 2022-10-17 DIAGNOSIS — R11.2 NAUSEA AND VOMITING, UNSPECIFIED VOMITING TYPE: ICD-10-CM

## 2022-10-17 DIAGNOSIS — R05.1 ACUTE COUGH: ICD-10-CM

## 2022-10-17 DIAGNOSIS — M79.10 MUSCLE PAIN: ICD-10-CM

## 2022-10-17 DIAGNOSIS — R50.9 FEVER, UNSPECIFIED FEVER CAUSE: ICD-10-CM

## 2022-10-17 DIAGNOSIS — J02.9 SORE THROAT: ICD-10-CM

## 2022-10-17 DIAGNOSIS — R09.89 SYMPTOMS OF UPPER RESPIRATORY INFECTION (URI): Primary | ICD-10-CM

## 2022-10-17 DIAGNOSIS — R05.9 COUGH, UNSPECIFIED TYPE: ICD-10-CM

## 2022-10-17 LAB
INFLUENZA A, MOLECULAR: POSITIVE
INFLUENZA B, MOLECULAR: POSITIVE
SARS-COV-2 RNA RESP QL NAA+PROBE: NOT DETECTED
SARS-COV-2- CYCLE NUMBER: NORMAL
SPECIMEN SOURCE: ABNORMAL

## 2022-10-17 PROCEDURE — 3074F SYST BP LT 130 MM HG: CPT | Mod: CPTII,S$GLB,,

## 2022-10-17 PROCEDURE — 3078F DIAST BP <80 MM HG: CPT | Mod: CPTII,S$GLB,,

## 2022-10-17 PROCEDURE — 3078F PR MOST RECENT DIASTOLIC BLOOD PRESSURE < 80 MM HG: ICD-10-PCS | Mod: CPTII,S$GLB,,

## 2022-10-17 PROCEDURE — 3074F PR MOST RECENT SYSTOLIC BLOOD PRESSURE < 130 MM HG: ICD-10-PCS | Mod: CPTII,S$GLB,,

## 2022-10-17 PROCEDURE — 3044F PR MOST RECENT HEMOGLOBIN A1C LEVEL <7.0%: ICD-10-PCS | Mod: CPTII,S$GLB,,

## 2022-10-17 PROCEDURE — 71046 XR CHEST PA AND LATERAL: ICD-10-PCS | Mod: 26,,, | Performed by: INTERNAL MEDICINE

## 2022-10-17 PROCEDURE — U0003 INFECTIOUS AGENT DETECTION BY NUCLEIC ACID (DNA OR RNA); SEVERE ACUTE RESPIRATORY SYNDROME CORONAVIRUS 2 (SARS-COV-2) (CORONAVIRUS DISEASE [COVID-19]), AMPLIFIED PROBE TECHNIQUE, MAKING USE OF HIGH THROUGHPUT TECHNOLOGIES AS DESCRIBED BY CMS-2020-01-R: HCPCS

## 2022-10-17 PROCEDURE — 3044F HG A1C LEVEL LT 7.0%: CPT | Mod: CPTII,S$GLB,,

## 2022-10-17 PROCEDURE — 99213 OFFICE O/P EST LOW 20 MIN: CPT | Mod: S$GLB,,,

## 2022-10-17 PROCEDURE — 99999 PR PBB SHADOW E&M-EST. PATIENT-LVL III: ICD-10-PCS | Mod: PBBFAC,,,

## 2022-10-17 PROCEDURE — 87502 INFLUENZA DNA AMP PROBE: CPT

## 2022-10-17 PROCEDURE — U0005 INFEC AGEN DETEC AMPLI PROBE: HCPCS

## 2022-10-17 PROCEDURE — 99213 PR OFFICE/OUTPT VISIT, EST, LEVL III, 20-29 MIN: ICD-10-PCS | Mod: S$GLB,,,

## 2022-10-17 PROCEDURE — 71046 X-RAY EXAM CHEST 2 VIEWS: CPT | Mod: 26,,, | Performed by: INTERNAL MEDICINE

## 2022-10-17 PROCEDURE — 99999 PR PBB SHADOW E&M-EST. PATIENT-LVL III: CPT | Mod: PBBFAC,,,

## 2022-10-17 PROCEDURE — 71046 X-RAY EXAM CHEST 2 VIEWS: CPT | Mod: TC

## 2022-10-17 RX ORDER — ONDANSETRON 4 MG/1
4 TABLET, ORALLY DISINTEGRATING ORAL EVERY 6 HOURS PRN
Qty: 20 TABLET | Refills: 0 | Status: SHIPPED | OUTPATIENT
Start: 2022-10-17 | End: 2023-02-17 | Stop reason: SDUPTHER

## 2022-10-17 NOTE — PROGRESS NOTES
Internal Medicine Clinic Note    Subjective     Chief Complaint:  flu-like symptoms    History of Present Illness:  Ms. Terese Macias is a 47 y.o. female with history of B-cell lymphoma (in remission), GERD, iron deficiency anemia, recent COVID infection (August 2022) who presents to clinic with concerns of upper respiratory infection. She started developing symptoms starting 10/11 with body aches, fevers, chills, fatigue. She took a home COVID test on 10/11 which was negative and she was seen in urgent care on 10/12. During her urgent care visit she had a flu swab done which was negative. She was advised to treat symptomatically with tylenol/ibuprofen and OTC medications. She has been taking Cold and Flu OTC medications and just started Delsym this weekend. Since Wednesday she has continued to have body aches and chills leading into this past weekend. Documented fevers (last 10/13) up to 102.8F.     She denies fevers today. She notes that she had a nosebleed on Friday. She has had nosebleeds as a child and denies any trauma, blowing her nose prior to the incident. She does not take any blood thinners. She had a few days of left sided discomfort, which is chronic but given history of B-cell lymphoma she was concerned about her symptoms. She messaged her PCP and obtained a chest x-ray this AM. On her way to x-ray she felt nauseous and dizzy, and vomited. She states she feels fine now and denies any current nausea, dizziness. She has not eaten/drank anything today. Since this weekend her sinus congestion has worsened and she has a worsening sore throat. She states it is painful to swallow foods other than liquids. Today she is tolerating her oral secretions and denies any shortness of breath, chest pain, sinus pain, ear pain.     The patient works as a  for elementary school. Her daughter is also sick with sinus congestion and lightheadedness which started after the patient's symptoms began. She also  has a grandchild that attends .       Review of Systems   Constitutional:  Positive for chills (up until yesterday), fever (last fever noted 10/13) and malaise/fatigue.   HENT:  Positive for congestion (sinus pressure), nosebleeds (last Friday) and sore throat. Negative for ear pain.    Eyes:  Negative for blurred vision, photophobia and pain.   Respiratory:  Positive for cough and sputum production. Negative for hemoptysis, shortness of breath and wheezing.    Cardiovascular:  Negative for chest pain, orthopnea and leg swelling.   Gastrointestinal:  Positive for nausea and vomiting. Negative for abdominal pain, constipation, diarrhea and heartburn.   Genitourinary:  Negative for dysuria and hematuria.   Musculoskeletal:  Negative for back pain, joint pain and myalgias (had them last week).   Neurological:  Positive for dizziness (along with nausea after blowing nose). Negative for weakness and headaches.     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    Fever blister     GERD (gastroesophageal reflux disease)     Heavy periods 6/8/2015    Hemorrhoids without complication     IBS (irritable colon syndrome)     Joint pain     Left upper quadrant pain 12/19/2018    Pancreatic mass 12/19/2018    Psychiatric problem     Screening for HPV (human papillomavirus)     Sleep difficulties     Splenomegaly 12/19/2018    Thalassemia     Thalassemia     Therapy        Past Surgical History:   Procedure Laterality Date    COLONOSCOPY      COLONOSCOPY N/A 11/8/2018    Procedure: COLONOSCOPY;  Surgeon: Ba Stewart MD;  Location: [address]);  Service: Endoscopy;  Laterality: N/A;  non diabetic constipation prep      ok to schedule per [facility]    ENDOSCOPIC ULTRASOUND OF UPPER GASTROINTESTINAL  TRACT N/A 12/12/2018    Procedure: ULTRASOUND-ENDOSCOPIC-UPPER;  Surgeon: Venu Montiel MD;  Location: [facility] ENDO;  Service: Endoscopy;  Laterality: N/A;    ESOPHAGOGASTRODUODENOSCOPY N/A 11/8/2018    Procedure: EGD (ESOPHAGOGASTRODUODENOSCOPY);  Surgeon: Ba Stewart MD;  [facility]  Service: Endoscopy;  Laterality: N/A;  ok to schedule per Tiara    HERNIA REPAIR         Family History   Problem Relation Age of Onset    Hypertension Mother     Diabetes Father     Heart disease Father     Eczema Father     Eczema Sister     Acne Sister     Eczema Daughter     Cancer Maternal Aunt         breast    Breast cancer Maternal Aunt     No Known Problems Maternal Uncle     No Known Problems Paternal Aunt     Colon cancer Paternal Uncle 60        colon cancer    Breast cancer Maternal Grandmother     Cancer Maternal Grandmother         breast    No Known Problems Maternal Grandfather     No Known Problems Paternal Grandmother     No Known Problems Paternal Grandfather     Eczema Brother     Cancer Cousin         breast    Cancer Cousin 44        stomach cancer    Breast cancer Cousin     Eczema Other     No Known Problems Other     Melanoma Neg Hx     Psoriasis Neg Hx     Lupus Neg Hx     Ovarian cancer Neg Hx     Amblyopia Neg Hx     Blindness Neg Hx     Cataracts Neg Hx     Glaucoma Neg Hx     Macular degeneration Neg Hx     Retinal detachment Neg Hx     Strabismus Neg Hx     Stroke Neg Hx     Thyroid disease Neg Hx     Esophageal cancer Neg Hx     Irritable bowel syndrome Neg Hx     Crohn's disease Neg Hx     Ulcerative colitis Neg Hx     Celiac disease Neg Hx        Social History     Socioeconomic History    Marital status: Single    Number of children: 1   Occupational History    Occupation: Teacher     Employer: [employer]   Tobacco Use    Smoking status: Never    Smokeless tobacco: Never   Substance and Sexual Activity    Alcohol use: Yes     Comment: Occasionally     Drug use: No    Sexual activity: Never     Partners: Male     Birth control/protection: Injection   Other Topics Concern    Are you pregnant or think you may be? No    Breast-feeding No    Patient feels they ought to cut down on drinking/drug use No    Patient annoyed by others criticizing their drinking/drug use No    Patient has felt bad or guilty about drinking/drug use No    Patient has had a drink/used drugs as an eye opener in the AM No   Social History Narrative    Teacher, [employer]. Single, 1 child, daughter    Lives with parents at present.       MEDICATIONS & ALLERGIES:     Current Outpatient Medications on File Prior to Visit   Medication Sig    cetirizine (ZYRTEC) 10 MG tablet Take 10 mg by mouth.    cholecalciferol, vitamin D3, 1,250 mcg (50,000 unit) capsule Take 1 capsule (50,000 Units total) by mouth once a week.    FLUoxetine 20 MG capsule Take 1 capsule (20 mg total) by mouth once daily.    fluticasone (FLONASE) 50 mcg/actuation nasal spray 1 spray (50 mcg total) by Each Nare route once daily.    linaCLOtide (LINZESS) 145 mcg Cap capsule Take 1 capsule (145 mcg total) by mouth once daily.    metFORMIN (GLUCOPHAGE-XR) 500 MG ER 24hr tablet One tablet daily for 7 days then 2 tablets daily.    methocarbamoL (ROBAXIN) 500 MG Tab Take 1 tablet (500 mg total) by mouth 3 (three) times daily as needed (muscle spasm).    propranoloL (INDERAL) 10 MG tablet TAKE 1 TABLET BY MOUTH THREE TIMES A DAY AS NEEDED    traZODone (DESYREL) 50 MG tablet Take 1 tablet (50 mg total) by mouth nightly as needed for Insomnia.    [DISCONTINUED] ondansetron (ZOFRAN-ODT) 4 MG TbDL Take 1 tablet (4 mg total) by mouth every 6 (six) hours as needed (nausea or vomiting).     No current facility-administered medications on file prior to visit.       Review of patient's allergies indicates:   Allergen Reactions    Penicillins Other (See Comments) and Rash     Pt had reaction to PCN as an infant.       OBJECTIVE:     Vital  "Signs:  Vitals:    10/17/22 0937   BP: 112/70   BP Location: Right arm   Patient Position: Sitting   BP Method: Small (Manual)   Pulse: 78   SpO2: (!) 94%   Weight: 87.8 kg (193 lb 9 oz)   Height: 5' 2" (1.575 m)       Body mass index is 35.4 kg/m².     Physical Exam  Constitutional:       General: She is not in acute distress.     Appearance: Normal appearance. She is not ill-appearing.   HENT:      Head: Normocephalic and atraumatic.      Right Ear: Tympanic membrane, ear canal and external ear normal.      Left Ear: Tympanic membrane, ear canal and external ear normal.      Nose: Congestion present.      Mouth/Throat:      Mouth: Mucous membranes are moist.      Pharynx: Posterior oropharyngeal erythema present. No oropharyngeal exudate.   Cardiovascular:      Rate and Rhythm: Normal rate and regular rhythm.   Pulmonary:      Effort: Pulmonary effort is normal. No respiratory distress.      Breath sounds: Normal breath sounds. No wheezing.   Abdominal:      General: Bowel sounds are normal. There is no distension.      Palpations: Abdomen is soft.      Tenderness: There is no abdominal tenderness.   Musculoskeletal:         General: Normal range of motion.      Right lower leg: No edema.      Left lower leg: No edema.   Skin:     General: Skin is warm and dry.      Findings: No erythema.   Neurological:      Mental Status: She is alert and oriented to person, place, and time. Mental status is at baseline.      Motor: No weakness.   Psychiatric:         Mood and Affect: Mood normal.         Behavior: Behavior normal.       Laboratory  Lab Results   Component Value Date    WBC 8.89 08/16/2022    HGB 11.5 (L) 08/16/2022    HCT 35.9 (L) 08/16/2022    MCV 90 08/16/2022     08/16/2022     BMP  Lab Results   Component Value Date     08/16/2022    K 4.2 08/16/2022     08/16/2022    CO2 27 08/16/2022    BUN 12 08/16/2022    CREATININE 0.7 08/16/2022    CALCIUM 9.4 08/16/2022    ANIONGAP 8 08/16/2022    " ESTGFRAFRICA >60.0 11/02/2021    EGFRNONAA >60.0 11/02/2021     Lab Results   Component Value Date    INR 1.1 04/26/2019     Lab Results   Component Value Date    HGBA1C 5.5 08/16/2022       Diagnostic Results:  Labs: Reviewed    Health Maintenance Due   Topic Date Due    HIV Screening  Never done    Pneumococcal Vaccines (Age 0-64) (2 - PPSV23 if available, else PCV20) 12/31/2019    COVID-19 Vaccine (4 - Booster for Moderna series) 03/11/2022    Influenza Vaccine (1) 09/01/2022         ASSESSMENT & PLAN:   Ms. Terese Macias is a 47 y.o. female with a history of B-cell lymphoma (in remission), GERD, recent COVID infection in August who presents to clinic with concerns of upper respiratory infection. Her symptoms of myalgias, headache, fever, fatigue over the past week seem consistent with likely flu or possible  COVID re-infection. Chest x-ray obtained this morning was not concerning for infiltrates, effusion, consolidative process. Based on CENTOR criteria, she has a low likelihood of strep throat infection - 1 point; no exudates, no anterior cervical lymphadenopathy. Though she has tested herself at home for COVID and had a negative flu swab on 10/12, given the persistence of symptoms we will re-check both COVID and flu today. Unfortunately she is out of window for antiviral treatments and will need to continue symptomatic management.          -     We discussed liberalizing fluids, taking steam to help with congestion. In addition, recommended continuing OTC decongestants such as Delsym, Mucinex. Will prescribe zofran for nausea symptoms and suggested continuing Tylenol/ibuprofen as needed for fevers, myalgias.           -     If her symptoms fail to improve within the next few days (by 10/20), instructed patient to reach out for further evaluation of possible superimposed post-viral bacterial infection    Symptoms of upper respiratory infection (URI)  -     Influenza A & B by Molecular  -     COVID-19 Routine  Screening  -     Cancel: Group A Strep, Molecular    Sore throat  -     Influenza A & B by Molecular  -     Cancel: Group A Strep, Molecular    Nausea and vomiting, unspecified vomiting type  -     ondansetron (ZOFRAN-ODT) 4 MG TbDL; Take 1 tablet (4 mg total) by mouth every 6 (six) hours as needed (nausea or vomiting).  Dispense: 20 tablet; Refill: 0  -     COVID-19 Routine Screening    Fever, unspecified fever cause  -     Influenza A & B by Molecular  -     COVID-19 Routine Screening    Acute cough  -     COVID-19 Routine Screening    Muscle pain  -     COVID-19 Routine Screening      RTC in PRN    Discussed with Dr. Rodriguez  - staff attestation to follow        Valeria La MD, MPH  Internal Medicine, PGY-2  [facility]  [address] [place] [address] [phone]

## 2022-10-17 NOTE — TELEPHONE ENCOUNTER
Called and spoke to pt. States she is currently is at the clinic awaiting appt with a different provider. Pt had chest xray done.

## 2022-10-17 NOTE — PATIENT INSTRUCTIONS
Today you were tested for both COVID and flu. For now we will continue symptomatic management of your upper respiratory symptoms.  Liberalize fluids - continue with teas, soups, drinking water liberally     Would continue taking Delsym, can start on Mucinex in addition to help with congestion. Take zofran as needed for nausea.    If your symptoms don't start to improve by Thursday, please reach out to seek care and be re-assessed for possible bacterial infection

## 2022-10-17 NOTE — PROGRESS NOTES
I have reviewed the notes, assessments, and/or procedures performed by the resident, I concur with her/his documentation of Terese Macias.

## 2022-10-23 ENCOUNTER — PATIENT MESSAGE (OUTPATIENT)
Dept: INTERNAL MEDICINE | Facility: CLINIC | Age: 47
End: 2022-10-23
Payer: COMMERCIAL

## 2022-10-25 ENCOUNTER — OFFICE VISIT (OUTPATIENT)
Dept: BEHAVIORAL HEALTH | Facility: CLINIC | Age: 47
End: 2022-10-25
Payer: COMMERCIAL

## 2022-10-25 DIAGNOSIS — F41.9 ANXIETY: Primary | ICD-10-CM

## 2022-10-25 DIAGNOSIS — F43.21 GRIEF: ICD-10-CM

## 2022-10-25 DIAGNOSIS — F33.1 DEPRESSION, MAJOR, RECURRENT, MODERATE: ICD-10-CM

## 2022-10-25 PROCEDURE — 90834 PR PSYCHOTHERAPY W/PATIENT, 45 MIN: ICD-10-PCS | Mod: 95,,, | Performed by: SOCIAL WORKER

## 2022-10-25 PROCEDURE — 90834 PSYTX W PT 45 MINUTES: CPT | Mod: 95,,, | Performed by: SOCIAL WORKER

## 2022-10-25 PROCEDURE — 3044F PR MOST RECENT HEMOGLOBIN A1C LEVEL <7.0%: ICD-10-PCS | Mod: CPTII,95,, | Performed by: SOCIAL WORKER

## 2022-10-25 PROCEDURE — 3044F HG A1C LEVEL LT 7.0%: CPT | Mod: CPTII,95,, | Performed by: SOCIAL WORKER

## 2022-10-25 NOTE — PROGRESS NOTES
"The patient location is: [place]  The chief complaint leading to consultation is: anxiety, grief, depression    Visit type: audiovisual    Face to Face time with patient: 45  47 minutes of total time spent on the encounter, which includes face to face time and non-face to face time preparing to see the patient (eg, review of tests), Obtaining and/or reviewing separately obtained history, Documenting clinical information in the electronic or other health record, Independently interpreting results (not separately reported) and communicating results to the patient/family/caregiver, or Care coordination (not separately reported).     Individual Psychotherapy (LCSW/PhD)  Terese Macias,  10/25/2022    Site:  Telemed         Therapeutic Intervention: Met with patient for individual psychotherapy.    Chief complaint/reason for encounter: depression, anxiety, and grief     Interval history and content of current session: PT was out sick with the flu. Pt had just had covid not too long ago.  Pt is having to catch up with work.  She feels she is doing "ok" but has a few things she's trying not to get discouraged about.  For example, her living situation and trying to buy a house.  She is waiting on paperwork for the mortgage company.  She had worked to improve her credit but it went down after getting a credit card. Pt is disappointed in herself because she worked so hard to improve her credit. She shares she got a raise this year but isn't sure where her money goes.  Discussed getting a budget. Pt shares she is still trying to decide if she should put her daughter on the mortgage. She shared difficulty of establishing boundaries with her mom. She feels frustrated by her mom's constant correction and criticism. Discussed assertively communicating her feelings. She would like to be in a relationship but doesn't put herself out there.  She wonders why she has had several  men ask her out.  Discussed difficulties " communicate how she feels. Discussed I feel statements.     Treatment plan:  Target symptoms: depression, anxiety , grief  Why chosen therapy is appropriate versus another modality: relevant to diagnosis, patient responds to this modality  Outcome monitoring methods: self-report, observation  Therapeutic intervention type: behavior modifying psychotherapy, supportive psychotherapy    Risk parameters:  Patient reports no suicidal ideation  Patient reports no homicidal ideation  Patient reports no self-injurious behavior  Patient reports no violent behavior    Verbal deficits: None    Patient's response to intervention:  The patient's response to intervention is accepting.    Progress toward goals and other mental status changes:  The patient's progress toward goals is fair .    Diagnosis:     ICD-10-CM ICD-9-CM   1. Anxiety  F41.9 300.00   2. Grief  F43.21 309.0   3. Depression, major, recurrent, moderate  F33.1 296.32       Plan: Pt plans to continue individual psychotherapy    Return to clinic: 3 weeks    Length of Service (minutes): 45        Each patient to whom he or she provides medical services by telemedicine is:  (1) informed of the relationship between the physician and patient and the respective role of any other health care provider with respect to management of the patient; and (2) notified that he or she may decline to receive medical services by telemedicine and may withdraw from such care at any time.

## 2022-10-26 ENCOUNTER — TELEPHONE (OUTPATIENT)
Dept: BEHAVIORAL HEALTH | Facility: CLINIC | Age: 47
End: 2022-10-26
Payer: COMMERCIAL

## 2022-10-26 NOTE — PROGRESS NOTES
Behavioral Health Community Health Worker  Follow-Up  Completed by:  Linsey Moreno    Date:  10/26/2022    Patient Enrollment in Behavioral Health Program:  Terese Macias was enrolled in the Behavioral Health Program on 6/29/22    Assessments     Promis 10:  No flowsheet data found.    Depression PHQ:  PHQ9 10/26/2022   Total Score 16       Generalized Anxiety Disorder 7-Item Scale:  GAD7 10/25/2022   1. Feeling nervous, anxious, or on edge? 3   2. Not being able to stop or control worrying? 3   3. Worrying too much about different things? 3   4. Trouble relaxing? 2   5. Being so restless that it is hard to sit still? 1   6. Becoming easily annoyed or irritable? 1   7. Feeling afraid as if something awful might happen? 1   8. If you checked off any problems, how difficult have these problems made it for you to do your work, take care of things at home, or get along with other people? -   DARYL-7 Score 14       Patients' Global Impression of Change (PGIC) Scale:  Since beginning treatment at this clinic, how would you describe the change (if any) in ACTIVITY LIMITATIONS, SYMPTOMS, EMOTIONS, and OVERALL QUALITY OF LIFE, related to your painful condition?  No Value exists for the : OHS#89056      In a similar way, please check the number below that matches your degree of change since beginning care at this clinic (Much better (0) - Much Worse (10)): No Value exists for the : OHS#32751        Much Better                                     No Change                                    Much Worse                        -----------------------------------------------------------------------------                        0       1       2       3       4       5       6       7      8       9      10                     Call Summary     Assessment updated

## 2022-10-27 ENCOUNTER — PATIENT MESSAGE (OUTPATIENT)
Dept: INTERNAL MEDICINE | Facility: CLINIC | Age: 47
End: 2022-10-27
Payer: COMMERCIAL

## 2022-11-14 ENCOUNTER — OFFICE VISIT (OUTPATIENT)
Dept: BEHAVIORAL HEALTH | Facility: CLINIC | Age: 47
End: 2022-11-14
Payer: COMMERCIAL

## 2022-11-14 ENCOUNTER — PATIENT MESSAGE (OUTPATIENT)
Dept: BEHAVIORAL HEALTH | Facility: CLINIC | Age: 47
End: 2022-11-14

## 2022-11-14 DIAGNOSIS — F43.21 GRIEF: ICD-10-CM

## 2022-11-14 DIAGNOSIS — F33.1 DEPRESSION, MAJOR, RECURRENT, MODERATE: ICD-10-CM

## 2022-11-14 DIAGNOSIS — F41.9 ANXIETY: Primary | ICD-10-CM

## 2022-11-14 PROCEDURE — 90834 PR PSYCHOTHERAPY W/PATIENT, 45 MIN: ICD-10-PCS | Mod: 95,,, | Performed by: SOCIAL WORKER

## 2022-11-14 PROCEDURE — 90834 PSYTX W PT 45 MINUTES: CPT | Mod: 95,,, | Performed by: SOCIAL WORKER

## 2022-11-14 PROCEDURE — 3044F HG A1C LEVEL LT 7.0%: CPT | Mod: CPTII,95,, | Performed by: SOCIAL WORKER

## 2022-11-14 PROCEDURE — 3044F PR MOST RECENT HEMOGLOBIN A1C LEVEL <7.0%: ICD-10-PCS | Mod: CPTII,95,, | Performed by: SOCIAL WORKER

## 2022-11-14 NOTE — PROGRESS NOTES
The patient location is: [place]  The chief complaint leading to consultation is: depression, anxiety, grief    Visit type: audiovisual    Face to Face time with patient: 45  47 minutes of total time spent on the encounter, which includes face to face time and non-face to face time preparing to see the patient (eg, review of tests), Obtaining and/or reviewing separately obtained history, Documenting clinical information in the electronic or other health record, Independently interpreting results (not separately reported) and communicating results to the patient/family/caregiver, or Care coordination (not separately reported).     Individual Psychotherapy (LCSW/PhD)  Terese Macias,  11/14/2022    Site:  Telemed         Therapeutic Intervention: Met with patient for individual psychotherapy.    Chief complaint/reason for encounter: depression, anxiety, and grief     Interval history and content of current session: PT reports feeling healthier.  She shares work has been busy. She feels she needs to improve her confidence.  She knows she is adjusting to a new job.  She has a hard time getting to work on time. Mom told her she doesn't think pt rests well at night.  Her mom notices pt snores at night.  She shares she has struggled with confidence over the years. 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  She notes she doesn't cry as much when she's listening to meditation regularly.  She feels unsure of herself when she is picking a song to sing on stage. She shares her family rarely goes to her mom and dad's house.  She shares moving back home makes her feel like she's moving backwards in life.  She feels getting her home will resolve a lot of insecurities she feels.   She doesn't want to move in to her mom's house.  It would be best for her mom to sell the house where pt is living. Pt shares she feels shame she hurt her credit by getting a credit card.  She has a  "high interest rate on her current credit card.  Pt shares how she always had a dream of buying a house.  She moved several times raising her daughter.  She was evicted from one house she had because of issues with the [employer] company being predatory.She shares she feels guilty if she is resting.  Discussed other things she likes about herself. She shares how she feels she is a good person but her dad was a good person but he was hurt because people didn't value that.  She shares frustration with finances.  Discussed not putting self worth in outside things.    Discussed meds and pt is open to increasing dose. C[facility] will staff with Dr. Acuna.    Pt isn't taking propranolol.  "I haven't been but I need to."  She did it find helpful.  She hasn't been taking trazadone.    CODY will send budget holden and affirmations holden on my chart.    Goal: buy tickets for inspirational conference or get nails done.     Treatment plan:  Target symptoms: depression, anxiety , grief  Why chosen therapy is appropriate versus another modality: relevant to diagnosis, patient responds to this modality  Outcome monitoring methods: self-report, observation  Therapeutic intervention type: behavior modifying psychotherapy, supportive psychotherapy    Risk parameters:  Patient reports no suicidal ideation  Patient reports no homicidal ideation  Patient reports no self-injurious behavior  Patient reports no violent behavior    Verbal deficits: None    Patient's response to intervention:  The patient's response to intervention is accepting.    Progress toward goals and other mental status changes:  The patient's progress toward goals is fair .    Diagnosis:     ICD-10-CM ICD-9-CM   1. Anxiety  F41.9 300.00   2. Grief  F43.21 309.0   3. Depression, major, recurrent, moderate  F33.1 296.32       Plan: Pt plans to continue individual psychotherapy    Return to clinic: 3 weeks    Length of Service (minutes): 45        Each patient to whom he or she provides " medical services by telemedicine is:  (1) informed of the relationship between the physician and patient and the respective role of any other health care provider with respect to management of the patient; and (2) notified that he or she may decline to receive medical services by telemedicine and may withdraw from such care at any time.

## 2022-11-14 NOTE — Clinical Note
Please see my addendum from today's I meeting.  If you agree with this plan, I will pend medication order in a separate refill encounter.  Please reach out to me with any questions.  Araceli Acuna

## 2022-11-29 ENCOUNTER — PATIENT MESSAGE (OUTPATIENT)
Dept: BEHAVIORAL HEALTH | Facility: CLINIC | Age: 47
End: 2022-11-29
Payer: COMMERCIAL

## 2022-12-01 ENCOUNTER — OFFICE VISIT (OUTPATIENT)
Dept: INTERNAL MEDICINE | Facility: CLINIC | Age: 47
End: 2022-12-01
Payer: COMMERCIAL

## 2022-12-01 ENCOUNTER — IMMUNIZATION (OUTPATIENT)
Dept: INTERNAL MEDICINE | Facility: CLINIC | Age: 47
End: 2022-12-01
Payer: COMMERCIAL

## 2022-12-01 VITALS
BODY MASS INDEX: 35.62 KG/M2 | WEIGHT: 193.56 LBS | SYSTOLIC BLOOD PRESSURE: 124 MMHG | OXYGEN SATURATION: 98 % | DIASTOLIC BLOOD PRESSURE: 86 MMHG | HEIGHT: 62 IN | HEART RATE: 67 BPM

## 2022-12-01 DIAGNOSIS — D69.6 THROMBOCYTOPENIA: ICD-10-CM

## 2022-12-01 DIAGNOSIS — F39 MOOD DISORDER: ICD-10-CM

## 2022-12-01 DIAGNOSIS — K21.9 GASTROESOPHAGEAL REFLUX DISEASE WITHOUT ESOPHAGITIS: ICD-10-CM

## 2022-12-01 DIAGNOSIS — E66.01 SEVERE OBESITY (BMI 35.0-39.9) WITH COMORBIDITY: Primary | ICD-10-CM

## 2022-12-01 DIAGNOSIS — C83.37 DIFFUSE LARGE B-CELL LYMPHOMA OF SPLEEN: ICD-10-CM

## 2022-12-01 PROCEDURE — 3079F DIAST BP 80-89 MM HG: CPT | Mod: CPTII,S$GLB,, | Performed by: INTERNAL MEDICINE

## 2022-12-01 PROCEDURE — 3008F BODY MASS INDEX DOCD: CPT | Mod: CPTII,S$GLB,, | Performed by: INTERNAL MEDICINE

## 2022-12-01 PROCEDURE — 3074F PR MOST RECENT SYSTOLIC BLOOD PRESSURE < 130 MM HG: ICD-10-PCS | Mod: CPTII,S$GLB,, | Performed by: INTERNAL MEDICINE

## 2022-12-01 PROCEDURE — 99999 PR PBB SHADOW E&M-EST. PATIENT-LVL III: CPT | Mod: PBBFAC,,, | Performed by: INTERNAL MEDICINE

## 2022-12-01 PROCEDURE — 3044F HG A1C LEVEL LT 7.0%: CPT | Mod: CPTII,S$GLB,, | Performed by: INTERNAL MEDICINE

## 2022-12-01 PROCEDURE — 99214 OFFICE O/P EST MOD 30 MIN: CPT | Mod: 25,S$GLB,, | Performed by: INTERNAL MEDICINE

## 2022-12-01 PROCEDURE — 99214 PR OFFICE/OUTPT VISIT, EST, LEVL IV, 30-39 MIN: ICD-10-PCS | Mod: 25,S$GLB,, | Performed by: INTERNAL MEDICINE

## 2022-12-01 PROCEDURE — 90471 FLU VACCINE (QUAD) GREATER THAN OR EQUAL TO 3YO PRESERVATIVE FREE IM: ICD-10-PCS | Mod: S$GLB,,, | Performed by: INTERNAL MEDICINE

## 2022-12-01 PROCEDURE — 1159F MED LIST DOCD IN RCRD: CPT | Mod: CPTII,S$GLB,, | Performed by: INTERNAL MEDICINE

## 2022-12-01 PROCEDURE — 3074F SYST BP LT 130 MM HG: CPT | Mod: CPTII,S$GLB,, | Performed by: INTERNAL MEDICINE

## 2022-12-01 PROCEDURE — 3079F PR MOST RECENT DIASTOLIC BLOOD PRESSURE 80-89 MM HG: ICD-10-PCS | Mod: CPTII,S$GLB,, | Performed by: INTERNAL MEDICINE

## 2022-12-01 PROCEDURE — 90686 IIV4 VACC NO PRSV 0.5 ML IM: CPT | Mod: S$GLB,,, | Performed by: INTERNAL MEDICINE

## 2022-12-01 PROCEDURE — 1159F PR MEDICATION LIST DOCUMENTED IN MEDICAL RECORD: ICD-10-PCS | Mod: CPTII,S$GLB,, | Performed by: INTERNAL MEDICINE

## 2022-12-01 PROCEDURE — 99999 PR PBB SHADOW E&M-EST. PATIENT-LVL III: ICD-10-PCS | Mod: PBBFAC,,, | Performed by: INTERNAL MEDICINE

## 2022-12-01 PROCEDURE — 3008F PR BODY MASS INDEX (BMI) DOCUMENTED: ICD-10-PCS | Mod: CPTII,S$GLB,, | Performed by: INTERNAL MEDICINE

## 2022-12-01 PROCEDURE — 3044F PR MOST RECENT HEMOGLOBIN A1C LEVEL <7.0%: ICD-10-PCS | Mod: CPTII,S$GLB,, | Performed by: INTERNAL MEDICINE

## 2022-12-01 PROCEDURE — 90686 FLU VACCINE (QUAD) GREATER THAN OR EQUAL TO 3YO PRESERVATIVE FREE IM: ICD-10-PCS | Mod: S$GLB,,, | Performed by: INTERNAL MEDICINE

## 2022-12-01 PROCEDURE — 90471 IMMUNIZATION ADMIN: CPT | Mod: S$GLB,,, | Performed by: INTERNAL MEDICINE

## 2022-12-01 RX ORDER — PROPRANOLOL HYDROCHLORIDE 20 MG/1
20 TABLET ORAL 2 TIMES DAILY
Qty: 60 TABLET | Refills: 5 | Status: SHIPPED | OUTPATIENT
Start: 2022-12-01 | End: 2023-12-01

## 2022-12-01 NOTE — PROGRESS NOTES
Chief Complaint: Follow up of multiple issues     HPI: This is a 46 year old woman who presents for follow up of above    Her 97 year old maternal grandfather passed recently and services were last weekend.  She is getting adjusted to this change. Her mother is grieving and she is helping her mother through her grief.          She still has some left side pain when she lies on the left side.. It is uncomfortable if she lies on her side too long.  CT scan done at [facility] are stable.  No dysuria, hematuria, frequency.  She has constipation at times.   She has a BM every other day. She would feel relief in her left sided abdominal pain if she has a BM.   She is taking Linzess as needed for constipation.  She needs to take the Linzess more regularly.      She is taking fluoxetine 20 mg daily.  She takes propranolol 10 mg daily as needed for anxiety.Anxiety has been a little worse lately so she has been taking the propranolol more recently which has been helping.  She is trying to buy a home. Mood is ok  She is sleeping well.  She is speaking with a therapist - Martita Park has been helpful  She has been snoring more.  She has seen sleep medicine but has not gotten a sleep study yet.        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 She is being treated at [facility] and [facility]. she saw [facility] on 2/11/22 and is in complete remission status. She had MRI ABdomen, CT neck, chest, abdomen and pelvis that were stable 7/30/2021. She will see [facility] yearly now     She is no longer taking Bactrim DS Monday Wednesday and Friday and Valcyclovir 500 mg daily due to immunosupression. SHe has numbness in her hands since her chemo - this has since resolved.      She is not taking vitamin D 50,000 units once a week for vitamin D deficiency     Her last shot of Depo Provera was in December 2019. No periods.      Occcaional back pain. She has occasional  "spasms.     She has some right TMJ issues- she saw an oral surgeon and will see a doctor at [facility] dentistry.         She has not been taking metformin for her weight.  She has been watching her diet and has lost 4 pounds               Past Surgical History:   Procedure Laterality Date    COLONOSCOPY        COLONOSCOPY N/A 11/8/2018     Performed by Ba Stewart MD at [facility] ENDO (2ND FLR)    EGD (ESOPHAGOGASTRODUODENOSCOPY) N/A 11/8/2018     Performed by Ba Stewart MD at [facility] ENDO (2ND FLR)    ESOPHAGOGASTRODUODENOSCOPY (EGD) N/A 3/23/2015     Performed by Ba Stewart MD at [facility] ENDO (4TH FLR)    HERNIA REPAIR        SD COLONOSCOPY,DIAGNOSTIC [17934 (CPT®)] N/A 7/2/2014     Performed by Cj Lassiter MD at [facility] ENDO (4TH FLR)    ULTRASOUND-ENDOSCOPIC-UPPER N/A 12/12/2018     Performed by Venu Montiel MD at [facility] ENDO            Meds and allergies: updated on Epic      /86 (BP Location: Right arm, Patient Position: Sitting)   Pulse 70   Ht 5' 2" (1.575 m)   Wt 87.8 kg (193 lb 9 oz)   LMP 04/17/2019   SpO2 98%   BMI 35.40 kg/m²     General: alert, oriented x 3, no apparent distress.  Affect normal  HEENT: Conjunctivae: anicteric, PERRL, EOMI, TM clear, Oralpharynx clear  Neck: supple, no thyroid enlargement, no cervical lymphadenopathy  Resp: effort normal, lungs clear bilaterally  CV: Regular rate and rhythm without murmurs, gallops or rubs, no lower extremity edema,        Assessment/Plan:        Stage 4 large B cell lymphoma - finished chemo 6/26/19. Follow up with [facility] -saw 2/2022- now yearly - will see them 2/2023     Anemia - s/pp iron infusions years ago.  Doing well .       Anxiety and depression/insomnia/grief- better on fluoxetine 20 mg daily Continue therapy. Propranolol 20 mg twice daily.  She would like to be tested for ADHD as she is having trouble focusing.      Menopause - could be contributing to weight.   GERD - asx     Left breast abnormality on CT scan at [facility] " - diagnostic left mammogram 8/16/2021 was fine - bilateral mammogram7/2022   .   Obesity - discussed diet, exercise and weight loss.  Metformin  mg daily for 7 days then 2 tablets daily.      LLQ pain - stable - due to constipation.  Constipation comes and goes.      MMG 7/2022 fine        I will see her back in 6 months sooner if problems arise.

## 2022-12-02 DIAGNOSIS — F41.9 ANXIETY: Primary | ICD-10-CM

## 2022-12-02 DIAGNOSIS — F33.1 DEPRESSION, MAJOR, RECURRENT, MODERATE: ICD-10-CM

## 2022-12-02 RX ORDER — FLUOXETINE HYDROCHLORIDE 40 MG/1
40 CAPSULE ORAL DAILY
Qty: 90 CAPSULE | Refills: 1 | Status: SHIPPED | OUTPATIENT
Start: 2022-12-02 | End: 2023-05-16 | Stop reason: SDUPTHER

## 2022-12-02 NOTE — PROGRESS NOTES
Patient discussed during I meeting today.  Patient with poorly controlled depression and anxiety.  She is currently taking Prozac 20mg daily.  She was previously taking Propranolol 20mg BID PRN anxiety as well but has not been taking recently - it was helpful so plans to restart.  She is wanting to increase Prozac dose.  I would recommend increasing to 40mg daily.  LCSW will discuss with patient.  I will pend order for PCP if she agrees with plan.    Time: 10 minutes

## 2022-12-05 ENCOUNTER — OFFICE VISIT (OUTPATIENT)
Dept: BEHAVIORAL HEALTH | Facility: CLINIC | Age: 47
End: 2022-12-05
Payer: COMMERCIAL

## 2022-12-05 ENCOUNTER — TELEPHONE (OUTPATIENT)
Dept: BEHAVIORAL HEALTH | Facility: CLINIC | Age: 47
End: 2022-12-05
Payer: COMMERCIAL

## 2022-12-05 DIAGNOSIS — F43.21 GRIEF: Primary | ICD-10-CM

## 2022-12-05 DIAGNOSIS — F33.1 DEPRESSION, MAJOR, RECURRENT, MODERATE: ICD-10-CM

## 2022-12-05 DIAGNOSIS — F41.9 ANXIETY: ICD-10-CM

## 2022-12-05 PROCEDURE — 3044F HG A1C LEVEL LT 7.0%: CPT | Mod: CPTII,95,, | Performed by: SOCIAL WORKER

## 2022-12-05 PROCEDURE — 90832 PR PSYCHOTHERAPY W/PATIENT, 30 MIN: ICD-10-PCS | Mod: 95,,, | Performed by: SOCIAL WORKER

## 2022-12-05 PROCEDURE — 3044F PR MOST RECENT HEMOGLOBIN A1C LEVEL <7.0%: ICD-10-PCS | Mod: CPTII,95,, | Performed by: SOCIAL WORKER

## 2022-12-05 PROCEDURE — 90832 PSYTX W PT 30 MINUTES: CPT | Mod: 95,,, | Performed by: SOCIAL WORKER

## 2022-12-05 NOTE — PROGRESS NOTES
"The patient location is: [place]  The chief complaint leading to consultation is: depression, anxiety, grief    Visit type: audiovisual    Face to Face time with patient: 20   25 minutes of total time spent on the encounter, which includes face to face time and non-face to face time preparing to see the patient (eg, review of tests), Obtaining and/or reviewing separately obtained history, Documenting clinical information in the electronic or other health record, Independently interpreting results (not separately reported) and communicating results to the patient/family/caregiver, or Care coordination (not separately reported).     Individual Psychotherapy (LCSW/PhD)  Terese Macias,  12/5/2022    Site:  Telemed         Therapeutic Intervention: Met with patient for individual psychotherapy.    Chief complaint/reason for encounter: depression, anxiety, and grief     Interval history and content of current session: Pt shares work was busy today.  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  She feels she wants to set some boundaries with them. She shares she has times when she feels very anxious.  She is even feeling anxious about singing and if she picked the right songs even though she enjoys singing.    Dr. Frye increased her propranolol.    Pt shares she feels the anxiety more when she is at home.  She feels she won't be at peace until she gets her own space and feels settled.  She shares part of her house are how she likes it but parts are "a mess" like her closet.  She is waiting on paperwork from her student loans to give to her lender for approval to buy a house.  She got credit advice from a friend.  IT's hard to celebrate small wins because she has a fear it won't work out.  She reports she is trying to stay positive about the situation.  She tries to shift her thinking.  She shares how she doesn't want to be there at the house she grew up in.   The video call " was dropped after 20 minutes.  CODY attempted to call pt back twice but was unable to reach pt.          Treatment plan:  Target symptoms: depression, anxiety , grief  Why chosen therapy is appropriate versus another modality: relevant to diagnosis, patient responds to this modality  Outcome monitoring methods: self-report, observation  Therapeutic intervention type: behavior modifying psychotherapy, supportive psychotherapy    Risk parameters:  Patient reports no suicidal ideation  Patient reports no homicidal ideation  Patient reports no self-injurious behavior  Patient reports no violent behavior    Verbal deficits: None    Patient's response to intervention:  The patient's response to intervention is accepting.    Progress toward goals and other mental status changes:  The patient's progress toward goals is fair .    Diagnosis:     ICD-10-CM ICD-9-CM   1. Grief  F43.21 309.0   2. Anxiety  F41.9 300.00   3. Depression, major, recurrent, moderate  F33.1 296.32       Plan: Pt plans to continue individual psychotherapy    Return to clinic: 3 weeks    Length of Service (minutes): 45        Each patient to whom he or she provides medical services by telemedicine is:  (1) informed of the relationship between the physician and patient and the respective role of any other health care provider with respect to management of the patient; and (2) notified that he or she may decline to receive medical services by telemedicine and may withdraw from such care at any time.

## 2022-12-07 ENCOUNTER — PATIENT MESSAGE (OUTPATIENT)
Dept: BEHAVIORAL HEALTH | Facility: CLINIC | Age: 47
End: 2022-12-07
Payer: COMMERCIAL

## 2022-12-16 ENCOUNTER — TELEPHONE (OUTPATIENT)
Dept: INTERNAL MEDICINE | Facility: CLINIC | Age: 47
End: 2022-12-16
Payer: COMMERCIAL

## 2022-12-16 NOTE — TELEPHONE ENCOUNTER
----- Message from Kiya Manriquez sent at 12/16/2022  9:42 AM CST -----  Regarding: advice  Contact: patient [phone]  Patient needs to know the date of her breast US.  Please call and advise.

## 2022-12-21 ENCOUNTER — PATIENT MESSAGE (OUTPATIENT)
Dept: BEHAVIORAL HEALTH | Facility: CLINIC | Age: 47
End: 2022-12-21
Payer: COMMERCIAL

## 2023-01-07 ENCOUNTER — PATIENT MESSAGE (OUTPATIENT)
Dept: INTERNAL MEDICINE | Facility: CLINIC | Age: 48
End: 2023-01-07
Payer: COMMERCIAL

## 2023-01-07 DIAGNOSIS — N95.0 POST-MENOPAUSAL BLEEDING: Primary | ICD-10-CM

## 2023-01-11 ENCOUNTER — PATIENT MESSAGE (OUTPATIENT)
Dept: BEHAVIORAL HEALTH | Facility: CLINIC | Age: 48
End: 2023-01-11
Payer: COMMERCIAL

## 2023-01-17 ENCOUNTER — HOSPITAL ENCOUNTER (OUTPATIENT)
Dept: RADIOLOGY | Facility: OTHER | Age: 48
Discharge: HOME | End: 2023-01-17
Attending: INTERNAL MEDICINE
Payer: COMMERCIAL

## 2023-01-17 DIAGNOSIS — N95.0 POST-MENOPAUSAL BLEEDING: ICD-10-CM

## 2023-01-17 PROCEDURE — 76856 US EXAM PELVIC COMPLETE: CPT | Mod: 26,,, | Performed by: RADIOLOGY

## 2023-01-17 PROCEDURE — 76830 TRANSVAGINAL US NON-OB: CPT | Mod: 26,,, | Performed by: RADIOLOGY

## 2023-01-17 PROCEDURE — 76856 US EXAM PELVIC COMPLETE: CPT | Mod: TC

## 2023-01-17 PROCEDURE — 76856 US PELVIS COMP WITH TRANSVAG NON-OB (XPD): ICD-10-PCS | Mod: 26,,, | Performed by: RADIOLOGY

## 2023-01-17 PROCEDURE — 76830 US PELVIS COMP WITH TRANSVAG NON-OB (XPD): ICD-10-PCS | Mod: 26,,, | Performed by: RADIOLOGY

## 2023-01-24 ENCOUNTER — OFFICE VISIT (OUTPATIENT)
Dept: OBSTETRICS AND GYNECOLOGY | Facility: CLINIC | Age: 48
End: 2023-01-24
Payer: COMMERCIAL

## 2023-01-24 ENCOUNTER — LAB VISIT (OUTPATIENT)
Dept: LAB | Facility: OTHER | Age: 48
End: 2023-01-24
Attending: OBSTETRICS & GYNECOLOGY
Payer: COMMERCIAL

## 2023-01-24 VITALS
HEIGHT: 62 IN | WEIGHT: 198 LBS | SYSTOLIC BLOOD PRESSURE: 106 MMHG | DIASTOLIC BLOOD PRESSURE: 60 MMHG | BODY MASS INDEX: 36.44 KG/M2

## 2023-01-24 DIAGNOSIS — N95.0 POST-MENOPAUSAL BLEEDING: ICD-10-CM

## 2023-01-24 LAB
ESTRADIOL SERPL-MCNC: 24 PG/ML
FSH SERPL-ACNC: 9.35 MIU/ML

## 2023-01-24 PROCEDURE — 58100 BIOPSY OF UTERUS LINING: CPT | Mod: S$GLB,,, | Performed by: OBSTETRICS & GYNECOLOGY

## 2023-01-24 PROCEDURE — 3008F BODY MASS INDEX DOCD: CPT | Mod: CPTII,S$GLB,, | Performed by: OBSTETRICS & GYNECOLOGY

## 2023-01-24 PROCEDURE — 88305 TISSUE EXAM BY PATHOLOGIST: CPT | Performed by: PATHOLOGY

## 2023-01-24 PROCEDURE — 99999 PR PBB SHADOW E&M-EST. PATIENT-LVL III: ICD-10-PCS | Mod: PBBFAC,,, | Performed by: OBSTETRICS & GYNECOLOGY

## 2023-01-24 PROCEDURE — 3078F PR MOST RECENT DIASTOLIC BLOOD PRESSURE < 80 MM HG: ICD-10-PCS | Mod: CPTII,S$GLB,, | Performed by: OBSTETRICS & GYNECOLOGY

## 2023-01-24 PROCEDURE — 58100 ENDOMETRIAL BIOPSY: ICD-10-PCS | Mod: S$GLB,,, | Performed by: OBSTETRICS & GYNECOLOGY

## 2023-01-24 PROCEDURE — 3078F DIAST BP <80 MM HG: CPT | Mod: CPTII,S$GLB,, | Performed by: OBSTETRICS & GYNECOLOGY

## 2023-01-24 PROCEDURE — 3074F PR MOST RECENT SYSTOLIC BLOOD PRESSURE < 130 MM HG: ICD-10-PCS | Mod: CPTII,S$GLB,, | Performed by: OBSTETRICS & GYNECOLOGY

## 2023-01-24 PROCEDURE — 99203 OFFICE O/P NEW LOW 30 MIN: CPT | Mod: 25,S$GLB,, | Performed by: OBSTETRICS & GYNECOLOGY

## 2023-01-24 PROCEDURE — 36415 COLL VENOUS BLD VENIPUNCTURE: CPT | Performed by: OBSTETRICS & GYNECOLOGY

## 2023-01-24 PROCEDURE — 1159F MED LIST DOCD IN RCRD: CPT | Mod: CPTII,S$GLB,, | Performed by: OBSTETRICS & GYNECOLOGY

## 2023-01-24 PROCEDURE — 83001 ASSAY OF GONADOTROPIN (FSH): CPT | Performed by: OBSTETRICS & GYNECOLOGY

## 2023-01-24 PROCEDURE — 88141 PR  CYTOPATH CERV/VAG INTERPRET: ICD-10-PCS | Mod: ,,, | Performed by: PATHOLOGY

## 2023-01-24 PROCEDURE — 1159F PR MEDICATION LIST DOCUMENTED IN MEDICAL RECORD: ICD-10-PCS | Mod: CPTII,S$GLB,, | Performed by: OBSTETRICS & GYNECOLOGY

## 2023-01-24 PROCEDURE — 88305 TISSUE EXAM BY PATHOLOGIST: ICD-10-PCS | Mod: 26,,, | Performed by: PATHOLOGY

## 2023-01-24 PROCEDURE — 88142 CYTOPATH C/V THIN LAYER: CPT | Performed by: PATHOLOGY

## 2023-01-24 PROCEDURE — 88141 CYTOPATH C/V INTERPRET: CPT | Mod: ,,, | Performed by: PATHOLOGY

## 2023-01-24 PROCEDURE — 88305 TISSUE EXAM BY PATHOLOGIST: CPT | Mod: 26,,, | Performed by: PATHOLOGY

## 2023-01-24 PROCEDURE — 3008F PR BODY MASS INDEX (BMI) DOCUMENTED: ICD-10-PCS | Mod: CPTII,S$GLB,, | Performed by: OBSTETRICS & GYNECOLOGY

## 2023-01-24 PROCEDURE — 99999 PR PBB SHADOW E&M-EST. PATIENT-LVL III: CPT | Mod: PBBFAC,,, | Performed by: OBSTETRICS & GYNECOLOGY

## 2023-01-24 PROCEDURE — 82670 ASSAY OF TOTAL ESTRADIOL: CPT | Performed by: OBSTETRICS & GYNECOLOGY

## 2023-01-24 PROCEDURE — 99203 PR OFFICE/OUTPT VISIT, NEW, LEVL III, 30-44 MIN: ICD-10-PCS | Mod: 25,S$GLB,, | Performed by: OBSTETRICS & GYNECOLOGY

## 2023-01-24 PROCEDURE — 3074F SYST BP LT 130 MM HG: CPT | Mod: CPTII,S$GLB,, | Performed by: OBSTETRICS & GYNECOLOGY

## 2023-01-24 NOTE — H&P (VIEW-ONLY)
EMBX  DETACHED FRAGMENTS OF BENIGN SQUAMOUS EPITHELIUM, NO ENDOMETRIAL TISSUE   IDENTIFIED     01/24/23    Estradiol 24   FSH 9.35       PT SEEN BY PCP WITH PMB.  HAD CHEMO AND MENSES STOPPED FOR 3 YRS. HAD BLEEDING EARLIER THIS MONTH AND NOW.    01/17/2023 U/S  Uterus:  Size: 8.7 x 4.5 x 5.5 cm  The parenchyma is homogeneous.  Endometrium is diffusely thickened measuring 1.7 cm.  Complex focus in the endocervical canal measures 1.9 cm.  No internal vascularity.  Right vary:  Size: 2.9 x 1.8 x 2.6 cm.  Complex cyst with mural nodule measures 2 cm.  No internal vascularity.  Left ovary:  Size: 3 x 1.4 x 1.5 cm.  Complex cyst with a reticular pattern of internal echoes measures 1.4 cm.  Free Fluid:  None  Impression:  Diffuse thickening of the endometrium.  This is abnormal in this patient who is reportedly postmenopausal.  Correlation with endometrial sampling may be considered in further evaluating.  Complex bilateral ovarian cysts.  Cyst on the right is a typical appearance of a corpus luteum in cyst on the left has a typical appearance of a hemorrhagic follicle, both of which would be normal for premenopausal female.  In a postmenopausal female, follow-up is recommended to ensure stability or resolution.  Complex focus in the endocervical canal may represent blood products in this patient with reported vaginal bleeding.  Please correlate with exam.      08/16/22 09:15   Hemoglobin 11.5 (L)   Hematocrit 35.9 (L)   TSH 1.274       ROS:  GENERAL: No fever, chills, fatigability or weight loss.  VULVAR: No pain, no lesions and no itching.  VAGINAL: No relaxation, no itching, no discharge, no abnormal bleeding and no lesions.  ABDOMEN: No abdominal pain. Denies nausea. Denies vomiting. No diarrhea. No constipation  BREAST: Denies pain. No lumps. No discharge.  URINARY: No incontinence, no nocturia, no frequency and no dysuria.  CARDIOVASCULAR: No chest pain. No shortness of breath. No leg cramps.  NEUROLOGICAL: No  headaches. No vision changes.  The remainder of the review of systems was negative.    PE:  General Appearance: obese And Well developed. Well nourished. In no acute distress.  Vulva: Lesions: No.  Urethral Meatus: Normal size. Normal location. No lesions. No prolapse.  Urethra: No masses. No tenderness. No prolapse. No scarring.  Bladder: No masses. No tenderness.  Vagina: Mucosa NI:yes discharge no, atrophy no, cystocele no or rectocele no.  Cervix: Lesion: no  Stenotic: no Cervical motion tenderness: no  Uterus: Uterus size: 8 weeks. Support good. Uterus size: Normal  Adnexa: Masses: No Tenderness: No CDS Nodularity: No  Abdomen: obese No masses. No tenderness.  CHEST: CTA B  HEART: RRR  EXT: NO EDEMA      PROCEDURES:    PLAN:     DIAGNOSIS:  1. Post-menopausal bleeding        MEDICATIONS & ORDERS:  Orders Placed This Encounter    Endometrial biopsy    Follicle Stimulating Hormone    Estradiol    Specimen to Pathology Gynecology and Obstetrics    Liquid-Based Pap Smear, Screening    miSOPROStoL (CYTOTEC) 200 MCG Tab    Case Request Operating Room: HYSTEROSCOPY, WITH DILATION AND CURETTAGE OF UTERUS       Patient was counseled today on the new ACS guidelines for cervical cytology screening as well as the current recommendations for breast cancer screening. She was counseled to follow up with her PCP for other routine health maintenance. Counseling session lasted approximately 10 minutes, and all her questions were answered.         FOLLOW-UP: With me AFTER BX AND LABS.    Bravo Borrero Jr, MD, FACOG

## 2023-01-25 NOTE — PROCEDURES
Endometrial biopsy    Date/Time: 1/24/2023 3:00 PM  Performed by: Bravo Borrero Jr., MD  Authorized by: Bravo Borrero Jr., MD     Consent:     Consent obtained:  Verbal    Consent given by:  Patient    Patient questions answered: yes      Patient agrees, verbalizes understanding, and wants to proceed: yes      Educational handouts given: no      Instructions and paperwork completed: no    Pre-procedure:     Pre-procedure timeout performed: yes    Procedure:     Procedure: endometrial biopsy with Pipelle      Cervix cleaned and prepped: no      A paracervical block was performed: no      An intracervical block was performed: no      The cervix was dilated: no      Uterus sounded: yes      Uterus sound depth (cm):  6    Patient tolerated procedure well with no complications: yes    SHE DID OK BUT PAIN WITH SPECULUM

## 2023-01-26 ENCOUNTER — HOSPITAL ENCOUNTER (EMERGENCY)
Facility: HOSPITAL | Age: 48
Discharge: HOME | End: 2023-01-26
Attending: STUDENT IN AN ORGANIZED HEALTH CARE EDUCATION/TRAINING PROGRAM
Payer: COMMERCIAL

## 2023-01-26 ENCOUNTER — NURSE TRIAGE (OUTPATIENT)
Dept: ADMINISTRATIVE | Facility: CLINIC | Age: 48
End: 2023-01-26
Payer: COMMERCIAL

## 2023-01-26 VITALS
DIASTOLIC BLOOD PRESSURE: 78 MMHG | HEIGHT: 62 IN | WEIGHT: 190 LBS | RESPIRATION RATE: 16 BRPM | SYSTOLIC BLOOD PRESSURE: 105 MMHG | BODY MASS INDEX: 34.96 KG/M2 | OXYGEN SATURATION: 99 % | TEMPERATURE: 99 F | HEART RATE: 67 BPM

## 2023-01-26 DIAGNOSIS — R11.2 NAUSEA AND VOMITING, UNSPECIFIED VOMITING TYPE: ICD-10-CM

## 2023-01-26 DIAGNOSIS — R10.2 PELVIC PAIN: Primary | ICD-10-CM

## 2023-01-26 LAB
B-HCG UR QL: NEGATIVE
BACTERIA #/AREA URNS AUTO: ABNORMAL /HPF
BACTERIA GENITAL QL WET PREP: ABNORMAL
BILIRUB UR QL STRIP: NEGATIVE
CLARITY UR REFRACT.AUTO: ABNORMAL
CLUE CELLS VAG QL WET PREP: ABNORMAL
COLOR UR AUTO: ABNORMAL
CTP QC/QA: YES
FILAMENT FUNGI VAG WET PREP-#/AREA: ABNORMAL
GLUCOSE UR QL STRIP: NEGATIVE
HGB UR QL STRIP: ABNORMAL
HYALINE CASTS UR QL AUTO: 0 /LPF
KETONES UR QL STRIP: NEGATIVE
LEUKOCYTE ESTERASE UR QL STRIP: ABNORMAL
MICROSCOPIC COMMENT: ABNORMAL
NITRITE UR QL STRIP: NEGATIVE
PH UR STRIP: 7 [PH] (ref 5–8)
PROT UR QL STRIP: ABNORMAL
RBC #/AREA URNS AUTO: >100 /HPF (ref 0–4)
SP GR UR STRIP: >1.03 (ref 1–1.03)
SPECIMEN SOURCE: ABNORMAL
SQUAMOUS #/AREA URNS AUTO: 0 /HPF
T VAGINALIS GENITAL QL WET PREP: ABNORMAL
URN SPEC COLLECT METH UR: ABNORMAL
WBC #/AREA URNS AUTO: 45 /HPF (ref 0–5)
WBC #/AREA VAG WET PREP: ABNORMAL
YEAST GENITAL QL WET PREP: ABNORMAL

## 2023-01-26 PROCEDURE — 99284 EMERGENCY DEPT VISIT MOD MDM: CPT | Mod: ,,, | Performed by: STUDENT IN AN ORGANIZED HEALTH CARE EDUCATION/TRAINING PROGRAM

## 2023-01-26 PROCEDURE — 87086 URINE CULTURE/COLONY COUNT: CPT | Performed by: PHYSICIAN ASSISTANT

## 2023-01-26 PROCEDURE — 81001 URINALYSIS AUTO W/SCOPE: CPT | Performed by: PHYSICIAN ASSISTANT

## 2023-01-26 PROCEDURE — 81025 URINE PREGNANCY TEST: CPT | Performed by: PHYSICIAN ASSISTANT

## 2023-01-26 PROCEDURE — 87591 N.GONORRHOEAE DNA AMP PROB: CPT | Performed by: PHYSICIAN ASSISTANT

## 2023-01-26 PROCEDURE — 99284 EMERGENCY DEPT VISIT MOD MDM: CPT

## 2023-01-26 PROCEDURE — 87210 SMEAR WET MOUNT SALINE/INK: CPT | Performed by: PHYSICIAN ASSISTANT

## 2023-01-26 PROCEDURE — 99284 PR EMERGENCY DEPT VISIT,LEVEL IV: ICD-10-PCS | Mod: ,,, | Performed by: STUDENT IN AN ORGANIZED HEALTH CARE EDUCATION/TRAINING PROGRAM

## 2023-01-26 NOTE — ED PROVIDER NOTES
Encounter Date: 1/26/2023       History     Chief Complaint   Patient presents with    Abdominal Pain     Abdominal pain that began Jan 5th and last for 7 days with menstrual cycle. Cycle return this past weekend. Worse pain now. Coming in waves. + nausea and vomiting. Hx Non Hodgkins lymphoma in 2018; so cycle stopped in 2019.      47-year-old female with pmhx of diffuse large cell lymphoma,iron deficiency anemia, and thalassemia minor presents to the ED for pelvic pain starting around 3:00 a.m. this morning.  Describes it as crampy and intermittent.  Associated with nausea and 1 episode of vomiting. She took ibuprofen and Aleve without improvement.  She is currently on day 5 of vaginal bleeding. Denies heavy bleeding. Using 2 pads daily.  Of note she has not had a menstrual cycle since 2019 which she attributes to diffuse large-cell lymphoma treatment.  She saw her GYN 2 days ago and endometrial sampling was performed as thickening was noted on ultrasound she had earlier this month.  Denies previous episodes.  Denies fever, vaginal discharge, dizziness, headache, chest pain, shortness of breath.    The history is provided by the patient.   Review of patient's allergies indicates:   Allergen Reactions    Penicillins Other (See Comments) and Rash     Pt had reaction to PCN as an infant.     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    Fever blister     GERD (gastroesophageal reflux disease)     Heavy periods 6/8/2015    Hemorrhoids without complication     IBS (irritable colon syndrome)     Joint pain     Left upper quadrant pain 12/19/2018    Pancreatic mass 12/19/2018    Psychiatric problem     Screening for HPV (human papillomavirus)     Sleep difficulties     Splenomegaly 12/19/2018    Thalassemia      Thalassemia     Therapy      Past Surgical History:   Procedure Laterality Date    COLONOSCOPY      COLONOSCOPY N/A 11/8/2018    Procedure: COLONOSCOPY;  Surgeon: Ba Stewart MD;  [facility]  Service: Endoscopy;  Laterality: N/A;  non diabetic constipation prep      ok to schedule per Tiara    ENDOSCOPIC ULTRASOUND OF UPPER GASTROINTESTINAL TRACT N/A 12/12/2018    Procedure: ULTRASOUND-ENDOSCOPIC-UPPER;  Surgeon: Venu Montiel MD;  Location: [facility];  Service: Endoscopy;  Laterality: N/A;    ESOPHAGOGASTRODUODENOSCOPY N/A 11/8/2018    Procedure: EGD (ESOPHAGOGASTRODUODENOSCOPY);  Surgeon: Ba Stewart MD;  Location: [facility] ([address] [place] [address]);  Service: Endoscopy;  Laterality: N/A;  ok to schedule per Tiara    HERNIA REPAIR       Family History   Problem Relation Age of Onset    Hypertension Mother     Diabetes Father     Heart disease Father     Eczema Father     Eczema Sister     Acne Sister     Eczema Daughter     Cancer Maternal Aunt         breast    Breast cancer Maternal Aunt     No Known Problems Maternal Uncle     No Known Problems Paternal Aunt     Colon cancer Paternal Uncle 60        colon cancer    Breast cancer Maternal Grandmother     Cancer Maternal Grandmother         breast    No Known Problems Maternal Grandfather     No Known Problems Paternal Grandmother     No Known Problems Paternal Grandfather     Eczema Brother     Cancer Cousin         breast    Cancer Cousin 44        stomach cancer    Breast cancer Cousin     Eczema Other     No Known Problems Other     Melanoma Neg Hx     Psoriasis Neg Hx     Lupus Neg Hx     Ovarian cancer Neg Hx     Amblyopia Neg Hx     Blindness Neg Hx     Cataracts Neg Hx     Glaucoma Neg Hx     Macular degeneration Neg Hx     Retinal detachment Neg Hx     Strabismus Neg Hx     Stroke Neg Hx     Thyroid disease Neg Hx     Esophageal cancer Neg Hx     Irritable bowel syndrome Neg Hx     Crohn's disease Neg Hx     Ulcerative colitis Neg  Hx     Celiac disease Neg Hx      Social History     Tobacco Use    Smoking status: Never    Smokeless tobacco: Never   Substance Use Topics    Alcohol use: Yes     Comment: Occasionally    Drug use: No     Review of Systems   Constitutional:  Negative for chills and fever.   Respiratory:  Negative for cough and shortness of breath.    Cardiovascular:  Negative for chest pain.   Gastrointestinal:  Positive for abdominal pain, nausea and vomiting.   Genitourinary:  Positive for menstrual problem, pelvic pain and vaginal bleeding. Negative for dysuria, flank pain, frequency, hematuria, vaginal discharge and vaginal pain.   Musculoskeletal:  Negative for arthralgias and myalgias.   Skin:  Negative for rash.   Neurological:  Negative for dizziness and headaches.     Physical Exam     Initial Vitals [01/26/23 0712]   BP Pulse Resp Temp SpO2   137/78 70 19 98.5 °F (36.9 °C) 100 %      MAP       --         Physical Exam    Nursing note and vitals reviewed.  Constitutional: She appears well-developed and well-nourished. She is not diaphoretic. No distress.   HENT:   Head: Normocephalic and atraumatic.   Nose: Nose normal.   Eyes: Conjunctivae and EOM are normal.   Neck: Neck supple.   Cardiovascular:  Normal rate and regular rhythm.           Pulmonary/Chest: Breath sounds normal. No respiratory distress.   Abdominal: Abdomen is soft. Bowel sounds are normal. She exhibits no distension and no mass. There is no abdominal tenderness.   Genitourinary:    Vagina normal.   Cervix exhibits no motion tenderness and no friability. Right adnexum displays no mass and no tenderness. Left adnexum displays no mass and no tenderness.    No vaginal discharge or tenderness.   No tenderness in the vagina.    Genitourinary Comments: Chaperone present     Musculoskeletal:         General: No edema.      Cervical back: Neck supple.     Neurological: She is alert and oriented to person, place, and time.   Skin: No rash noted.   Psychiatric: She  has a normal mood and affect. Thought content normal.       ED Course   Procedures  Labs Reviewed   VAGINAL SCREEN - Abnormal; Notable for the following components:       Result Value    Budding Yeast Rare (*)     WBC - Vaginal Screen Occasional (*)     Bacteria - Vaginal Screen Rare (*)     All other components within normal limits    Narrative:     Release to patient->Immediate   URINALYSIS, REFLEX TO URINE CULTURE - Abnormal; Notable for the following components:    Color, UA Red (*)     Specific Gravity, UA >1.030 (*)     Protein, UA 3+ (*)     Occult Blood UA 3+ (*)     Leukocytes, UA 1+ (*)     All other components within normal limits    Narrative:     Specimen Source->Urine   URINALYSIS MICROSCOPIC - Abnormal; Notable for the following components:    RBC, UA >100 (*)     WBC, UA 45 (*)     All other components within normal limits    Narrative:     Specimen Source->Urine   C. TRACHOMATIS/N. GONORRHOEAE BY AMP DNA   CULTURE, URINE   POCT URINE PREGNANCY          Imaging Results    None          Medications - No data to display  Medical Decision Making:   Initial Assessment:   47-year-old female with past medical history of diffuse large cell lymphoma, IVDA, thalassemia minor presents to ED with pelvic pain starting this morning.  Ultrasound performed earlier this month shows endometrial thickening, bilateral ovarian cyst.  Endometrial sample was performed 2 days ago but results are pending.  Her vital signs are stable, she is nontoxic in appearance.  Her pain has improved with at-home treatment.  She denies dizziness, menorrhea, weakness, fever.  Will plan to perform pelvic exam and manage accordingly.  Differential Diagnosis:   Uterine fibroid, ovarian cyst, ovarian torsion, TOA, endometrial carcinoma/dysplasia, Menstrual cramps  ED Management:  Pelvic exam negative for CMT, friability, discharge, adnexal tenderness.  Mild bleeding present on exam and vaginal canal.  Because of improvement in pain, I believe  she is stable at this time for discharge with close follow-up with her gynecologist.  I do not believe imaging is recommended at this time as she had ultrasound earlier this month and she has improvement in symptoms. Recommend pain management with over-the-counter Tylenol and ibuprofen.  I discussed this case my plan with my supervising physician  Other:   I discussed test(s) with the performing physician. female           ED Course as of 01/26/23 1139   Thu Jan 26, 2023   0837 Preg Test, Ur: Negative [HM]      ED Course User Index  [HM] Kalyn Cameron PA-C                 Clinical Impression:   Final diagnoses:  [R10.2] Pelvic pain (Primary)  [R11.2] Nausea and vomiting, unspecified vomiting type        ED Disposition Condition    Discharge Stable          ED Prescriptions    None       Follow-up Information    None          Kalyn Cameron PA-C  01/26/23 1146

## 2023-01-26 NOTE — ED TRIAGE NOTES
Abdominal Pain (Abdominal pain that began Jan 5th and last for 7 days with menstrual cycle. Cycle return this past weekend. Worse pain now. Coming in waves. + nausea and vomiting. Hx Non Hodgkins lymphoma in 2018; so cycle stopped in 2019. )

## 2023-01-26 NOTE — TELEPHONE ENCOUNTER
Pt with complaints of severe abd pain, pt was seen 2 days ago by GYN Dr. OPAL Borrero for Postmenopausal bleeding.  Abd pain is a new symptom for the pt.  Pt sounds uncomfortable on the triage call.  Care advice states to go to ED now.  Pt verbally understands, all questions answered.  Advised to call back for any worsening symptoms or concerns.    Reason for Disposition   SEVERE abdominal pain    Additional Information   Negative: Shock suspected (e.g., cold/pale/clammy skin, too weak to stand, low BP, rapid pulse)   Negative: Difficult to awaken or acting confused (e.g., disoriented, slurred speech)   Negative: Passed out (i.e., lost consciousness, collapsed and was not responding)   Negative: Sounds like a life-threatening emergency to the triager    Protocols used: Vaginal Bleeding - [id number]

## 2023-01-26 NOTE — DISCHARGE INSTRUCTIONS
You may take ibuprofen or Tylenol for pain.  Take as directed and needed  Recommend heating pad for cramping pain  Please follow-up with your gynecologist for unknown cause of pelvic pain and perimenopausal bleeding as discussed  Your urine pregnancy test was negative today  Return to the ED for new or worsening symptoms

## 2023-01-26 NOTE — ED NOTES
Patient identifiers verified and correct for Terese Gilberto  LOC: The patient is awake, alert and aware of environment with an appropriate affect, the patient is oriented x 3 and speaking appropriately.   APPEARANCE: Patient appears comfortable and in no acute distress, patient is clean and well groomed.  SKIN: The skin is warm and dry, color consistent with ethnicity, patient has normal skin turgor and moist mucus membranes, skin intact, no breakdown or bruising noted.   MUSCULOSKELETAL: Patient moving all extremities spontaneously, no swelling noted.  RESPIRATORY: Airway is open and patent, respirations are spontaneous, patient has a normal effort and rate, no accessory muscle use noted, pt placed on continuous pulse ox with O2 sats noted at 97% on room air.  CARDIAC: Pt placed on cardiac monitor. Patient has a normal rate and regular rhythm, no edema noted, capillary refill < 3 seconds.   GASTRO: Soft and non tender to palpation, no distention noted, normoactive bowel sounds present in all four quadrants. Pt states bowel movements have been regular. Pt reports abdominal pain.   : Pt denies any pain or frequency with urination. Pt on menstrual cycle  NEURO: Pt opens eyes spontaneously, behavior appropriate to situation, follows commands, facial expression symmetrical, bilateral hand grasp equal and even, purposeful motor response noted, normal sensation in all extremities when touched with a finger.

## 2023-01-27 LAB
BACTERIA UR CULT: NORMAL
C TRACH DNA SPEC QL NAA+PROBE: NOT DETECTED
N GONORRHOEA DNA SPEC QL NAA+PROBE: NOT DETECTED

## 2023-01-31 LAB
FINAL PATHOLOGIC DIAGNOSIS: NORMAL
GROSS: NORMAL
Lab: NORMAL

## 2023-02-01 LAB
FINAL PATHOLOGIC DIAGNOSIS: NORMAL
Lab: NORMAL

## 2023-02-02 RX ORDER — MISOPROSTOL 200 UG/1
TABLET ORAL
Qty: 3 TABLET | Refills: 0 | Status: ON HOLD | OUTPATIENT
Start: 2023-02-02 | End: 2023-02-10 | Stop reason: HOSPADM

## 2023-02-06 ENCOUNTER — ANESTHESIA EVENT (OUTPATIENT)
Dept: SURGERY | Facility: OTHER | Age: 48
End: 2023-02-06
Payer: COMMERCIAL

## 2023-02-06 ENCOUNTER — HOSPITAL ENCOUNTER (OUTPATIENT)
Dept: PREADMISSION TESTING | Facility: OTHER | Age: 48
Discharge: HOME | End: 2023-02-06
Attending: OBSTETRICS & GYNECOLOGY
Payer: COMMERCIAL

## 2023-02-06 VITALS
WEIGHT: 190 LBS | HEIGHT: 62 IN | BODY MASS INDEX: 34.96 KG/M2 | SYSTOLIC BLOOD PRESSURE: 109 MMHG | DIASTOLIC BLOOD PRESSURE: 70 MMHG | HEART RATE: 64 BPM | OXYGEN SATURATION: 99 %

## 2023-02-06 DIAGNOSIS — Z01.818 PREOP TESTING: Primary | ICD-10-CM

## 2023-02-06 LAB
ABO + RH BLD: NORMAL
BASOPHILS # BLD AUTO: 0.04 K/UL (ref 0–0.2)
BASOPHILS NFR BLD: 0.4 % (ref 0–1.9)
BLD GP AB SCN CELLS X3 SERPL QL: NORMAL
DIFFERENTIAL METHOD: ABNORMAL
EOSINOPHIL # BLD AUTO: 0.1 K/UL (ref 0–0.5)
EOSINOPHIL NFR BLD: 1.3 % (ref 0–8)
ERYTHROCYTE [DISTWIDTH] IN BLOOD BY AUTOMATED COUNT: 14.2 % (ref 11.5–14.5)
HCT VFR BLD AUTO: 35.3 % (ref 37–48.5)
HGB BLD-MCNC: 11.2 G/DL (ref 12–16)
IMM GRANULOCYTES # BLD AUTO: 0.01 K/UL (ref 0–0.04)
IMM GRANULOCYTES NFR BLD AUTO: 0.1 % (ref 0–0.5)
LYMPHOCYTES # BLD AUTO: 5.1 K/UL (ref 1–4.8)
LYMPHOCYTES NFR BLD: 54.9 % (ref 18–48)
MCH RBC QN AUTO: 28.7 PG (ref 27–31)
MCHC RBC AUTO-ENTMCNC: 31.7 G/DL (ref 32–36)
MCV RBC AUTO: 91 FL (ref 82–98)
MONOCYTES # BLD AUTO: 0.6 K/UL (ref 0.3–1)
MONOCYTES NFR BLD: 6.6 % (ref 4–15)
NEUTROPHILS # BLD AUTO: 3.4 K/UL (ref 1.8–7.7)
NEUTROPHILS NFR BLD: 36.7 % (ref 38–73)
NRBC BLD-RTO: 0 /100 WBC
PLATELET # BLD AUTO: 224 K/UL (ref 150–450)
PLATELET BLD QL SMEAR: ABNORMAL
PMV BLD AUTO: 10.2 FL (ref 9.2–12.9)
RBC # BLD AUTO: 3.9 M/UL (ref 4–5.4)
WBC # BLD AUTO: 9.3 K/UL (ref 3.9–12.7)

## 2023-02-06 PROCEDURE — 85025 COMPLETE CBC W/AUTO DIFF WBC: CPT | Performed by: ANESTHESIOLOGY

## 2023-02-06 PROCEDURE — 86900 BLOOD TYPING SEROLOGIC ABO: CPT | Performed by: ANESTHESIOLOGY

## 2023-02-06 PROCEDURE — 36415 COLL VENOUS BLD VENIPUNCTURE: CPT | Performed by: ANESTHESIOLOGY

## 2023-02-06 RX ORDER — IBUPROFEN 800 MG/1
800 TABLET ORAL EVERY 6 HOURS PRN
COMMUNITY
Start: 2022-11-02

## 2023-02-06 RX ORDER — CARISOPRODOL 350 MG/1
350 TABLET ORAL DAILY PRN
COMMUNITY
Start: 2022-11-02 | End: 2023-05-16

## 2023-02-06 RX ORDER — LIDOCAINE HYDROCHLORIDE 10 MG/ML
0.5 INJECTION, SOLUTION EPIDURAL; INFILTRATION; INTRACAUDAL; PERINEURAL ONCE
Status: CANCELLED | OUTPATIENT
Start: 2023-02-06 | End: 2023-02-06

## 2023-02-06 RX ORDER — SODIUM CHLORIDE, SODIUM LACTATE, POTASSIUM CHLORIDE, CALCIUM CHLORIDE 600; 310; 30; 20 MG/100ML; MG/100ML; MG/100ML; MG/100ML
INJECTION, SOLUTION INTRAVENOUS CONTINUOUS
Status: CANCELLED | OUTPATIENT
Start: 2023-02-06

## 2023-02-06 RX ORDER — ACETAMINOPHEN 500 MG
1000 TABLET ORAL
Status: CANCELLED | OUTPATIENT
Start: 2023-02-06 | End: 2023-02-06

## 2023-02-06 NOTE — DISCHARGE INSTRUCTIONS
Information to Prepare you for your Surgery    PRE-ADMIT TESTING -  [phone]    [address] [facility]          Your surgery has been scheduled at [facility]. We are pleased to have the opportunity to serve you. For Further Information please call [phone].    On the day of surgery please report to the Information Desk on the 1st floor.    CONTACT YOUR PHYSICIAN'S OFFICE THE DAY PRIOR TO YOUR SURGERY TO OBTAIN YOUR ARRIVAL TIME.     The evening before surgery do not eat anything after 9 p.m. ( this includes hard candy, chewing gum and mints).  You may only have GATORADE, POWERADE AND WATER  from 9 p.m. until you leave your home.   DO NOT DRINK ANY LIQUIDS ON THE WAY TO THE HOSPITAL.      Why does your anesthesiologist allow you to drink Gatorade/Powerade before surgery?  Gatorade/Powerade helps to increase your comfort before surgery and to decrease your nausea after surgery. The carbohydrates in Gatorade/Powerade help reduce your body's stress response to surgery.  If you are a diabetic-drink only water prior to surgery.      Current Visitor policy(12/27/2021) - Patients may have 2 visitors pre and post procedure. Only 2 visitors will be allowed in the Surgical building with the patient. No one under the age of 12 will be allowed into the facility.    SPECIAL MEDICATION INSTRUCTIONS: TAKE medications checked off by the Anesthesiologist on your Medication List.    Angiogram Patients: Take medications as instructed by your physician, including aspirin.     Surgery Patients:    If you take ASPIRIN - Your PHYSICIAN/SURGEON will need to inform you IF/OR when you need to stop taking aspirin prior to your surgery.     The week prior to surgery do not ot take any medications containing IBUPROFEN or NSAIDS ( Advil, Motrin, Goodys, BC, Aleve, Naproxen etc) If you are not sure if you should take a medicine please call your surgeon's office.  Ok to take  Tylenol    Do Not Wear any make-up (especially eye make-up) to surgery. Please remove any false eyelashes or eyelash extensions. If you arrive the day of surgery with makeup/eyelashes on you will be required to remove prior to surgery. (There is a risk of corneal abrasions if eye makeup/eyelash extensions are not removed)      Leave all valuables at home.   Do Not wear any jewelry or watches, including any metal in body piercings. Jewelry must be removed prior to coming to the hospital.  There is a possibility that rings that are unable to be removed may be cut off if they are on the surgical extremity.    Please remove all hair extensions, wigs, clips and any other metal accessories/ ornaments from your hair.  These items may pose a flammable/fire risk in Surgery and must be removed.    Do not shave your surgical area at least 5 days prior to your surgery. The surgical prep will be performed at the hospital according to Infection Control regulations.    Contact Lens must be removed before surgery. Either do not wear the contact lens or bring a case and solution for storage.  Please bring a container for eyeglasses or dentures as required.  Bring any paperwork your physician has provided, such as consent forms,  history and physicals, doctor's orders, etc.   Bring comfortable clothes that are loose fitting to wear upon discharge. Take into consideration the type of surgery being performed.  Maintain your diet as advised per your physician the day prior to surgery.      Adequate rest the night before surgery is advised.   Park in the Parking lot behind the hospital or in the [facility] across the street from the parking lot. Parking is complimentary.  If you will be discharged the same day as your procedure, please arrange for a responsible adult to drive you home or to accompany you if traveling by taxi.   YOU WILL NOT BE PERMITTED TO DRIVE OR TO LEAVE THE HOSPITAL ALONE AFTER SURGERY.   If you are  being discharged the same day, it is strongly recommended that you arrange for someone to remain with you for the first 24 hrs following your surgery.    The Surgeon will speak to your family/visitor after your surgery regarding the outcome of your surgery and post op care.  The Surgeon may speak to you after your surgery, but there is a possibility you may not remember the details.  Please check with your family members regarding the conversation with the Surgeon.    We strongly recommend whoever is bringing you home be present for discharge instructions.  This will ensure a thorough understanding for your post op home care.    ALL CHILDREN MUST ALWAYS BE ACCOMPANIED BY AN ADULT.    Visitors-Refer to current Visitor policy handouts.    Thank you for your cooperation.  The Staff of [facility].            Bathing Instructions with Hibiclens    Shower the evening before and morning of your procedure with Chlorhexidine (Hibiclens)  do not use Chlorhexidine on your face or genitals. Do not get in your eyes.  Wash your face with water and your regular face wash/soap  Use your regular shampoo  Apply Chlorhexidine (Hibiclens) directly on your skin or on a wet washcloth and wash gently. When showering: Move away from the shower stream when applying Chlorhexidine (Hibiclens) to avoid rinsing off too soon.  Rinse thoroughly with warm water  Do not dilute Chlorhexidine (Hibiclens)   Dry off as usual, do not use any deodorant, powder, body lotions, perfume, after shave or cologne.

## 2023-02-06 NOTE — ANESTHESIA PREPROCEDURE EVALUATION
02/06/2023  Terese Macias is a 47 y.o., female.      Pre-op Assessment    I have reviewed the Patient Summary Reports.     I have reviewed the Nursing Notes. I have reviewed the NPO Status.   I have reviewed the Medications.     Review of Systems  Anesthesia Hx:  Denies Family Hx of Anesthesia complications.   Denies Personal Hx of Anesthesia complications.   Social:  Non-Smoker    Hematology/Oncology:  Hematology Normal      Hematology Comments: Thalassemia   minor  --  Cancer in past history:  chemotherapy  Oncology Comments: Diffuse large B-cell lymphoma of spleen     EENT/Dental:EENT/Dental Normal   Cardiovascular:  Cardiovascular Normal     Pulmonary:  Pulmonary Normal    Renal/:  Renal/ Normal     Hepatic/GI:   GERD    Musculoskeletal:  Musculoskeletal Normal    Neurological:  Neurology Normal    Endocrine:  Endocrine Normal    Dermatological:  Skin Normal    Psych:  Psychiatric Normal           Physical Exam  General: Well nourished, Cooperative, Alert and Oriented    Airway:  Mallampati: III   Mouth Opening: Normal  TM Distance: Normal  Tongue: Normal  Neck ROM: Normal ROM    Dental:  Intact        Anesthesia Plan  Type of Anesthesia, risks & benefits discussed:    Anesthesia Type: Gen ETT  Intra-op Monitoring Plan: Standard ASA Monitors  Post Op Pain Control Plan: multimodal analgesia  Induction:  IV  Airway Plan: Video  Informed Consent: Informed consent signed with the Patient and all parties understand the risks and agree with anesthesia plan.  All questions answered.   ASA Score: 2  Anesthesia Plan Notes: Cbc type and screen today    Ready For Surgery From Anesthesia Perspective.     .

## 2023-02-08 ENCOUNTER — PATIENT MESSAGE (OUTPATIENT)
Dept: SURGERY | Facility: OTHER | Age: 48
End: 2023-02-08
Payer: COMMERCIAL

## 2023-02-09 ENCOUNTER — TELEPHONE (OUTPATIENT)
Dept: OBSTETRICS AND GYNECOLOGY | Facility: CLINIC | Age: 48
End: 2023-02-09
Payer: COMMERCIAL

## 2023-02-09 NOTE — TELEPHONE ENCOUNTER
It will have to be done tomorrow. Tried contacting pt to schedule a preop appt w/ Dr. Borrero and she declined the offered days.

## 2023-02-09 NOTE — TELEPHONE ENCOUNTER
----- Message from Tasia Vazquez RN sent at 2/9/2023 11:20 AM CST -----  Regarding: consent  Checking on consent for surgery tomorrow 1/10/23. How will we obtain consent?    Thanks,  Ramonita  Pre-admit

## 2023-02-10 ENCOUNTER — HOSPITAL ENCOUNTER (OUTPATIENT)
Facility: OTHER | Age: 48
Discharge: HOME | End: 2023-02-10
Attending: OBSTETRICS & GYNECOLOGY | Admitting: OBSTETRICS & GYNECOLOGY
Payer: COMMERCIAL

## 2023-02-10 ENCOUNTER — ANESTHESIA (OUTPATIENT)
Dept: SURGERY | Facility: OTHER | Age: 48
End: 2023-02-10
Payer: COMMERCIAL

## 2023-02-10 DIAGNOSIS — N93.8 DUB (DYSFUNCTIONAL UTERINE BLEEDING): Primary | ICD-10-CM

## 2023-02-10 DIAGNOSIS — N95.0 POST-MENOPAUSAL BLEEDING: Primary | ICD-10-CM

## 2023-02-10 DIAGNOSIS — N93.8 DUB (DYSFUNCTIONAL UTERINE BLEEDING): ICD-10-CM

## 2023-02-10 DIAGNOSIS — N95.0 POST-MENOPAUSAL BLEEDING: ICD-10-CM

## 2023-02-10 DIAGNOSIS — Z98.890 STATUS POST HYSTEROSCOPY: Primary | ICD-10-CM

## 2023-02-10 PROCEDURE — 63600175 PHARM REV CODE 636 W HCPCS: Performed by: NURSE ANESTHETIST, CERTIFIED REGISTERED

## 2023-02-10 PROCEDURE — 71000033 HC RECOVERY, INTIAL HOUR: Performed by: OBSTETRICS & GYNECOLOGY

## 2023-02-10 PROCEDURE — 25000003 PHARM REV CODE 250: Performed by: ANESTHESIOLOGY

## 2023-02-10 PROCEDURE — 58558 HYSTEROSCOPY BIOPSY: CPT | Mod: ,,, | Performed by: OBSTETRICS & GYNECOLOGY

## 2023-02-10 PROCEDURE — 71000015 HC POSTOP RECOV 1ST HR: Performed by: OBSTETRICS & GYNECOLOGY

## 2023-02-10 PROCEDURE — 37000009 HC ANESTHESIA EA ADD 15 MINS: Performed by: OBSTETRICS & GYNECOLOGY

## 2023-02-10 PROCEDURE — 71000016 HC POSTOP RECOV ADDL HR: Performed by: OBSTETRICS & GYNECOLOGY

## 2023-02-10 PROCEDURE — 27201423 OPTIME MED/SURG SUP & DEVICES STERILE SUPPLY: Performed by: OBSTETRICS & GYNECOLOGY

## 2023-02-10 PROCEDURE — 63600175 PHARM REV CODE 636 W HCPCS: Performed by: ANESTHESIOLOGY

## 2023-02-10 PROCEDURE — 25000003 PHARM REV CODE 250: Performed by: NURSE ANESTHETIST, CERTIFIED REGISTERED

## 2023-02-10 PROCEDURE — 88305 TISSUE EXAM BY PATHOLOGIST: CPT | Mod: 26,,, | Performed by: PATHOLOGY

## 2023-02-10 PROCEDURE — 25000003 PHARM REV CODE 250: Performed by: OBSTETRICS & GYNECOLOGY

## 2023-02-10 PROCEDURE — 37000008 HC ANESTHESIA 1ST 15 MINUTES: Performed by: OBSTETRICS & GYNECOLOGY

## 2023-02-10 PROCEDURE — 58558 PR HYSTEROSCOPY,W/ENDO BX: ICD-10-PCS | Mod: ,,, | Performed by: OBSTETRICS & GYNECOLOGY

## 2023-02-10 PROCEDURE — 36000707: Performed by: OBSTETRICS & GYNECOLOGY

## 2023-02-10 PROCEDURE — 36000706: Performed by: OBSTETRICS & GYNECOLOGY

## 2023-02-10 PROCEDURE — 88305 TISSUE EXAM BY PATHOLOGIST: CPT | Performed by: PATHOLOGY

## 2023-02-10 PROCEDURE — 88305 TISSUE EXAM BY PATHOLOGIST: ICD-10-PCS | Mod: 26,,, | Performed by: PATHOLOGY

## 2023-02-10 PROCEDURE — 71000039 HC RECOVERY, EACH ADD'L HOUR: Performed by: OBSTETRICS & GYNECOLOGY

## 2023-02-10 RX ORDER — KETOROLAC TROMETHAMINE 30 MG/ML
INJECTION, SOLUTION INTRAMUSCULAR; INTRAVENOUS
Status: DISCONTINUED | OUTPATIENT
Start: 2023-02-10 | End: 2023-02-10

## 2023-02-10 RX ORDER — DEXAMETHASONE SODIUM PHOSPHATE 4 MG/ML
INJECTION, SOLUTION INTRA-ARTICULAR; INTRALESIONAL; INTRAMUSCULAR; INTRAVENOUS; SOFT TISSUE
Status: DISCONTINUED | OUTPATIENT
Start: 2023-02-10 | End: 2023-02-10

## 2023-02-10 RX ORDER — ONDANSETRON 8 MG/1
8 TABLET, ORALLY DISINTEGRATING ORAL EVERY 8 HOURS PRN
Status: DISCONTINUED | OUTPATIENT
Start: 2023-02-10 | End: 2023-02-10 | Stop reason: HOSPADM

## 2023-02-10 RX ORDER — SODIUM CHLORIDE 9 MG/ML
INJECTION, SOLUTION INTRAVENOUS CONTINUOUS
Status: CANCELLED | OUTPATIENT
Start: 2023-02-10

## 2023-02-10 RX ORDER — DIPHENHYDRAMINE HYDROCHLORIDE 50 MG/ML
12.5 INJECTION INTRAMUSCULAR; INTRAVENOUS EVERY 30 MIN PRN
Status: DISCONTINUED | OUTPATIENT
Start: 2023-02-10 | End: 2023-02-10 | Stop reason: HOSPADM

## 2023-02-10 RX ORDER — ONDANSETRON 2 MG/ML
INJECTION INTRAMUSCULAR; INTRAVENOUS
Status: DISCONTINUED | OUTPATIENT
Start: 2023-02-10 | End: 2023-02-10

## 2023-02-10 RX ORDER — SODIUM CHLORIDE 0.9 % (FLUSH) 0.9 %
3 SYRINGE (ML) INJECTION
Status: DISCONTINUED | OUTPATIENT
Start: 2023-02-10 | End: 2023-02-10 | Stop reason: HOSPADM

## 2023-02-10 RX ORDER — LIDOCAINE HCL/PF 100 MG/5ML
SYRINGE (ML) INTRAVENOUS
Status: DISCONTINUED | OUTPATIENT
Start: 2023-02-10 | End: 2023-02-10

## 2023-02-10 RX ORDER — MEPERIDINE HYDROCHLORIDE 25 MG/ML
12.5 INJECTION INTRAMUSCULAR; INTRAVENOUS; SUBCUTANEOUS ONCE AS NEEDED
Status: DISCONTINUED | OUTPATIENT
Start: 2023-02-10 | End: 2023-02-10 | Stop reason: HOSPADM

## 2023-02-10 RX ORDER — SODIUM CHLORIDE, SODIUM LACTATE, POTASSIUM CHLORIDE, CALCIUM CHLORIDE 600; 310; 30; 20 MG/100ML; MG/100ML; MG/100ML; MG/100ML
INJECTION, SOLUTION INTRAVENOUS CONTINUOUS
Status: DISCONTINUED | OUTPATIENT
Start: 2023-02-10 | End: 2023-02-10 | Stop reason: HOSPADM

## 2023-02-10 RX ORDER — DIPHENHYDRAMINE HYDROCHLORIDE 50 MG/ML
25 INJECTION INTRAMUSCULAR; INTRAVENOUS EVERY 4 HOURS PRN
Status: CANCELLED | OUTPATIENT
Start: 2023-02-10

## 2023-02-10 RX ORDER — DIPHENHYDRAMINE HYDROCHLORIDE 50 MG/ML
25 INJECTION INTRAMUSCULAR; INTRAVENOUS EVERY 4 HOURS PRN
Status: DISCONTINUED | OUTPATIENT
Start: 2023-02-10 | End: 2023-02-10 | Stop reason: HOSPADM

## 2023-02-10 RX ORDER — HYDROCODONE BITARTRATE AND ACETAMINOPHEN 5; 325 MG/1; MG/1
1 TABLET ORAL EVERY 4 HOURS PRN
Status: DISCONTINUED | OUTPATIENT
Start: 2023-02-10 | End: 2023-02-10 | Stop reason: HOSPADM

## 2023-02-10 RX ORDER — PROPOFOL 10 MG/ML
VIAL (ML) INTRAVENOUS CONTINUOUS PRN
Status: DISCONTINUED | OUTPATIENT
Start: 2023-02-10 | End: 2023-02-10

## 2023-02-10 RX ORDER — HYDROMORPHONE HYDROCHLORIDE 2 MG/ML
0.4 INJECTION, SOLUTION INTRAMUSCULAR; INTRAVENOUS; SUBCUTANEOUS EVERY 5 MIN PRN
Status: DISCONTINUED | OUTPATIENT
Start: 2023-02-10 | End: 2023-02-10 | Stop reason: HOSPADM

## 2023-02-10 RX ORDER — ONDANSETRON 4 MG/1
8 TABLET, ORALLY DISINTEGRATING ORAL EVERY 8 HOURS PRN
Status: CANCELLED | OUTPATIENT
Start: 2023-02-10

## 2023-02-10 RX ORDER — HYDROCODONE BITARTRATE AND ACETAMINOPHEN 5; 325 MG/1; MG/1
1 TABLET ORAL EVERY 4 HOURS PRN
Status: CANCELLED | OUTPATIENT
Start: 2023-02-10

## 2023-02-10 RX ORDER — OXYCODONE HYDROCHLORIDE 5 MG/1
5 TABLET ORAL
Status: DISCONTINUED | OUTPATIENT
Start: 2023-02-10 | End: 2023-02-10 | Stop reason: HOSPADM

## 2023-02-10 RX ORDER — SODIUM CHLORIDE 9 MG/ML
INJECTION, SOLUTION INTRAVENOUS CONTINUOUS
Status: DISCONTINUED | OUTPATIENT
Start: 2023-02-10 | End: 2023-02-10 | Stop reason: HOSPADM

## 2023-02-10 RX ORDER — ACETAMINOPHEN 500 MG
1000 TABLET ORAL
Status: DISCONTINUED | OUTPATIENT
Start: 2023-02-10 | End: 2023-02-10 | Stop reason: HOSPADM

## 2023-02-10 RX ORDER — LIDOCAINE HYDROCHLORIDE 10 MG/ML
0.5 INJECTION, SOLUTION EPIDURAL; INFILTRATION; INTRACAUDAL; PERINEURAL ONCE
Status: DISCONTINUED | OUTPATIENT
Start: 2023-02-10 | End: 2023-02-10 | Stop reason: HOSPADM

## 2023-02-10 RX ORDER — FENTANYL CITRATE 50 UG/ML
INJECTION, SOLUTION INTRAMUSCULAR; INTRAVENOUS
Status: DISCONTINUED | OUTPATIENT
Start: 2023-02-10 | End: 2023-02-10

## 2023-02-10 RX ORDER — PROCHLORPERAZINE EDISYLATE 5 MG/ML
5 INJECTION INTRAMUSCULAR; INTRAVENOUS EVERY 30 MIN PRN
Status: DISCONTINUED | OUTPATIENT
Start: 2023-02-10 | End: 2023-02-10 | Stop reason: HOSPADM

## 2023-02-10 RX ORDER — DIPHENHYDRAMINE HCL 25 MG
25 CAPSULE ORAL EVERY 4 HOURS PRN
Status: CANCELLED | OUTPATIENT
Start: 2023-02-10

## 2023-02-10 RX ORDER — PROPOFOL 10 MG/ML
VIAL (ML) INTRAVENOUS
Status: DISCONTINUED | OUTPATIENT
Start: 2023-02-10 | End: 2023-02-10

## 2023-02-10 RX ORDER — IBUPROFEN 600 MG/1
600 TABLET ORAL EVERY 6 HOURS PRN
Qty: 30 TABLET | Refills: 1 | Status: SHIPPED | OUTPATIENT
Start: 2023-02-10 | End: 2023-05-16

## 2023-02-10 RX ORDER — MIDAZOLAM HYDROCHLORIDE 1 MG/ML
INJECTION INTRAMUSCULAR; INTRAVENOUS
Status: DISCONTINUED | OUTPATIENT
Start: 2023-02-10 | End: 2023-02-10

## 2023-02-10 RX ADMIN — HYDROMORPHONE HYDROCHLORIDE 0.4 MG: 2 INJECTION, SOLUTION INTRAMUSCULAR; INTRAVENOUS; SUBCUTANEOUS at 02:02

## 2023-02-10 RX ADMIN — PROCHLORPERAZINE EDISYLATE 5 MG: 5 INJECTION INTRAMUSCULAR; INTRAVENOUS at 06:02

## 2023-02-10 RX ADMIN — GLYCOPYRROLATE 0.2 MG: 0.2 INJECTION, SOLUTION INTRAMUSCULAR; INTRAVITREAL at 02:02

## 2023-02-10 RX ADMIN — PROPOFOL 200 MG: 10 INJECTION, EMULSION INTRAVENOUS at 01:02

## 2023-02-10 RX ADMIN — DEXAMETHASONE SODIUM PHOSPHATE 4 MG: 4 INJECTION, SOLUTION INTRA-ARTICULAR; INTRALESIONAL; INTRAMUSCULAR; INTRAVENOUS; SOFT TISSUE at 01:02

## 2023-02-10 RX ADMIN — SODIUM CHLORIDE, SODIUM LACTATE, POTASSIUM CHLORIDE, AND CALCIUM CHLORIDE: 600; 310; 30; 20 INJECTION, SOLUTION INTRAVENOUS at 01:02

## 2023-02-10 RX ADMIN — LIDOCAINE HYDROCHLORIDE 75 MG: 20 INJECTION, SOLUTION INTRAVENOUS at 01:02

## 2023-02-10 RX ADMIN — ONDANSETRON 8 MG: 8 TABLET, ORALLY DISINTEGRATING ORAL at 05:02

## 2023-02-10 RX ADMIN — ONDANSETRON 4 MG: 2 INJECTION INTRAMUSCULAR; INTRAVENOUS at 02:02

## 2023-02-10 RX ADMIN — FENTANYL CITRATE 100 MCG: 0.05 INJECTION, SOLUTION INTRAMUSCULAR; INTRAVENOUS at 01:02

## 2023-02-10 RX ADMIN — PROPOFOL 150 MCG/KG/MIN: 10 INJECTION, EMULSION INTRAVENOUS at 01:02

## 2023-02-10 RX ADMIN — OXYCODONE 5 MG: 5 TABLET ORAL at 02:02

## 2023-02-10 RX ADMIN — MIDAZOLAM HYDROCHLORIDE 2 MG: 1 INJECTION, SOLUTION INTRAMUSCULAR; INTRAVENOUS at 01:02

## 2023-02-10 RX ADMIN — KETOROLAC TROMETHAMINE 30 MG: 30 INJECTION, SOLUTION INTRAMUSCULAR; INTRAVENOUS at 02:02

## 2023-02-10 RX ADMIN — GLYCOPYRROLATE 0.1 MG: 0.2 INJECTION, SOLUTION INTRAMUSCULAR; INTRAVITREAL at 01:02

## 2023-02-10 NOTE — TRANSFER OF CARE
"Anesthesia Transfer of Care Note    Patient: Terese Macias    Procedure(s) Performed: Procedure(s) (LRB):  HYSTEROSCOPY, WITH DILATION AND CURETTAGE OF UTERUS (N/A)    Patient location: PACU    Anesthesia Type: general    Transport from OR: Transported from OR on 2-3 L/min O2 by NC with adequate spontaneous ventilation    Post pain: adequate analgesia    Post assessment: no apparent anesthetic complications    Post vital signs: stable    Level of consciousness: awake, alert and oriented    Nausea/Vomiting: no nausea/vomiting    Complications: none    Transfer of care protocol was followed      Last vitals:   Visit Vitals  /79   Pulse 67   Temp 36.8 °C (98.3 °F) (Oral)   Resp 18   Ht 5' 2" (1.575 m)   Wt 86.2 kg (190 lb)   LMP 04/17/2019   SpO2 100%   Breastfeeding No   BMI 34.75 kg/m²     "

## 2023-02-10 NOTE — ANESTHESIA POSTPROCEDURE EVALUATION
Anesthesia Post Evaluation    Patient: Terese Macias    Procedure(s) Performed: Procedure(s) (LRB):  HYSTEROSCOPY, WITH DILATION AND CURETTAGE OF UTERUS (N/A)    Final Anesthesia Type: general      Patient location during evaluation: PACU  Patient participation: Yes- Able to Participate  Level of consciousness: awake and alert  Post-procedure vital signs: reviewed and stable  Pain management: adequate  Airway patency: patent    PONV status at discharge: No PONV  Anesthetic complications: no      Cardiovascular status: blood pressure returned to baseline  Respiratory status: spontaneous ventilation  Hydration status: euvolemic  Follow-up not needed.          Vitals Value Taken Time   /68 02/10/23 1502   Temp 36.3 °C (97.3 °F) 02/10/23 1429   Pulse 96 02/10/23 1511   Resp 16 02/10/23 1500   SpO2 95 % 02/10/23 1511   Vitals shown include unvalidated device data.      No case tracking events are documented in the log.      Pain/Eduardo Score: Pain Rating Prior to Med Admin: 7 (2/10/2023  2:49 PM)  Eduardo Score: 9 (2/10/2023  3:00 PM)

## 2023-02-10 NOTE — DISCHARGE SUMMARY
Discharge Summary  Gynecology      Admit Date: 2/10/2023    Discharge Date and Time: 2/10/2023     Attending Physician: Bravo Borrero Jr., MD    Principal Diagnoses:   Status post hysteroscopy    Active Hospital Problems    Diagnosis  POA    *Status post hysteroscopy [Z98.890]  Not Applicable      Resolved Hospital Problems   No resolved problems to display.       Procedures:   Procedure(s) (LRB):  HYSTEROSCOPY, WITH DILATION AND CURETTAGE OF UTERUS (N/A)    Discharged Condition: Good    Hospital Course:   Terese Macias is a 47 y.o. y.o.  female who presented on 2/10/2023 for the above-listed procedures for the treatment of PMB.  Patient tolerated the procedure well and was placed in PACU for post-operative care. Post-operative course was uncomplicated.  On day of discharge (POD#0), patient was in stable condition, having met all post-operative milestones. She was discharged with medications and follow up as listed below.     Consults: None    Significant Diagnostic Studies:  Recent Labs   Lab 23  1458   WBC 9.30   HGB 11.2*   HCT 35.3*   MCV 91           Treatments:   1. Surgery as above    Disposition: Home or Self Care    Patient Instructions:   Current Discharge Medication List        START taking these medications    Details   !! ibuprofen (ADVIL,MOTRIN) 600 MG tablet Take 1 tablet (600 mg total) by mouth every 6 (six) hours as needed for Pain.  Qty: 30 tablet, Refills: 1       !! - Potential duplicate medications found. Please discuss with provider.        CONTINUE these medications which have NOT CHANGED    Details   carisoprodoL (SOMA) 350 MG tablet Take 350 mg by mouth daily as needed.      cetirizine (ZYRTEC) 10 MG tablet Take 10 mg by mouth.      cholecalciferol, vitamin D3, 1,250 mcg (50,000 unit) capsule Take 1 capsule (50,000 Units total) by mouth once a week.  Qty: 12 capsule, Refills: 6      FLUoxetine 40 MG capsule Take 1 capsule (40 mg total) by mouth once daily.  Qty: 90  capsule, Refills: 1    Associated Diagnoses: Anxiety; Depression, major, recurrent, moderate      fluticasone (FLONASE) 50 mcg/actuation nasal spray 1 spray (50 mcg total) by Each Nare route once daily.  Qty: 1 Bottle, Refills: 0    Associated Diagnoses: Allergic rhinitis, unspecified chronicity, unspecified seasonality, unspecified trigger      !! ibuprofen (ADVIL,MOTRIN) 800 MG tablet Take 800 mg by mouth every 6 (six) hours as needed.      linaCLOtide (LINZESS) 145 mcg Cap capsule Take 1 capsule (145 mcg total) by mouth once daily.  Qty: 30 capsule, Refills: 5    Associated Diagnoses: Irritable bowel syndrome with constipation      methocarbamoL (ROBAXIN) 500 MG Tab Take 1 tablet (500 mg total) by mouth 3 (three) times daily as needed (muscle spasm).  Qty: 40 tablet, Refills: 0      ondansetron (ZOFRAN-ODT) 4 MG TbDL Take 1 tablet (4 mg total) by mouth every 6 (six) hours as needed (nausea or vomiting).  Qty: 20 tablet, Refills: 0    Associated Diagnoses: Nausea and vomiting, unspecified vomiting type      propranoloL (INDERAL) 20 MG tablet Take 1 tablet (20 mg total) by mouth 2 (two) times daily.  Qty: 60 tablet, Refills: 5    Comments: .      traZODone (DESYREL) 50 MG tablet Take 1 tablet (50 mg total) by mouth nightly as needed for Insomnia.  Qty: 90 tablet, Refills: 1      metFORMIN (GLUCOPHAGE-XR) 500 MG ER 24hr tablet One tablet daily for 7 days then 2 tablets daily.  Qty: 180 tablet, Refills: 3       !! - Potential duplicate medications found. Please discuss with provider.        STOP taking these medications       miSOPROStoL (CYTOTEC) 200 MCG Tab Comments:   Reason for Stopping:               Discharge Procedure Orders   Diet Adult Regular     Other restrictions (specify):   Order Comments: No heavy lifting over 5 lbs  No baths until 6 week check up. Showers only.   No driving until off narcotic pain medication/not feeling drowsy or dizzy when driving.     Pelvic Rest     Notify your health care provider  if you experience any of the following:  temperature >100.4     Notify your health care provider if you experience any of the following:  persistent nausea and vomiting or diarrhea     Notify your health care provider if you experience any of the following:  severe uncontrolled pain     Notify your health care provider if you experience any of the following:  redness, tenderness, or signs of infection (pain, swelling, redness, odor or green/yellow discharge around incision site)     Notify your health care provider if you experience any of the following:  difficulty breathing or increased cough     Notify your health care provider if you experience any of the following:  severe persistent headache     Notify your health care provider if you experience any of the following:  worsening rash     Notify your health care provider if you experience any of the following:  persistent dizziness, light-headedness, or visual disturbances     Notify your health care provider if you experience any of the following:  increased confusion or weakness     Notify your health care provider if you experience any of the following:   Order Comments: Call if you are saturating more than 2 pads an hour for more than 2 hours in a row.        Follow-up Information       Bravo Borrero Jr, MD Follow up in 6 week(s).    Specialties: Obstetrics, Obstetrics and Gynecology  Why: post operative follow up  Contact information:  [address] [address]  [phone]                             Alis Gramajo M.D.  OB/GYN PGY-4    Bravo Borrero MD

## 2023-02-11 NOTE — PLAN OF CARE
Terese Macias has met all discharge criteria from Phase II. Vital Signs are stable, ambulating  without difficulty. Discharge instructions given, patient verbalized understanding. Discharged from facility via wheelchair in stable condition.

## 2023-02-13 ENCOUNTER — PATIENT MESSAGE (OUTPATIENT)
Dept: OBSTETRICS AND GYNECOLOGY | Facility: CLINIC | Age: 48
End: 2023-02-13
Payer: COMMERCIAL

## 2023-02-13 ENCOUNTER — PATIENT MESSAGE (OUTPATIENT)
Dept: INTERNAL MEDICINE | Facility: CLINIC | Age: 48
End: 2023-02-13
Payer: COMMERCIAL

## 2023-02-13 ENCOUNTER — PATIENT MESSAGE (OUTPATIENT)
Dept: SLEEP MEDICINE | Facility: CLINIC | Age: 48
End: 2023-02-13
Payer: COMMERCIAL

## 2023-02-13 VITALS
TEMPERATURE: 98 F | HEIGHT: 62 IN | RESPIRATION RATE: 18 BRPM | OXYGEN SATURATION: 100 % | SYSTOLIC BLOOD PRESSURE: 110 MMHG | HEART RATE: 74 BPM | BODY MASS INDEX: 34.96 KG/M2 | DIASTOLIC BLOOD PRESSURE: 72 MMHG | WEIGHT: 190 LBS

## 2023-02-13 DIAGNOSIS — H57.9 EYE EXAM ABNORMAL: Primary | ICD-10-CM

## 2023-02-14 LAB
FINAL PATHOLOGIC DIAGNOSIS: NORMAL
GROSS: NORMAL
Lab: NORMAL

## 2023-02-14 NOTE — TELEPHONE ENCOUNTER
----- Message from Fabian Love sent at 2/26/2019  1:06 PM CST -----  Contact: Patient   Pt is faxing over paperwork that needs to be filled out by the doctor. Would like a call to confirm that it was received.     Contact:: [phone]   Detail Level: Detailed Quality 110: Preventive Care And Screening: Influenza Immunization: Influenza Immunization previously received during influenza season

## 2023-02-15 NOTE — OP NOTE
[facility] Surgery ([facility]  Surgery Department  Operative Note    SUMMARY     Date of Procedure: 2/10/2023     Procedure: Procedure(s) (LRB):  HYSTEROSCOPY, WITH DILATION AND CURETTAGE OF UTERUS (N/A)     Surgeon(s) and Role:     * Bravo Borrero Jr., MD - Primary    Assisting Surgeon: None    Pre-Operative Diagnosis:   Post-menopausal bleeding [N95.0]    Post-Operative Diagnosis: Post-Op Diagnosis Codes:     * Post-menopausal bleeding [N95.0]    Anesthesia: General    Operative Findings (including complications, if any):   THE VULVA VAGINA AND CERVIX WERE WITHIN NORMAL LIMITS.  POINT ENTRY INTO THE UTERINE CAVITY IT WAS NOTED THE CAVITY HAD A REGULAR CONTOUR NO EVIDENCE OF FIBROIDS.  NO POLYPS.  BILATERAL OSTIA WERE VISUALIZED.    Description of Technical Procedures:   Patient was taken to the operative room after obtaining adequate informed consent.  The patient placed in dorsal lithotomy position in [facility].  The patient's vagina and perineum were prepped and draped in the usual sterile manner.  Weighted speculum placed in posterior aspect of the vagina.  Cervix was visualized and grasped anterior lip with a single-tooth tenaculum.  The uterus was then dilated to a 18. Hysteroscope was then entered into the cervix into the in the endometrial cavity and the above operative findings were noted.  Hysteroscope was removed and a sharp curettage was performed on all quadrants of the uterus.  This was sent to pathology.  All instruments were removed from the vagina all counts were correct x2 and the patient was taken recovery room stable condition    Estimated Blood Loss (EBL): * No values recorded between 2/10/2023  2:08 PM and 2/10/2023  2:28 PM *           Implants: * No implants in log *    Specimens:   Specimen (24h ago, onward)      None                    Condition: Good    Disposition: PACU - hemodynamically stable.    Attestation: I was present and scrubbed for the entire procedure.    Bravo PACKER  Adair SWANN

## 2023-02-17 DIAGNOSIS — R11.2 NAUSEA AND VOMITING, UNSPECIFIED VOMITING TYPE: ICD-10-CM

## 2023-02-17 RX ORDER — ONDANSETRON 4 MG/1
4 TABLET, ORALLY DISINTEGRATING ORAL EVERY 6 HOURS PRN
Qty: 20 TABLET | Refills: 0 | Status: SHIPPED | OUTPATIENT
Start: 2023-02-17 | End: 2023-02-17

## 2023-02-17 RX ORDER — ONDANSETRON 8 MG/1
8 TABLET, ORALLY DISINTEGRATING ORAL EVERY 8 HOURS PRN
Qty: 30 TABLET | Refills: 2 | Status: SHIPPED | OUTPATIENT
Start: 2023-02-17 | End: 2023-02-24

## 2023-02-28 ENCOUNTER — PATIENT MESSAGE (OUTPATIENT)
Dept: BEHAVIORAL HEALTH | Facility: CLINIC | Age: 48
End: 2023-02-28
Payer: COMMERCIAL

## 2023-03-07 ENCOUNTER — PATIENT MESSAGE (OUTPATIENT)
Dept: BEHAVIORAL HEALTH | Facility: CLINIC | Age: 48
End: 2023-03-07

## 2023-03-07 ENCOUNTER — OFFICE VISIT (OUTPATIENT)
Dept: BEHAVIORAL HEALTH | Facility: CLINIC | Age: 48
End: 2023-03-07
Payer: COMMERCIAL

## 2023-03-07 DIAGNOSIS — F43.21 GRIEF: ICD-10-CM

## 2023-03-07 DIAGNOSIS — F41.9 ANXIETY: Primary | ICD-10-CM

## 2023-03-07 DIAGNOSIS — F33.1 DEPRESSION, MAJOR, RECURRENT, MODERATE: ICD-10-CM

## 2023-03-07 PROCEDURE — 90834 PR PSYCHOTHERAPY W/PATIENT, 45 MIN: ICD-10-PCS | Mod: 95,,, | Performed by: SOCIAL WORKER

## 2023-03-07 PROCEDURE — 90834 PSYTX W PT 45 MINUTES: CPT | Mod: 95,,, | Performed by: SOCIAL WORKER

## 2023-03-07 NOTE — PROGRESS NOTES
"The patient location is: [facility] [address]  The chief complaint leading to consultation is: grief, anxiety, depression    Visit type: audiovisual    Face to Face time with patient: 45  47 minutes of total time spent on the encounter, which includes face to face time and non-face to face time preparing to see the patient (eg, review of tests), Obtaining and/or reviewing separately obtained history, Documenting clinical information in the electronic or other health record, Independently interpreting results (not separately reported) and communicating results to the patient/family/caregiver, or Care coordination (not separately reported).     Individual Psychotherapy (LCSW/PhD)  Terese KHOI Macias,  3/7/2023    Site:  Telemed         Therapeutic Intervention: Met with patient for individual psychotherapy.    Chief complaint/reason for encounter: depression, anxiety, and grief     Interval history and content of current session: Pt says things have been "up and down and back and forth."  She's still trying to get her living situation figured out.  They have to get pictures of her mom's house to sell it.  She had some issues with a  but is trying to find another .  Her credit score fell 100 points in one day but she doesn't know what caused this.  She just talked to the  again and he said he would use the first credit score.  She has thought about moving back into her mom's house but that would feel like a failure to her. She feels guilty she hasn't been able to provide a stable home to her daughter.  She feels if she moves back into her mom's house she won't feel fully like an adult.  She doesn't feel her siblings respect her as someone who is knowledgable or makes good decisions. She's had to lean on her siblings at times.  She would often go to them for advice.  She shares her siblings disapproved of her son's father.  She and her sister were at the same college at the time.She " "shares sister was resentful of pt.  Youngest sister calls mom every day all day, and mom vents to her too.  Sometimes mom's venting causes issues within the siblings' relationships. Mom isn't spending every night at pt's house anymore.  "If I move home, everyone will be in my business."  Mom is the oldest of 10 siblings.  She feels not having her house organized since she was planning on moving out has affected her.    Discussed negative self-talk pt is having.  Discussed how thoughts affect mood.  She was able to say today that she liked how she looked today.    Thoughts of "what am I doing wrong?" She prays for God to help her be in a happier place.    She went to [facility] and the oncologist said she's on survivor's maintenance.    Discussed reframing negative self-talk to positive affirmations.  "I'm an overcomer.  I'm working on bettering my life every day."   Discussed transferring to long term tehrapy.  Pt will look for outside provider.   will send psychologytoday. We will have 2 more sessions unless pt establishes with another therapist sooner.    Treatment plan:  Target symptoms: depression, anxiety , work stress  Why chosen therapy is appropriate versus another modality: relevant to diagnosis, patient responds to this modality  Outcome monitoring methods: self-report, observation  Therapeutic intervention type: behavior modifying psychotherapy, supportive psychotherapy    Risk parameters:  Patient reports no suicidal ideation  Patient reports no homicidal ideation  Patient reports no self-injurious behavior  Patient reports no violent behavior    Verbal deficits: None    Patient's response to intervention:  The patient's response to intervention is accepting.    Progress toward goals and other mental status changes:  The patient's progress toward goals is fair .    Diagnosis:     ICD-10-CM ICD-9-CM   1. Anxiety  F41.9 300.00   2. Grief  F43.21 309.0   3. Depression, major, recurrent, moderate  F33.1 " 296.32       Plan: Pt plans to continue individual psychotherapy    Return to clinic: 3 weeks    Length of Service (minutes): 45        Each patient to whom he or she provides medical services by telemedicine is:  (1) informed of the relationship between the physician and patient and the respective role of any other health care provider with respect to management of the patient; and (2) notified that he or she may decline to receive medical services by telemedicine and may withdraw from such care at any time.

## 2023-03-14 ENCOUNTER — PATIENT MESSAGE (OUTPATIENT)
Dept: INTERNAL MEDICINE | Facility: CLINIC | Age: 48
End: 2023-03-14
Payer: COMMERCIAL

## 2023-03-14 ENCOUNTER — OFFICE VISIT (OUTPATIENT)
Dept: URGENT CARE | Facility: CLINIC | Age: 48
End: 2023-03-14
Payer: COMMERCIAL

## 2023-03-14 VITALS
SYSTOLIC BLOOD PRESSURE: 122 MMHG | OXYGEN SATURATION: 98 % | HEART RATE: 83 BPM | BODY MASS INDEX: 34.8 KG/M2 | TEMPERATURE: 100 F | DIASTOLIC BLOOD PRESSURE: 84 MMHG | RESPIRATION RATE: 18 BRPM | WEIGHT: 190.25 LBS

## 2023-03-14 DIAGNOSIS — J02.0 STREP PHARYNGITIS: Primary | ICD-10-CM

## 2023-03-14 LAB
CTP QC/QA: YES
CTP QC/QA: YES
MOLECULAR STREP A: POSITIVE
SARS-COV-2 AG RESP QL IA.RAPID: NEGATIVE

## 2023-03-14 PROCEDURE — 99213 PR OFFICE/OUTPT VISIT, EST, LEVL III, 20-29 MIN: ICD-10-PCS | Mod: S$GLB,,, | Performed by: FAMILY MEDICINE

## 2023-03-14 PROCEDURE — 87811 SARS CORONAVIRUS 2 ANTIGEN POCT, MANUAL READ: ICD-10-PCS | Mod: QW,S$GLB,, | Performed by: FAMILY MEDICINE

## 2023-03-14 PROCEDURE — 87811 SARS-COV-2 COVID19 W/OPTIC: CPT | Mod: QW,S$GLB,, | Performed by: FAMILY MEDICINE

## 2023-03-14 PROCEDURE — 87651 STREP A DNA AMP PROBE: CPT | Mod: QW,S$GLB,, | Performed by: FAMILY MEDICINE

## 2023-03-14 PROCEDURE — 87651 POCT STREP A MOLECULAR: ICD-10-PCS | Mod: QW,S$GLB,, | Performed by: FAMILY MEDICINE

## 2023-03-14 PROCEDURE — 99213 OFFICE O/P EST LOW 20 MIN: CPT | Mod: S$GLB,,, | Performed by: FAMILY MEDICINE

## 2023-03-14 RX ORDER — AZITHROMYCIN 250 MG/1
TABLET, FILM COATED ORAL
Qty: 6 TABLET | Refills: 0 | Status: SHIPPED | OUTPATIENT
Start: 2023-03-14 | End: 2023-03-19

## 2023-03-14 NOTE — PROGRESS NOTES
Subjective:       Patient ID: Terese Macias is a 47 y.o. female.    Vitals:  weight is 86.3 kg (190 lb 4.1 oz). Her oral temperature is 99.8 °F (37.7 °C). Her blood pressure is 122/84 and her pulse is 83. Her respiration is 18 and oxygen saturation is 90% (abnormal).     Chief Complaint: Sore Throat    Sore Throat   The current episode started in the past 7 days. The problem has been unchanged. Neither side of throat is experiencing more pain than the other. There has been no fever. The pain is at a severity of 6/10. The pain is mild. Associated symptoms include headaches and trouble swallowing. Pertinent negatives include no congestion. She has had exposure to strep. She has tried acetaminophen for the symptoms. The treatment provided mild relief.     HENT:  Positive for sore throat and trouble swallowing. Negative for congestion.    Neurological:  Positive for headaches.     Objective:      Physical Exam   Constitutional: She does not appear ill. No distress. obesity  HENT:   Head: Normocephalic and atraumatic.   Nose: Congestion present.   Mouth/Throat: Mucous membranes are moist. Posterior oropharyngeal erythema present. Tonsils are 2+ on the right. Tonsils are 2+ on the left. Tonsillar exudate.   Neck: Neck supple.   Cardiovascular: Normal rate, regular rhythm, normal heart sounds and normal pulses.   Pulmonary/Chest: Effort normal and breath sounds normal.   Abdominal: Normal appearance and bowel sounds are normal. Soft.   Lymphadenopathy:     She has cervical adenopathy (nontender).   Neurological: She is alert.   Nursing note and vitals reviewed.      Results for orders placed or performed in visit on 03/14/23   SARS Coronavirus 2 Antigen, POCT Manual Read   Result Value Ref Range    SARS Coronavirus 2 Antigen Negative Negative     Acceptable Yes    POCT Strep A, Molecular   Result Value Ref Range    Molecular Strep A, POC Positive (A) Negative     Acceptable Yes        Assessment:       1. Strep pharyngitis          Plan:         Strep pharyngitis  -     SARS Coronavirus 2 Antigen, POCT Manual Read  -     POCT Strep A, Molecular  -     azithromycin (ZITHROMAX) 250 MG tablet; Take 2 tablets (500 mg) on  Day 1,  followed by 1 tablet (250 mg) once daily on Days 2 through 5.  Dispense: 6 tablet; Refill: 0    OTc analgesia

## 2023-03-14 NOTE — LETTER
March 14, 2023      [facility]  [address]  [facility] [address]  Phone: [phone]  Fax: [phone]       Patient: Terese Macias   YOB: 1975  Date of Visit: 03/14/2023    To Whom It May Concern:    Any Macias  was at [facility] on 03/14/2023. The patient may return to work/school on 03/16/2023 with no restrictions. If you have any questions or concerns, or if I can be of further assistance, please do not hesitate to contact me.    Sincerely,              Michael Zepeda MD

## 2023-03-21 ENCOUNTER — OFFICE VISIT (OUTPATIENT)
Dept: BEHAVIORAL HEALTH | Facility: CLINIC | Age: 48
End: 2023-03-21
Payer: COMMERCIAL

## 2023-03-21 DIAGNOSIS — F33.1 DEPRESSION, MAJOR, RECURRENT, MODERATE: ICD-10-CM

## 2023-03-21 DIAGNOSIS — F43.21 GRIEF: ICD-10-CM

## 2023-03-21 DIAGNOSIS — F41.9 ANXIETY: Primary | ICD-10-CM

## 2023-03-21 PROCEDURE — 90834 PR PSYCHOTHERAPY W/PATIENT, 45 MIN: ICD-10-PCS | Mod: 95,,, | Performed by: SOCIAL WORKER

## 2023-03-21 PROCEDURE — 90834 PSYTX W PT 45 MINUTES: CPT | Mod: 95,,, | Performed by: SOCIAL WORKER

## 2023-03-21 NOTE — PROGRESS NOTES
"The patient location is: [facility] [address], [place]  The chief complaint leading to consultation is: depression, anxiety    Visit type: audiovisual    Face to Face time with patient: 45  50 minutes of total time spent on the encounter, which includes face to face time and non-face to face time preparing to see the patient (eg, review of tests), Obtaining and/or reviewing separately obtained history, Documenting clinical information in the electronic or other health record, Independently interpreting results (not separately reported) and communicating results to the patient/family/caregiver, or Care coordination (not separately reported).     Individual Psychotherapy (LCSW/PhD)  Terese Macias,  3/21/2023    Site:  Telemed         Therapeutic Intervention: Met with patient for individual psychotherapy.    Chief complaint/reason for encounter: depression and anxiety     Interval history and content of current session: Pt was sick last week with strep throat.  She's feeling much better now.  She hasn't looked into the therapist list at [facility] yet.  Discussed next session being our last session.  Pt will follow up with setting up with a new therapist.  She shares she is more sensitive things when she misses her medicine.  She printed out laminated positive affirmations to have in her office.  Discussed also putting some in her bathroom to help with positive affirmations.    She listened to a sermon this morning.    She shares feeling like she is judged by her sister.  She feels she needs help but feels they expect her to need help.    She shares how getting her cancer dx in the past was almost a relief because "see there is something wrong."  She felt her siblings were looking at her as being lazy before that because she was anemic.    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  This will help her debt to income ration.    She shares she looks at not having " enough finances makes her feel like she's not that successful.    She shares successful to her means she would be independent.  She also wants to have balance in her life.  She shares she doesn't feel settled.  She doesn't have a sense of   She heard a sermon that said what's in your cup is for you but what runs over is for her to share with others.    Discussed doing things for herself to fill her own cup with self-care.      Treatment plan:  Target symptoms: depression, anxiety   Why chosen therapy is appropriate versus another modality: relevant to diagnosis, patient responds to this modality  Outcome monitoring methods: self-report, observation  Therapeutic intervention type: behavior modifying psychotherapy, supportive psychotherapy    Risk parameters:  Patient reports no suicidal ideation  Patient reports no homicidal ideation  Patient reports no self-injurious behavior  Patient reports no violent behavior    Verbal deficits: None    Patient's response to intervention:  The patient's response to intervention is accepting.    Progress toward goals and other mental status changes:  The patient's progress toward goals is fair .    Diagnosis:   No diagnosis found.    Plan: Pt plans to continue individual psychotherapy    Return to clinic: 3 weeks    Length of Service (minutes): 45        Each patient to whom he or she provides medical services by telemedicine is:  (1) informed of the relationship between the physician and patient and the respective role of any other health care provider with respect to management of the patient; and (2) notified that he or she may decline to receive medical services by telemedicine and may withdraw from such care at any time.

## 2023-04-04 ENCOUNTER — PATIENT MESSAGE (OUTPATIENT)
Dept: BEHAVIORAL HEALTH | Facility: CLINIC | Age: 48
End: 2023-04-04
Payer: COMMERCIAL

## 2023-05-16 ENCOUNTER — OFFICE VISIT (OUTPATIENT)
Dept: INTERNAL MEDICINE | Facility: CLINIC | Age: 48
End: 2023-05-16
Payer: COMMERCIAL

## 2023-05-16 VITALS
OXYGEN SATURATION: 98 % | HEIGHT: 62 IN | WEIGHT: 187 LBS | HEART RATE: 72 BPM | BODY MASS INDEX: 34.41 KG/M2 | DIASTOLIC BLOOD PRESSURE: 70 MMHG | SYSTOLIC BLOOD PRESSURE: 110 MMHG

## 2023-05-16 DIAGNOSIS — F33.1 DEPRESSION, MAJOR, RECURRENT, MODERATE: ICD-10-CM

## 2023-05-16 DIAGNOSIS — F39 MOOD DISORDER: ICD-10-CM

## 2023-05-16 DIAGNOSIS — D69.6 THROMBOCYTOPENIA: ICD-10-CM

## 2023-05-16 DIAGNOSIS — C83.37 DIFFUSE LARGE B-CELL LYMPHOMA OF SPLEEN: ICD-10-CM

## 2023-05-16 DIAGNOSIS — Z12.31 SCREENING MAMMOGRAM FOR BREAST CANCER: Primary | ICD-10-CM

## 2023-05-16 DIAGNOSIS — K21.9 GASTROESOPHAGEAL REFLUX DISEASE WITHOUT ESOPHAGITIS: ICD-10-CM

## 2023-05-16 DIAGNOSIS — F41.9 ANXIETY: ICD-10-CM

## 2023-05-16 DIAGNOSIS — E66.01 SEVERE OBESITY (BMI 35.0-39.9) WITH COMORBIDITY: ICD-10-CM

## 2023-05-16 DIAGNOSIS — E78.5 HYPERLIPIDEMIA, UNSPECIFIED HYPERLIPIDEMIA TYPE: ICD-10-CM

## 2023-05-16 PROCEDURE — 99999 PR PBB SHADOW E&M-EST. PATIENT-LVL IV: ICD-10-PCS | Mod: PBBFAC,,, | Performed by: INTERNAL MEDICINE

## 2023-05-16 PROCEDURE — 1159F PR MEDICATION LIST DOCUMENTED IN MEDICAL RECORD: ICD-10-PCS | Mod: CPTII,S$GLB,, | Performed by: INTERNAL MEDICINE

## 2023-05-16 PROCEDURE — 3078F PR MOST RECENT DIASTOLIC BLOOD PRESSURE < 80 MM HG: ICD-10-PCS | Mod: CPTII,S$GLB,, | Performed by: INTERNAL MEDICINE

## 2023-05-16 PROCEDURE — 99999 PR PBB SHADOW E&M-EST. PATIENT-LVL IV: CPT | Mod: PBBFAC,,, | Performed by: INTERNAL MEDICINE

## 2023-05-16 PROCEDURE — 3074F SYST BP LT 130 MM HG: CPT | Mod: CPTII,S$GLB,, | Performed by: INTERNAL MEDICINE

## 2023-05-16 PROCEDURE — 99214 OFFICE O/P EST MOD 30 MIN: CPT | Mod: S$GLB,,, | Performed by: INTERNAL MEDICINE

## 2023-05-16 PROCEDURE — 1159F MED LIST DOCD IN RCRD: CPT | Mod: CPTII,S$GLB,, | Performed by: INTERNAL MEDICINE

## 2023-05-16 PROCEDURE — 3008F PR BODY MASS INDEX (BMI) DOCUMENTED: ICD-10-PCS | Mod: CPTII,S$GLB,, | Performed by: INTERNAL MEDICINE

## 2023-05-16 PROCEDURE — 3008F BODY MASS INDEX DOCD: CPT | Mod: CPTII,S$GLB,, | Performed by: INTERNAL MEDICINE

## 2023-05-16 PROCEDURE — 3074F PR MOST RECENT SYSTOLIC BLOOD PRESSURE < 130 MM HG: ICD-10-PCS | Mod: CPTII,S$GLB,, | Performed by: INTERNAL MEDICINE

## 2023-05-16 PROCEDURE — 99214 PR OFFICE/OUTPT VISIT, EST, LEVL IV, 30-39 MIN: ICD-10-PCS | Mod: S$GLB,,, | Performed by: INTERNAL MEDICINE

## 2023-05-16 PROCEDURE — 3078F DIAST BP <80 MM HG: CPT | Mod: CPTII,S$GLB,, | Performed by: INTERNAL MEDICINE

## 2023-05-16 RX ORDER — ASPIRIN 325 MG
50000 TABLET, DELAYED RELEASE (ENTERIC COATED) ORAL WEEKLY
Qty: 12 CAPSULE | Refills: 6 | Status: SHIPPED | OUTPATIENT
Start: 2023-05-16 | End: 2023-11-14 | Stop reason: SDUPTHER

## 2023-05-16 RX ORDER — FLUOXETINE HYDROCHLORIDE 40 MG/1
40 CAPSULE ORAL DAILY
Qty: 90 CAPSULE | Refills: 1 | Status: SHIPPED | OUTPATIENT
Start: 2023-05-16 | End: 2023-11-22 | Stop reason: SDUPTHER

## 2023-05-16 NOTE — PROGRESS NOTES
Chief Complaint: Follow up of multiple issues     HPI: This is a 46 year old woman who presents for follow up of above    She has been eating raisin bran and has had a daily BM which is very good for her.  Left sided abdominal pain is better.  She still has some left side pain when she lies on the left side too long. It is uncomfortable if she lies on her side too long.  CT scan done at [facility] are stable.  No dysuria, hematuria, frequency.  She has constipation at times.   She is taking Linzess as needed for constipation.  She needs to take the Linzess more regularly.      She is taking fluoxetine 20 mg daily.  She takes propranolol 10 mg daily as needed for anxiety.  She sleeps ok.  She takes trazodone as needed. . Mood is ok  She is sleeping well.  She is speaking with a therapist - Martita Park (last visti 3/21/23) has been helpful  She has been snoring more.        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 She is being treated at [facility]. she saw [facility] on 2/23/23 and is in complete remission status. She had MRI ABdomen, CT neck, chest, abdomen and pelvis that were stable 7/30/2021. She will follow up as needed.      She is not taking vitamin D 50,000 units once a week for vitamin D deficiency     Her last shot of Depo Provera was in December 2019. No periods.      Occcaional back pain. She has occasional spasms.      She has some right TMJ issues- she saw an oral surgeon [facility]  She has some jaw pain if she lays on her right side too long         She has not been taking metformin for her weight.  She has been watching her diet and has lost 6 pounds               Past Surgical History:   Procedure Laterality Date    COLONOSCOPY        COLONOSCOPY N/A 11/8/2018     Performed by Ba Stewart MD at [facility] ([address] [place] [address])    EGD (ESOPHAGOGASTRODUODENOSCOPY) N/A 11/8/2018     Performed by  "Ba Stewart MD at [facility] (2ND FLR)    ESOPHAGOGASTRODUODENOSCOPY (EGD) N/A 3/23/2015     Performed by Ba Stewart MD at [facility] (4TH FLR)    HERNIA REPAIR        NV COLONOSCOPY,DIAGNOSTIC [90740 (CPT®)] N/A 7/2/2014     Performed by Cj Lassiter MD at [facility] (4TH FLR)    ULTRASOUND-ENDOSCOPIC-UPPER N/A 12/12/2018     Performed by Venu Montiel MD at [facility]            Meds and allergies: updated on Epic   /70   Pulse 72   Ht 5' 2" (1.575 m)   Wt 84.8 kg (187 lb)   LMP 04/17/2019   SpO2 98%   BMI 34.20 kg/m²      General: alert, oriented x 3, no apparent distress.  Affect normal  HEENT: Conjunctivae: anicteric, PERRL, EOMI, TM clear, Oralpharynx clear  Neck: supple, no thyroid enlargement, no cervical lymphadenopathy  Resp: effort normal, lungs clear bilaterally  CV: Regular rate and rhythm without murmurs, gallops or rubs, no lower extremity edema,        Assessment/Plan:        Stage 4 large B cell lymphoma - finished chemo 6/26/19. Follow up with [facility] -saw 2/2023-     Thombocytopenia - montiored     Anemia - s/p iron infusions years ago.  Doing well .       Anxiety and depression/insomnia/grief- better on fluoxetine 20 mg daily Continue therapy. Propranolol 20 mg twice daily as needed.  She would like to be tested for ADHD as she is having trouble focusing.      Menopause - could be contributing to weight.     GERD - asx     Left breast abnormality on CT scan at [facility] - diagnostic left mammogram 8/16/2021 was fine - bilateral   Mammogram 7/2022   .   Obesity -doing well with  exercise and weight loss.       LLQ pain - stable - due to constipation.  Constipation comes and goes.      Colonoscopy 11/2018 - 1- 4 mm hyperplastic polyp- given 10 years.    MMG 7/2022 fine        I will see her back in 6 months sooner if problems arise.  "

## 2023-05-24 ENCOUNTER — OFFICE VISIT (OUTPATIENT)
Dept: SLEEP MEDICINE | Facility: CLINIC | Age: 48
End: 2023-05-24
Payer: COMMERCIAL

## 2023-05-24 VITALS
HEART RATE: 66 BPM | DIASTOLIC BLOOD PRESSURE: 83 MMHG | WEIGHT: 185.19 LBS | SYSTOLIC BLOOD PRESSURE: 121 MMHG | BODY MASS INDEX: 34.08 KG/M2 | HEIGHT: 62 IN

## 2023-05-24 DIAGNOSIS — G47.30 SLEEP APNEA, UNSPECIFIED TYPE: Primary | ICD-10-CM

## 2023-05-24 DIAGNOSIS — G47.10 HYPERSOMNOLENCE: ICD-10-CM

## 2023-05-24 PROCEDURE — 1159F PR MEDICATION LIST DOCUMENTED IN MEDICAL RECORD: ICD-10-PCS | Mod: CPTII,S$GLB,, | Performed by: INTERNAL MEDICINE

## 2023-05-24 PROCEDURE — 3079F DIAST BP 80-89 MM HG: CPT | Mod: CPTII,S$GLB,, | Performed by: INTERNAL MEDICINE

## 2023-05-24 PROCEDURE — 3074F SYST BP LT 130 MM HG: CPT | Mod: CPTII,S$GLB,, | Performed by: INTERNAL MEDICINE

## 2023-05-24 PROCEDURE — 3008F BODY MASS INDEX DOCD: CPT | Mod: CPTII,S$GLB,, | Performed by: INTERNAL MEDICINE

## 2023-05-24 PROCEDURE — 3079F PR MOST RECENT DIASTOLIC BLOOD PRESSURE 80-89 MM HG: ICD-10-PCS | Mod: CPTII,S$GLB,, | Performed by: INTERNAL MEDICINE

## 2023-05-24 PROCEDURE — 3074F PR MOST RECENT SYSTOLIC BLOOD PRESSURE < 130 MM HG: ICD-10-PCS | Mod: CPTII,S$GLB,, | Performed by: INTERNAL MEDICINE

## 2023-05-24 PROCEDURE — 3008F PR BODY MASS INDEX (BMI) DOCUMENTED: ICD-10-PCS | Mod: CPTII,S$GLB,, | Performed by: INTERNAL MEDICINE

## 2023-05-24 PROCEDURE — 99214 PR OFFICE/OUTPT VISIT, EST, LEVL IV, 30-39 MIN: ICD-10-PCS | Mod: S$GLB,,, | Performed by: INTERNAL MEDICINE

## 2023-05-24 PROCEDURE — 99999 PR PBB SHADOW E&M-EST. PATIENT-LVL III: ICD-10-PCS | Mod: PBBFAC,,, | Performed by: INTERNAL MEDICINE

## 2023-05-24 PROCEDURE — 1159F MED LIST DOCD IN RCRD: CPT | Mod: CPTII,S$GLB,, | Performed by: INTERNAL MEDICINE

## 2023-05-24 PROCEDURE — 99999 PR PBB SHADOW E&M-EST. PATIENT-LVL III: CPT | Mod: PBBFAC,,, | Performed by: INTERNAL MEDICINE

## 2023-05-24 PROCEDURE — 99214 OFFICE O/P EST MOD 30 MIN: CPT | Mod: S$GLB,,, | Performed by: INTERNAL MEDICINE

## 2023-05-24 NOTE — PROGRESS NOTES
"ESTABLISHEDPATIENT VISIT    Terese Macias  is a pleasant 47 y.o. female  with PMH significant for  DUSTIN, DLBCL, GERD,  who presented 2022 of snoring    Here today for sleepiness, possible sleep stuyd    PLAN last visit:   -will proceed with sleep testing   -discussed trial of PAP therapy if YOLIE present   -driving precautions were discussed with the patient      Since last visit:   Has not been able to have sleep study done      SLEEP SCHEDULE   Bed Time 10pm   Sleep Latency 30min   Arousals Once or twice   Nocturia 0-1   Back to sleep    Wake time 6:45 AM   Naps denies   Work            Vitals:    05/24/23 1633   BP: 121/83   BP Location: Right arm   Patient Position: Sitting   BP Method: Small (Automatic)   Pulse: 66   Weight: 84 kg (185 lb 3 oz)   Height: 5' 2" (1.575 m)     Physical Exam:    GEN:   Well-appearing  Psych:  Appropriate affect, demonstrates insight  SKIN:  No rash on the face or bridge of the nose    LABS:   Lab Results   Component Value Date    HGB 11.2 (L) 02/06/2023       RECORDS REVIEWED PREVIOUSLY:        ASSESSMENT    No flowsheet data found.  PROBLEM DESCRIPTION Interval Hx STATUS   Suspected YOLIE   + witnessed snoring by her family for at least the past year, +snoring arousals, , her mother has been concerned that she might not be breathing in her sleep  HEENT: MP3, + narrow pharyngeal opening + continues to have loud snoring persists   Daytime Sx   Denies sleepiness when inactive (after lunch, hard to get up in the morning)  rare sleepiness when driving home from work.  ESS 16/24 on intake Keeps active, yawning if slows down persists   Other issues:     PLAN   -discussed sleep testing  -discussed trial of CPAP     RTC after testing         The patient was given open opportunity to ask questions and/or express concerns about treatment plan.   All questions/concerns were discussed.     Two patient identifiers used prior to evaluation.           "

## 2023-05-30 ENCOUNTER — TELEPHONE (OUTPATIENT)
Dept: SLEEP MEDICINE | Facility: OTHER | Age: 48
End: 2023-05-30
Payer: COMMERCIAL

## 2023-06-01 ENCOUNTER — OFFICE VISIT (OUTPATIENT)
Dept: BEHAVIORAL HEALTH | Facility: CLINIC | Age: 48
End: 2023-06-01
Payer: COMMERCIAL

## 2023-06-01 ENCOUNTER — TELEPHONE (OUTPATIENT)
Dept: SLEEP MEDICINE | Facility: OTHER | Age: 48
End: 2023-06-01
Payer: COMMERCIAL

## 2023-06-01 DIAGNOSIS — F43.21 GRIEF: ICD-10-CM

## 2023-06-01 DIAGNOSIS — F41.9 ANXIETY: Primary | ICD-10-CM

## 2023-06-01 DIAGNOSIS — F33.1 DEPRESSION, MAJOR, RECURRENT, MODERATE: ICD-10-CM

## 2023-06-01 PROCEDURE — 99499 NO LOS: ICD-10-PCS | Mod: 95,,, | Performed by: SOCIAL WORKER

## 2023-06-01 PROCEDURE — 99499 UNLISTED E&M SERVICE: CPT | Mod: 95,,, | Performed by: SOCIAL WORKER

## 2023-06-05 ENCOUNTER — PATIENT MESSAGE (OUTPATIENT)
Dept: BEHAVIORAL HEALTH | Facility: CLINIC | Age: 48
End: 2023-06-05

## 2023-06-05 ENCOUNTER — OFFICE VISIT (OUTPATIENT)
Dept: BEHAVIORAL HEALTH | Facility: CLINIC | Age: 48
End: 2023-06-05
Payer: COMMERCIAL

## 2023-06-05 DIAGNOSIS — F33.1 DEPRESSION, MAJOR, RECURRENT, MODERATE: ICD-10-CM

## 2023-06-05 DIAGNOSIS — F43.21 GRIEF: ICD-10-CM

## 2023-06-05 DIAGNOSIS — F41.9 ANXIETY: Primary | ICD-10-CM

## 2023-06-05 PROCEDURE — 90832 PSYTX W PT 30 MINUTES: CPT | Mod: 95,,, | Performed by: SOCIAL WORKER

## 2023-06-05 PROCEDURE — 90832 PR PSYCHOTHERAPY W/PATIENT, 30 MIN: ICD-10-PCS | Mod: 95,,, | Performed by: SOCIAL WORKER

## 2023-06-05 NOTE — PROGRESS NOTES
The patient location is: [place], [place]  The chief complaint leading to consultation is: anxiety, depression, grief    Visit type: audiovisual    Face to Face time with patient: 30 pt arrived late to session  32 minutes of total time spent on the encounter, which includes face to face time and non-face to face time preparing to see the patient (eg, review of tests), Obtaining and/or reviewing separately obtained history, Documenting clinical information in the electronic or other health record, Independently interpreting results (not separately reported) and communicating results to the patient/family/caregiver, or Care coordination (not separately reported).     Individual Psychotherapy (LCSW/PhD)  Terese KHOI Macias,  6/5/2023    Site:  Telemed         Therapeutic Intervention: Met with patient for individual psychotherapy.    Chief complaint/reason for encounter: depression, anxiety, and grief     Interval history and content of current session: She shares she is trying to make decisions in her life.  She had made up her mind she wasn't going to move in with her mom, but then decided she would. She hasn't moved just yet.  She has shared she is speaking her mind more.  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  She wants to be more prepared when school starts.  She has been saving affirmations on [employer] for herself.  She still has negative self-talk at times, but is more aware of it now.  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  She hasn't scheduled with a long term therapist but set goal to do that today.  PT to message this week if she has any issues getting scheduled.  SW will also send pt number to [facility]. Praised pt for gains made.  This is our last session.  Pt to also reach out to Dr. Frye if needed for a referral.    Treatment plan:  Target symptoms: depression,  anxiety , grief  Why chosen therapy is appropriate versus another modality: relevant to diagnosis, patient responds to this modality  Outcome monitoring methods: self-report, observation  Therapeutic intervention type: behavior modifying psychotherapy, supportive psychotherapy    Risk parameters:  Patient reports no suicidal ideation  Patient reports no homicidal ideation  Patient reports no self-injurious behavior  Patient reports no violent behavior    Verbal deficits: None    Patient's response to intervention:  The patient's response to intervention is accepting.    Progress toward goals and other mental status changes:  The patient's progress toward goals is fair .    Diagnosis:     ICD-10-CM ICD-9-CM   1. Anxiety  F41.9 300.00   2. Depression, major, recurrent, moderate  F33.1 296.32   3. Grief  F43.21 309.0       Plan: Pt plans to continue individual psychotherapy.  Pt to contact [facility] dept and/or outside referrals about getting scheduled.      Return to clinic: as scheduled    Length of Service (minutes): 30        Each patient to whom he or she provides medical services by telemedicine is:  (1) informed of the relationship between the physician and patient and the respective role of any other health care provider with respect to management of the patient; and (2) notified that he or she may decline to receive medical services by telemedicine and may withdraw from such care at any time.

## 2023-06-06 ENCOUNTER — HOSPITAL ENCOUNTER (OUTPATIENT)
Dept: SLEEP MEDICINE | Facility: OTHER | Age: 48
Discharge: HOME | End: 2023-06-06
Attending: INTERNAL MEDICINE
Payer: COMMERCIAL

## 2023-06-06 DIAGNOSIS — G47.10 HYPERSOMNOLENCE: ICD-10-CM

## 2023-06-06 DIAGNOSIS — G47.30 SLEEP APNEA, UNSPECIFIED TYPE: ICD-10-CM

## 2023-06-06 PROCEDURE — 95800 SLP STDY UNATTENDED: CPT

## 2023-06-07 PROBLEM — G47.30 SLEEP APNEA: Status: ACTIVE | Noted: 2023-06-07

## 2023-06-07 NOTE — NURSING
Patient here for blood draw and dressing changer on right arm PICC line-both lines with good blood return-blood to lab-old dressing removed-no redness at site-area cleaned and new dressing applied over site-patient tolerated well.  
 used

## 2023-06-08 PROCEDURE — 95806 SLEEP STUDY UNATT&RESP EFFT: CPT | Mod: 26,,, | Performed by: INTERNAL MEDICINE

## 2023-06-08 PROCEDURE — 95806 PR SLEEP STUDY, UNATTENDED, SIMUL RECORD HR/O2 SAT/RESP FLOW/RESP EFFT: ICD-10-PCS | Mod: 26,,, | Performed by: INTERNAL MEDICINE

## 2023-06-09 ENCOUNTER — PATIENT MESSAGE (OUTPATIENT)
Dept: RESEARCH | Facility: HOSPITAL | Age: 48
End: 2023-06-09
Payer: COMMERCIAL

## 2023-06-12 ENCOUNTER — PATIENT MESSAGE (OUTPATIENT)
Dept: SLEEP MEDICINE | Facility: CLINIC | Age: 48
End: 2023-06-12
Payer: COMMERCIAL

## 2023-06-12 DIAGNOSIS — G47.33 OSA (OBSTRUCTIVE SLEEP APNEA): Primary | ICD-10-CM

## 2023-07-20 ENCOUNTER — HOSPITAL ENCOUNTER (OUTPATIENT)
Dept: RADIOLOGY | Facility: HOSPITAL | Age: 48
Discharge: HOME | End: 2023-07-20
Attending: INTERNAL MEDICINE
Payer: COMMERCIAL

## 2023-07-20 VITALS — BODY MASS INDEX: 35.67 KG/M2 | WEIGHT: 195 LBS

## 2023-07-20 DIAGNOSIS — Z12.31 SCREENING MAMMOGRAM FOR BREAST CANCER: ICD-10-CM

## 2023-07-20 PROCEDURE — 77067 SCR MAMMO BI INCL CAD: CPT | Mod: 26,,, | Performed by: RADIOLOGY

## 2023-07-20 PROCEDURE — 77063 BREAST TOMOSYNTHESIS BI: CPT | Mod: 26,,, | Performed by: RADIOLOGY

## 2023-07-20 PROCEDURE — 77067 SCR MAMMO BI INCL CAD: CPT | Mod: TC

## 2023-07-20 PROCEDURE — 77063 MAMMO DIGITAL SCREENING BILAT WITH TOMO: ICD-10-PCS | Mod: 26,,, | Performed by: RADIOLOGY

## 2023-07-20 PROCEDURE — 77067 MAMMO DIGITAL SCREENING BILAT WITH TOMO: ICD-10-PCS | Mod: 26,,, | Performed by: RADIOLOGY

## 2023-08-25 ENCOUNTER — TELEPHONE (OUTPATIENT)
Dept: INTERNAL MEDICINE | Facility: CLINIC | Age: 48
End: 2023-08-25
Payer: COMMERCIAL

## 2023-08-25 DIAGNOSIS — F39 MOOD DISORDER: ICD-10-CM

## 2023-08-25 DIAGNOSIS — R41.840 INATTENTION: Primary | ICD-10-CM

## 2023-08-25 NOTE — TELEPHONE ENCOUNTER
Called and spoke to pt she is concerned with medication and being in the sun  She is a  and has to be in the direct sun for a period of time  Should she be is the son with her medical history/ past chemo and with medication she is on?     Also she sd that she thinks she has ADD she was dong different self help stuff stuff and she sd this was discussed with you in the oast and thinks now she should be on medication?   Wants your thoughts?  Should we make her an appt?

## 2023-08-25 NOTE — TELEPHONE ENCOUNTER
----- Message from Jose Zepeda sent at 8/24/2023  4:08 PM CDT -----  Contact: Self [phone]  Pt requesting a call in regards to past med history and the heat.    Please call and advise

## 2023-08-28 ENCOUNTER — PATIENT MESSAGE (OUTPATIENT)
Dept: INTERNAL MEDICINE | Facility: CLINIC | Age: 48
End: 2023-08-28
Payer: COMMERCIAL

## 2023-08-28 NOTE — TELEPHONE ENCOUNTER
She needs to contact her hematologist regarding her history of chemo and sun expsoure.  The medictions she is on currently should not cause a problem with sun exposure    If she wants to get tested for adhd, she needs to be evaluated in psychiatry- [phone]

## 2023-09-26 ENCOUNTER — TELEPHONE (OUTPATIENT)
Dept: INTERNAL MEDICINE | Facility: CLINIC | Age: 48
End: 2023-09-26
Payer: COMMERCIAL

## 2023-09-26 DIAGNOSIS — R41.840 INATTENTION: Primary | ICD-10-CM

## 2023-09-26 NOTE — TELEPHONE ENCOUNTER
----- Message from Monique Skaggs sent at 9/26/2023  9:11 AM CDT -----  Contact: [phone]  Pt is requesting a referral to a Psychiatrist. Per pt, please give her a call.                Thank you

## 2023-09-26 NOTE — TELEPHONE ENCOUNTER
Called and spoke to pt   She says she thinks she has ADD but has worked thru and has now exhausted everything   Would like to get a referral to see a psych

## 2023-11-10 ENCOUNTER — LAB VISIT (OUTPATIENT)
Dept: LAB | Facility: HOSPITAL | Age: 48
End: 2023-11-10
Attending: INTERNAL MEDICINE
Payer: COMMERCIAL

## 2023-11-10 ENCOUNTER — PATIENT MESSAGE (OUTPATIENT)
Dept: PSYCHIATRY | Facility: CLINIC | Age: 48
End: 2023-11-10
Payer: COMMERCIAL

## 2023-11-10 DIAGNOSIS — E78.5 HYPERLIPIDEMIA, UNSPECIFIED HYPERLIPIDEMIA TYPE: ICD-10-CM

## 2023-11-10 LAB
ALBUMIN SERPL BCP-MCNC: 3.8 G/DL (ref 3.5–5.2)
ALP SERPL-CCNC: 81 U/L (ref 55–135)
ALT SERPL W/O P-5'-P-CCNC: 15 U/L (ref 10–44)
ANION GAP SERPL CALC-SCNC: 7 MMOL/L (ref 8–16)
AST SERPL-CCNC: 17 U/L (ref 10–40)
BASOPHILS # BLD AUTO: 0.02 K/UL (ref 0–0.2)
BASOPHILS NFR BLD: 0.2 % (ref 0–1.9)
BILIRUB SERPL-MCNC: 0.4 MG/DL (ref 0.1–1)
BUN SERPL-MCNC: 13 MG/DL (ref 6–20)
CALCIUM SERPL-MCNC: 9.5 MG/DL (ref 8.7–10.5)
CHLORIDE SERPL-SCNC: 104 MMOL/L (ref 95–110)
CHOLEST SERPL-MCNC: 172 MG/DL (ref 120–199)
CHOLEST/HDLC SERPL: 3.1 {RATIO} (ref 2–5)
CO2 SERPL-SCNC: 27 MMOL/L (ref 23–29)
CREAT SERPL-MCNC: 0.8 MG/DL (ref 0.5–1.4)
DIFFERENTIAL METHOD: NORMAL
EOSINOPHIL # BLD AUTO: 0.1 K/UL (ref 0–0.5)
EOSINOPHIL NFR BLD: 1.1 % (ref 0–8)
ERYTHROCYTE [DISTWIDTH] IN BLOOD BY AUTOMATED COUNT: 13.7 % (ref 11.5–14.5)
EST. GFR  (NO RACE VARIABLE): >60 ML/MIN/1.73 M^2
GLUCOSE SERPL-MCNC: 89 MG/DL (ref 70–110)
HCT VFR BLD AUTO: 37.2 % (ref 37–48.5)
HDLC SERPL-MCNC: 56 MG/DL (ref 40–75)
HDLC SERPL: 32.6 % (ref 20–50)
HGB BLD-MCNC: 12 G/DL (ref 12–16)
IMM GRANULOCYTES # BLD AUTO: 0.02 K/UL (ref 0–0.04)
IMM GRANULOCYTES NFR BLD AUTO: 0.2 % (ref 0–0.5)
LDLC SERPL CALC-MCNC: 105 MG/DL (ref 63–159)
LYMPHOCYTES # BLD AUTO: 3.2 K/UL (ref 1–4.8)
LYMPHOCYTES NFR BLD: 32.6 % (ref 18–48)
MCH RBC QN AUTO: 28.2 PG (ref 27–31)
MCHC RBC AUTO-ENTMCNC: 32.3 G/DL (ref 32–36)
MCV RBC AUTO: 87 FL (ref 82–98)
MONOCYTES # BLD AUTO: 0.6 K/UL (ref 0.3–1)
MONOCYTES NFR BLD: 6.1 % (ref 4–15)
NEUTROPHILS # BLD AUTO: 5.8 K/UL (ref 1.8–7.7)
NEUTROPHILS NFR BLD: 59.8 % (ref 38–73)
NONHDLC SERPL-MCNC: 116 MG/DL
NRBC BLD-RTO: 0 /100 WBC
PLATELET # BLD AUTO: 247 K/UL (ref 150–450)
PMV BLD AUTO: 11.2 FL (ref 9.2–12.9)
POTASSIUM SERPL-SCNC: 3.9 MMOL/L (ref 3.5–5.1)
PROT SERPL-MCNC: 8.1 G/DL (ref 6–8.4)
RBC # BLD AUTO: 4.26 M/UL (ref 4–5.4)
SODIUM SERPL-SCNC: 138 MMOL/L (ref 136–145)
TRIGL SERPL-MCNC: 55 MG/DL (ref 30–150)
TSH SERPL DL<=0.005 MIU/L-ACNC: 1.43 UIU/ML (ref 0.4–4)
WBC # BLD AUTO: 9.65 K/UL (ref 3.9–12.7)

## 2023-11-10 PROCEDURE — 80061 LIPID PANEL: CPT | Performed by: INTERNAL MEDICINE

## 2023-11-10 PROCEDURE — 85025 COMPLETE CBC W/AUTO DIFF WBC: CPT | Performed by: INTERNAL MEDICINE

## 2023-11-10 PROCEDURE — 36415 COLL VENOUS BLD VENIPUNCTURE: CPT | Performed by: INTERNAL MEDICINE

## 2023-11-10 PROCEDURE — 80053 COMPREHEN METABOLIC PANEL: CPT | Performed by: INTERNAL MEDICINE

## 2023-11-10 PROCEDURE — 84443 ASSAY THYROID STIM HORMONE: CPT | Performed by: INTERNAL MEDICINE

## 2023-11-13 NOTE — TELEPHONE ENCOUNTER
----- Message from Lisandra Gallo sent at 12/12/2017  4:47 PM CST -----  Contact: Patient [phone]  She said her blood tests results were not too good therefore she would like to consult with you for future care.    Thank you  
Please schedule her for an urgent care appt  
uc appt scheduled  
3

## 2023-11-14 ENCOUNTER — IMMUNIZATION (OUTPATIENT)
Dept: INTERNAL MEDICINE | Facility: CLINIC | Age: 48
End: 2023-11-14
Payer: COMMERCIAL

## 2023-11-14 ENCOUNTER — OFFICE VISIT (OUTPATIENT)
Dept: INTERNAL MEDICINE | Facility: CLINIC | Age: 48
End: 2023-11-14
Payer: COMMERCIAL

## 2023-11-14 VITALS
WEIGHT: 196 LBS | HEIGHT: 62 IN | SYSTOLIC BLOOD PRESSURE: 116 MMHG | OXYGEN SATURATION: 96 % | DIASTOLIC BLOOD PRESSURE: 80 MMHG | BODY MASS INDEX: 36.07 KG/M2 | HEART RATE: 64 BPM

## 2023-11-14 DIAGNOSIS — Z00.00 ROUTINE GENERAL MEDICAL EXAMINATION AT A HEALTH CARE FACILITY: Primary | ICD-10-CM

## 2023-11-14 DIAGNOSIS — Z23 NEED FOR VACCINATION: Primary | ICD-10-CM

## 2023-11-14 PROCEDURE — 90471 FLU VACCINE (QUAD) GREATER THAN OR EQUAL TO 3YO PRESERVATIVE FREE IM: ICD-10-PCS | Mod: S$GLB,,, | Performed by: INTERNAL MEDICINE

## 2023-11-14 PROCEDURE — 3079F PR MOST RECENT DIASTOLIC BLOOD PRESSURE 80-89 MM HG: ICD-10-PCS | Mod: CPTII,S$GLB,, | Performed by: INTERNAL MEDICINE

## 2023-11-14 PROCEDURE — 3074F SYST BP LT 130 MM HG: CPT | Mod: CPTII,S$GLB,, | Performed by: INTERNAL MEDICINE

## 2023-11-14 PROCEDURE — 3074F PR MOST RECENT SYSTOLIC BLOOD PRESSURE < 130 MM HG: ICD-10-PCS | Mod: CPTII,S$GLB,, | Performed by: INTERNAL MEDICINE

## 2023-11-14 PROCEDURE — 99396 PREV VISIT EST AGE 40-64: CPT | Mod: S$GLB,,, | Performed by: INTERNAL MEDICINE

## 2023-11-14 PROCEDURE — 90471 IMMUNIZATION ADMIN: CPT | Mod: S$GLB,,, | Performed by: INTERNAL MEDICINE

## 2023-11-14 PROCEDURE — 90686 IIV4 VACC NO PRSV 0.5 ML IM: CPT | Mod: S$GLB,,, | Performed by: INTERNAL MEDICINE

## 2023-11-14 PROCEDURE — 90686 FLU VACCINE (QUAD) GREATER THAN OR EQUAL TO 3YO PRESERVATIVE FREE IM: ICD-10-PCS | Mod: S$GLB,,, | Performed by: INTERNAL MEDICINE

## 2023-11-14 PROCEDURE — 1159F MED LIST DOCD IN RCRD: CPT | Mod: CPTII,S$GLB,, | Performed by: INTERNAL MEDICINE

## 2023-11-14 PROCEDURE — 99999 PR PBB SHADOW E&M-EST. PATIENT-LVL IV: CPT | Mod: PBBFAC,,, | Performed by: INTERNAL MEDICINE

## 2023-11-14 PROCEDURE — 3008F PR BODY MASS INDEX (BMI) DOCUMENTED: ICD-10-PCS | Mod: CPTII,S$GLB,, | Performed by: INTERNAL MEDICINE

## 2023-11-14 PROCEDURE — 99396 PR PREVENTIVE VISIT,EST,40-64: ICD-10-PCS | Mod: S$GLB,,, | Performed by: INTERNAL MEDICINE

## 2023-11-14 PROCEDURE — 3008F BODY MASS INDEX DOCD: CPT | Mod: CPTII,S$GLB,, | Performed by: INTERNAL MEDICINE

## 2023-11-14 PROCEDURE — 99999 PR PBB SHADOW E&M-EST. PATIENT-LVL IV: ICD-10-PCS | Mod: PBBFAC,,, | Performed by: INTERNAL MEDICINE

## 2023-11-14 PROCEDURE — 1159F PR MEDICATION LIST DOCUMENTED IN MEDICAL RECORD: ICD-10-PCS | Mod: CPTII,S$GLB,, | Performed by: INTERNAL MEDICINE

## 2023-11-14 PROCEDURE — 3079F DIAST BP 80-89 MM HG: CPT | Mod: CPTII,S$GLB,, | Performed by: INTERNAL MEDICINE

## 2023-11-14 RX ORDER — METFORMIN HYDROCHLORIDE 500 MG/1
TABLET, EXTENDED RELEASE ORAL
Qty: 180 TABLET | Refills: 3 | Status: SHIPPED | OUTPATIENT
Start: 2023-11-14

## 2023-11-14 RX ORDER — ASPIRIN 325 MG
50000 TABLET, DELAYED RELEASE (ENTERIC COATED) ORAL WEEKLY
Qty: 12 CAPSULE | Refills: 6 | Status: SHIPPED | OUTPATIENT
Start: 2023-11-14

## 2023-11-14 RX ORDER — METHOCARBAMOL 500 MG/1
500 TABLET, FILM COATED ORAL 3 TIMES DAILY PRN
Qty: 40 TABLET | Refills: 0 | Status: SHIPPED | OUTPATIENT
Start: 2023-11-14

## 2023-11-14 RX ORDER — ONDANSETRON 4 MG/1
4 TABLET, ORALLY DISINTEGRATING ORAL EVERY 6 HOURS PRN
COMMUNITY
Start: 2023-06-28

## 2023-11-14 NOTE — PROGRESS NOTES
Chief Complaint: Annual exam  Follow up of multiple issues     HPI: This is a 48 year old woman who presents for follow up of above     She has been eating raisin bran and has had a daily BM which is very good for her.   She has not needed Linzess.        She is taking fluoxetine 40 mg daily in the evening.  The fluoxetine is helping her sleep.  She accidentally missed 2 doses of the fluoxetine and was weepier so she feels that the fluoxetine is helping.  She feels that she is coping with her stressors. .  She takes propranolol 10 mg daily as needed for anxiety.    She takes trazodone as needed. . Mood is ok   She was speaking with a therapist - Martita Park (last visit 6/23) until she left - she has apts in psychiatry coming up.     She saw sleep medicine and had a sleep study. She has sleep apnea and is wearing a CPAP machine. She was doing well with the CPAP machine.  She is out of a routine currently - she is now living with her mother because her mother needs support.  She is selling the family home she lived.     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 She is being treated at [facility]. she saw Dr [facility] on 2/23/23 and is in complete remission status. She will follow up in one year - 2/2024.      She is not taking vitamin D 50,000 units once a week for vitamin D deficiency     Her last shot of Depo Provera was in December 2019. No periods.      Occcaional back pain. She has occasional spasms.      She has some right TMJ issues- she saw an oral surgeon and will see a doctor at [facility].  She has some jaw pain if she lays on her right side too long    She is working as a [employer] at [employer] (pre K -8) in [place]. She likes her job                   Past Surgical History:   Procedure Laterality Date    COLONOSCOPY        COLONOSCOPY N/A 11/8/2018     Performed by Ba Stewart MD at [facility] (2ND FLR)  "   EGD (ESOPHAGOGASTRODUODENOSCOPY) N/A 11/8/2018     Performed by Ba Stewart MD at [facility] (2ND FLR)    ESOPHAGOGASTRODUODENOSCOPY (EGD) N/A 3/23/2015     Performed by Ba Stewart MD at [facility] (4TH FLR)    HERNIA REPAIR        ND COLONOSCOPY,DIAGNOSTIC [08937 (CPT®)] N/A 7/2/2014     Performed by Cj Lassiter MD at [facility] (4TH FLR)    ULTRASOUND-ENDOSCOPIC-UPPER N/A 12/12/2018     Performed by Venu Montiel MD at [facility]            Meds and allergies: updated on [facility]     /80 (BP Location: Right arm, Patient Position: Sitting, BP Method: Large (Manual))   Pulse 64   Ht 5' 2" (1.575 m)   Wt 88.9 kg (196 lb)   LMP 04/17/2019   SpO2 96%   BMI 35.85 kg/m²      General: alert, oriented x 3, no apparent distress.  Affect normal  HEENT: Conjunctivae: anicteric, PERRL, EOMI, TM clear, Oralpharynx clear  Neck: supple, no thyroid enlargement, no cervical lymphadenopathy  Resp: effort normal, lungs clear bilaterally  CV: Regular rate and rhythm without murmurs, gallops or rubs, no lower extremity edema,     Labs 11/10/23 reviewed     Assessment/Plan:      Annual exam - discussed diet, exercise and safety issues.    Sleep apnea - on cpap machine    Stage 4 large B cell lymphoma - finished chemo 6/26/19. Follow up with [facility] -saw 2/2023- due 2/2024     Thombocytopenia - montiored     Anemia - s/p iron infusions years ago.  Doing well .       Anxiety and depression/insomnia/grief- better on fluoxetine 40 mg daily . Propranolol 20 mg twice daily as needed.  TO establish care with psychiatry     Menopause - could be contributing to weight.      GERD - asx     Left breast abnormality on CT scan at [facility] - diagnostic left mammogram 8/16/2021 was fine - bilateral  Mammogram 7/2023   .   Obesity- metformin  mg one tablet daily for 7 days then 2 tabelts daily.       .      Colonoscopy 11/2018 - 1- 4 mm hyperplastic polyp- given 10 years.     MMG 7/2023 fine        I will see her back " in 6 months sooner if problems arise.

## 2023-11-22 DIAGNOSIS — F41.9 ANXIETY: ICD-10-CM

## 2023-11-22 DIAGNOSIS — F33.1 DEPRESSION, MAJOR, RECURRENT, MODERATE: ICD-10-CM

## 2023-11-22 RX ORDER — FLUOXETINE HYDROCHLORIDE 40 MG/1
40 CAPSULE ORAL DAILY
Qty: 90 CAPSULE | Refills: 1 | Status: SHIPPED | OUTPATIENT
Start: 2023-11-22 | End: 2024-11-21

## 2023-11-22 NOTE — TELEPHONE ENCOUNTER
Care Due:                  Date            Visit Type   Department     Provider  --------------------------------------------------------------------------------                                EP -                              PRIMARY      [facility] INTERNAL  Last Visit: 11-      CARE ([facility])   MEDICINE       CADEN HEATH                              EP -                              PRIMARY      [facility] INTERNAL  Next Visit: 04-      CARE ([facility])   MEDICINE       CADEN HEATH                                                            Last  Test          Frequency    Reason                     Performed    Due Date  --------------------------------------------------------------------------------    HBA1C.......  6 months...  metFORMIN................  08- 02-    [facility] Embedded Care Due Messages. Reference number: [id number].   11/22/2023 12:16:45 PM CST

## 2024-01-06 NOTE — PROGRESS NOTES
Pt was not seen.  She was in the car with her daughter and grandchild and couldn't talk.  She had forgotten about the appointment.  Pt was rescheduled for Monday.        
show

## 2024-01-31 ENCOUNTER — OFFICE VISIT (OUTPATIENT)
Dept: PSYCHIATRY | Facility: CLINIC | Age: 49
End: 2024-01-31
Payer: COMMERCIAL

## 2024-01-31 DIAGNOSIS — F33.1 MDD (MAJOR DEPRESSIVE DISORDER), RECURRENT EPISODE, MODERATE: Primary | ICD-10-CM

## 2024-01-31 DIAGNOSIS — R41.840 INATTENTION: ICD-10-CM

## 2024-01-31 DIAGNOSIS — F41.1 GAD (GENERALIZED ANXIETY DISORDER): ICD-10-CM

## 2024-01-31 PROCEDURE — 90791 PSYCH DIAGNOSTIC EVALUATION: CPT | Mod: 95,,, | Performed by: PSYCHOLOGIST

## 2024-01-31 NOTE — PROGRESS NOTES
Psychiatry Initial Visit (PhD/LCSW)  Diagnostic Interview - CPT 07639    Date: 1/31/2024    Site: Telemed    The patient location is: Patient's home/ Patient reported that his/her location at the time of this visit was in the [facility]     Visit type: Virtual visit with synchronous audio and video     Each patient to whom he or she provides medical services by telemedicine is: (1) informed of the relationship between the physician and patient and the respective role of any other health care provider with respect to management of the patient; and (2) notified that he or she may decline to receive medical services by telemedicine and may withdraw from such care at any time.     Referral source: self    Clinical status of patient: Outpatient    Terese Macias, a 48 y.o. female, for initial evaluation visit.  Met with patient.    Chief complaint/reason for encounter: depression and anxiety    History of present illness: Patient reported long history of anxiety and depression for about 20 years.  Symptoms include depressed mood, diminished interest, insomnia, fatigue, worthlessness/guilt, poor concentration, altered libido, social isolation, excessive anxiety/worry, muscle tension, and panic attacks.  She noted the following symptoms of ADHD since childhood: difficulty concentrating, forgetfulness, and easily distracted.  Patient reported several recent stressors including her father's death in 2020, leg injury, and work related stress.  Patient denied history of self-harm behaviors.  Patient denied current suicidal and homicidal ideations.       Pain: noncontributory    Symptoms:   Mood: depressed mood, diminished interest, insomnia, fatigue, worthlessness/guilt, poor concentration, altered libido, and social isolation  Anxiety: excessive anxiety/worry, muscle tension, and panic attacks  Substance abuse: denied  Cognitive functioning: denied  Health behaviors: noncontributory    Psychiatric history:  "psychotropic management by PCP and has participated in counseling/psychotherapy on an outpatient basis in the past - individual therapy for about four months for anxiety, depression, grief    Medical history: Stage 4 cancer in remission, leg injury, anemia    Family history of psychiatric illness: not known    Social history (marriage, employment, etc.): Patient reported growing up in [place], [place] living with her parents and siblings.  She is the second of four children.  She reported having good relationships with her two younger sisters and one older brother.  She reports having a good relationship with her mother.  She reports having a good relationship with her father prior to his death.  Patient denied history of abuse or trauma during childhood.  She reported that her highest level of education is a Master's degree in Educational Leadership.  She currently works as the  at a school.  She has never been .  She has one daughter (age 24 years old) and one grandson (age 2 years old) both of whom she has good relationships.    Substance use:   Alcohol: infrequent   Drugs: none   Tobacco: none   Caffeine: Coffee - one cup daily    Current medications and drug reactions (include OTC, herbal): see medication list     Strengths and liabilities: Strength: Patient is expressive/articulate., Liability: Patient lacks coping skills.    Current Evaluation:     Mental Status Exam:  General Appearance:  unremarkable, age appropriate   Speech: normal tone, normal rate, normal pitch, normal volume      Level of Cooperation: cooperative      Thought Processes: normal and logical   Mood: anxious, depressed      Thought Content: normal, no suicidality, no homicidality, delusions, or paranoia   Affect: congruent and appropriate   Orientation: Oriented x3   Memory: recent >  words after brief delay: 2 of 3 words, remote >  intact   Attention Span & Concentration: spelled "WORLD" forwards and backwards   Fund of " General Knowledge: intact and appropriate to age and level of education   Judgment & Insight: fair     Language  intact     Diagnostic Impression - Plan:       ICD-10-CM ICD-9-CM   1. MDD (major depressive disorder), recurrent episode, moderate  F33.1 296.32   2. DARYL (generalized anxiety disorder)  F41.1 300.02   3. Inattention  R41.840 799.51       Plan:individual psychotherapy and consult psychiatrist for medication evaluation    Return to Clinic: 2 weeks    Length of Service (minutes): 60

## 2024-02-17 ENCOUNTER — PATIENT MESSAGE (OUTPATIENT)
Dept: INTERNAL MEDICINE | Facility: CLINIC | Age: 49
End: 2024-02-17
Payer: COMMERCIAL

## 2024-02-18 RX ORDER — OXYCODONE HYDROCHLORIDE 10 MG/1
10 TABLET ORAL EVERY 6 HOURS PRN
COMMUNITY
Start: 2024-02-14

## 2024-02-18 RX ORDER — DOCUSATE SODIUM 50 MG/5ML
50 LIQUID ORAL 2 TIMES DAILY
COMMUNITY
Start: 2024-01-24 | End: 2024-04-09

## 2024-02-18 RX ORDER — ASPIRIN 81 MG/1
1 TABLET ORAL DAILY
COMMUNITY
Start: 2024-01-24 | End: 2025-01-23

## 2024-02-19 ENCOUNTER — OFFICE VISIT (OUTPATIENT)
Dept: PSYCHIATRY | Facility: CLINIC | Age: 49
End: 2024-02-19
Payer: COMMERCIAL

## 2024-02-19 DIAGNOSIS — F41.1 GAD (GENERALIZED ANXIETY DISORDER): ICD-10-CM

## 2024-02-19 DIAGNOSIS — R41.840 INATTENTION: ICD-10-CM

## 2024-02-19 DIAGNOSIS — F33.1 MDD (MAJOR DEPRESSIVE DISORDER), RECURRENT EPISODE, MODERATE: Primary | ICD-10-CM

## 2024-02-19 PROCEDURE — 90837 PSYTX W PT 60 MINUTES: CPT | Mod: 95,,, | Performed by: PSYCHOLOGIST

## 2024-02-19 NOTE — PROGRESS NOTES
Individual Psychotherapy (PhD/LCSW)    2/19/2024    Therapeutic Intervention: Met with patient.  Outpatient - Behavior modifying psychotherapy 60 min - CPT code 05315    The patient location is: Patient's home/ Patient reported that her location at the time of this visit was in the [facility]     Visit type: Virtual visit with synchronous audio and video     Each patient to whom he or she provides medical services by telemedicine is: (1) informed of the relationship between the physician and patient and the respective role of any other health care provider with respect to management of the patient; and (2) notified that he or she may decline to receive medical services by telemedicine and may withdraw from such care at any time.     Chief complaint/reason for encounter: depression and anxiety     Interval history and content of current session: Patient presented casually dressed, alert, and oriented.  Patient reported experiencing depressed mood, diminished interest, insomnia, fatigue, worthlessness/guilt, poor concentration, altered libido, social isolation, excessive anxiety/worry, muscle tension, difficulty concentrating, forgetfulness, easily distracted, and panic attacks.  Patient denied current suicidal and homicidal ideations.  Explored source of patient's depressed mood.  Active listening skills were used as patient described work related stress especially around discord with her supervisor.  Patient exhibited good insight when she stated that she tends to avoid confrontation because she easily becomes tearful.  Educated patient about various communication styles (passive, aggressive, assertive).  Discussed patient using assertive communication skills to address her concerns and feelings with her supervisor.  Modeled the use of I statements as an assertive communication technique to reduce feelings of depression and anxiety.               Treatment plan:  Target symptoms: depression, anxiety   Why  chosen therapy is appropriate versus another modality: relevant to diagnosis  Outcome monitoring methods: self-report  Therapeutic intervention type: insight oriented psychotherapy, behavior modifying psychotherapy    Risk parameters:  Patient reports no suicidal ideation  Patient reports no homicidal ideation  Patient reports no self-injurious behavior  Patient reports no violent behavior    Verbal deficits: None    Patient's response to intervention:  The patient's response to intervention is accepting.    Progress toward goals and other mental status changes:  The patient's progress toward goals is fair .    Diagnosis:     ICD-10-CM ICD-9-CM   1. MDD (major depressive disorder), recurrent episode, moderate  F33.1 296.32   2. DARYL (generalized anxiety disorder)  F41.1 300.02   3. Inattention  R41.840 799.51       Plan:  individual psychotherapy and consult psychiatrist for medication evaluation    Return to clinic: 2 weeks    Length of Service (minutes): 60

## 2024-02-21 ENCOUNTER — PATIENT MESSAGE (OUTPATIENT)
Dept: PSYCHIATRY | Facility: CLINIC | Age: 49
End: 2024-02-21
Payer: COMMERCIAL

## 2024-03-05 ENCOUNTER — OFFICE VISIT (OUTPATIENT)
Dept: PSYCHIATRY | Facility: CLINIC | Age: 49
End: 2024-03-05
Payer: COMMERCIAL

## 2024-03-05 DIAGNOSIS — F41.1 GAD (GENERALIZED ANXIETY DISORDER): ICD-10-CM

## 2024-03-05 DIAGNOSIS — R41.840 INATTENTION: ICD-10-CM

## 2024-03-05 DIAGNOSIS — F33.1 MDD (MAJOR DEPRESSIVE DISORDER), RECURRENT EPISODE, MODERATE: Primary | ICD-10-CM

## 2024-03-05 PROCEDURE — 90837 PSYTX W PT 60 MINUTES: CPT | Mod: 95,,, | Performed by: PSYCHOLOGIST

## 2024-03-05 NOTE — PROGRESS NOTES
Individual Psychotherapy (PhD/LCSW)    3/5/2024    Therapeutic Intervention: Met with patient.  Outpatient - Behavior modifying psychotherapy 60 min - CPT code 63291    The patient location is: Patient's home/ Patient reported that her location at the time of this visit was in the [facility]     Visit type: Virtual visit with synchronous audio and video     Each patient to whom he or she provides medical services by telemedicine is: (1) informed of the relationship between the physician and patient and the respective role of any other health care provider with respect to management of the patient; and (2) notified that he or she may decline to receive medical services by telemedicine and may withdraw from such care at any time.     Chief complaint/reason for encounter: depression and anxiety     Interval history and content of current session: Patient presented casually dressed, alert, and oriented.  Patient reported experiencing depressed mood, diminished interest, insomnia, fatigue, worthlessness/guilt, poor concentration, altered libido, social isolation, excessive anxiety/worry, muscle tension, difficulty concentrating, forgetfulness, easily distracted, and panic attacks.  Patient denied current suicidal and homicidal ideations.  Explored source of patient's depressed mood.  Active listening skills were used as patient described sadness around not being able to do what she previously was prior to her leg injury.  Assisted patient in processing her feelings around wanting a sense of normalcy related to her living arrangements, health, engaging in activities that she enjoys, and dating.  Educated patient about the positive relationship between gratitude and mental wellbeing.  Discussed gratitude exercises, including keeping a gratitude journal.  Patient was asked to write in a gratitude journal for homework.                 Treatment plan:  Target symptoms: depression, anxiety   Why chosen therapy is  appropriate versus another modality: relevant to diagnosis  Outcome monitoring methods: self-report  Therapeutic intervention type: insight oriented psychotherapy, behavior modifying psychotherapy    Risk parameters:  Patient reports no suicidal ideation  Patient reports no homicidal ideation  Patient reports no self-injurious behavior  Patient reports no violent behavior    Verbal deficits: None    Patient's response to intervention:  The patient's response to intervention is accepting.    Progress toward goals and other mental status changes:  The patient's progress toward goals is fair .    Diagnosis:     ICD-10-CM ICD-9-CM   1. MDD (major depressive disorder), recurrent episode, moderate  F33.1 296.32   2. DARYL (generalized anxiety disorder)  F41.1 300.02   3. Inattention  R41.840 799.51       Plan:  individual psychotherapy and consult psychiatrist for medication evaluation    Return to clinic: 2 weeks    Length of Service (minutes): 60

## 2024-03-12 ENCOUNTER — CLINICAL SUPPORT (OUTPATIENT)
Dept: REHABILITATION | Facility: HOSPITAL | Age: 49
End: 2024-03-12
Payer: COMMERCIAL

## 2024-03-12 DIAGNOSIS — M25.562 ACUTE PAIN OF LEFT KNEE: Primary | ICD-10-CM

## 2024-03-12 DIAGNOSIS — M25.662 DECREASED RANGE OF MOTION (ROM) OF LEFT KNEE: ICD-10-CM

## 2024-03-12 DIAGNOSIS — R29.898 WEAKNESS OF LEFT LOWER EXTREMITY: ICD-10-CM

## 2024-03-12 PROCEDURE — 97110 THERAPEUTIC EXERCISES: CPT

## 2024-03-12 PROCEDURE — 97161 PT EVAL LOW COMPLEX 20 MIN: CPT

## 2024-03-12 NOTE — PLAN OF CARE
[facility]   Physical Therapy Initial Evaluation      Date: 3/12/2024   Name: Terese Macias  Clinic Number: [id number]    Therapy Diagnosis:   Encounter Diagnoses   Name Primary?    Acute pain of left knee Yes    Decreased range of motion (ROM) of left knee     Weakness of left lower extremity      Physician: Meng Savage, *    Physician Orders: PT Eval and Treat   Medical Diagnosis from Referral: S82.142S (ICD-10-CM) - Displaced bicondylar fracture of left tibia, sequela   Evaluation Date: 3/12/2024  Authorization Period Expiration: TBD  Plan of Care Expiration: 5/12/2024  Progress Note Due: 4/12/2024  Visit # / Visits authorized: 1/ 1   FOTO: 1/5    FOTO Initial Evaluation: 26  FOTO 2nd F/U: NA  FOTO Discharge: NA    Precautions: 25% weight bearing        Surgical Note from Outside Meng Savage: from Jan 21st     Findings: The patient is a 48-year-old female who sustained a severe injury to the right knee while at a [employer] park yesterday. She was admitted to the hospital yesterday evening. X-rays revealed a dislocation of the left knee. An immediate closed reduction was performed by the ER MD. pulses were good, nonetheless a CT angiogram was performed revealing no disruption of the popliteal artery. Her neurological status was intact. The CT revealed a focal compression fracture of the anterior aspect of the medial tibial plateau with significant displacement downward. It was elected to bring the patient to the operating room today as swelling was not severe and it was felt that some stability was necessary early on along with repair fixation of her bony structure. She was told that she may need multiple surgeries for stability purposes and she was told that she was at significant risk for posttraumatic arthritis and long-term instability.    Technique: The patient was brought to the operating room anesthesia was induced she was intubated the left lower extremity was  prepped and draped sterilely below an upper thigh tourniquet. After exsanguination the tourniquet was inflated to 350 mm Hg. An incision was made just medial to the midline in the parapatellar region dissection was carried through the skin subcutaneous tissue a soft tissue plane was created between the retinacular layer and the subcutaneous layer. A medial parapatellar approach to the knee was utilized the patella was flipped laterally. Subperiosteal dissection was performed in the anterior horn of the medial meniscus was detached and reflected with the soft tissue flap medially this allowed access to the tibial plateau fracture which was readily identified. Using osteotomes and a bone tamp were able to elevate the fragment which was punched down even with the joint surface this impacting it to an anatomical level. Good joint congruity was seen at this location thereafter. We elected to utilize 5 cc of HydroSet which was then injected into the defect and allowed to harden. We elected to perform fixation to back this up with a compressive 6.5 mm cannulated cancellous screw. The C-arm was used as an aid for the appropriate location length and placement direction of the screw. A washer was also utilized this placed in the good subchondral bone obtaining excellent fixation. C-arm views revealed good placement of the screw good length of the screw and good occupation of the defect with HydroSet. An examination under anesthesia was performed revealing significant posterior instability and anterior instability. The cruciate ligaments were identified and found to be both completely ruptured. The ACL was essentially avulsed off of the tibia and the PCL was essentially avulsed off of the femur. We elected to perform a repair by utilization of anchors. Two anchors were placed on the femoral side 2 on the tibial side. Each had a double-arm suture. One anchor on each side contain titanium and was radial dense the other contained  peek which was radiolucent each anchor was attached to a double-arm suture. This allowed reattachment of the PCL directly to the femur and reattachment of the ACL directly to the tibia. Once these were completed we revealed that the knee no longer hyperextended and posterior and anterior stability were excellent. Aricept was used to irrigate the region closure was performed with interrupted 1. Vicryl and a running 2. Quill in the retinacular layer. 10 cc of plain Marcaine was injected into the knee joint to help minimize postoperative discomfort. A sterile bulky soft wrap was applied. She was thereafter placed in a hinged postoperative knee brace locked at 30° of flexion. The tourniquet was deflated prior to closure small subcutaneous vessels were identified and coagulated.         Time In: 200  Time Out: 230  Total Appointment Time (timed & untimed codes): 30 minutes      SUBJECTIVE     Date of onset: Jan 21st     History of current condition - Terese reports: she broke her tibial plateau, tore PCL and two other ligaments. Patient reports she has a screw in her tibial plateau. Patient is not sure exactly what ligaments were damaged and the procedure that was done. Patient reports her knee brace is locked to 60 degrees and is wearing it at all times. Patient reports she is using a rolling walker. Patient has stairs at home but not using them.     Imaging, not on file    Prior Therapy: Yes   Social History: family lives at home, with stairs   Occupation:    Prior Level of Function: working out a few years ago 4x a week. 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    Fever blister     GERD (gastroesophageal reflux disease)     Heavy periods 06/08/2015    Hemorrhoids without complication     History of chemotherapy     IBS (irritable colon syndrome)     Joint pain     comes and goes    Left upper quadrant pain 12/19/2018    Pancreatic mass 12/19/2018    Sleep difficulties     Splenomegaly 12/19/2018    Thalassemia        Surgical History:   Terese Macias  has a past surgical history that includes Hernia repair; Colonoscopy; Esophagogastroduodenoscopy (N/A, 11/8/2018); Colonoscopy (N/A, 11/8/2018); Endoscopic ultrasound of upper gastrointestinal tract (N/A, 12/12/2018); and Hysteroscopy with dilation and curettage of uterus (N/A, 2/10/2023).    Medications:   Terese has a current medication list which includes the following prescription(s): aspirin, cetirizine, cholecalciferol (vitamin d3), docusate, fluoxetine, fluticasone propionate, ibuprofen, linaclotide, metformin, methocarbamol, ondansetron, oxycodone, propranolol, and trazodone.    Allergies:   Review of patient's allergies indicates:   Allergen Reactions    Penicillins Other (See Comments) and Rash     Pt had reaction to PCN as an infant.          OBJECTIVE        Knee Right Right Right Left Left Left Pain/Dysfunction with Movement    AROM PROM MMT AROM PROM MMT    Flexion 120 125 4+/5 30 65 NT    Extension 0 0 5/5 - 5 -3  NT        Observation: swelling of L knee   - incision well healed but tight    Gait: ambulatory with RW and 25% weight bearing on L LE       FOTO knee Survey    Therapist reviewed FOTO scores for Terese Macias on 3/12/2024.   FOTO documents entered into EPIC - see Media section.    Intake Score: 26         TREATMENT     Total Treatment time (time-based codes) separate from Evaluation: 10 minutes      Terese received the treatments listed below:       therapeutic exercises to develop ROM and flexibility for 10 minutes including:    Knee ext prop 3 min  QS 30x   Seated heel flex/ext  - 20x             PATIENT EDUCATION AND HOME EXERCISES     Education provided:   - Purpose of PT  - HEP    Written Home Exercises Provided: yes. Exercises were reviewed and Terese was able to demonstrate them prior to the end of the session.  Terese demonstrated good  understanding of the education provided. See EMR under Patient Instructions for exercises provided during therapy sessions.    ASSESSMENT     Terese is a 48 y.o. female referred to outpatient Physical Therapy with a medical diagnosis of S82.142S (ICD-10-CM) - Displaced bicondylar fracture of left tibia, sequela. Patient presents to PT with a history of L tibial plateau ORIF and ACL/PCL repair. Patient is 25% weight bearing of L knee for at least 2 more weeks until she sees her doc for followup. Patient received a few exercises today to address quad control and knee range of motion. Was only able to do a few due to time constraint of patient showing late. Will progress next session.     Patient prognosis is Good.   Patient will benefit from skilled outpatient Physical Therapy to address the deficits stated above and in the chart below, provide patient /family education, and to maximize patientt's level of independence.     Plan of care discussed with patient: Yes  Patient's spiritual, cultural and educational needs considered and patient is agreeable to the plan of care and goals as stated below:     Anticipated Barriers for therapy: None     Medical Necessity is demonstrated by the following  History  Co-morbidities and personal factors that may impact the plan of care Co-morbidities:   See medical history     Personal Factors:   no deficits     high   Examination  Body Structures and Functions, activity limitations and participation restrictions that may impact the plan of care Body Regions:   lower extremities    Body  Systems:    ROM  strength  balance  gait  transfers    Participation Restrictions:   None    Activity limitations:   Learning and applying knowledge  no deficits    General Tasks and Commands  no deficits    Communication  no deficits    Mobility  walking    Self care  no deficits    Domestic Life  shopping  cooking  doing house work (cleaning house, washing dishes, laundry)  assisting others    Interactions/Relationships  no deficits    Life Areas  employment    Community and Social Life  recreation and leisure         high   Clinical Presentation stable and uncomplicated low   Decision Making/ Complexity Score: low     Goals:    Short Term Goals: in 8 weeks    1. Pt will be independent with HEP supplement PT in improving functional mobility.  2. Pt will improve LLE strength to at least 75% of R LE strength as measured via MicroFet handheld dynamometer in order to improve functional mobility  3. Pt will improve L knee AROM to at least 0-0-120 in order to improve gait    Long Term Goals: in 16 weeks    1. Pt will be independent with updated HEP supplement PT in improving functional mobility.  2. Pt will improve L LE strength to at least 90% of R LE strength as measured via MicroFet handheld dynamometer in order to improve functional mobility  3. Pt will improve L knee AROM to at least 0-0-125 in order to improve gait and ability to perform ADLs  4. Pt will improve FOTO knee survey score to >/= 50 in order to demo improved functional mobility  5. Pt will perform TUG in < 10 seconds without AD in order to demo improved gait speed  6. Pt will perform at least 10 sit to stands without UE support on 30 second sit to stand test in order to demo improved ability to perform transfers      PLAN   Plan of care Certification: 3/12/2024 to 5/12/2024.    Outpatient Physical Therapy 2 times weekly for 10 weeks to include the following interventions: Manual Therapy, Moist Heat/ Ice, Neuromuscular Re-ed, Patient Education, Self  Care, Therapeutic Activities, Therapeutic Exercise    Lawrence Sheridan PT      I CERTIFY THE NEED FOR THESE SERVICES FURNISHED UNDER THIS PLAN OF TREATMENT AND WHILE UNDER MY CARE   Physician's comments:     Physician's Signature: ___________________________________________________

## 2024-03-15 NOTE — PROGRESS NOTES
[facility]   Physical Therapy Treatment Note      Name: Terese Macias  Clinic Number: [id number]    Therapy Diagnosis:   Encounter Diagnoses   Name Primary?    Acute pain of left knee Yes    Decreased range of motion (ROM) of left knee     Weakness of left lower extremity      Physician: Meng Savage, *    Visit Date: 3/19/2024       Physician Orders: PT Eval and Treat   Medical Diagnosis from Referral: S82.142S (ICD-10-CM) - Displaced bicondylar fracture of left tibia, sequela   Evaluation Date: 3/12/2024  Authorization Period Expiration: 12/31/24  Plan of Care Expiration: 5/12/2024  Progress Note Due: 4/12/2024  Visit # / Visits authorized: 1/ 1 1/20  FOTO: 1/5     FOTO Initial Evaluation: 26  FOTO 2nd F/U: NA  FOTO Discharge: NA     Precautions: 25% weight bearing          PTA Visit #: 1/5     Time In: 320  Time Out: 430  Total Billable Time: 62 minutes    Subjective     Pt reports: the knee feels really tight. Pt states she is not sleeping well.  She was not compliant with home exercise program.  Response to previous treatment: no adverse affects  Functional change: progressing    Pain: 3/10  Location: left knee      Objective      Objective Measures updated at progress report unless specified.     Treatment     Terese received the treatments listed below:      therapeutic exercises to develop strength, endurance, ROM, flexibility, posture, and core stabilization for 54 minutes including:  Seated heel slides   QS 3x10  SAQ strap assisted 20x with focus on TKE  Knee ext prop 3 min   SLR (unable)  Ankle circles 30x cw/ccw  Seated marches 20x  Seated clams 20  Heel slides /c strap 20x    manual therapy techniques: Soft tissue Mobilization patella mobs were applied to the: L knee for 8 minutes      neuromuscular re-education activities to improve: Balance, Coordination, Kinesthetic, Sense, Proprioception, and Posture for 0 minutes. The following activities were  included:      therapeutic activities to improve functional performance for 0  minutes, including:      gait training to improve functional mobility and safety for 0  minutes, including:        cold pack for 8 minutes to L knee.    Patient Education and Home Exercises       Education provided:   - no active knee flexion  - ice and elevate the knee  - daily HEP    Written Home Exercises Provided: Patient instructed to cont prior HEP. Exercises were reviewed and Terese was able to demonstrate them prior to the end of the session.  Terese demonstrated good  understanding of the education provided. See EMR under Patient Instructions for exercises provided during therapy sessions    Assessment     Pt presents with antalgic gait pattern. Patient is ad hearing to WB status. Pt did well with first full PT visit. Patient remains guarded and somewhat scared to participate. Session with heavy emphasis on ROM and quad control. Extensive education with patient on HEP compliance. Patient would benefit from NMES to facilitate quad activation. Encouraged patient to attend PT 2x wk vs 1.    Terese Is progressing well towards her goals.   Pt prognosis is Good.     Pt will continue to benefit from skilled outpatient physical therapy to address the deficits listed in the problem list box on initial evaluation, provide pt/family education and to maximize pt's level of independence in the home and community environment.     Pt's spiritual, cultural and educational needs considered and pt agreeable to plan of care and goals.     Anticipated barriers to physical therapy: none    Goals:   Short Term Goals: in 8 weeks     1. Pt will be independent with HEP supplement PT in improving functional mobility.  2. Pt will improve LLE strength to at least 75% of R LE strength as measured via MicroFet handheld dynamometer in order to improve functional mobility  3. Pt will improve L knee AROM to at least 0-0-120 in order to improve gait     Long Term  Goals: in 16 weeks     1. Pt will be independent with updated HEP supplement PT in improving functional mobility.  2. Pt will improve L LE strength to at least 90% of R LE strength as measured via MicroFet handheld dynamometer in order to improve functional mobility  3. Pt will improve L knee AROM to at least 0-0-125 in order to improve gait and ability to perform ADLs  4. Pt will improve FOTO knee survey score to >/= 50 in order to demo improved functional mobility  5. Pt will perform TUG in < 10 seconds without AD in order to demo improved gait speed  6. Pt will perform at least 10 sit to stands without UE support on 30 second sit to stand test in order to demo improved ability to perform transfers    Plan     Plan of care Certification: 3/12/2024 to 5/12/2024.     Outpatient Physical Therapy 2 times weekly for 10 weeks to include the following interventions: Manual Therapy, Moist Heat/ Ice, Neuromuscular Re-ed, Patient Education, Self Care, Therapeutic Activities, Therapeutic Exercise    PT/PTA met face to face to discuss pt's treatment plan and progress towards established goals. Pt will be seen by a physical therapist minimally every 6th visit or every 30 days.      Rakesh Alcazar, PTA

## 2024-03-18 ENCOUNTER — PATIENT MESSAGE (OUTPATIENT)
Dept: PSYCHIATRY | Facility: CLINIC | Age: 49
End: 2024-03-18
Payer: COMMERCIAL

## 2024-03-19 ENCOUNTER — CLINICAL SUPPORT (OUTPATIENT)
Dept: REHABILITATION | Facility: HOSPITAL | Age: 49
End: 2024-03-19
Payer: COMMERCIAL

## 2024-03-19 DIAGNOSIS — R29.898 WEAKNESS OF LEFT LOWER EXTREMITY: ICD-10-CM

## 2024-03-19 DIAGNOSIS — M25.662 DECREASED RANGE OF MOTION (ROM) OF LEFT KNEE: ICD-10-CM

## 2024-03-19 DIAGNOSIS — M25.562 ACUTE PAIN OF LEFT KNEE: Primary | ICD-10-CM

## 2024-03-19 PROCEDURE — 97110 THERAPEUTIC EXERCISES: CPT | Mod: CQ

## 2024-03-19 PROCEDURE — 97140 MANUAL THERAPY 1/> REGIONS: CPT | Mod: CQ

## 2024-03-26 ENCOUNTER — CLINICAL SUPPORT (OUTPATIENT)
Dept: REHABILITATION | Facility: HOSPITAL | Age: 49
End: 2024-03-26
Payer: COMMERCIAL

## 2024-03-26 DIAGNOSIS — M25.662 DECREASED RANGE OF MOTION (ROM) OF LEFT KNEE: ICD-10-CM

## 2024-03-26 DIAGNOSIS — M25.562 ACUTE PAIN OF LEFT KNEE: Primary | ICD-10-CM

## 2024-03-26 DIAGNOSIS — R29.898 WEAKNESS OF LEFT LOWER EXTREMITY: ICD-10-CM

## 2024-03-26 PROCEDURE — 97014 ELECTRIC STIMULATION THERAPY: CPT | Mod: CQ

## 2024-03-26 PROCEDURE — 97110 THERAPEUTIC EXERCISES: CPT | Mod: CQ

## 2024-03-26 PROCEDURE — 97112 NEUROMUSCULAR REEDUCATION: CPT | Mod: CQ

## 2024-03-26 NOTE — PROGRESS NOTES
[facility]   Physical Therapy Treatment Note      Name: Terese Macias  Clinic Number: [id number]    Therapy Diagnosis:   Encounter Diagnoses   Name Primary?    Acute pain of left knee Yes    Decreased range of motion (ROM) of left knee     Weakness of left lower extremity        Physician: Meng Savage, *    Visit Date: 3/26/2024       Physician Orders: PT Eval and Treat   Medical Diagnosis from Referral: S82.142S (ICD-10-CM) - Displaced bicondylar fracture of left tibia, sequela   Evaluation Date: 3/12/2024  Authorization Period Expiration: 12/31/24  Plan of Care Expiration: 5/12/2024  Progress Note Due: 4/12/2024  Visit # / Visits authorized: 1/ 1 2/20  FOTO: 1/5     FOTO Initial Evaluation: 26  FOTO 2nd F/U: NA  FOTO Discharge: NA     Precautions: 25% weight bearing          PTA Visit #: 2/5     Time In: 1:58 pm (pt arrived late)   Time Out: 2:45 pm   Total Billable Time: 42 minutes (5 minutes not billed d.t cold pack application)     Subjective     Pt reports:  that she has noted some decreased swelling in her knee today and feels that her knee is a little better compared to her last session. Pt states that she hasn't been doing her HEP as consistently as she probably should.   She  was partially  compliant with home exercise program.  Response to previous treatment: no adverse affects  Functional change: progressing    Pain: 3/10  Location: left knee      Objective      Objective Measures updated at progress report unless specified.     Treatment     Terese received the treatments listed below:      therapeutic exercises to develop strength, endurance, ROM, flexibility, posture, and core stabilization for 27 minutes including:  Seated heel slides 3 min   Knee ext prop 3 min   Heel slides /c strap 20x  Seated marches 20x    SLR (unable)  Ankle circles 30x cw/ccw (not performed)  Seated clams 20 (not performed)     manual therapy techniques: Soft tissue Mobilization patella mobs  were applied to the: L knee for 0 minutes      neuromuscular re-education activities to improve: Balance, Coordination, Proprioception, and Motor Control for 15 minutes. The following activities were included:  QS c/ NMES 10 min   SAQ strap assisted 20x with focus on TKE    therapeutic activities to improve functional performance for 0  minutes, including:      gait training to improve functional mobility and safety for 0  minutes, including:      supervised modalities after being cleared for contradictions: NMES Electrical Stimulation:  Terese received NMES Electrical Stimulation to elicit muscle contraction of the quadriceps. Pt received stimulation at a rate of 10 pps with symmetric current, ramp of 2 seconds with 10 second on time and 20 second off time. Patient tolerated treatment well without any adverse effects.       cold pack for 8 minutes to L knee.    Patient Education and Home Exercises       Education provided:   - no active knee flexion  - ice and elevate the knee  - daily HEP    Written Home Exercises Provided: Patient instructed to cont prior HEP. Exercises were reviewed and Terese was able to demonstrate them prior to the end of the session.  Terese demonstrated good  understanding of the education provided. See EMR under Patient Instructions for exercises provided during therapy sessions    Assessment     Pt arrived late to treatment session, therefore short session provided. Focused session quad activation and ROM. Incorporated NMES for quad control. Will continue to progress pt towards goals as tolerated.     Terese Is progressing well towards her goals.   Pt prognosis is Good.     Pt will continue to benefit from skilled outpatient physical therapy to address the deficits listed in the problem list box on initial evaluation, provide pt/family education and to maximize pt's level of independence in the home and community environment.     Pt's spiritual, cultural and educational needs considered and  pt agreeable to plan of care and goals.     Anticipated barriers to physical therapy: none    Goals:   Short Term Goals: in 8 weeks     1. Pt will be independent with HEP supplement PT in improving functional mobility.  2. Pt will improve LLE strength to at least 75% of R LE strength as measured via MicroFet handheld dynamometer in order to improve functional mobility  3. Pt will improve L knee AROM to at least 0-0-120 in order to improve gait     Long Term Goals: in 16 weeks     1. Pt will be independent with updated HEP supplement PT in improving functional mobility.  2. Pt will improve L LE strength to at least 90% of R LE strength as measured via MicroFet handheld dynamometer in order to improve functional mobility  3. Pt will improve L knee AROM to at least 0-0-125 in order to improve gait and ability to perform ADLs  4. Pt will improve FOTO knee survey score to >/= 50 in order to demo improved functional mobility  5. Pt will perform TUG in < 10 seconds without AD in order to demo improved gait speed  6. Pt will perform at least 10 sit to stands without UE support on 30 second sit to stand test in order to demo improved ability to perform transfers    Plan     Plan of care Certification: 3/12/2024 to 5/12/2024.     Outpatient Physical Therapy 2 times weekly for 10 weeks to include the following interventions: Manual Therapy, Moist Heat/ Ice, Neuromuscular Re-ed, Patient Education, Self Care, Therapeutic Activities, Therapeutic Exercise    PT/PTA met face to face to discuss pt's treatment plan and progress towards established goals. Pt will be seen by a physical therapist minimally every 6th visit or every 30 days.      Licha Rascon PTA

## 2024-03-28 ENCOUNTER — CLINICAL SUPPORT (OUTPATIENT)
Dept: REHABILITATION | Facility: HOSPITAL | Age: 49
End: 2024-03-28
Payer: COMMERCIAL

## 2024-03-28 DIAGNOSIS — M25.562 ACUTE PAIN OF LEFT KNEE: Primary | ICD-10-CM

## 2024-03-28 DIAGNOSIS — R29.898 WEAKNESS OF LEFT LOWER EXTREMITY: ICD-10-CM

## 2024-03-28 DIAGNOSIS — M25.662 DECREASED RANGE OF MOTION (ROM) OF LEFT KNEE: ICD-10-CM

## 2024-03-28 PROCEDURE — 97032 APPL MODALITY 1+ESTIM EA 15: CPT | Mod: CQ

## 2024-03-28 PROCEDURE — 97110 THERAPEUTIC EXERCISES: CPT | Mod: CQ

## 2024-03-28 PROCEDURE — 97112 NEUROMUSCULAR REEDUCATION: CPT | Mod: CQ

## 2024-03-28 NOTE — PROGRESS NOTES
[facility]   Physical Therapy Treatment Note      Name: Terese Macias  Clinic Number: [id number]    Therapy Diagnosis:   Encounter Diagnoses   Name Primary?    Acute pain of left knee Yes    Decreased range of motion (ROM) of left knee     Weakness of left lower extremity        Physician: Meng Savage, *    Visit Date: 3/28/2024       Physician Orders: PT Eval and Treat   Medical Diagnosis from Referral: S82.142S (ICD-10-CM) - Displaced bicondylar fracture of left tibia, sequela   Evaluation Date: 3/12/2024  Authorization Period Expiration: 12/31/24  Plan of Care Expiration: 5/12/2024  Progress Note Due: 4/12/2024  Visit # / Visits authorized: 1/ 1 2/20  FOTO: 1/5     FOTO Initial Evaluation: 26  FOTO 2nd F/U: NA  FOTO Discharge: NA     Precautions: 25% weight bearing          PTA Visit #: 2/5     Time In: 1245 pm (pt arrived late)   Time Out: 100 pm   Total Billable Time: 45 minutes (5 minutes not billed d.t cold pack application)     Subjective     Pt reports:  the knee is constantly stiff. She continues to get pain in the knee with exercises. She did take an anti inflammatory pill prior to therapy today.  She  was partially  compliant with home exercise program.  Response to previous treatment: no adverse affects  Functional change: progressing    Pain: 3/10  Location: left knee      Objective      Objective Measures updated at progress report unless specified.     Treatment     Terese received the treatments listed below:      therapeutic exercises to develop strength, endurance, ROM, flexibility, posture, and core stabilization for 25 minutes including:  Seated heel slides 3 min   Knee ext prop 3 min   Heel slides /c strap 20x  Seated marches 20x    SLR (unable)  Ankle circles 30x cw/ccw (not performed)  Seated clams 20 (not performed)     manual therapy techniques: Soft tissue Mobilization patella mobs were applied to the: L knee for 0 minutes      neuromuscular re-education  activities to improve: Balance, Coordination, Proprioception, and Motor Control for 15 minutes. The following activities were included:  QS c/ NMES 10 min   SAQ strap assisted 20x with focus on TKE    therapeutic activities to improve functional performance for 0  minutes, including:      gait training to improve functional mobility and safety for 0  minutes, including:      supervised modalities after being cleared for contradictions: NMES Electrical Stimulation:  Terese received NMES Electrical Stimulation to elicit muscle contraction of the quadriceps. Pt received stimulation at a rate of 20 pps with symmetric current, ramp of 2 seconds with 10 second on time and 20 second off time. Patient tolerated treatment well without any adverse effects.       cold pack for 5 minutes to L knee.    Patient Education and Home Exercises       Education provided:   - no active knee flexion  - ice and elevate the knee  - daily HEP    Written Home Exercises Provided: Patient instructed to cont prior HEP. Exercises were reviewed and Terese was able to demonstrate them prior to the end of the session.  Terese demonstrated good  understanding of the education provided. See EMR under Patient Instructions for exercises provided during therapy sessions    Assessment     Pt continues with decreased ROM and strength. Focused session quad activation and ROM. Patient needs encouragement to push her self. Will continue to progress pt towards goals as tolerated.     Terese Is progressing well towards her goals.   Pt prognosis is Good.     Pt will continue to benefit from skilled outpatient physical therapy to address the deficits listed in the problem list box on initial evaluation, provide pt/family education and to maximize pt's level of independence in the home and community environment.     Pt's spiritual, cultural and educational needs considered and pt agreeable to plan of care and goals.     Anticipated barriers to physical therapy:  none    Goals:   Short Term Goals: in 8 weeks     1. Pt will be independent with HEP supplement PT in improving functional mobility.  2. Pt will improve LLE strength to at least 75% of R LE strength as measured via MicroFet handheld dynamometer in order to improve functional mobility  3. Pt will improve L knee AROM to at least 0-0-120 in order to improve gait     Long Term Goals: in 16 weeks     1. Pt will be independent with updated HEP supplement PT in improving functional mobility.  2. Pt will improve L LE strength to at least 90% of R LE strength as measured via MicroFet handheld dynamometer in order to improve functional mobility  3. Pt will improve L knee AROM to at least 0-0-125 in order to improve gait and ability to perform ADLs  4. Pt will improve FOTO knee survey score to >/= 50 in order to demo improved functional mobility  5. Pt will perform TUG in < 10 seconds without AD in order to demo improved gait speed  6. Pt will perform at least 10 sit to stands without UE support on 30 second sit to stand test in order to demo improved ability to perform transfers    Plan     Plan of care Certification: 3/12/2024 to 5/12/2024.     Outpatient Physical Therapy 2 times weekly for 10 weeks to include the following interventions: Manual Therapy, Moist Heat/ Ice, Neuromuscular Re-ed, Patient Education, Self Care, Therapeutic Activities, Therapeutic Exercise    PT/PTA met face to face to discuss pt's treatment plan and progress towards established goals. Pt will be seen by a physical therapist minimally every 6th visit or every 30 days.      Rakesh Alcazar PTA

## 2024-04-01 ENCOUNTER — OFFICE VISIT (OUTPATIENT)
Dept: PSYCHIATRY | Facility: CLINIC | Age: 49
End: 2024-04-01
Payer: COMMERCIAL

## 2024-04-01 DIAGNOSIS — F33.1 MDD (MAJOR DEPRESSIVE DISORDER), RECURRENT EPISODE, MODERATE: Primary | ICD-10-CM

## 2024-04-01 DIAGNOSIS — F41.1 GAD (GENERALIZED ANXIETY DISORDER): ICD-10-CM

## 2024-04-01 DIAGNOSIS — R41.840 INATTENTION: ICD-10-CM

## 2024-04-01 PROCEDURE — 90837 PSYTX W PT 60 MINUTES: CPT | Mod: 95,,, | Performed by: PSYCHOLOGIST

## 2024-04-01 NOTE — PROGRESS NOTES
Individual Psychotherapy (PhD/LCSW)    4/1/2024    Therapeutic Intervention: Met with patient.  Outpatient - Behavior modifying psychotherapy 60 min - CPT code 73732    The patient location is: Patient's home/ Patient reported that her location at the time of this visit was in the [facility]     Visit type: Virtual visit with synchronous audio and video     Each patient to whom he or she provides medical services by telemedicine is: (1) informed of the relationship between the physician and patient and the respective role of any other health care provider with respect to management of the patient; and (2) notified that he or she may decline to receive medical services by telemedicine and may withdraw from such care at any time.     Chief complaint/reason for encounter: depression and anxiety     Interval history and content of current session: Patient presented casually dressed, alert, and oriented.  Patient reported experiencing depressed mood, diminished interest, insomnia, fatigue, worthlessness/guilt, poor concentration, altered libido, social isolation, excessive anxiety/worry, muscle tension, difficulty concentrating, forgetfulness, easily distracted, and panic attacks.  Patient denied current suicidal and homicidal ideations.  Explored source of patient's depressed mood.  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  Assisted patient in processing her feelings around losing her job and adjusting to life changes.  Explored patient's options regarding her career and educated her about strategies for coping with life changes.  Patient was taught behavioral coping strategies such as increased social involvement, gratitude exercises, relaxation skills, sharing of feelings, and writing in a journal as ways to reduce feelings of anxiety and depression.                            Treatment plan:  Target symptoms: depression, anxiety   Why chosen therapy is appropriate versus another modality: relevant to diagnosis  Outcome monitoring methods: self-report  Therapeutic intervention type: insight oriented psychotherapy, behavior modifying psychotherapy    Risk parameters:  Patient reports no suicidal ideation  Patient reports no homicidal ideation  Patient reports no self-injurious behavior  Patient reports no violent behavior    Verbal deficits: None    Patient's response to intervention:  The patient's response to intervention is accepting.    Progress toward goals and other mental status changes:  The patient's progress toward goals is fair .    Diagnosis:     ICD-10-CM ICD-9-CM   1. MDD (major depressive disorder), recurrent episode, moderate  F33.1 296.32   2. DARYL (generalized anxiety disorder)  F41.1 300.02   3. Inattention  R41.840 799.51       Plan:  individual psychotherapy and consult psychiatrist for medication evaluation    Return to clinic: 2 weeks    Length of Service (minutes): 60

## 2024-04-02 ENCOUNTER — CLINICAL SUPPORT (OUTPATIENT)
Dept: REHABILITATION | Facility: HOSPITAL | Age: 49
End: 2024-04-02
Payer: COMMERCIAL

## 2024-04-02 DIAGNOSIS — M25.562 ACUTE PAIN OF LEFT KNEE: Primary | ICD-10-CM

## 2024-04-02 DIAGNOSIS — M25.662 DECREASED RANGE OF MOTION (ROM) OF LEFT KNEE: ICD-10-CM

## 2024-04-02 DIAGNOSIS — R29.898 WEAKNESS OF LEFT LOWER EXTREMITY: ICD-10-CM

## 2024-04-02 PROCEDURE — 97110 THERAPEUTIC EXERCISES: CPT

## 2024-04-02 PROCEDURE — 97112 NEUROMUSCULAR REEDUCATION: CPT

## 2024-04-02 PROCEDURE — 97116 GAIT TRAINING THERAPY: CPT

## 2024-04-02 NOTE — PROGRESS NOTES
[facility]   Physical Therapy Treatment Note      Name: Terese Macias  Clinic Number: [id number]    Therapy Diagnosis:   Encounter Diagnoses   Name Primary?    Acute pain of left knee Yes    Decreased range of motion (ROM) of left knee     Weakness of left lower extremity          Physician: Meng Savage, *    Visit Date: 4/2/2024       Physician Orders: PT Eval and Treat   Medical Diagnosis from Referral: S82.142S (ICD-10-CM) - Displaced bicondylar fracture of left tibia, sequela   Evaluation Date: 3/12/2024  Authorization Period Expiration: 12/31/24  Plan of Care Expiration: 5/12/2024  Progress Note Due: 4/12/2024  Visit # / Visits authorized: 1/1,  3/20  FOTO: 4/5     FOTO Initial Evaluation: 26  FOTO 2nd F/U: NA  FOTO Discharge: NA     Precautions: 25% weight bearing          PTA Visit #: 0/5     Time In: 245   Time Out: 345  Total Billable Time: 60 minutes      Subjective     Pt reports: she is still stiff and having trouble walking  the knee is constantly stiff. She continues to get pain in the knee with exercises. She did take an anti inflammatory pill prior to therapy today.  She  was partially  compliant with home exercise program.  Response to previous treatment: no adverse affects  Functional change: progressing    Pain: 3/10  Location: left knee      Objective      Objective Measures updated at progress report unless specified.     Treatment     Terese received the treatments listed below:      therapeutic exercises to develop strength, endurance, ROM, flexibility, posture, and core stabilization for 32 minutes including:    Heel prop on 1/2 roll  - 3 min  - completed twice through session   Heel slides   - 2x10   Quarter squats on table   - 3x10   TKE w/ orange cord  - 3x10       Gait training for 8 minutes focusing on TKE and follow through             HEP Review  Access Code: [email or web address]  URL: [email or web address]    Exercises  - Supine Knee Extension Stretch on  Towel Roll  - 3 x daily - 7 x weekly - 2 sets - 3 minutes hold  - Supine Quad Set  - 3 x daily - 7 x weekly - 20 reps - 3s hold  - Supine Heel Slide with Strap  - 3 x daily - 7 x weekly - 2 sets - 10 reps  - Supine Knee Extension Strengthening  - 3 x daily - 7 x weekly - 2 sets - 10 reps  - Seated Long Arc Quad  - 3 x daily - 7 x weekly - 2 sets - 10 reps  - Quarter Squat with Table  - 3 x daily - 7 x weekly - 2 sets - 10 reps  - Standing Terminal Knee Extension with Resistance  - 3 x daily - 7 x weekly - 3 sets - 10 reps        manual therapy techniques: Soft tissue Mobilization patella mobs were applied to the: L knee for 0 minutes      neuromuscular re-education activities to improve: Balance, Coordination, Proprioception, and Motor Control for 20  minutes. The following activities were included:    Quad sets w/ heel lift   -1/2 roll under knee   - completed twice throughout session  SAQ strap assisted 20x with focus on TKE  - completed twice throughout session          therapeutic activities to improve functional performance for 0  minutes, including:      supervised modalities after being cleared for contradictions: NMES Electrical Stimulation:  Terese received NMES Electrical Stimulation to elicit muscle contraction of the quadriceps. Pt received stimulation at a rate of 20 pps with symmetric current, ramp of 2 seconds with 10 second on time and 20 second off time. Patient tolerated treatment well without any adverse effects.       cold pack for 5 minutes to L knee.    Patient Education and Home Exercises       Education provided:   - no active knee flexion  - ice and elevate the knee  - daily HEP    Written Home Exercises Provided: Patient instructed to cont prior HEP. Exercises were reviewed and Terese was able to demonstrate them prior to the end of the session.  Terese demonstrated good  understanding of the education provided. See EMR under Patient Instructions for exercises provided during therapy  sessions    Assessment     Pt continues with decreased ROM and strength. Focused session quad activation and ROM. Patient needs encouragement to push her self. Will continue to progress pt towards goals as tolerated. Patient takes increased time to perform majority of exercises.     Terese Is progressing well towards her goals.   Pt prognosis is Good.     Pt will continue to benefit from skilled outpatient physical therapy to address the deficits listed in the problem list box on initial evaluation, provide pt/family education and to maximize pt's level of independence in the home and community environment.     Pt's spiritual, cultural and educational needs considered and pt agreeable to plan of care and goals.     Anticipated barriers to physical therapy: none    Goals:   Short Term Goals: in 8 weeks     1. Pt will be independent with HEP supplement PT in improving functional mobility.  2. Pt will improve LLE strength to at least 75% of R LE strength as measured via MicroFet handheld dynamometer in order to improve functional mobility  3. Pt will improve L knee AROM to at least 0-0-120 in order to improve gait     Long Term Goals: in 16 weeks     1. Pt will be independent with updated HEP supplement PT in improving functional mobility.  2. Pt will improve L LE strength to at least 90% of R LE strength as measured via MicroFet handheld dynamometer in order to improve functional mobility  3. Pt will improve L knee AROM to at least 0-0-125 in order to improve gait and ability to perform ADLs  4. Pt will improve FOTO knee survey score to >/= 50 in order to demo improved functional mobility  5. Pt will perform TUG in < 10 seconds without AD in order to demo improved gait speed  6. Pt will perform at least 10 sit to stands without UE support on 30 second sit to stand test in order to demo improved ability to perform transfers    Plan     Plan of care Certification: 3/12/2024 to 5/12/2024.     Outpatient Physical Therapy 2  times weekly for 10 weeks to include the following interventions: Manual Therapy, Moist Heat/ Ice, Neuromuscular Re-ed, Patient Education, Self Care, Therapeutic Activities, Therapeutic Exercise    PT/PTA met face to face to discuss pt's treatment plan and progress towards established goals. Pt will be seen by a physical therapist minimally every 6th visit or every 30 days.      Lawrence Sheridan, PT

## 2024-04-02 NOTE — PROGRESS NOTES
Chief Complaint:   Follow up of multiple issues     HPI: This is a 48 year old woman who presents for follow up of above    She fell on 1/20/24 and had a Displaced fracture of medial condyle of left tibia. She had ORIF of the left tibia on 1/21/24 at [facility]. She is currently in physical therapy. She still has lots of stiffness in the left knee.   She had therapy today. She is walking with a walker and is working on a cane with therapy.  She is taking oxycodone 10 mg 2-3 times a day.  Definitely at bedtime and during the day depending on her activity. Oxycodone makes her drowsy.  She has not been working since the accident.     She has had some constipation.  She has a BM every couple days with the help with diet.  She has not used the Linzess.      She is taking fluoxetine 40 mg every other day due to oxycodone use.  Mood has been up and down,  She is better when she is around people. daily in the evening.  She takes propranolol 10 mg daily as needed for anxiety.    She takes trazodone as needed.   She is doing virtual therapy which has been helpful.    She saw sleep medicine and had a sleep study. She has sleep apnea and is wearing a CPAP machine. She was doing well with the CPAP machine.  She is out of a routine currently - she is now living with her mother because her mother needs support.  She is selling the family home she lived.      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 She is being treated at [facility]. she saw Dr Diane [facility] on 2/23/23 and is in complete remission status. She will follow up in one year - 2/2024.      She is not taking vitamin D 50,000 units once a week for vitamin D deficiency     Her last shot of Depo Provera was in December 2019. No periods.       She has some right TMJ issues- she saw an oral surgeon and will see a doctor at [facility] dent[facility].  She has some jaw pain if she lays on her right side  too long                      Past Surgical History:   Procedure Laterality Date    COLONOSCOPY        COLONOSCOPY N/A 11/8/2018     Performed by Ba Stewart MD at [facility] (2ND FLR)    EGD (ESOPHAGOGASTRODUODENOSCOPY) N/A 11/8/2018     Performed by Ba Stewart MD at [facility] (2ND FLR)    ESOPHAGOGASTRODUODENOSCOPY (EGD) N/A 3/23/2015     Performed by Ba Stewart MD at [facility] (4TH FLR)    HERNIA REPAIR        MD COLONOSCOPY,DIAGNOSTIC [36494 (CPT®)] N/A 7/2/2014     Performed by Cj Lassiter MD at [facility] (4TH FLR)    ULTRASOUND-ENDOSCOPIC-UPPER N/A 12/12/2018     Performed by Venu Montiel MD at [facility]            Meds and allergies: updated on Epic       General: alert, oriented x 3, no apparent distress.  Affect normal  HEENT: Conjunctivae: anicteric, PERRL, EOMI, TM clear, Oralpharynx clear  Neck: supple, no thyroid enlargement, no cervical lymphadenopathy  Resp: effort normal, lungs clear bilaterally  CV: Regular rate and rhythm without murmurs, gallops or rubs, no lower extremity edema,     Labs 11/10/23 reviewed     Assessment/Plan:    Displaced fracture of medial condyle of left tibia - s/p ORIF - in physical therapy        Sleep apnea - get on cpap machine     Stage 4 large B cell lymphoma - finished chemo 6/26/19. Follow up with [facility] -saw 2/2023. Will be seen may 31,2024     History of Thombocytopenia - montiored     History of Anemia - s/p iron infusions years ago.  Doing well .       Anxiety and depression/insomnia/grief- continue therapy,  Continue  fluoxetine 40 mg every other daily . Propranolol 20 mg twice daily as needed.       Menopause - could be contributing to weight.      GERD - asx     Left breast abnormality on CT scan at [facility] - diagnostic left mammogram 8/16/2021 was fine - bilateral  Mammogram 7/2023   .   Obesity- can get back on metformin  mg one tablet daily for 7 days then 2 tabelts daily.       .      Colonoscopy 11/2018 - 1- 4 mm  hyperplastic polyp- given 10 years.     MM 7/2023 fine        I will see her back in 4 months sooner if problems arise.

## 2024-04-04 ENCOUNTER — CLINICAL SUPPORT (OUTPATIENT)
Dept: REHABILITATION | Facility: HOSPITAL | Age: 49
End: 2024-04-04
Payer: COMMERCIAL

## 2024-04-04 DIAGNOSIS — M25.662 DECREASED RANGE OF MOTION (ROM) OF LEFT KNEE: ICD-10-CM

## 2024-04-04 DIAGNOSIS — R29.898 WEAKNESS OF LEFT LOWER EXTREMITY: ICD-10-CM

## 2024-04-04 DIAGNOSIS — M25.562 ACUTE PAIN OF LEFT KNEE: Primary | ICD-10-CM

## 2024-04-04 PROCEDURE — 97110 THERAPEUTIC EXERCISES: CPT

## 2024-04-04 PROCEDURE — 97112 NEUROMUSCULAR REEDUCATION: CPT

## 2024-04-04 NOTE — PROGRESS NOTES
[facility]   Physical Therapy Treatment Note      Name: Terese Macias  Clinic Number: [id number]    Therapy Diagnosis:   Encounter Diagnoses   Name Primary?    Acute pain of left knee Yes    Decreased range of motion (ROM) of left knee     Weakness of left lower extremity            Physician: Meng Savage, *    Visit Date: 4/4/2024       Physician Orders: PT Eval and Treat   Medical Diagnosis from Referral: S82.142S (ICD-10-CM) - Displaced bicondylar fracture of left tibia, sequela   Evaluation Date: 3/12/2024  Authorization Period Expiration: 12/31/24  Plan of Care Expiration: 5/12/2024  Progress Note Due: 4/12/2024  Visit # / Visits authorized: 1/1,  4/20  FOTO: 5/5     FOTO Initial Evaluation: 26  FOTO 2nd F/U: NA  FOTO Discharge: NA     Precautions: WBAT        PTA Visit #: 0/5     Time In: 1055  Time Out: 1140  Total Billable Time: 45 minutes      Subjective     Pt reports: she continues to have stiffness in her knee.       She  was partially  compliant with home exercise program.  Response to previous treatment: no adverse affects  Functional change: progressing    Pain: 3/10  Location: left knee      Objective      Objective Measures updated at progress report unless specified.     Treatment     Terese received the treatments listed below:      therapeutic exercises to develop strength, endurance, ROM, flexibility, posture, and core stabilization for 35 minutes including:    Upright Bike rocking Seat # 4  - 10 minutes   Heel prop on 1/2 roll  - 3 min  - completed twice through session   Heel slides   - 2x10   Quarter squats on table   - 3x10   TKE w/ orange cord  - 3x10               HEP Review  Access Code: [email or web address]  URL: [email or web address]    Exercises  - Supine Knee Extension Stretch on Towel Roll  - 3 x daily - 7 x weekly - 2 sets - 3 minutes hold  - Supine Quad Set  - 3 x daily - 7 x weekly - 20 reps - 3s hold  - Supine Heel Slide with Strap  - 3 x daily - 7  x weekly - 2 sets - 10 reps  - Supine Knee Extension Strengthening  - 3 x daily - 7 x weekly - 2 sets - 10 reps  - Seated Long Arc Quad  - 3 x daily - 7 x weekly - 2 sets - 10 reps  - Quarter Squat with Table  - 3 x daily - 7 x weekly - 2 sets - 10 reps  - Standing Terminal Knee Extension with Resistance  - 3 x daily - 7 x weekly - 3 sets - 10 reps        manual therapy techniques: Soft tissue Mobilization patella mobs were applied to the: L knee for 0 minutes      neuromuscular re-education activities to improve: Balance, Coordination, Proprioception, and Motor Control for 10 minutes. The following activities were included:    Quad sets w/ heel lift   -1/2 roll under knee   - completed twice throughout session  SAQ strap assisted 20x with focus on TKE  - completed twice throughout session          therapeutic activities to improve functional performance for 0  minutes, including:      supervised modalities after being cleared for contradictions: NMES Electrical Stimulation:  Terese received NMES Electrical Stimulation to elicit muscle contraction of the quadriceps. Pt received stimulation at a rate of 20 pps with symmetric current, ramp of 2 seconds with 10 second on time and 20 second off time. Patient tolerated treatment well without any adverse effects.       cold pack for 5 minutes to L knee.    Patient Education and Home Exercises       Education provided:   - no active knee flexion  - ice and elevate the knee  - daily HEP    Written Home Exercises Provided: Patient instructed to cont prior HEP. Exercises were reviewed and Terese was able to demonstrate them prior to the end of the session.  Terese demonstrated good  understanding of the education provided. See EMR under Patient Instructions for exercises provided during therapy sessions    Assessment     Pt continues with decreased ROM and strength. Focused session quad activation and ROM. Patient needs encouragement to push her self. Will continue to progress  pt towards goals as tolerated. Patient takes increased time to perform majority of exercises.     Terese Is progressing well towards her goals.   Pt prognosis is Good.     Pt will continue to benefit from skilled outpatient physical therapy to address the deficits listed in the problem list box on initial evaluation, provide pt/family education and to maximize pt's level of independence in the home and community environment.     Pt's spiritual, cultural and educational needs considered and pt agreeable to plan of care and goals.     Anticipated barriers to physical therapy: none    Goals:   Short Term Goals: in 8 weeks     1. Pt will be independent with HEP supplement PT in improving functional mobility.  2. Pt will improve LLE strength to at least 75% of R LE strength as measured via MicroFet handheld dynamometer in order to improve functional mobility  3. Pt will improve L knee AROM to at least 0-0-120 in order to improve gait     Long Term Goals: in 16 weeks     1. Pt will be independent with updated HEP supplement PT in improving functional mobility.  2. Pt will improve L LE strength to at least 90% of R LE strength as measured via MicroFet handheld dynamometer in order to improve functional mobility  3. Pt will improve L knee AROM to at least 0-0-125 in order to improve gait and ability to perform ADLs  4. Pt will improve FOTO knee survey score to >/= 50 in order to demo improved functional mobility  5. Pt will perform TUG in < 10 seconds without AD in order to demo improved gait speed  6. Pt will perform at least 10 sit to stands without UE support on 30 second sit to stand test in order to demo improved ability to perform transfers    Plan     Plan of care Certification: 3/12/2024 to 5/12/2024.     Outpatient Physical Therapy 2 times weekly for 10 weeks to include the following interventions: Manual Therapy, Moist Heat/ Ice, Neuromuscular Re-ed, Patient Education, Self Care, Therapeutic Activities, Therapeutic  Exercise    PT/PTA met face to face to discuss pt's treatment plan and progress towards established goals. Pt will be seen by a physical therapist minimally every 6th visit or every 30 days.      Lawrence Sheridan, PT

## 2024-04-09 ENCOUNTER — CLINICAL SUPPORT (OUTPATIENT)
Dept: REHABILITATION | Facility: HOSPITAL | Age: 49
End: 2024-04-09
Payer: COMMERCIAL

## 2024-04-09 ENCOUNTER — OFFICE VISIT (OUTPATIENT)
Dept: INTERNAL MEDICINE | Facility: CLINIC | Age: 49
End: 2024-04-09
Payer: COMMERCIAL

## 2024-04-09 VITALS
HEIGHT: 62 IN | WEIGHT: 190.5 LBS | SYSTOLIC BLOOD PRESSURE: 94 MMHG | HEART RATE: 83 BPM | OXYGEN SATURATION: 99 % | DIASTOLIC BLOOD PRESSURE: 66 MMHG | BODY MASS INDEX: 35.06 KG/M2

## 2024-04-09 DIAGNOSIS — M25.662 DECREASED RANGE OF MOTION (ROM) OF LEFT KNEE: ICD-10-CM

## 2024-04-09 DIAGNOSIS — D69.6 THROMBOCYTOPENIA: ICD-10-CM

## 2024-04-09 DIAGNOSIS — S82.202D: ICD-10-CM

## 2024-04-09 DIAGNOSIS — C83.37 DIFFUSE LARGE B-CELL LYMPHOMA OF SPLEEN: ICD-10-CM

## 2024-04-09 DIAGNOSIS — M25.562 ACUTE PAIN OF LEFT KNEE: Primary | ICD-10-CM

## 2024-04-09 DIAGNOSIS — R29.898 WEAKNESS OF LEFT LOWER EXTREMITY: ICD-10-CM

## 2024-04-09 DIAGNOSIS — E66.01 SEVERE OBESITY (BMI 35.0-39.9) WITH COMORBIDITY: ICD-10-CM

## 2024-04-09 DIAGNOSIS — Z12.31 SCREENING MAMMOGRAM FOR BREAST CANCER: Primary | ICD-10-CM

## 2024-04-09 PROBLEM — F43.23 ADJUSTMENT DISORDER WITH MIXED ANXIETY AND DEPRESSED MOOD: Status: ACTIVE | Noted: 2019-04-12

## 2024-04-09 PROCEDURE — 97116 GAIT TRAINING THERAPY: CPT | Mod: CQ

## 2024-04-09 PROCEDURE — 97110 THERAPEUTIC EXERCISES: CPT | Mod: CQ

## 2024-04-09 PROCEDURE — 1159F MED LIST DOCD IN RCRD: CPT | Mod: CPTII,S$GLB,, | Performed by: INTERNAL MEDICINE

## 2024-04-09 PROCEDURE — 99999 PR PBB SHADOW E&M-EST. PATIENT-LVL V: CPT | Mod: PBBFAC,,, | Performed by: INTERNAL MEDICINE

## 2024-04-09 PROCEDURE — 97530 THERAPEUTIC ACTIVITIES: CPT | Mod: CQ

## 2024-04-09 PROCEDURE — 3074F SYST BP LT 130 MM HG: CPT | Mod: CPTII,S$GLB,, | Performed by: INTERNAL MEDICINE

## 2024-04-09 PROCEDURE — 3078F DIAST BP <80 MM HG: CPT | Mod: CPTII,S$GLB,, | Performed by: INTERNAL MEDICINE

## 2024-04-09 PROCEDURE — 99214 OFFICE O/P EST MOD 30 MIN: CPT | Mod: S$GLB,,, | Performed by: INTERNAL MEDICINE

## 2024-04-09 PROCEDURE — 97112 NEUROMUSCULAR REEDUCATION: CPT | Mod: CQ

## 2024-04-09 PROCEDURE — 3008F BODY MASS INDEX DOCD: CPT | Mod: CPTII,S$GLB,, | Performed by: INTERNAL MEDICINE

## 2024-04-09 NOTE — PROGRESS NOTES
[facility]   Physical Therapy Treatment Note      Name: Terese Macias  Clinic Number: [id number]    Therapy Diagnosis:   Encounter Diagnoses   Name Primary?    Acute pain of left knee Yes    Decreased range of motion (ROM) of left knee     Weakness of left lower extremity            Physician: Meng Savage, *    Visit Date: 4/9/2024       Physician Orders: PT Eval and Treat   Medical Diagnosis from Referral: S82.142S (ICD-10-CM) - Displaced bicondylar fracture of left tibia, sequela   Evaluation Date: 3/12/2024  Authorization Period Expiration: 12/31/24  Plan of Care Expiration: 5/12/2024  Progress Note Due: 4/12/2024  Visit # / Visits authorized: 1/1,  4/20  FOTO: 5/5     FOTO Initial Evaluation: 26  FOTO 2nd F/U: NA  FOTO Discharge: NA     Precautions: WBAT        PTA Visit #: 1/5     Time In: 1055  Time Out: 1140  Total Billable Time: 45 minutes      Subjective     Pt reports: she continues to have stiffness in her knee.     She  was partially  compliant with home exercise program.  Response to previous treatment: no adverse affects  Functional change: progressing    Pain: 3/10  Location: left knee      Objective      Objective Measures updated at progress report unless specified.     Treatment     Terese received the treatments listed below:      therapeutic exercises to develop strength, endurance, ROM, flexibility, posture, and core stabilization for 15 minutes including:    Upright Bike rocking Seat # 4 (Recumbent bike today 6 min)  - 10 minutes   Heel prop on 1/2 roll np  - 3 min  - completed twice through session   Heel slides np  - 2x10   Quarter squats on table   - 3x10   TKE w/ orange cord  - 3x10       GAIT TRAINING to improve functional mobility and safety for 14 minutes.  SBQC x 125' CGA level and unlevel surfaces      HEP Review  Access Code: [email or web address]  URL: [email or web address]    Exercises  - Supine Knee Extension Stretch on Towel Roll  - 3 x daily - 7 x  weekly - 2 sets - 3 minutes hold  - Supine Quad Set  - 3 x daily - 7 x weekly - 20 reps - 3s hold  - Supine Heel Slide with Strap  - 3 x daily - 7 x weekly - 2 sets - 10 reps  - Supine Knee Extension Strengthening  - 3 x daily - 7 x weekly - 2 sets - 10 reps  - Seated Long Arc Quad  - 3 x daily - 7 x weekly - 2 sets - 10 reps  - Quarter Squat with Table  - 3 x daily - 7 x weekly - 2 sets - 10 reps  - Standing Terminal Knee Extension with Resistance  - 3 x daily - 7 x weekly - 3 sets - 10 reps        manual therapy techniques: Soft tissue Mobilization patella mobs were applied to the: L knee for 0 minutes      neuromuscular re-education activities to improve: Balance, Coordination, Proprioception, and Motor Control for 8 minutes. The following activities were included:    Quad sets w/ heel lift np  -1/2 roll under knee   - completed twice throughout session  SAQ strap assisted 20x with focus on TKE np  - completed twice throughout session  LAQ 2x10      therapeutic activities to improve functional performance for 8 minutes, including:  Standing toe taps lvl 1 B 20x L (unable to perform R)  Weight shifting onto L LE B UE support 10x      supervised modalities after being cleared for contradictions: NMES Electrical Stimulation:  Terese received NMES Electrical Stimulation to elicit muscle contraction of the quadriceps. Pt received stimulation at a rate of 20 pps with symmetric current, ramp of 2 seconds with 10 second on time and 20 second off time. Patient tolerated treatment well without any adverse effects.       cold pack for 0 minutes to L knee.    Patient Education and Home Exercises       Education provided:   - no active knee flexion  - ice and elevate the knee  - daily HEP    Written Home Exercises Provided: Patient instructed to cont prior HEP. Exercises were reviewed and Terese was able to demonstrate them prior to the end of the session.  Terese demonstrated good  understanding of the education provided. See  EMR under Patient Instructions for exercises provided during therapy sessions    Assessment     Pt continues with decreased ROM and strength. Focused session quad activation, gait quality and ROM. Gait assessment revealed step to pattern with unsteadiness requiring CGA. Pain continues to hinder progressions.     Terese Is progressing well towards her goals.   Pt prognosis is Good.     Pt will continue to benefit from skilled outpatient physical therapy to address the deficits listed in the problem list box on initial evaluation, provide pt/family education and to maximize pt's level of independence in the home and community environment.     Pt's spiritual, cultural and educational needs considered and pt agreeable to plan of care and goals.     Anticipated barriers to physical therapy: none    Goals:   Short Term Goals: in 8 weeks     1. Pt will be independent with HEP supplement PT in improving functional mobility.  2. Pt will improve LLE strength to at least 75% of R LE strength as measured via MicroFet handheld dynamometer in order to improve functional mobility  3. Pt will improve L knee AROM to at least 0-0-120 in order to improve gait     Long Term Goals: in 16 weeks     1. Pt will be independent with updated HEP supplement PT in improving functional mobility.  2. Pt will improve L LE strength to at least 90% of R LE strength as measured via MicroFet handheld dynamometer in order to improve functional mobility  3. Pt will improve L knee AROM to at least 0-0-125 in order to improve gait and ability to perform ADLs  4. Pt will improve FOTO knee survey score to >/= 50 in order to demo improved functional mobility  5. Pt will perform TUG in < 10 seconds without AD in order to demo improved gait speed  6. Pt will perform at least 10 sit to stands without UE support on 30 second sit to stand test in order to demo improved ability to perform transfers    Plan     Plan of care Certification: 3/12/2024 to 5/12/2024.      Outpatient Physical Therapy 2 times weekly for 10 weeks to include the following interventions: Manual Therapy, Moist Heat/ Ice, Neuromuscular Re-ed, Patient Education, Self Care, Therapeutic Activities, Therapeutic Exercise    PT/PTA met face to face to discuss pt's treatment plan and progress towards established goals. Pt will be seen by a physical therapist minimally every 6th visit or every 30 days.      Rakesh Alcazar, PTA

## 2024-04-16 ENCOUNTER — CLINICAL SUPPORT (OUTPATIENT)
Dept: REHABILITATION | Facility: HOSPITAL | Age: 49
End: 2024-04-16
Payer: COMMERCIAL

## 2024-04-16 DIAGNOSIS — R29.898 WEAKNESS OF LEFT LOWER EXTREMITY: ICD-10-CM

## 2024-04-16 DIAGNOSIS — M25.562 ACUTE PAIN OF LEFT KNEE: Primary | ICD-10-CM

## 2024-04-16 DIAGNOSIS — M25.662 DECREASED RANGE OF MOTION (ROM) OF LEFT KNEE: ICD-10-CM

## 2024-04-16 PROCEDURE — 97116 GAIT TRAINING THERAPY: CPT

## 2024-04-16 PROCEDURE — 97112 NEUROMUSCULAR REEDUCATION: CPT

## 2024-04-16 PROCEDURE — 97530 THERAPEUTIC ACTIVITIES: CPT

## 2024-04-16 PROCEDURE — 97110 THERAPEUTIC EXERCISES: CPT

## 2024-04-16 NOTE — PROGRESS NOTES
[facility]   Physical Therapy Treatment Note      Name: Terese Macias  Clinic Number: [id number]    Therapy Diagnosis:   Encounter Diagnoses   Name Primary?    Acute pain of left knee Yes    Decreased range of motion (ROM) of left knee     Weakness of left lower extremity          Physician: Meng Savage, *    Visit Date: 4/16/2024       Physician Orders: PT Eval and Treat   Medical Diagnosis from Referral: S82.142S (ICD-10-CM) - Displaced bicondylar fracture of left tibia, sequela   Evaluation Date: 3/12/2024  Authorization Period Expiration: 12/31/24  Plan of Care Expiration: 5/12/2024  Progress Note Due: 4/12/2024  Visit # / Visits authorized: 1/1,  7/20  FOTO: 5/5     FOTO Initial Evaluation: 26  FOTO 2nd F/U: NA  FOTO Discharge: NA     Precautions: WBAT        PTA Visit #: 2/5     Time In: 235  Time Out: 330  Total Billable Time: 55 minutes      Subjective     Pt reports: she continues to have limited motion in her knee.     She  was partially  compliant with home exercise program.  Response to previous treatment: no adverse affects  Functional change: progressing    Pain: 3/10  Location: left knee      Objective      Objective Measures updated at progress report unless specified.     Treatment     Terese received the treatments listed below:      therapeutic exercises to develop strength, endurance, ROM, flexibility, posture, and core stabilization for 24 minutes including:    Upright Bike rocking Seat # 8 (Recumbent bike today 6 min)  - 10 minutes   Heel slides   - 2x10   Heel prop on 1/2 roll   - 3 min  - completed twice through session   TKE w/ orange cord  - 3x10       GAIT TRAINING to improve functional mobility and safety for 8 minutes.  SBQC x 125' CGA level and unlevel surfaces      HEP Review  Access Code: [email or web address]  URL: [email or web address]    Exercises  - Supine Knee Extension Stretch on Towel Roll  - 3 x daily - 7 x weekly - 2 sets - 3 minutes hold  -  Supine Quad Set  - 3 x daily - 7 x weekly - 20 reps - 3s hold  - Supine Heel Slide with Strap  - 3 x daily - 7 x weekly - 2 sets - 10 reps  - Supine Knee Extension Strengthening  - 3 x daily - 7 x weekly - 2 sets - 10 reps  - Seated Long Arc Quad  - 3 x daily - 7 x weekly - 2 sets - 10 reps  - Quarter Squat with Table  - 3 x daily - 7 x weekly - 2 sets - 10 reps  - Standing Terminal Knee Extension with Resistance  - 3 x daily - 7 x weekly - 3 sets - 10 reps        manual therapy techniques: Soft tissue Mobilization patella mobs were applied to the: L knee for 0 minutes      neuromuscular re-education activities to improve: Balance, Coordination, Proprioception, and Motor Control for 15 minutes. The following activities were included:    Quad sets w/ heel lift np  -1/2 roll under knee   - completed twice throughout session  SAQ strap assisted 20x with focus on TKE   - completed twice throughout session  LAQ 2x10      therapeutic activities to improve functional performance for 8 minutes, including:    Standing toe taps lvl 1 B 20x L   Quarter squats on table   - 3x10       Not today   supervised modalities after being cleared for contradictions: NMES Electrical Stimulation:  Terese received NMES Electrical Stimulation to elicit muscle contraction of the quadriceps. Pt received stimulation at a rate of 20 pps with symmetric current, ramp of 2 seconds with 10 second on time and 20 second off time. Patient tolerated treatment well without any adverse effects.       cold pack for 0 minutes to L knee.    Patient Education and Home Exercises       Education provided:   - no active knee flexion  - ice and elevate the knee  - daily HEP    Written Home Exercises Provided: Patient instructed to cont prior HEP. Exercises were reviewed and Terese was able to demonstrate them prior to the end of the session.  Terese demonstrated good  understanding of the education provided. See EMR under Patient Instructions for exercises provided  during therapy sessions    Assessment     Pt continues with decreased ROM and strength. Focused session quad activation, gait quality and ROM. Gait assessment revealed step to pattern with unsteadiness requiring CGA. Pain continues to hinder progressions.     Terese Is progressing well towards her goals.   Pt prognosis is Good.     Pt will continue to benefit from skilled outpatient physical therapy to address the deficits listed in the problem list box on initial evaluation, provide pt/family education and to maximize pt's level of independence in the home and community environment.     Pt's spiritual, cultural and educational needs considered and pt agreeable to plan of care and goals.     Anticipated barriers to physical therapy: none    Goals:   Short Term Goals: in 8 weeks     1. Pt will be independent with HEP supplement PT in improving functional mobility.  2. Pt will improve LLE strength to at least 75% of R LE strength as measured via MicroFet handheld dynamometer in order to improve functional mobility  3. Pt will improve L knee AROM to at least 0-0-120 in order to improve gait     Long Term Goals: in 16 weeks     1. Pt will be independent with updated HEP supplement PT in improving functional mobility.  2. Pt will improve L LE strength to at least 90% of R LE strength as measured via MicroFet handheld dynamometer in order to improve functional mobility  3. Pt will improve L knee AROM to at least 0-0-125 in order to improve gait and ability to perform ADLs  4. Pt will improve FOTO knee survey score to >/= 50 in order to demo improved functional mobility  5. Pt will perform TUG in < 10 seconds without AD in order to demo improved gait speed  6. Pt will perform at least 10 sit to stands without UE support on 30 second sit to stand test in order to demo improved ability to perform transfers    Plan     Plan of care Certification: 3/12/2024 to 5/12/2024.     Outpatient Physical Therapy 2 times weekly for 10  weeks to include the following interventions: Manual Therapy, Moist Heat/ Ice, Neuromuscular Re-ed, Patient Education, Self Care, Therapeutic Activities, Therapeutic Exercise    PT/PTA met face to face to discuss pt's treatment plan and progress towards established goals. Pt will be seen by a physical therapist minimally every 6th visit or every 30 days.      Lawrence Sheridan, PT

## 2024-04-18 ENCOUNTER — CLINICAL SUPPORT (OUTPATIENT)
Dept: REHABILITATION | Facility: HOSPITAL | Age: 49
End: 2024-04-18
Payer: COMMERCIAL

## 2024-04-18 DIAGNOSIS — M25.562 ACUTE PAIN OF LEFT KNEE: Primary | ICD-10-CM

## 2024-04-18 DIAGNOSIS — M25.662 DECREASED RANGE OF MOTION (ROM) OF LEFT KNEE: ICD-10-CM

## 2024-04-18 DIAGNOSIS — R29.898 WEAKNESS OF LEFT LOWER EXTREMITY: ICD-10-CM

## 2024-04-18 PROCEDURE — 97110 THERAPEUTIC EXERCISES: CPT | Mod: CQ

## 2024-04-18 PROCEDURE — 97112 NEUROMUSCULAR REEDUCATION: CPT | Mod: CQ

## 2024-04-18 NOTE — PROGRESS NOTES
[facility]   Physical Therapy Treatment Note      Name: Terese Macias  Clinic Number: [id number]    Therapy Diagnosis:   Encounter Diagnoses   Name Primary?    Acute pain of left knee Yes    Decreased range of motion (ROM) of left knee     Weakness of left lower extremity            Physician: Meng Savage, *    Visit Date: 4/18/2024       Physician Orders: PT Eval and Treat   Medical Diagnosis from Referral: S82.142S (ICD-10-CM) - Displaced bicondylar fracture of left tibia, sequela   Evaluation Date: 3/12/2024  Authorization Period Expiration: 12/31/24  Plan of Care Expiration: 5/12/2024  Progress Note Due: 4/12/2024  Visit # / Visits authorized: 1/1,  8/20  FOTO: 2/3     FOTO Initial Evaluation: 26  FOTO 2nd F/U: 42  FOTO Discharge: NA     Precautions: WBAT        PTA Visit #: 2/5     Time In: 230  Time Out: 339  Total Billable Time: 61 minutes  (8 min CP )    Subjective     Pt reports: she is having minimal pain today, its more so in the inside of her knee.    She  was partially  compliant with home exercise program.  Response to previous treatment: no adverse affects  Functional change: rollator vs RW, able to perform car transfers with greater ease.    Pain: 2/10  Location: left knee      Objective      Objective Measures updated at progress report unless specified.     Treatment     Terese received the treatments listed below:      therapeutic exercises to develop strength, endurance, ROM, flexibility, posture, and core stabilization for 31 minutes including:    Upright Bike rocking Seat # 8   - 10 minutes   Heel slides   - 2x10   Heel prop on 1/2 roll   - 3 min  - completed twice through session   TKE w/ orange cord  - 3x10       GAIT TRAINING to improve functional mobility and safety for 0 minutes.  SBQC x 125' CGA level and unlevel surfaces      HEP Review  Access Code: [email or web address]  URL: [email or web address]    Exercises  - Supine Knee Extension Stretch on Towel Roll   - 3 x daily - 7 x weekly - 2 sets - 3 minutes hold  - Supine Quad Set  - 3 x daily - 7 x weekly - 20 reps - 3s hold  - Supine Heel Slide with Strap  - 3 x daily - 7 x weekly - 2 sets - 10 reps  - Supine Knee Extension Strengthening  - 3 x daily - 7 x weekly - 2 sets - 10 reps  - Seated Long Arc Quad  - 3 x daily - 7 x weekly - 2 sets - 10 reps  - Quarter Squat with Table  - 3 x daily - 7 x weekly - 2 sets - 10 reps  - Standing Terminal Knee Extension with Resistance  - 3 x daily - 7 x weekly - 3 sets - 10 reps        manual therapy techniques: Soft tissue Mobilization patella mobs were applied to the: L knee for 0 minutes      neuromuscular re-education activities to improve: Balance, Coordination, Proprioception, and Motor Control for 30 minutes. The following activities were included:    Quad sets w/ heel lift np  -1/2 roll under knee   - completed twice throughout session  SAQ strap assisted 20x with focus on TKE   - completed twice throughout session  LAQ 2x10      therapeutic activities to improve functional performance for 0 minutes, including:    Standing toe taps lvl 1 B 20x L   Quarter squats on table   - 3x10       Not today   supervised modalities after being cleared for contradictions: NMES Electrical Stimulation:  Terese received NMES Electrical Stimulation to elicit muscle contraction of the quadriceps. Pt received stimulation at a rate of 20 pps with symmetric current, ramp of 2 seconds with 10 second on time and 20 second off time. Patient tolerated treatment well without any adverse effects.       cold pack for 8 minutes to L knee.    Patient Education and Home Exercises       Education provided:   - no active knee flexion  - ice and elevate the knee  - daily HEP    Written Home Exercises Provided: Patient instructed to cont prior HEP. Exercises were reviewed and Terese was able to demonstrate them prior to the end of the session.  Terese demonstrated good  understanding of the education provided. See  EMR under Patient Instructions for exercises provided during therapy sessions    Assessment     Pt continues with decreased ROM and strength. Focused session quad activation, gait quality and ROM. Patient arrived with rollator vs RW but continued step to pattern. Plan to incorporate passive knee flexion next visit. Pt educated on importance of HEP, ice and elevation. Plus retro massage to reduce edema. Pain continues to hinder progressions.     Terese Is progressing well towards her goals.   Pt prognosis is Good.     Pt will continue to benefit from skilled outpatient physical therapy to address the deficits listed in the problem list box on initial evaluation, provide pt/family education and to maximize pt's level of independence in the home and community environment.     Pt's spiritual, cultural and educational needs considered and pt agreeable to plan of care and goals.     Anticipated barriers to physical therapy: none    Goals:   Short Term Goals: in 8 weeks     1. Pt will be independent with HEP supplement PT in improving functional mobility.  2. Pt will improve LLE strength to at least 75% of R LE strength as measured via MicroFet handheld dynamometer in order to improve functional mobility  3. Pt will improve L knee AROM to at least 0-0-120 in order to improve gait     Long Term Goals: in 16 weeks     1. Pt will be independent with updated HEP supplement PT in improving functional mobility.  2. Pt will improve L LE strength to at least 90% of R LE strength as measured via MicroFet handheld dynamometer in order to improve functional mobility  3. Pt will improve L knee AROM to at least 0-0-125 in order to improve gait and ability to perform ADLs  4. Pt will improve FOTO knee survey score to >/= 50 in order to demo improved functional mobility  5. Pt will perform TUG in < 10 seconds without AD in order to demo improved gait speed  6. Pt will perform at least 10 sit to stands without UE support on 30 second sit  to stand test in order to demo improved ability to perform transfers    Plan     Plan of care Certification: 3/12/2024 to 5/12/2024.     Outpatient Physical Therapy 2 times weekly for 10 weeks to include the following interventions: Manual Therapy, Moist Heat/ Ice, Neuromuscular Re-ed, Patient Education, Self Care, Therapeutic Activities, Therapeutic Exercise    PT/PTA met face to face to discuss pt's treatment plan and progress towards established goals. Pt will be seen by a physical therapist minimally every 6th visit or every 30 days.      Rakesh Alcazar, PTA

## 2024-04-23 ENCOUNTER — CLINICAL SUPPORT (OUTPATIENT)
Dept: REHABILITATION | Facility: HOSPITAL | Age: 49
End: 2024-04-23
Payer: COMMERCIAL

## 2024-04-23 DIAGNOSIS — M25.562 ACUTE PAIN OF LEFT KNEE: Primary | ICD-10-CM

## 2024-04-23 DIAGNOSIS — R29.898 WEAKNESS OF LEFT LOWER EXTREMITY: ICD-10-CM

## 2024-04-23 DIAGNOSIS — M25.662 DECREASED RANGE OF MOTION (ROM) OF LEFT KNEE: ICD-10-CM

## 2024-04-23 PROCEDURE — 97112 NEUROMUSCULAR REEDUCATION: CPT | Mod: CQ

## 2024-04-23 PROCEDURE — 97110 THERAPEUTIC EXERCISES: CPT | Mod: CQ

## 2024-04-23 NOTE — PROGRESS NOTES
[facility]   Physical Therapy Treatment Note      Name: Terese Macias  Clinic Number: [id number]    Therapy Diagnosis:   Encounter Diagnoses   Name Primary?    Acute pain of left knee Yes    Decreased range of motion (ROM) of left knee     Weakness of left lower extremity            Physician: Meng Savage, *    Visit Date: 4/23/2024       Physician Orders: PT Eval and Treat   Medical Diagnosis from Referral: S82.142S (ICD-10-CM) - Displaced bicondylar fracture of left tibia, sequela   Evaluation Date: 3/12/2024  Authorization Period Expiration: 12/31/24  Plan of Care Expiration: 5/12/2024  Progress Note Due: 4/12/2024  Visit # / Visits authorized: 1/1,  8/20  FOTO: 2/3     FOTO Initial Evaluation: 26  FOTO 2nd F/U: 42  FOTO Discharge: NA     Precautions: WBAT        PTA Visit #: 2/5     Time In: 230  Time Out: 339  Total Billable Time: 61 minutes  (8 min CP )    Subjective     Pt reports: Medial knee pain continues. Her doctor mentioned that she may need an additional surgery to re-align femur and tibia if the PT doesn't.    She  was partially  compliant with home exercise program.  Response to previous treatment: no adverse affects  Functional change: rollator vs RW, able to perform car transfers with greater ease.    Pain: 2/10  Location: left knee      Objective      Objective Measures updated at progress report unless specified.     Treatment     Terese received the treatments listed below:      therapeutic exercises to develop strength, endurance, ROM, flexibility, posture, and core stabilization for 31 minutes including:    Upright Bike rocking Seat # 8   - 10 minutes   Heel slides   - 2x10   Heel prop on 1/2 roll   - 3 min  - completed twice through session   TKE w/ orange cord  - 3x10   Gastroc wedge 3x30s    GAIT TRAINING to improve functional mobility and safety for 0 minutes.  SBQC x 125' CGA level and unlevel surfaces      HEP Review  Access Code: [email or web address]  URL:  [email or web address]    Exercises  - Supine Knee Extension Stretch on Towel Roll  - 3 x daily - 7 x weekly - 2 sets - 3 minutes hold  - Supine Quad Set  - 3 x daily - 7 x weekly - 20 reps - 3s hold  - Supine Heel Slide with Strap  - 3 x daily - 7 x weekly - 2 sets - 10 reps  - Supine Knee Extension Strengthening  - 3 x daily - 7 x weekly - 2 sets - 10 reps  - Seated Long Arc Quad  - 3 x daily - 7 x weekly - 2 sets - 10 reps  - Quarter Squat with Table  - 3 x daily - 7 x weekly - 2 sets - 10 reps  - Standing Terminal Knee Extension with Resistance  - 3 x daily - 7 x weekly - 3 sets - 10 reps        manual therapy techniques: Soft tissue Mobilization patella mobs were applied to the: L knee for 0 minutes      neuromuscular re-education activities to improve: Balance, Coordination, Proprioception, and Motor Control for 30 minutes. The following activities were included:    Quad sets w/ heel lift   -1/2 roll under knee   - completed twice throughout session  SAQ strap assisted 20x with focus on TKE   - completed twice throughout session  LAQ 3x10      therapeutic activities to improve functional performance for 0 minutes, including:    Standing toe taps lvl 1 B 20x L   Quarter squats on table   - 3x10       Not today   supervised modalities after being cleared for contradictions: NMES Electrical Stimulation:  Terese received NMES Electrical Stimulation to elicit muscle contraction of the quadriceps. Pt received stimulation at a rate of 20 pps with symmetric current, ramp of 2 seconds with 10 second on time and 20 second off time. Patient tolerated treatment well without any adverse effects.       cold pack for 8 minutes to L knee.    Patient Education and Home Exercises       Education provided:   - no active knee flexion  - ice and elevate the knee  - daily HEP    Written Home Exercises Provided: Patient instructed to cont prior HEP. Exercises were reviewed and Terese was able to demonstrate them prior to the end  of the session.  Terese demonstrated good  understanding of the education provided. See EMR under Patient Instructions for exercises provided during therapy sessions    Assessment   Pt remains significantly limited in her knee flexion. She is still unable to make full rotations on up right bike. Extension lag present with SLR, slightly better with quad activation. She is now able to perform a SAQ without any assistance from strap. VC to not compensate hip flexors with quad sets, and educated to perform concurrently with her R LE.     Terese Is progressing well towards her goals.   Pt prognosis is Good.     Pt will continue to benefit from skilled outpatient physical therapy to address the deficits listed in the problem list box on initial evaluation, provide pt/family education and to maximize pt's level of independence in the home and community environment.     Pt's spiritual, cultural and educational needs considered and pt agreeable to plan of care and goals.     Anticipated barriers to physical therapy: none    Goals:   Short Term Goals: in 8 weeks     1. Pt will be independent with HEP supplement PT in improving functional mobility.  2. Pt will improve LLE strength to at least 75% of R LE strength as measured via MicroFet handheld dynamometer in order to improve functional mobility  3. Pt will improve L knee AROM to at least 0-0-120 in order to improve gait     Long Term Goals: in 16 weeks     1. Pt will be independent with updated HEP supplement PT in improving functional mobility.  2. Pt will improve L LE strength to at least 90% of R LE strength as measured via MicroFet handheld dynamometer in order to improve functional mobility  3. Pt will improve L knee AROM to at least 0-0-125 in order to improve gait and ability to perform ADLs  4. Pt will improve FOTO knee survey score to >/= 50 in order to demo improved functional mobility  5. Pt will perform TUG in < 10 seconds without AD in order to demo improved gait  speed  6. Pt will perform at least 10 sit to stands without UE support on 30 second sit to stand test in order to demo improved ability to perform transfers    Plan     Plan of care Certification: 3/12/2024 to 5/12/2024.     Outpatient Physical Therapy 2 times weekly for 10 weeks to include the following interventions: Manual Therapy, Moist Heat/ Ice, Neuromuscular Re-ed, Patient Education, Self Care, Therapeutic Activities, Therapeutic Exercise    PT/PTA met face to face to discuss pt's treatment plan and progress towards established goals. Pt will be seen by a physical therapist minimally every 6th visit or every 30 days.      Rakesh Alcazar, PTA

## 2024-04-25 ENCOUNTER — CLINICAL SUPPORT (OUTPATIENT)
Dept: REHABILITATION | Facility: HOSPITAL | Age: 49
End: 2024-04-25
Payer: COMMERCIAL

## 2024-04-25 DIAGNOSIS — M25.662 DECREASED RANGE OF MOTION (ROM) OF LEFT KNEE: ICD-10-CM

## 2024-04-25 DIAGNOSIS — R29.898 WEAKNESS OF LEFT LOWER EXTREMITY: ICD-10-CM

## 2024-04-25 DIAGNOSIS — M25.562 ACUTE PAIN OF LEFT KNEE: Primary | ICD-10-CM

## 2024-04-25 PROCEDURE — 97530 THERAPEUTIC ACTIVITIES: CPT

## 2024-04-25 PROCEDURE — 97110 THERAPEUTIC EXERCISES: CPT

## 2024-04-25 PROCEDURE — 97112 NEUROMUSCULAR REEDUCATION: CPT

## 2024-04-25 NOTE — PROGRESS NOTES
[facility]   Physical Therapy Treatment Note      Name: Terese Macias  Clinic Number: [id number]    Therapy Diagnosis:   Encounter Diagnoses   Name Primary?    Acute pain of left knee Yes    Decreased range of motion (ROM) of left knee     Weakness of left lower extremity              Physician: Meng Savage, *    Visit Date: 4/25/2024       Physician Orders: PT Eval and Treat   Medical Diagnosis from Referral: S82.142S (ICD-10-CM) - Displaced bicondylar fracture of left tibia, sequela   Evaluation Date: 3/12/2024  Authorization Period Expiration: 12/31/24  Plan of Care Expiration: 5/12/2024  Progress Note Due: 4/12/2024  Visit # / Visits authorized: 1/1,  9/20  FOTO: 2/3     FOTO Initial Evaluation: 26  FOTO 2nd F/U: 42  FOTO Discharge: NA     Precautions: WBAT        PTA Visit #: 0/5     Time In: 150  Time Out: 230  Total Billable Time: 40 minutes      Subjective     Pt reports:  Medial knee pain continues. Her doctor mentioned that she may need an additional surgery to re-align femur and tibia if the PT doesn't.    She  was partially  compliant with home exercise program.  Response to previous treatment: no adverse affects  Functional change: rollator vs RW, able to perform car transfers with greater ease.    Pain: 2/10  Location: left knee      Objective      Objective Measures updated at progress report unless specified.     Treatment     Terese received the treatments listed below:      therapeutic exercises to develop strength, endurance, ROM, flexibility, posture, and core stabilization for 10 minutes including:    Upright Bike revolutions Seat # 11  - 10 minutes           Not today   Heel slides   - 2x10   Heel prop on 1/2 roll   - 3 min  - completed twice through session   TKE w/ orange cord  - 3x10   Gastroc wedge 3x30s    GAIT TRAINING to improve functional mobility and safety for 0 minutes.  SBQC x 125' CGA level and unlevel surfaces      HEP Review  Access Code:  [email or web address]  URL: [email or web address]    Exercises  - Supine Knee Extension Stretch on Towel Roll  - 3 x daily - 7 x weekly - 2 sets - 3 minutes hold  - Supine Quad Set  - 3 x daily - 7 x weekly - 20 reps - 3s hold  - Supine Heel Slide with Strap  - 3 x daily - 7 x weekly - 2 sets - 10 reps  - Supine Knee Extension Strengthening  - 3 x daily - 7 x weekly - 2 sets - 10 reps  - Seated Long Arc Quad  - 3 x daily - 7 x weekly - 2 sets - 10 reps  - Quarter Squat with Table  - 3 x daily - 7 x weekly - 2 sets - 10 reps  - Standing Terminal Knee Extension with Resistance  - 3 x daily - 7 x weekly - 3 sets - 10 reps        manual therapy techniques: Soft tissue Mobilization patella mobs were applied to the: L knee for 0 minutes      neuromuscular re-education activities to improve: Balance, Coordination, Proprioception, and Motor Control for 10 minutes. The following activities were included:    SAQ 30x with focus on TKE   SAQ + SLR 2x10         Quad sets w/ heel lift   -1/2 roll under knee   - completed twice throughout session  LAQ 3x10      therapeutic activities to improve functional performance for 20 minutes, including:    Standing toe taps lvl 1 B 20x L   Standing hip 20x ea  - marching  - abd   - ext  Half squats on table   - 3x10   Standing TKE orange 2x10       Not today   supervised modalities after being cleared for contradictions: NMES Electrical Stimulation:  Terese received NMES Electrical Stimulation to elicit muscle contraction of the quadriceps. Pt received stimulation at a rate of 20 pps with symmetric current, ramp of 2 seconds with 10 second on time and 20 second off time. Patient tolerated treatment well without any adverse effects.       cold pack for 8 minutes to L knee.    Patient Education and Home Exercises       Education provided:   - no active knee flexion  - ice and elevate the knee  - daily HEP    Written Home Exercises Provided: Patient instructed to cont prior HEP. Exercises were  reviewed and Terese was able to demonstrate them prior to the end of the session.  Terese demonstrated good  understanding of the education provided. See EMR under Patient Instructions for exercises provided during therapy sessions    Assessment    Pt remains significantly limited in her knee flexion. She continues to be late for the start of therapy which limits our time to complete a full session. Patient did a revolution on the bike but the seat level had to be raised to 11. Patient is improving with knee extension strength.      Terese Is progressing well towards her goals.   Pt prognosis is Good.     Pt will continue to benefit from skilled outpatient physical therapy to address the deficits listed in the problem list box on initial evaluation, provide pt/family education and to maximize pt's level of independence in the home and community environment.     Pt's spiritual, cultural and educational needs considered and pt agreeable to plan of care and goals.     Anticipated barriers to physical therapy: none    Goals:   Short Term Goals: in 8 weeks     1. Pt will be independent with HEP supplement PT in improving functional mobility.  2. Pt will improve LLE strength to at least 75% of R LE strength as measured via MicroFet handheld dynamometer in order to improve functional mobility  3. Pt will improve L knee AROM to at least 0-0-120 in order to improve gait     Long Term Goals: in 16 weeks     1. Pt will be independent with updated HEP supplement PT in improving functional mobility.  2. Pt will improve L LE strength to at least 90% of R LE strength as measured via MicroFet handheld dynamometer in order to improve functional mobility  3. Pt will improve L knee AROM to at least 0-0-125 in order to improve gait and ability to perform ADLs  4. Pt will improve FOTO knee survey score to >/= 50 in order to demo improved functional mobility  5. Pt will perform TUG in < 10 seconds without AD in order to demo improved gait  speed  6. Pt will perform at least 10 sit to stands without UE support on 30 second sit to stand test in order to demo improved ability to perform transfers    Plan     Plan of care Certification: 3/12/2024 to 5/12/2024.     Outpatient Physical Therapy 2 times weekly for 10 weeks to include the following interventions: Manual Therapy, Moist Heat/ Ice, Neuromuscular Re-ed, Patient Education, Self Care, Therapeutic Activities, Therapeutic Exercise    PT/PTA met face to face to discuss pt's treatment plan and progress towards established goals. Pt will be seen by a physical therapist minimally every 6th visit or every 30 days.      Lawrence Sheridan, PT

## 2024-04-30 ENCOUNTER — CLINICAL SUPPORT (OUTPATIENT)
Dept: REHABILITATION | Facility: HOSPITAL | Age: 49
End: 2024-04-30
Payer: COMMERCIAL

## 2024-04-30 DIAGNOSIS — M25.562 ACUTE PAIN OF LEFT KNEE: Primary | ICD-10-CM

## 2024-04-30 DIAGNOSIS — M25.662 DECREASED RANGE OF MOTION (ROM) OF LEFT KNEE: ICD-10-CM

## 2024-04-30 DIAGNOSIS — R29.898 WEAKNESS OF LEFT LOWER EXTREMITY: ICD-10-CM

## 2024-04-30 PROCEDURE — 97116 GAIT TRAINING THERAPY: CPT | Mod: CQ

## 2024-04-30 PROCEDURE — 97530 THERAPEUTIC ACTIVITIES: CPT | Mod: CQ

## 2024-04-30 NOTE — PROGRESS NOTES
[facility]   Physical Therapy Treatment Note      Name: Terese Macias  Clinic Number: [id number]    Therapy Diagnosis:   Encounter Diagnoses   Name Primary?    Acute pain of left knee Yes    Decreased range of motion (ROM) of left knee     Weakness of left lower extremity              Physician: Meng Savage, *    Visit Date: 4/30/2024       Physician Orders: PT Eval and Treat   Medical Diagnosis from Referral: S82.142S (ICD-10-CM) - Displaced bicondylar fracture of left tibia, sequela   Evaluation Date: 3/12/2024  Authorization Period Expiration: 12/31/24  Plan of Care Expiration: 5/12/2024  Progress Note Due: 4/12/2024  Visit # / Visits authorized: 1/1,  11/20  FOTO: 2/3     FOTO Initial Evaluation: 26  FOTO 2nd F/U: 42  FOTO Discharge: NA     Precautions: WBAT        PTA Visit #: 1/5     Time In: 235  Time Out: 317  Total Billable Time: 42 minutes      Subjective     Pt reports: she is inquiring about returning to work and driving timeline.    She  was partially  compliant with home exercise program.  Response to previous treatment: no adverse affects  Functional change: rollator vs RW, able to perform car transfers with greater ease.    Pain: 2/10  Location: left knee      Objective      Objective Measures updated at progress report unless specified.     Treatment     Terese received the treatments listed below:      therapeutic exercises to develop strength, endurance, ROM, flexibility, posture, and core stabilization for 10 minutes including:    Upright Bike revolutions   - 5 minutes seat # 11  - 5 min seat # 9      cold pack for 0 minutes to L knee.      GAIT TRAINING to improve functional mobility and safety for 10 minutes.  SBQC x 125' CGA level and unlevel surfaces      HEP Review  Access Code: [email or web address]  URL: [email or web address]    Exercises  - Supine Knee Extension Stretch on Towel Roll  - 3 x daily - 7 x weekly - 2 sets - 3 minutes hold  - Supine Quad Set  - 3  x daily - 7 x weekly - 20 reps - 3s hold  - Supine Heel Slide with Strap  - 3 x daily - 7 x weekly - 2 sets - 10 reps  - Supine Knee Extension Strengthening  - 3 x daily - 7 x weekly - 2 sets - 10 reps  - Seated Long Arc Quad  - 3 x daily - 7 x weekly - 2 sets - 10 reps  - Quarter Squat with Table  - 3 x daily - 7 x weekly - 2 sets - 10 reps  - Standing Terminal Knee Extension with Resistance  - 3 x daily - 7 x weekly - 3 sets - 10 reps        manual therapy techniques: Soft tissue Mobilization patella mobs were applied to the: L knee for 0 minutes      neuromuscular re-education activities to improve: Balance, Coordination, Proprioception, and Motor Control for 0 minutes. The following activities were included:    SAQ 30x with focus on TKE   SAQ + SLR 2x10         Quad sets w/ heel lift   -1/2 roll under knee   - completed twice throughout session  LAQ 3x10      therapeutic activities to improve functional performance for 32 minutes, including:    Standing toe taps lvl 1 B 20x L   Standing hip 20x ea  - marching  - abd   - ext  Half squats on table   - 3x10   Standing TKE orange 2x10   Stair training  Step ups lvl 1 20x B  Knee flexion mob lvl 4 step 10x15s    Not today   Heel slides   - 2x10   Heel prop on 1/2 roll   - 3 min  - completed twice through session   TKE w/ orange cord  - 3x10   Gastroc wedge 3x30s      Patient Education and Home Exercises       Education provided:   - no active knee flexion  - ice and elevate the knee  - daily HEP    Written Home Exercises Provided: Patient instructed to cont prior HEP. Exercises were reviewed and Terese was able to demonstrate them prior to the end of the session.  Terese demonstrated good  understanding of the education provided. See EMR under Patient Instructions for exercises provided during therapy sessions    Assessment    Pt remains significantly limited in her knee flexion. Patient needs extra encouragement to advance. Improved gait with SBQC as she is able to  tolerate greater weight bearing t/o L LE. Educated patient on stair negotiating as she has been avoiding this area. Informed patient to consult with physician regarding driving or returning to work.    Terese Is progressing well towards her goals.   Pt prognosis is Good.     Pt will continue to benefit from skilled outpatient physical therapy to address the deficits listed in the problem list box on initial evaluation, provide pt/family education and to maximize pt's level of independence in the home and community environment.     Pt's spiritual, cultural and educational needs considered and pt agreeable to plan of care and goals.     Anticipated barriers to physical therapy: none    Goals:   Short Term Goals: in 8 weeks     1. Pt will be independent with HEP supplement PT in improving functional mobility.  2. Pt will improve LLE strength to at least 75% of R LE strength as measured via MicroFet handheld dynamometer in order to improve functional mobility  3. Pt will improve L knee AROM to at least 0-0-120 in order to improve gait     Long Term Goals: in 16 weeks     1. Pt will be independent with updated HEP supplement PT in improving functional mobility.  2. Pt will improve L LE strength to at least 90% of R LE strength as measured via MicroFet handheld dynamometer in order to improve functional mobility  3. Pt will improve L knee AROM to at least 0-0-125 in order to improve gait and ability to perform ADLs  4. Pt will improve FOTO knee survey score to >/= 50 in order to demo improved functional mobility  5. Pt will perform TUG in < 10 seconds without AD in order to demo improved gait speed  6. Pt will perform at least 10 sit to stands without UE support on 30 second sit to stand test in order to demo improved ability to perform transfers    Plan     Plan of care Certification: 3/12/2024 to 5/12/2024.     Outpatient Physical Therapy 2 times weekly for 10 weeks to include the following interventions: Manual Therapy,  Moist Heat/ Ice, Neuromuscular Re-ed, Patient Education, Self Care, Therapeutic Activities, Therapeutic Exercise    PT/PTA met face to face to discuss pt's treatment plan and progress towards established goals. Pt will be seen by a physical therapist minimally every 6th visit or every 30 days.      Rakesh Alcazar, PTA

## 2024-05-03 ENCOUNTER — CLINICAL SUPPORT (OUTPATIENT)
Dept: REHABILITATION | Facility: HOSPITAL | Age: 49
End: 2024-05-03
Payer: COMMERCIAL

## 2024-05-03 DIAGNOSIS — M25.562 ACUTE PAIN OF LEFT KNEE: Primary | ICD-10-CM

## 2024-05-03 DIAGNOSIS — R29.898 WEAKNESS OF LEFT LOWER EXTREMITY: ICD-10-CM

## 2024-05-03 DIAGNOSIS — M25.662 DECREASED RANGE OF MOTION (ROM) OF LEFT KNEE: ICD-10-CM

## 2024-05-03 PROCEDURE — 97530 THERAPEUTIC ACTIVITIES: CPT | Mod: CQ

## 2024-05-03 PROCEDURE — 97110 THERAPEUTIC EXERCISES: CPT | Mod: CQ

## 2024-05-03 NOTE — PROGRESS NOTES
[facility]   Physical Therapy Treatment Note      Name: Terese Macias  Clinic Number: [id number]    Therapy Diagnosis:   Encounter Diagnoses   Name Primary?    Acute pain of left knee Yes    Decreased range of motion (ROM) of left knee     Weakness of left lower extremity                Physician: Meng Savage, *    Visit Date: 5/3/2024       Physician Orders: PT Eval and Treat   Medical Diagnosis from Referral: S82.142S (ICD-10-CM) - Displaced bicondylar fracture of left tibia, sequela   Evaluation Date: 3/12/2024  Authorization Period Expiration: 12/31/24  Plan of Care Expiration: 5/12/2024  Progress Note Due: 4/12/2024  Visit # / Visits authorized: 1/1,  11/20  FOTO: 2/3     FOTO Initial Evaluation: 26  FOTO 2nd F/U: 42  FOTO Discharge: NA     Precautions: WBAT        PTA Visit #: 1/5     Time In: 110  Time Out: 155  Total Billable Time: 45 minutes      Subjective     Pt reports: she is inquiring about returning to work and driving timeline.    She  was partially  compliant with home exercise program.  Response to previous treatment: no adverse affects  Functional change: rollator vs RW, able to perform car transfers with greater ease.    Pain: 0/10  Location: left knee      Objective      Objective Measures updated at progress report unless specified.     Treatment     Terese received the treatments listed below:      therapeutic exercises to develop strength, endurance, ROM, flexibility, posture, and core stabilization for 10 minutes including:    Upright Bike revolutions   - 5 minutes seat # 11  - 5 min seat # 8      cold pack for 0 minutes to L knee.      GAIT TRAINING to improve functional mobility and safety for 00 minutes.  SBQC x 125' CGA level and unlevel surfaces      HEP Review  Access Code: [email or web address]  URL: [email or web address]    Exercises  - Supine Knee Extension Stretch on Towel Roll  - 3 x daily - 7 x weekly - 2 sets - 3 minutes hold  - Supine Quad Set  - 3  "x daily - 7 x weekly - 20 reps - 3s hold  - Supine Heel Slide with Strap  - 3 x daily - 7 x weekly - 2 sets - 10 reps  - Supine Knee Extension Strengthening  - 3 x daily - 7 x weekly - 2 sets - 10 reps  - Seated Long Arc Quad  - 3 x daily - 7 x weekly - 2 sets - 10 reps  - Quarter Squat with Table  - 3 x daily - 7 x weekly - 2 sets - 10 reps  - Standing Terminal Knee Extension with Resistance  - 3 x daily - 7 x weekly - 3 sets - 10 reps        manual therapy techniques: Soft tissue Mobilization patella mobs were applied to the: L knee for 0 minutes      neuromuscular re-education activities to improve: Balance, Coordination, Proprioception, and Motor Control for 0 minutes. The following activities were included:    SAQ 30x with focus on TKE   SAQ + SLR 2x10   Quad sets w/ heel lift   -1/2 roll under knee   - completed twice throughout session  LAQ 3x10      therapeutic activities to improve functional performance for 32 minutes, including:    Standing hip 20x ea  - marching  - abd   - ext  Half squats on table   - 3x10     Step ups lvl 2 20x B  Knee flexion mob lvl 4 step 10x15s  Prone quad stretch 4x30"      Not today   Heel slides   - 2x10   Heel prop on 1/2 roll   - 3 min  - completed twice through session   TKE w/ orange cord  - 3x10   Gastroc wedge 3x30s  Standing toe taps lvl 1 B 20x L   Stair training nt  Patient Education and Home Exercises       Education provided:   - no active knee flexion  - ice and elevate the knee  - daily HEP    Written Home Exercises Provided: Patient instructed to cont prior HEP. Exercises were reviewed and Terese was able to demonstrate them prior to the end of the session.  Terese demonstrated good  understanding of the education provided. See EMR under Patient Instructions for exercises provided during therapy sessions    Assessment    Pt remains significantly limited in her knee flexion, therefore majority of session with concentration on improving in this area. She remains with " step to gait pattern with use of rollator. Progressed patient with lvl 2 step ups patient with heavy use of UE support to manage. Prone quad stretch added to help facilitate greater flexion range. Issued this for home use as well.    Terese Is progressing well towards her goals.   Pt prognosis is Good.     Pt will continue to benefit from skilled outpatient physical therapy to address the deficits listed in the problem list box on initial evaluation, provide pt/family education and to maximize pt's level of independence in the home and community environment.     Pt's spiritual, cultural and educational needs considered and pt agreeable to plan of care and goals.     Anticipated barriers to physical therapy: none    Goals:   Short Term Goals: in 8 weeks     1. Pt will be independent with HEP supplement PT in improving functional mobility.  2. Pt will improve LLE strength to at least 75% of R LE strength as measured via MicroFet handheld dynamometer in order to improve functional mobility  3. Pt will improve L knee AROM to at least 0-0-120 in order to improve gait     Long Term Goals: in 16 weeks     1. Pt will be independent with updated HEP supplement PT in improving functional mobility.  2. Pt will improve L LE strength to at least 90% of R LE strength as measured via MicroFet handheld dynamometer in order to improve functional mobility  3. Pt will improve L knee AROM to at least 0-0-125 in order to improve gait and ability to perform ADLs  4. Pt will improve FOTO knee survey score to >/= 50 in order to demo improved functional mobility  5. Pt will perform TUG in < 10 seconds without AD in order to demo improved gait speed  6. Pt will perform at least 10 sit to stands without UE support on 30 second sit to stand test in order to demo improved ability to perform transfers    Plan     Plan of care Certification: 3/12/2024 to 5/12/2024.     Outpatient Physical Therapy 2 times weekly for 10 weeks to include the  following interventions: Manual Therapy, Moist Heat/ Ice, Neuromuscular Re-ed, Patient Education, Self Care, Therapeutic Activities, Therapeutic Exercise    PT/PTA met face to face to discuss pt's treatment plan and progress towards established goals. Pt will be seen by a physical therapist minimally every 6th visit or every 30 days.      Rakesh Alcazar, PTA

## 2024-05-06 ENCOUNTER — OFFICE VISIT (OUTPATIENT)
Dept: PSYCHIATRY | Facility: CLINIC | Age: 49
End: 2024-05-06
Payer: COMMERCIAL

## 2024-05-06 DIAGNOSIS — F41.1 GAD (GENERALIZED ANXIETY DISORDER): ICD-10-CM

## 2024-05-06 DIAGNOSIS — R41.840 INATTENTION: ICD-10-CM

## 2024-05-06 DIAGNOSIS — F33.1 MDD (MAJOR DEPRESSIVE DISORDER), RECURRENT EPISODE, MODERATE: Primary | ICD-10-CM

## 2024-05-06 PROCEDURE — 90837 PSYTX W PT 60 MINUTES: CPT | Mod: 95,,, | Performed by: PSYCHOLOGIST

## 2024-05-06 NOTE — PROGRESS NOTES
Individual Psychotherapy (PhD/LCSW)    5/6/2024    Therapeutic Intervention: Met with patient.  Outpatient - Behavior modifying psychotherapy 60 min - CPT code 94696    The patient location is: Patient's home/ Patient reported that her location at the time of this visit was in the [facility]     Visit type: Virtual visit with synchronous audio and video     Each patient to whom he or she provides medical services by telemedicine is: (1) informed of the relationship between the physician and patient and the respective role of any other health care provider with respect to management of the patient; and (2) notified that he or she may decline to receive medical services by telemedicine and may withdraw from such care at any time.     Chief complaint/reason for encounter: depression and anxiety     Interval history and content of current session: Patient presented casually dressed, alert, and oriented.  Patient reported experiencing depressed mood, diminished interest, insomnia, fatigue, worthlessness/guilt, poor concentration, altered libido, social isolation, excessive anxiety/worry, muscle tension, difficulty concentrating, forgetfulness, easily distracted, and panic attacks.  Patient denied current suicidal and homicidal ideations.  Explored source of patient's depressed mood.  Patient described that she worries about issues related to family, personal safety, health, and employment, among other things.  Active listening skills were used as patient described her recent job search and her desire to buy her own home.  Assisted patient in processing her disappointment around currently living with her mother.    Patient was taught behavioral coping strategies such as sharing of feelings, setting boundaries, and increased assertiveness as ways to reduce feelings of depression and anxiety.  Explored ways for patient to set boundaries with both her mother and daughter.        Treatment plan:  Target symptoms:  depression, anxiety   Why chosen therapy is appropriate versus another modality: relevant to diagnosis  Outcome monitoring methods: self-report  Therapeutic intervention type: insight oriented psychotherapy, behavior modifying psychotherapy    Risk parameters:  Patient reports no suicidal ideation  Patient reports no homicidal ideation  Patient reports no self-injurious behavior  Patient reports no violent behavior    Verbal deficits: None    Patient's response to intervention:  The patient's response to intervention is accepting.    Progress toward goals and other mental status changes:  The patient's progress toward goals is fair .    Diagnosis:     ICD-10-CM ICD-9-CM   1. MDD (major depressive disorder), recurrent episode, moderate  F33.1 296.32   2. DARYL (generalized anxiety disorder)  F41.1 300.02   3. Inattention  R41.840 799.51       Plan:  individual psychotherapy and consult psychiatrist for medication evaluation    Return to clinic: 2 weeks    Length of Service (minutes): 60

## 2024-05-07 ENCOUNTER — CLINICAL SUPPORT (OUTPATIENT)
Dept: REHABILITATION | Facility: HOSPITAL | Age: 49
End: 2024-05-07
Payer: COMMERCIAL

## 2024-05-07 DIAGNOSIS — M25.662 DECREASED RANGE OF MOTION (ROM) OF LEFT KNEE: ICD-10-CM

## 2024-05-07 DIAGNOSIS — M25.562 ACUTE PAIN OF LEFT KNEE: Primary | ICD-10-CM

## 2024-05-07 DIAGNOSIS — R29.898 WEAKNESS OF LEFT LOWER EXTREMITY: ICD-10-CM

## 2024-05-07 PROCEDURE — 97530 THERAPEUTIC ACTIVITIES: CPT | Mod: CQ

## 2024-05-07 PROCEDURE — 97110 THERAPEUTIC EXERCISES: CPT | Mod: CQ

## 2024-05-07 NOTE — PROGRESS NOTES
[facility]   Physical Therapy Treatment Note      Name: Terese Macias  Clinic Number: [id number]    Therapy Diagnosis:   Encounter Diagnoses   Name Primary?    Acute pain of left knee Yes    Decreased range of motion (ROM) of left knee     Weakness of left lower extremity                Physician: Meng Savage, *    Visit Date: 5/7/2024       Physician Orders: PT Eval and Treat   Medical Diagnosis from Referral: S82.142S (ICD-10-CM) - Displaced bicondylar fracture of left tibia, sequela   Evaluation Date: 3/12/2024  Authorization Period Expiration: 12/31/24  Plan of Care Expiration: 5/12/2024  Progress Note Due: 4/12/2024  Visit # / Visits authorized: 1/1,  11/20  FOTO: 2/3     FOTO Initial Evaluation: 26  FOTO 2nd F/U: 42  FOTO Discharge: NA     Precautions: WBAT        PTA Visit #: 1/5     Time In: 230 pm  Time Out: 315 pm  Total Billable Time: 45 minutes      Subjective     Pt reports: she is starting to trust the knee more and walk around the house without use of her assistive device.    She  was partially  compliant with home exercise program.  Response to previous treatment: no adverse affects  Functional change: rollator vs RW, able to perform car transfers with greater ease.    Pain: 0/10  Location: left knee      Objective      Objective Measures updated at progress report unless specified.     Treatment     Terese received the treatments listed below:      therapeutic exercises to develop strength, endurance, ROM, flexibility, posture, and core stabilization for 10 minutes including:    Upright Bike revolutions   - 5 minutes seat # 11  - 5 min seat # 8      cold pack for 0 minutes to L knee.      GAIT TRAINING to improve functional mobility and safety for 00 minutes.  SBQC x 125' CGA level and unlevel surfaces      HEP Review  Access Code: [email or web address]  URL: [email or web address]    Exercises  - Supine Knee Extension Stretch on Towel Roll  - 3 x daily - 7 x weekly - 2  "sets - 3 minutes hold  - Supine Quad Set  - 3 x daily - 7 x weekly - 20 reps - 3s hold  - Supine Heel Slide with Strap  - 3 x daily - 7 x weekly - 2 sets - 10 reps  - Supine Knee Extension Strengthening  - 3 x daily - 7 x weekly - 2 sets - 10 reps  - Seated Long Arc Quad  - 3 x daily - 7 x weekly - 2 sets - 10 reps  - Quarter Squat with Table  - 3 x daily - 7 x weekly - 2 sets - 10 reps  - Standing Terminal Knee Extension with Resistance  - 3 x daily - 7 x weekly - 3 sets - 10 reps        manual therapy techniques: Soft tissue Mobilization patella mobs were applied to the: L knee for 0 minutes      neuromuscular re-education activities to improve: Balance, Coordination, Proprioception, and Motor Control for 0 minutes. The following activities were included:    SAQ 30x with focus on TKE   SAQ + SLR 2x10   Quad sets w/ heel lift   -1/2 roll under knee   - completed twice throughout session  LAQ 3x10      therapeutic activities to improve functional performance for 35 minutes, including:    Standing hip 20x ea  - marching  - abd   - ext  Half squats on table   - 3x10   STS no UE standard chair 2x10  Step ups lvl 2 20x L fwd and lateral  Knee flexion mob lvl 4 step 10x15s  Prone quad stretch 4x30"      Not today   Heel slides   - 2x10   Heel prop on 1/2 roll   - 3 min  - completed twice through session   TKE w/ orange cord  - 3x10   Gastroc wedge 3x30s  Standing toe taps lvl 1 B 20x L   Stair training nt  Patient Education and Home Exercises       Education provided:   - no active knee flexion  - ice and elevate the knee  - daily HEP    Written Home Exercises Provided: Patient instructed to cont prior HEP. Exercises were reviewed and Terese was able to demonstrate them prior to the end of the session.  Terese demonstrated good  understanding of the education provided. See EMR under Patient Instructions for exercises provided during therapy sessions    Assessment    Pt is slowly improving. She has more confidence with " regards to knee stability. Patient with L knee flexion avoidance when performing squats, VC and TC to correct. Progressed patients functional strengthening with STS. Patient would like to try SPC training next session to discontinue RW.    Terese Is progressing well towards her goals.   Pt prognosis is Good.     Pt will continue to benefit from skilled outpatient physical therapy to address the deficits listed in the problem list box on initial evaluation, provide pt/family education and to maximize pt's level of independence in the home and community environment.     Pt's spiritual, cultural and educational needs considered and pt agreeable to plan of care and goals.     Anticipated barriers to physical therapy: none    Goals:   Short Term Goals: in 8 weeks     1. Pt will be independent with HEP supplement PT in improving functional mobility.  2. Pt will improve LLE strength to at least 75% of R LE strength as measured via MicroFet handheld dynamometer in order to improve functional mobility  3. Pt will improve L knee AROM to at least 0-0-120 in order to improve gait     Long Term Goals: in 16 weeks     1. Pt will be independent with updated HEP supplement PT in improving functional mobility.  2. Pt will improve L LE strength to at least 90% of R LE strength as measured via MicroFet handheld dynamometer in order to improve functional mobility  3. Pt will improve L knee AROM to at least 0-0-125 in order to improve gait and ability to perform ADLs  4. Pt will improve FOTO knee survey score to >/= 50 in order to demo improved functional mobility  5. Pt will perform TUG in < 10 seconds without AD in order to demo improved gait speed  6. Pt will perform at least 10 sit to stands without UE support on 30 second sit to stand test in order to demo improved ability to perform transfers    Plan     Plan of care Certification: 3/12/2024 to 5/12/2024.     Outpatient Physical Therapy 2 times weekly for 10 weeks to include the  following interventions: Manual Therapy, Moist Heat/ Ice, Neuromuscular Re-ed, Patient Education, Self Care, Therapeutic Activities, Therapeutic Exercise    PT/PTA met face to face to discuss pt's treatment plan and progress towards established goals. Pt will be seen by a physical therapist minimally every 6th visit or every 30 days.      Rakesh Alcazar, PTA

## 2024-05-09 ENCOUNTER — CLINICAL SUPPORT (OUTPATIENT)
Dept: REHABILITATION | Facility: HOSPITAL | Age: 49
End: 2024-05-09
Payer: COMMERCIAL

## 2024-05-09 ENCOUNTER — OFFICE VISIT (OUTPATIENT)
Dept: PSYCHIATRY | Facility: CLINIC | Age: 49
End: 2024-05-09
Payer: COMMERCIAL

## 2024-05-09 VITALS
SYSTOLIC BLOOD PRESSURE: 118 MMHG | BODY MASS INDEX: 35.22 KG/M2 | WEIGHT: 191.38 LBS | DIASTOLIC BLOOD PRESSURE: 70 MMHG | HEIGHT: 62 IN | HEART RATE: 80 BPM

## 2024-05-09 DIAGNOSIS — R29.898 WEAKNESS OF LEFT LOWER EXTREMITY: ICD-10-CM

## 2024-05-09 DIAGNOSIS — F33.1 DEPRESSION, MAJOR, RECURRENT, MODERATE: ICD-10-CM

## 2024-05-09 DIAGNOSIS — M25.662 DECREASED RANGE OF MOTION (ROM) OF LEFT KNEE: ICD-10-CM

## 2024-05-09 DIAGNOSIS — F43.23 ADJUSTMENT DISORDER WITH MIXED ANXIETY AND DEPRESSED MOOD: Primary | ICD-10-CM

## 2024-05-09 DIAGNOSIS — M25.562 ACUTE PAIN OF LEFT KNEE: Primary | ICD-10-CM

## 2024-05-09 PROCEDURE — 97530 THERAPEUTIC ACTIVITIES: CPT | Mod: CQ

## 2024-05-09 PROCEDURE — 97110 THERAPEUTIC EXERCISES: CPT | Mod: CQ

## 2024-05-09 PROCEDURE — 3074F SYST BP LT 130 MM HG: CPT | Mod: CPTII,S$GLB,, | Performed by: FAMILY MEDICINE

## 2024-05-09 PROCEDURE — 99205 OFFICE O/P NEW HI 60 MIN: CPT | Mod: S$GLB,,, | Performed by: FAMILY MEDICINE

## 2024-05-09 PROCEDURE — 3078F DIAST BP <80 MM HG: CPT | Mod: CPTII,S$GLB,, | Performed by: FAMILY MEDICINE

## 2024-05-09 PROCEDURE — 1159F MED LIST DOCD IN RCRD: CPT | Mod: CPTII,S$GLB,, | Performed by: FAMILY MEDICINE

## 2024-05-09 PROCEDURE — 1160F RVW MEDS BY RX/DR IN RCRD: CPT | Mod: CPTII,S$GLB,, | Performed by: FAMILY MEDICINE

## 2024-05-09 PROCEDURE — 99999 PR PBB SHADOW E&M-EST. PATIENT-LVL III: CPT | Mod: PBBFAC,,, | Performed by: FAMILY MEDICINE

## 2024-05-09 PROCEDURE — 3008F BODY MASS INDEX DOCD: CPT | Mod: CPTII,S$GLB,, | Performed by: FAMILY MEDICINE

## 2024-05-09 RX ORDER — FLUOXETINE 10 MG/1
10 CAPSULE ORAL DAILY
Qty: 30 CAPSULE | Refills: 11 | Status: SHIPPED | OUTPATIENT
Start: 2024-05-09 | End: 2025-05-09

## 2024-05-09 NOTE — PROGRESS NOTES
"Outpatient Psychiatry Initial Visit ([facility])    2024    Terese Macias, a 48 y.o. female, presenting for initial evaluation visit. Met with patient.    Reason for Encounter:  to establish care and medication management.*. Patient complains of: Needing medication management for depression and anxiety    History of Present Illness:     Patient has mental health hx of major depressive disorder, recurrent episode, moderate and generalized anxiety.  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 Patient states this means she will have to apply to jobs that require her to get paid less. Patient states she is currently living with her mother after her father . States mom is having finical issues and she does not want to leave her alone. States since her father  everything has been placed on her and she feels her mom needs to see a therapist because it is becoming too much for her. States she feels like she has had ADD and feels like it is harder to manage. States she is making simple mistakes and not able to manage things. States she had a boss that she was not able to manager her expectations. States she is taking Prozac 40 mg every other day due to taking pain medication for her knee. Was suggested by PCP an Pharmacy to take Prozac every other day. Patient states she feels moments where medication is working. Patient is tearful during the interview when speaking about her father dying during COVID. Patient states that she had cancer 5 years ago and still has to go to [facility] for follow ups.     Mental Health hx of Major depressive disorder, recurrent episode, moderate and Generalized anxiety.      Mood: "ok"   Depression:" 6/10"  Anxiety:" 8/10"      Standardized Screenings tools:   PHQ9:  Moderately Severe -18  DARYL- 7: Moderate Anxiety -14  Mood Disorder Questionnaire:   Adult ADHD Self-Report Scale: Part A: Part B:       2024     1021 "   Depression Patient Health Questionnaire (PHQ-9)     Over the last two weeks how often have you been bothered by little interest or pleasure in doing things More than half the days   Over the last two weeks how often have you been bothered by feeling down, depressed or hopeless More than half the days   Over the last two weeks how often have you been bothered by trouble falling or staying asleep, or sleeping too much More than half the days   Over the last two weeks how often have you been bothered by feeling tired or having little energy Nearly every day   Over the last two weeks how often have you been bothered by a poor appetite or overeating Several days   Over the last two weeks how often have you been bothered by feeling bad about yourself - or that you are a failure or have let yourself or your family down More than half the days   Over the last two weeks how often have you been bothered by trouble concentrating on things, such as reading the newspaper or watching television Nearly every day   Over the last two weeks how often have you been bothered by moving or speaking so slowly that other people could have noticed. Or the opposite - being so fidgety or restless that you have been moving around a lot more than usual. Nearly every day   Over the last two weeks how often have you been bothered by thoughts that you would be better off dead, or of hurting yourself Not at all   If you checked off any problems, how difficult have these problems made it for you to do your work, take care of things at home or get along with other people? Somewhat difficult   PHQ-9 Score 18   PHQ-9 Interpretation Moderately Severe       Generalized Anxiety Disorder 7-Item Scale           Appointment from 5/9/2024 in [facility] [address]    5/9/2024    1025     Testing Status     Was test performed? Yes   Generalized Anxiety Disorder 7-item (DARYL-7) Scale. HOW OFTEN DURING THE PAST 2 WEEKS HAVE YOU FELT BOTHERED BY:      1. Feeling nervous, anxious, or on edge? Nearly everyday   2. Not being able to stop or control worrying? Nearly everyday   3. Worrying too much about different things? Nearly everyday   4. Trouble relaxing? More than half the days   5. Being so restless that it is hard to sit still? Not at all   6. Becoming easily annoyed or irritable? Several days   7. Feeling afraid as if something awful might happen? More than half the days   DARYL-7 Score 14   Number answered (out of first 7) 7   Interpretation Moderate Anxiety                     Stressors:  On medical leave from work states getting half of her pay. Living with her mom. States her mom has a lot of stress. States financial strain.    History:     Past Psychiatric History:   Previous therapy: yes  Previous psychiatric treatment and medication trials: yes - Prozac 40 mg   Previous psychiatric hospitalizations: no  Previous diagnoses: yes - MDD, DARYL  Previous suicide attempts: no   History of violence: no  Currently in treatment with .  Suicidal Ideation: States had a feeling of not wanting to be here but no plan. Thoughts were 5 years ago   Auditory Hallucination: Denies   Visual Hallucination:Denies   Education: post college graduate work or degree    Social History:  Housing: House   Lives with: mom   Marital status: single   Children: daughter 25  Education: Master's   Special Ed:Denies   Legal:Denies   Employment: medical leave   Access to gun:Denies   Hx of abuse:Denies     Substance Abuse History:  Recreational drugs: Denies   Alcohol: Denies   Tobacco use: Denies   Rehab:Denies     Family Hx: Denies     Neuro Hx  Seizure:Denies   Head trauma/TBI:Denies       Review Of Systems:     Medical Review Of Systems:  Pertinent positives noted in HPI    Psychiatric Review Of Systems:  Sleep: yes states sleeping well   Appetite changes: yes because of medication   Weight changes: no  Energy: yes  Anhedonia no  Somatic symptoms: no   Anxiety/panic:  yes  Guilty/hopeless: yes  Self-injurious behavior/risky behavior: no  Any drugs: no  Alcohol: no       Current Evaluation:       Mental Status Evaluation:  Appearance:  unremarkable, age appropriate, casually dressed, neatly groomed   Behavior:  normal, cooperative   Speech:  soft   Mood:  sad   Affect:  congruent and appropriate   Thought Process:  normal and logical   Thought Content:  normal, no suicidality, no homicidality, delusions, or paranoia   Sensorium:  grossly intact, person, place, situation, time/date, day of week, month of year, year   Cognition:  grossly intact, fund of knowledge 4 of 4 recent presidents, and memory > 3 objects at five mins, able to remember recent events- Yes   Insight:  limited awareness of illness   Judgment:  behavior is adequate to circumstances, age appropriate     Physical/Somatic Complaints   The patient lists: pain and left knee pain     Constitutional  unremarkable, age appropriate, casually dressed, neatly groomed       Laboratory Data  No visits with results within 1 Month(s) from this visit.   Latest known visit with results is:   Lab Visit on 11/10/2023   Component Date Value Ref Range Status    WBC 11/10/2023 9.65  3.90 - 12.70 K/uL Final    RBC 11/10/2023 4.26  4.00 - 5.40 M/uL Final    Hemoglobin 11/10/2023 12.0  12.0 - 16.0 g/dL Final    Hematocrit 11/10/2023 37.2  37.0 - 48.5 % Final    MCV 11/10/2023 87  82 - 98 fL Final    MCH 11/10/2023 28.2  27.0 - 31.0 pg Final    MCHC 11/10/2023 32.3  32.0 - 36.0 g/dL Final    RDW 11/10/2023 13.7  11.5 - 14.5 % Final    Platelets 11/10/2023 247  150 - 450 K/uL Final    MPV 11/10/2023 11.2  9.2 - 12.9 fL Final    Immature Granulocytes 11/10/2023 0.2  0.0 - 0.5 % Final    Gran # (ANC) 11/10/2023 5.8  1.8 - 7.7 K/uL Final    Immature Grans (Abs) 11/10/2023 0.02  0.00 - 0.04 K/uL Final    Lymph # 11/10/2023 3.2  1.0 - 4.8 K/uL Final    Mono # 11/10/2023 0.6  0.3 - 1.0 K/uL Final    Eos # 11/10/2023 0.1  0.0 - 0.5 K/uL Final     Baso # 11/10/2023 0.02  0.00 - 0.20 K/uL Final    nRBC 11/10/2023 0  0 /100 WBC Final    Gran % 11/10/2023 59.8  38.0 - 73.0 % Final    Lymph % 11/10/2023 32.6  18.0 - 48.0 % Final    Mono % 11/10/2023 6.1  4.0 - 15.0 % Final    Eosinophil % 11/10/2023 1.1  0.0 - 8.0 % Final    Basophil % 11/10/2023 0.2  0.0 - 1.9 % Final    Differential Method 11/10/2023 Automated   Final    Sodium 11/10/2023 138  136 - 145 mmol/L Final    Potassium 11/10/2023 3.9  3.5 - 5.1 mmol/L Final    Chloride 11/10/2023 104  95 - 110 mmol/L Final    CO2 11/10/2023 27  23 - 29 mmol/L Final    Glucose 11/10/2023 89  70 - 110 mg/dL Final    BUN 11/10/2023 13  6 - 20 mg/dL Final    Creatinine 11/10/2023 0.8  0.5 - 1.4 mg/dL Final    Calcium 11/10/2023 9.5  8.7 - 10.5 mg/dL Final    Total Protein 11/10/2023 8.1  6.0 - 8.4 g/dL Final    Albumin 11/10/2023 3.8  3.5 - 5.2 g/dL Final    Total Bilirubin 11/10/2023 0.4  0.1 - 1.0 mg/dL Final    Alkaline Phosphatase 11/10/2023 81  55 - 135 U/L Final    AST 11/10/2023 17  10 - 40 U/L Final    ALT 11/10/2023 15  10 - 44 U/L Final    eGFR 11/10/2023 >60.0  >60 mL/min/1.73 m^2 Final    Anion Gap 11/10/2023 7 (L)  8 - 16 mmol/L Final    Cholesterol 11/10/2023 172  120 - 199 mg/dL Final    Triglycerides 11/10/2023 55  30 - 150 mg/dL Final    HDL 11/10/2023 56  40 - 75 mg/dL Final    LDL Cholesterol 11/10/2023 105.0  63.0 - 159.0 mg/dL Final    HDL/Cholesterol Ratio 11/10/2023 32.6  20.0 - 50.0 % Final    Total Cholesterol/HDL Ratio 11/10/2023 3.1  2.0 - 5.0 Final    Non-HDL Cholesterol 11/10/2023 116  mg/dL Final    TSH 11/10/2023 1.433  0.400 - 4.000 uIU/mL Final         Medications  Outpatient Encounter Medications as of 5/9/2024   Medication Sig Dispense Refill    aspirin (ECOTRIN) 81 MG EC tablet Take 1 tablet by mouth once daily.      cetirizine (ZYRTEC) 10 MG tablet Take 10 mg by mouth.      cholecalciferol, vitamin D3, 1,250 mcg (50,000 unit) capsule Take 1 capsule (50,000 Units total) by mouth once a  week. 12 capsule 6    FLUoxetine 40 MG capsule Take 1 capsule (40 mg total) by mouth once daily. 90 capsule 1    fluticasone (FLONASE) 50 mcg/actuation nasal spray 1 spray (50 mcg total) by Each Nare route once daily. 1 Bottle 0    ibuprofen (ADVIL,MOTRIN) 800 MG tablet Take 800 mg by mouth every 6 (six) hours as needed.      linaCLOtide (LINZESS) 145 mcg Cap capsule Take 1 capsule (145 mcg total) by mouth once daily. 30 capsule 5    metFORMIN (GLUCOPHAGE-XR) 500 MG ER 24hr tablet One tablet daily for 7 days then 2 tablets daily. 180 tablet 3    methocarbamoL (ROBAXIN) 500 MG Tab Take 1 tablet (500 mg total) by mouth 3 (three) times daily as needed (muscle spasm). 40 tablet 0    ondansetron (ZOFRAN-ODT) 4 MG TbDL Take 4 mg by mouth every 6 (six) hours as needed.      oxyCODONE (ROXICODONE) 10 mg Tab immediate release tablet Take 10 mg by mouth every 6 (six) hours as needed for Pain.      propranoloL (INDERAL) 20 MG tablet Take 1 tablet (20 mg total) by mouth 2 (two) times daily. 60 tablet 5    traZODone (DESYREL) 50 MG tablet Take 1 tablet (50 mg total) by mouth nightly as needed for Insomnia. 90 tablet 1     No facility-administered encounter medications on file as of 5/9/2024.           Assessment - Diagnosis - Goals:     Impression: Terese Macias, a 48 y.o. female, presenting for initial evaluation visit.medication management     Diagnosis: Generalized anxiety, Major depressive disorder recurrent episode, moderate     Strengths and Liabilities: Strength: Patient accepts guidance/feedback, Strength: Patient is expressive/articulate., Strength: Patient is intelligent., Liability: Patient has no suport network., Liability: Patient has poor health.    Treatment Goals:    Anxiety: acquiring relapse prevention skills, reducing negative automatic thoughts, and reducing physical symptoms of anxiety  Depression: acquiring relapse prevention skills and increasing energy    Treatment Plan/Recommendations:   Medication  Management: Continue current medications.    Increase Prozac to 50 mg daily. Take 40 mg and 10 mg tablets to equal 50 mg     Discussed diagnosis, risks and benefits of proposed treatment above vs alternative treatments vs no treatment, and potential side effects of these treatments, and the inherent unpredicatbility of individual response to treatment.The patient expresses understanding and gives informed consent to pursue treatment at this time believing that the potential benefits outweight the potential risks. Patient has no other questions. Risks/adverse effects discussed at this time including but not limited to: GI side effects, sexual dysfunction, activation vs sedation, triggering of suicidal thoughts, and serotonin syndrome.  I discussed with the patient the risks of Extrapyramidal Side Effects (dystonia, akathisia, parkinsonism), Metabolic syndrome (including Hyperglycemia, hyperlipidemia), Neuroleptic Malignant syndrome (fever, muscle rigidity, autonomic instability), Orthostatic hypotension, Tardive Dyskinesia with antipsychotic use.  Patient voices understanding and agreement with this plan  Provided crisis numbers  Encouraged patient to keep future appointments.  Instructed patient to call or message with questions  In the event of an emergency, including suicidal ideation, patient was advised to go to the emergency room      Return to Clinic:  1 month or a month and a half    Total time: 60 minutes with more than 50% of time spent counseling and/or coordinating care.  (which included pts differential diagnosis and prognosis for psychiatric conditions, risks, benefits of treatments, instructions and adherence to treatment plan, risk reduction, reviewing current psychiatric medication regimen, medical problems and social stressors. In addition to possible discussion with other healthcare provider/s)    Vika Cruz, Nurse Practitioner, Psychiatry

## 2024-05-09 NOTE — PROGRESS NOTES
[facility]   Physical Therapy Treatment Note      Name: Terese Macias  Clinic Number: [id number]    Therapy Diagnosis:   Encounter Diagnoses   Name Primary?    Acute pain of left knee Yes    Decreased range of motion (ROM) of left knee     Weakness of left lower extremity                Physician: Meng Savage, *    Visit Date: 5/9/2024       Physician Orders: PT Eval and Treat   Medical Diagnosis from Referral: S82.142S (ICD-10-CM) - Displaced bicondylar fracture of left tibia, sequela   Evaluation Date: 3/12/2024  Authorization Period Expiration: 12/31/24  Plan of Care Expiration: 5/12/2024  Progress Note Due: 4/12/2024  Visit # / Visits authorized: 1/1,  11/20  FOTO: 2/3     FOTO Initial Evaluation: 26  FOTO 2nd F/U: 42  FOTO Discharge: NA     Precautions: WBAT        PTA Visit #: 3/5     Time In: 1:05 pm  Time Out: 2:00 pm  Total Billable Time: 55 minutes      Subjective     Pt reports: she has stairs at home but is still sleeping downstairs since her surgery.    She  was partially  compliant with home exercise program.  Response to previous treatment: no adverse affects  Functional change: rollator vs RW, able to perform car transfers with greater ease.    Pain: 0/10  Location: left knee      Objective      Objective Measures updated at progress report unless specified.     Treatment     Terese received the treatments listed below:      therapeutic exercises to develop strength, endurance, ROM, flexibility, posture, and core stabilization for 10 minutes including:    Upright Bike revolutions   - 5 minutes seat # 11  - 5 min seat # 8      cold pack for 0 minutes to L knee.      GAIT TRAINING to improve functional mobility and safety for 00 minutes.  SBQC x 125' CGA level and unlevel surfaces      HEP Review  Access Code: [email or web address]  URL: [email or web address]    Exercises  - Supine Knee Extension Stretch on Towel Roll  - 3 x daily - 7 x weekly - 2 sets - 3 minutes  "hold  - Supine Quad Set  - 3 x daily - 7 x weekly - 20 reps - 3s hold  - Supine Heel Slide with Strap  - 3 x daily - 7 x weekly - 2 sets - 10 reps  - Supine Knee Extension Strengthening  - 3 x daily - 7 x weekly - 2 sets - 10 reps  - Seated Long Arc Quad  - 3 x daily - 7 x weekly - 2 sets - 10 reps  - Quarter Squat with Table  - 3 x daily - 7 x weekly - 2 sets - 10 reps  - Standing Terminal Knee Extension with Resistance  - 3 x daily - 7 x weekly - 3 sets - 10 reps        manual therapy techniques: Soft tissue Mobilization patella mobs were applied to the: L knee for 0 minutes      neuromuscular re-education activities to improve: Balance, Coordination, Proprioception, and Motor Control for 0 minutes. The following activities were included:    SAQ 30x with focus on TKE   SAQ + SLR 2x10   Quad sets w/ heel lift   -1/2 roll under knee   - completed twice throughout session  LAQ 3x10      therapeutic activities to improve functional performance for 45 minutes, including:    Standing hip 2x12 ea  - marching  - abd   - ext  Half squats at table 2x10  Step ups to level 2 forward and lateral 2x10 Left  Knee flexion stretch Level 4 step 15"x10      Not today   Heel slides   - 2x10   Heel prop on 1/2 roll   - 3 min  - completed twice through session   TKE w/ orange cord  - 3x10   Gastroc wedge 3x30s  Standing toe taps lvl 1 B 20x L   Stair training nt  Patient Education and Home Exercises       Education provided:   - no active knee flexion  - ice and elevate the knee  - daily HEP    Written Home Exercises Provided: Patient instructed to cont prior HEP. Exercises were reviewed and Terese was able to demonstrate them prior to the end of the session.  Terese demonstrated good  understanding of the education provided. See EMR under Patient Instructions for exercises provided during therapy sessions    Assessment    Pt is slowly improving. She has increased confidence with regards to knee stability, but demonstrates decreased " weightbearing on left. Patient progressing with L knee flexion when performing squats. Continues to require verbal and tactile cues for technique with standing therex and used mirror feedback today to maintain weightbearing on left when performing therex.  Patient would like to try SPC training next session to progress mobility.    Terese Is progressing well towards her goals.   Pt prognosis is Good.     Pt will continue to benefit from skilled outpatient physical therapy to address the deficits listed in the problem list box on initial evaluation, provide pt/family education and to maximize pt's level of independence in the home and community environment.     Pt's spiritual, cultural and educational needs considered and pt agreeable to plan of care and goals.     Anticipated barriers to physical therapy: none    Goals:   Short Term Goals: in 8 weeks     1. Pt will be independent with HEP supplement PT in improving functional mobility.  2. Pt will improve LLE strength to at least 75% of R LE strength as measured via MicroFet handheld dynamometer in order to improve functional mobility  3. Pt will improve L knee AROM to at least 0-0-120 in order to improve gait     Long Term Goals: in 16 weeks     1. Pt will be independent with updated HEP supplement PT in improving functional mobility.  2. Pt will improve L LE strength to at least 90% of R LE strength as measured via MicroFet handheld dynamometer in order to improve functional mobility  3. Pt will improve L knee AROM to at least 0-0-125 in order to improve gait and ability to perform ADLs  4. Pt will improve FOTO knee survey score to >/= 50 in order to demo improved functional mobility  5. Pt will perform TUG in < 10 seconds without AD in order to demo improved gait speed  6. Pt will perform at least 10 sit to stands without UE support on 30 second sit to stand test in order to demo improved ability to perform transfers    Plan     Plan of care Certification:  3/12/2024 to 5/12/2024.    Trial stairs and gait training with single point cane next visit if appropriate.     Outpatient Physical Therapy 2 times weekly for 10 weeks to include the following interventions: Manual Therapy, Moist Heat/ Ice, Neuromuscular Re-ed, Patient Education, Self Care, Therapeutic Activities, Therapeutic Exercise    PT/PTA met face to face to discuss pt's treatment plan and progress towards established goals. Pt will be seen by a physical therapist minimally every 6th visit or every 30 days.      Kiera Robertson PTA

## 2024-05-14 ENCOUNTER — CLINICAL SUPPORT (OUTPATIENT)
Dept: REHABILITATION | Facility: HOSPITAL | Age: 49
End: 2024-05-14
Payer: COMMERCIAL

## 2024-05-14 DIAGNOSIS — M25.562 ACUTE PAIN OF LEFT KNEE: Primary | ICD-10-CM

## 2024-05-14 DIAGNOSIS — R29.898 WEAKNESS OF LEFT LOWER EXTREMITY: ICD-10-CM

## 2024-05-14 DIAGNOSIS — M25.662 DECREASED RANGE OF MOTION (ROM) OF LEFT KNEE: ICD-10-CM

## 2024-05-14 PROCEDURE — 97112 NEUROMUSCULAR REEDUCATION: CPT

## 2024-05-14 PROCEDURE — 97530 THERAPEUTIC ACTIVITIES: CPT

## 2024-05-14 PROCEDURE — 97110 THERAPEUTIC EXERCISES: CPT

## 2024-05-14 PROCEDURE — 97116 GAIT TRAINING THERAPY: CPT

## 2024-05-14 NOTE — PLAN OF CARE
[facility]   Physical Therapy Treatment Note / Updated plan of care      Name: Terese Macias  Clinic Number: [id number]    Therapy Diagnosis:   Encounter Diagnoses   Name Primary?    Acute pain of left knee Yes    Decreased range of motion (ROM) of left knee     Weakness of left lower extremity          Physician: Meng Savage, *    Visit Date: 5/14/2024       Physician Orders: PT Eval and Treat   Medical Diagnosis from Referral: S82.142S (ICD-10-CM) - Displaced bicondylar fracture of left tibia, sequela   Evaluation Date: 3/12/2024  Authorization Period Expiration: 12/31/24  Plan of Care Expiration: 7/14/2024   Progress Note Due: 6/14/2024  Visit # / Visits authorized: 1/1,  12/20  FOTO: 2/3     FOTO Initial Evaluation: 26  FOTO 2nd F/U: 42  FOTO Discharge: NA     Precautions: WBAT        PTA Visit #: 0/5     Time In: 235  Time Out: 330  Total Billable Time: 55 minutes      Subjective     Pt reports: she is feeling stronger and less pain. Patient has an appointment next week with her doctor        She was partially compliant with home exercise program.  Response to previous treatment: no adverse affects  Functional change: rollator vs RW, able to perform car transfers with greater ease.    Pain: 0/10  Location: left knee      Objective           Knee Right Right Right Left Left Left Pain/Dysfunction with Movement     AROM PROM MMT AROM PROM MMT     Flexion 120 125 4+/5 105 116 See below     Extension 0 0 5/5 -2 0  See below                L/E Strength w/ MicroFET Muscle Niurka Dynamometer Right Left Pain/Dysfunction with Movement   (approx 4 sec hold w/ max contraction)   Hip Flexion 20.8 kg  15.1 kg     Hip Abduction 13 kg  5.1 kg     Quadriceps 21.8 kg  4.0 kg     Hamstrings 14.4 kg  4.5 kg           Treatment     Terese received the treatments listed below:      therapeutic exercises/assessment to develop strength, endurance, ROM, flexibility, posture, and core stabilization  "for 23 minutes including:    Upright Bike revolutions   - 5 minutes seat # 9  - 5 min seat # 7      cold pack for 0 minutes to L knee.      GAIT TRAINING to improve functional mobility and safety for 8 minutes.    Quad cane training 3 laps   - education on technique             HEP Review  Access Code: [email or web address]  URL: [email or web address]    Exercises  - Supine Knee Extension Stretch on Towel Roll  - 3 x daily - 7 x weekly - 2 sets - 3 minutes hold  - Supine Quad Set  - 3 x daily - 7 x weekly - 20 reps - 3s hold  - Supine Heel Slide with Strap  - 3 x daily - 7 x weekly - 2 sets - 10 reps  - Supine Knee Extension Strengthening  - 3 x daily - 7 x weekly - 2 sets - 10 reps  - Seated Long Arc Quad  - 3 x daily - 7 x weekly - 2 sets - 10 reps  - Quarter Squat with Table  - 3 x daily - 7 x weekly - 2 sets - 10 reps  - Standing Terminal Knee Extension with Resistance  - 3 x daily - 7 x weekly - 3 sets - 10 reps        manual therapy techniques: Soft tissue Mobilization patella mobs were applied to the: L knee for 0 minutes      neuromuscular re-education activities to improve: Balance, Coordination, Proprioception, and Motor Control for 16 minutes. The following activities were included:    SAQ 30x with focus on TKE   SAQ + SLR 2x10   Quad sets w/ heel lift   -1/2 roll under knee   - completed twice throughout session  LAQ 3x10      therapeutic activities to improve functional performance for stairs, sit to stands, and squatting for 8 minutes, including:    Shuttle double leg press   - 2c 3x10  Shuttle 2 up 1 down   - 1.5c 3x10     Not today   Standing hip 2x12 ea  - marching  - abd   - ext  Half squats at table 2x10  Step ups to level 2 forward and lateral 2x10 Left  Knee flexion stretch Level 4 step 15"x10      Patient Education and Home Exercises       Education provided:   - no active knee flexion  - ice and elevate the knee  - daily HEP    Written Home Exercises Provided: Patient instructed to cont prior HEP. " Exercises were reviewed and Terese was able to demonstrate them prior to the end of the session.  Terese demonstrated good  understanding of the education provided. See EMR under Patient Instructions for exercises provided during therapy sessions    Assessment   Patient was reassessed today. Her range of motion is improving but her quad, hamstring, hip strength are still significantly limited. Patient is able to achieve greater straight leg and quad set with exercises. Patient was progressed with a cane today. Patient did well overall, but is slow with exercises and needs cueing.       Terese Is progressing well towards her goals.   Pt prognosis is Good.     Pt will continue to benefit from skilled outpatient physical therapy to address the deficits listed in the problem list box on initial evaluation, provide pt/family education and to maximize pt's level of independence in the home and community environment.     Pt's spiritual, cultural and educational needs considered and pt agreeable to plan of care and goals.     Anticipated barriers to physical therapy: none    Goals:   Short Term Goals: in 8 weeks     1. Pt will be independent with HEP supplement PT in improving functional mobility.  2. Pt will improve LLE strength to at least 75% of R LE strength as measured via MicroFet handheld dynamometer in order to improve functional mobility  3. Pt will improve L knee AROM to at least 0-0-120 in order to improve gait     Long Term Goals: in 16 weeks     1. Pt will be independent with updated HEP supplement PT in improving functional mobility.  2. Pt will improve L LE strength to at least 90% of R LE strength as measured via MicroFet handheld dynamometer in order to improve functional mobility  3. Pt will improve L knee AROM to at least 0-0-125 in order to improve gait and ability to perform ADLs  4. Pt will improve FOTO knee survey score to >/= 50 in order to demo improved functional mobility  5. Pt will perform TUG in <  10 seconds without AD in order to demo improved gait speed  6. Pt will perform at least 10 sit to stands without UE support on 30 second sit to stand test in order to demo improved ability to perform transfers    Plan     Plan of care Certification: Extend to 7/14/2024     Outpatient Physical Therapy 2 times weekly for 10 weeks to include the following interventions: Manual Therapy, Moist Heat/ Ice, Neuromuscular Re-ed, Patient Education, Self Care, Therapeutic Activities, Therapeutic Exercise    PT/PTA met face to face to discuss pt's treatment plan and progress towards established goals. Pt will be seen by a physical therapist minimally every 6th visit or every 30 days.      Lawrence Sheridan, PT

## 2024-05-14 NOTE — PROGRESS NOTES
[facility]   Physical Therapy Treatment Note / Updated plan of care      Name: Terese Macias  Clinic Number: [id number]    Therapy Diagnosis:   Encounter Diagnoses   Name Primary?    Acute pain of left knee Yes    Decreased range of motion (ROM) of left knee     Weakness of left lower extremity          Physician: Meng Savage, *    Visit Date: 5/14/2024       Physician Orders: PT Eval and Treat   Medical Diagnosis from Referral: S82.142S (ICD-10-CM) - Displaced bicondylar fracture of left tibia, sequela   Evaluation Date: 3/12/2024  Authorization Period Expiration: 12/31/24  Plan of Care Expiration: 7/14/2024   Progress Note Due: 6/14/2024  Visit # / Visits authorized: 1/1,  12/20  FOTO: 2/3     FOTO Initial Evaluation: 26  FOTO 2nd F/U: 42  FOTO Discharge: NA     Precautions: WBAT        PTA Visit #: 0/5     Time In: 235  Time Out: 330  Total Billable Time: 55 minutes      Subjective     Pt reports: she is feeling stronger and less pain. Patient has an appointment next week with her doctor        She  was partially  compliant with home exercise program.  Response to previous treatment: no adverse affects  Functional change: rollator vs RW, able to perform car transfers with greater ease.    Pain: 0/10  Location: left knee      Objective           Knee Right Right Right Left Left Left Pain/Dysfunction with Movement     AROM PROM MMT AROM PROM MMT     Flexion 120 125 4+/5 105 116 See below     Extension 0 0 5/5 -2 0  See below                L/E Strength w/ MicroFET Muscle Niurka Dynamometer Right Left Pain/Dysfunction with Movement   (approx 4 sec hold w/ max contraction)   Hip Flexion 20.8 kg  15.1 kg     Hip Abduction 13 kg  5.1 kg     Quadriceps 21.8 kg  4.0 kg     Hamstrings 14.4 kg  4.5 kg           Treatment     Terese received the treatments listed below:      therapeutic exercises/assessment to develop strength, endurance, ROM, flexibility, posture, and core  "stabilization for 23 minutes including:    Upright Bike revolutions   - 5 minutes seat # 9  - 5 min seat # 7      cold pack for 0 minutes to L knee.      GAIT TRAINING to improve functional mobility and safety for 8 minutes.    Quad cane training 3 laps   - education on technique             HEP Review  Access Code: [email or web address]  URL: [email or web address]    Exercises  - Supine Knee Extension Stretch on Towel Roll  - 3 x daily - 7 x weekly - 2 sets - 3 minutes hold  - Supine Quad Set  - 3 x daily - 7 x weekly - 20 reps - 3s hold  - Supine Heel Slide with Strap  - 3 x daily - 7 x weekly - 2 sets - 10 reps  - Supine Knee Extension Strengthening  - 3 x daily - 7 x weekly - 2 sets - 10 reps  - Seated Long Arc Quad  - 3 x daily - 7 x weekly - 2 sets - 10 reps  - Quarter Squat with Table  - 3 x daily - 7 x weekly - 2 sets - 10 reps  - Standing Terminal Knee Extension with Resistance  - 3 x daily - 7 x weekly - 3 sets - 10 reps        manual therapy techniques: Soft tissue Mobilization patella mobs were applied to the: L knee for 0 minutes      neuromuscular re-education activities to improve: Balance, Coordination, Proprioception, and Motor Control for 16 minutes. The following activities were included:    SAQ 30x with focus on TKE   SAQ + SLR 2x10   Quad sets w/ heel lift   -1/2 roll under knee   - completed twice throughout session  LAQ 3x10      therapeutic activities to improve functional performance for stairs, sit to stands, and squatting for 8 minutes, including:    Shuttle double leg press   - 2c 3x10  Shuttle 2 up 1 down   - 1.5c 3x10     Not today   Standing hip 2x12 ea  - marching  - abd   - ext  Half squats at table 2x10  Step ups to level 2 forward and lateral 2x10 Left  Knee flexion stretch Level 4 step 15"x10      Patient Education and Home Exercises       Education provided:   - no active knee flexion  - ice and elevate the knee  - daily HEP    Written Home Exercises Provided: Patient instructed to " cont prior HEP. Exercises were reviewed and Terese was able to demonstrate them prior to the end of the session.  Terese demonstrated good  understanding of the education provided. See EMR under Patient Instructions for exercises provided during therapy sessions    Assessment   Patient was reassessed today. Her range of motion is improving but her quad, hamstring, hip strength are still significantly limited. Patient is able to achieve greater straight leg and quad set with exercises. Patient was progressed with a cane today. Patient did well overall, but is slow with exercises and needs cueing.       Terese Is progressing well towards her goals.   Pt prognosis is Good.     Pt will continue to benefit from skilled outpatient physical therapy to address the deficits listed in the problem list box on initial evaluation, provide pt/family education and to maximize pt's level of independence in the home and community environment.     Pt's spiritual, cultural and educational needs considered and pt agreeable to plan of care and goals.     Anticipated barriers to physical therapy: none    Goals:   Short Term Goals: in 8 weeks     1. Pt will be independent with HEP supplement PT in improving functional mobility.  2. Pt will improve LLE strength to at least 75% of R LE strength as measured via MicroFet handheld dynamometer in order to improve functional mobility  3. Pt will improve L knee AROM to at least 0-0-120 in order to improve gait     Long Term Goals: in 16 weeks     1. Pt will be independent with updated HEP supplement PT in improving functional mobility.  2. Pt will improve L LE strength to at least 90% of R LE strength as measured via MicroFet handheld dynamometer in order to improve functional mobility  3. Pt will improve L knee AROM to at least 0-0-125 in order to improve gait and ability to perform ADLs  4. Pt will improve FOTO knee survey score to >/= 50 in order to demo improved functional mobility  5. Pt will  perform TUG in < 10 seconds without AD in order to demo improved gait speed  6. Pt will perform at least 10 sit to stands without UE support on 30 second sit to stand test in order to demo improved ability to perform transfers    Plan     Plan of care Certification: Extend to 7/14/2024     Outpatient Physical Therapy 2 times weekly for 10 weeks to include the following interventions: Manual Therapy, Moist Heat/ Ice, Neuromuscular Re-ed, Patient Education, Self Care, Therapeutic Activities, Therapeutic Exercise    PT/PTA met face to face to discuss pt's treatment plan and progress towards established goals. Pt will be seen by a physical therapist minimally every 6th visit or every 30 days.      Lawrence Sheridan, PT

## 2024-05-16 ENCOUNTER — CLINICAL SUPPORT (OUTPATIENT)
Dept: REHABILITATION | Facility: HOSPITAL | Age: 49
End: 2024-05-16
Payer: COMMERCIAL

## 2024-05-16 DIAGNOSIS — R29.898 WEAKNESS OF LEFT LOWER EXTREMITY: ICD-10-CM

## 2024-05-16 DIAGNOSIS — M25.662 DECREASED RANGE OF MOTION (ROM) OF LEFT KNEE: ICD-10-CM

## 2024-05-16 DIAGNOSIS — M25.562 ACUTE PAIN OF LEFT KNEE: Primary | ICD-10-CM

## 2024-05-16 PROCEDURE — 97112 NEUROMUSCULAR REEDUCATION: CPT | Mod: CQ

## 2024-05-16 PROCEDURE — 97530 THERAPEUTIC ACTIVITIES: CPT | Mod: CQ

## 2024-05-16 PROCEDURE — 97110 THERAPEUTIC EXERCISES: CPT | Mod: CQ

## 2024-05-16 PROCEDURE — 97116 GAIT TRAINING THERAPY: CPT | Mod: CQ

## 2024-05-16 NOTE — PROGRESS NOTES
[facility]   Physical Therapy Treatment Note / Updated plan of care      Name: Terese Macias  Clinic Number: [id number]    Therapy Diagnosis:   Encounter Diagnoses   Name Primary?    Acute pain of left knee Yes    Decreased range of motion (ROM) of left knee     Weakness of left lower extremity          Physician: Meng Savage, *    Visit Date: 5/16/2024       Physician Orders: PT Eval and Treat   Medical Diagnosis from Referral: S82.142S (ICD-10-CM) - Displaced bicondylar fracture of left tibia, sequela   Evaluation Date: 3/12/2024  Authorization Period Expiration: 12/31/24  Plan of Care Expiration: 7/14/2024   Progress Note Due: 6/14/2024  Visit # / Visits authorized: 1/1,  16/20  FOTO: 2/3     FOTO Initial Evaluation: 26  FOTO 2nd F/U: 42  FOTO Discharge: NA     Precautions: WBAT        PTA Visit #: 1/5     Time In: 235 pm  Time Out: 320 pm  Total Billable Time: 45 minutes      Subjective     Pt reports: she plans on getting the cane as soon as she leaves therapy today. Today the knee is hurting a little.    She  was partially  compliant with home exercise program.  Response to previous treatment: no adverse affects  Functional change: rollator vs RW, able to perform car transfers with greater ease.    Pain: 0/10  Location: left knee      Objective           Knee Right Right Right Left Left Left Pain/Dysfunction with Movement     AROM PROM MMT AROM PROM MMT     Flexion 120 125 4+/5 105 116 See below     Extension 0 0 5/5 -2 0  See below                L/E Strength w/ MicroFET Muscle Niurka Dynamometer Right Left Pain/Dysfunction with Movement   (approx 4 sec hold w/ max contraction)   Hip Flexion 20.8 kg  15.1 kg     Hip Abduction 13 kg  5.1 kg     Quadriceps 21.8 kg  4.0 kg     Hamstrings 14.4 kg  4.5 kg           Treatment     Terese received the treatments listed below:      therapeutic exercises to develop strength, endurance, ROM, flexibility, posture, and core  "stabilization for 10 minutes including:    Upright Bike revolutions   - 5 minutes seat # 8  - 5 min seat # 6      cold pack for 0 minutes to L knee.      GAIT TRAINING to improve functional mobility and safety for 8 minutes.    Quad cane training level and unlevel surfaces  - education on technique             HEP Review  Access Code: [email or web address]  URL: [email or web address]    Exercises  - Supine Knee Extension Stretch on Towel Roll  - 3 x daily - 7 x weekly - 2 sets - 3 minutes hold  - Supine Quad Set  - 3 x daily - 7 x weekly - 20 reps - 3s hold  - Supine Heel Slide with Strap  - 3 x daily - 7 x weekly - 2 sets - 10 reps  - Supine Knee Extension Strengthening  - 3 x daily - 7 x weekly - 2 sets - 10 reps  - Seated Long Arc Quad  - 3 x daily - 7 x weekly - 2 sets - 10 reps  - Quarter Squat with Table  - 3 x daily - 7 x weekly - 2 sets - 10 reps  - Standing Terminal Knee Extension with Resistance  - 3 x daily - 7 x weekly - 3 sets - 10 reps        manual therapy techniques: Soft tissue Mobilization patella mobs were applied to the: L knee for 0 minutes      neuromuscular re-education activities to improve: Balance, Coordination, Proprioception, and Motor Control for 19 minutes. The following activities were included:    SAQ 3# 30x with focus on TKE   SAQ + SLR 2x10 NT  Quad sets w/ heel lift NT  -1/2 roll under knee   - completed twice throughout session  LAQ 3# 3x10      therapeutic activities to improve functional performance for stairs, sit to stands, and squatting for 8 minutes, including:    Shuttle double leg press   - 2c 3x10  Shuttle 2 up 1 down   - 1c 3x10     Not today   Standing hip 2x12 ea  - marching  - abd   - ext  Half squats at table 2x10  Step ups to level 2 forward and lateral 2x10 Left  Knee flexion stretch Level 4 step 15"x10      Patient Education and Home Exercises       Education provided:   - no active knee flexion  - ice and elevate the knee  - daily HEP    Written Home Exercises " Provided: Patient instructed to cont prior HEP. Exercises were reviewed and Terese was able to demonstrate them prior to the end of the session.  Terese demonstrated good  understanding of the education provided. See EMR under Patient Instructions for exercises provided during therapy sessions    Assessment   Patient was progressed with ankle weights when performing LAQ and SAQ. Worked on gait with SBQC on level and unlevel surfaces without any issues. Pain and fear is hindering progressions.       Terese Is progressing well towards her goals.   Pt prognosis is Good.     Pt will continue to benefit from skilled outpatient physical therapy to address the deficits listed in the problem list box on initial evaluation, provide pt/family education and to maximize pt's level of independence in the home and community environment.     Pt's spiritual, cultural and educational needs considered and pt agreeable to plan of care and goals.     Anticipated barriers to physical therapy: none    Goals:   Short Term Goals: in 8 weeks     1. Pt will be independent with HEP supplement PT in improving functional mobility.  2. Pt will improve LLE strength to at least 75% of R LE strength as measured via MicroFet handheld dynamometer in order to improve functional mobility  3. Pt will improve L knee AROM to at least 0-0-120 in order to improve gait     Long Term Goals: in 16 weeks     1. Pt will be independent with updated HEP supplement PT in improving functional mobility.  2. Pt will improve L LE strength to at least 90% of R LE strength as measured via MicroFet handheld dynamometer in order to improve functional mobility  3. Pt will improve L knee AROM to at least 0-0-125 in order to improve gait and ability to perform ADLs  4. Pt will improve FOTO knee survey score to >/= 50 in order to demo improved functional mobility  5. Pt will perform TUG in < 10 seconds without AD in order to demo improved gait speed  6. Pt will perform at least  10 sit to stands without UE support on 30 second sit to stand test in order to demo improved ability to perform transfers    Plan     Plan of care Certification: Extend to 7/14/2024     Outpatient Physical Therapy 2 times weekly for 10 weeks to include the following interventions: Manual Therapy, Moist Heat/ Ice, Neuromuscular Re-ed, Patient Education, Self Care, Therapeutic Activities, Therapeutic Exercise    PT/PTA met face to face to discuss pt's treatment plan and progress towards established goals. Pt will be seen by a physical therapist minimally every 6th visit or every 30 days.      Rakesh Alcazar, PTA

## 2024-06-04 ENCOUNTER — OFFICE VISIT (OUTPATIENT)
Dept: PSYCHIATRY | Facility: CLINIC | Age: 49
End: 2024-06-04
Payer: COMMERCIAL

## 2024-06-04 DIAGNOSIS — F41.1 GAD (GENERALIZED ANXIETY DISORDER): ICD-10-CM

## 2024-06-04 DIAGNOSIS — R41.840 INATTENTION: ICD-10-CM

## 2024-06-04 DIAGNOSIS — F33.1 MDD (MAJOR DEPRESSIVE DISORDER), RECURRENT EPISODE, MODERATE: Primary | ICD-10-CM

## 2024-06-04 PROCEDURE — 90837 PSYTX W PT 60 MINUTES: CPT | Mod: 95,,, | Performed by: PSYCHOLOGIST

## 2024-06-04 NOTE — PROGRESS NOTES
Individual Psychotherapy (PhD/LCSW)    6/4/2024    Therapeutic Intervention: Met with patient.  Outpatient - Behavior modifying psychotherapy 60 min - CPT code 92341    The patient location is: Patient's home/ Patient reported that her location at the time of this visit was in the [facility]     Visit type: Virtual visit with synchronous audio and video     Each patient to whom he or she provides medical services by telemedicine is: (1) informed of the relationship between the physician and patient and the respective role of any other health care provider with respect to management of the patient; and (2) notified that he or she may decline to receive medical services by telemedicine and may withdraw from such care at any time.     Chief complaint/reason for encounter: depression and anxiety     Interval history and content of current session: Patient presented casually dressed, alert, and oriented.  Patient reported experiencing depressed mood, diminished interest, insomnia, fatigue, worthlessness/guilt, poor concentration, altered libido, social isolation, excessive anxiety/worry, muscle tension, difficulty concentrating, forgetfulness, easily distracted, and panic attacks.  Patient denied current suicidal and homicidal ideations.  Explored source of patient's depressed mood.  Patient described that she worries about issues related to family, personal safety, health, and employment, among other things.  Active listening skills were used as patient described the events leading up to and after recent discord with her sister around a trip.  Assisted patient in processing her feelings around her relationship with her sister.  Educated patient about using assertive communication skills to address her concerns and feelings with her sister.  Modeled the use of I statements as an assertive communication technique to reduce feelings of depression and anxiety.  Patient also discussed recent job interviews  including a current offer for employment, but she noted that she has some reservations about the offer.  Assisted patient in exploring her options regarding employment and using assertive communication.        Treatment plan:  Target symptoms: depression, anxiety   Why chosen therapy is appropriate versus another modality: relevant to diagnosis  Outcome monitoring methods: self-report  Therapeutic intervention type: insight oriented psychotherapy, behavior modifying psychotherapy    Risk parameters:  Patient reports no suicidal ideation  Patient reports no homicidal ideation  Patient reports no self-injurious behavior  Patient reports no violent behavior    Verbal deficits: None    Patient's response to intervention:  The patient's response to intervention is accepting.    Progress toward goals and other mental status changes:  The patient's progress toward goals is fair .    Diagnosis:     ICD-10-CM ICD-9-CM   1. MDD (major depressive disorder), recurrent episode, moderate  F33.1 296.32   2. DARYL (generalized anxiety disorder)  F41.1 300.02   3. Inattention  R41.840 799.51       Plan:  individual psychotherapy and consult psychiatrist for medication evaluation    Return to clinic: 2 weeks    Length of Service (minutes): 60

## 2024-06-06 ENCOUNTER — CLINICAL SUPPORT (OUTPATIENT)
Dept: REHABILITATION | Facility: HOSPITAL | Age: 49
End: 2024-06-06
Payer: COMMERCIAL

## 2024-06-06 DIAGNOSIS — M25.662 DECREASED RANGE OF MOTION (ROM) OF LEFT KNEE: ICD-10-CM

## 2024-06-06 DIAGNOSIS — R29.898 WEAKNESS OF LEFT LOWER EXTREMITY: ICD-10-CM

## 2024-06-06 DIAGNOSIS — M25.562 ACUTE PAIN OF LEFT KNEE: Primary | ICD-10-CM

## 2024-06-06 PROCEDURE — 97110 THERAPEUTIC EXERCISES: CPT | Mod: CQ

## 2024-06-06 PROCEDURE — 97112 NEUROMUSCULAR REEDUCATION: CPT | Mod: CQ

## 2024-06-06 NOTE — PROGRESS NOTES
[facility]   Physical Therapy Treatment Note / Updated plan of care      Name: Terese Macias  Clinic Number: [id number]    Therapy Diagnosis:   Encounter Diagnoses   Name Primary?    Acute pain of left knee Yes    Decreased range of motion (ROM) of left knee     Weakness of left lower extremity          Physician: Meng Savage, *    Visit Date: 6/6/2024       Physician Orders: PT Eval and Treat   Medical Diagnosis from Referral: S82.142S (ICD-10-CM) - Displaced bicondylar fracture of left tibia, sequela   Evaluation Date: 3/12/2024  Authorization Period Expiration: 12/31/24  Plan of Care Expiration: 7/14/2024   Progress Note Due: 6/14/2024  Visit # / Visits authorized: 1/1,  16/20  FOTO: 2/3     FOTO Initial Evaluation: 26  FOTO 2nd F/U: 42  FOTO Discharge: NA     Precautions: WBAT        PTA Visit #: 1/5     Time In: 1230 pm  Time Out: 100 pm  Total Billable Time: 30 minutes      Subjective     Pt reports: she walked a lot in the past week at [facility] in [place]. Increased swelling noticed after all the walking. Her surgeon wants to perform a minor surgery with a manipulation to break up scar tissue. After the procedure he wants her in therapy 5 days a week.    She  was partially  compliant with home exercise program.  Response to previous treatment: no adverse affects  Functional change: rollator vs RW, able to perform car transfers with greater ease.    Pain: 0/10  Location: left knee      Objective           Knee Right Right Right Left Left Left Pain/Dysfunction with Movement     AROM PROM MMT AROM PROM MMT     Flexion 120 125 4+/5 105 116 See below     Extension 0 0 5/5 -2 0  See below                L/E Strength w/ MicroFET Muscle Niurka Dynamometer Right Left Pain/Dysfunction with Movement   (approx 4 sec hold w/ max contraction)   Hip Flexion 20.8 kg  15.1 kg     Hip Abduction 13 kg  5.1 kg     Quadriceps 21.8 kg  4.0 kg     Hamstrings 14.4 kg  4.5 kg            Treatment     Terese received the treatments listed below:      therapeutic exercises to develop strength, endurance, ROM, flexibility, posture, and core stabilization for 10 minutes including:    Upright Bike revolutions   - 5 minutes seat # 8  - 5 min seat # 6      cold pack for 0 minutes to L knee.      GAIT TRAINING to improve functional mobility and safety for 0 minutes.    Quad cane training level and unlevel surfaces  - education on technique             HEP Review  Access Code: [email or web address]  URL: [email or web address]    Exercises  - Supine Knee Extension Stretch on Towel Roll  - 3 x daily - 7 x weekly - 2 sets - 3 minutes hold  - Supine Quad Set  - 3 x daily - 7 x weekly - 20 reps - 3s hold  - Supine Heel Slide with Strap  - 3 x daily - 7 x weekly - 2 sets - 10 reps  - Supine Knee Extension Strengthening  - 3 x daily - 7 x weekly - 2 sets - 10 reps  - Seated Long Arc Quad  - 3 x daily - 7 x weekly - 2 sets - 10 reps  - Quarter Squat with Table  - 3 x daily - 7 x weekly - 2 sets - 10 reps  - Standing Terminal Knee Extension with Resistance  - 3 x daily - 7 x weekly - 3 sets - 10 reps        manual therapy techniques: Soft tissue Mobilization patella mobs were applied to the: L knee for 0 minutes      neuromuscular re-education activities to improve: Balance, Coordination, Proprioception, and Motor Control for 20 minutes. The following activities were included:    SAQ 3# 30x with focus on TKE   SAQ + SLR 2x10 NT  Quad sets w/ heel lift NT  -1/2 roll under knee   - completed twice throughout session  LAQ 3# 3x10      therapeutic activities to improve functional performance for stairs, sit to stands, and squatting for 0 minutes, including:    Shuttle double leg press   - 2c 3x10  Shuttle 2 up 1 down   - 1c 3x10     Not today   Standing hip 2x12 ea  - marching  - abd   - ext  Half squats at table 2x10  Step ups to level 2 forward and lateral 2x10 Left  Knee flexion stretch Level 4 step  "15"x10      Patient Education and Home Exercises       Education provided:   - no active knee flexion  - ice and elevate the knee  - daily HEP    Written Home Exercises Provided: Patient instructed to cont prior HEP. Exercises were reviewed and Terese was able to demonstrate them prior to the end of the session.  Terese demonstrated good  understanding of the education provided. See EMR under Patient Instructions for exercises provided during therapy sessions    Assessment   Patient remains with significant strength and ROM deficits. Patient plans to have a procedure to remove scar tissue therefore we will discontinue therapy for now and resume once she is cleared.      Terese Is progressing well towards her goals.   Pt prognosis is Good.     Pt will continue to benefit from skilled outpatient physical therapy to address the deficits listed in the problem list box on initial evaluation, provide pt/family education and to maximize pt's level of independence in the home and community environment.     Pt's spiritual, cultural and educational needs considered and pt agreeable to plan of care and goals.     Anticipated barriers to physical therapy: none    Goals:   Short Term Goals: in 8 weeks     1. Pt will be independent with HEP supplement PT in improving functional mobility.  2. Pt will improve LLE strength to at least 75% of R LE strength as measured via MicroFet handheld dynamometer in order to improve functional mobility  3. Pt will improve L knee AROM to at least 0-0-120 in order to improve gait     Long Term Goals: in 16 weeks     1. Pt will be independent with updated HEP supplement PT in improving functional mobility.  2. Pt will improve L LE strength to at least 90% of R LE strength as measured via MicroFet handheld dynamometer in order to improve functional mobility  3. Pt will improve L knee AROM to at least 0-0-125 in order to improve gait and ability to perform ADLs  4. Pt will improve FOTO knee survey score " to >/= 50 in order to demo improved functional mobility  5. Pt will perform TUG in < 10 seconds without AD in order to demo improved gait speed  6. Pt will perform at least 10 sit to stands without UE support on 30 second sit to stand test in order to demo improved ability to perform transfers    Plan     Plan of care Certification: Extend to 7/14/2024     Outpatient Physical Therapy 2 times weekly for 10 weeks to include the following interventions: Manual Therapy, Moist Heat/ Ice, Neuromuscular Re-ed, Patient Education, Self Care, Therapeutic Activities, Therapeutic Exercise    PT/PTA met face to face to discuss pt's treatment plan and progress towards established goals. Pt will be seen by a physical therapist minimally every 6th visit or every 30 days.      Rakesh Alcazar PTA

## 2024-06-10 ENCOUNTER — PATIENT MESSAGE (OUTPATIENT)
Dept: INTERNAL MEDICINE | Facility: CLINIC | Age: 49
End: 2024-06-10
Payer: COMMERCIAL

## 2024-07-24 ENCOUNTER — HOSPITAL ENCOUNTER (OUTPATIENT)
Dept: RADIOLOGY | Facility: HOSPITAL | Age: 49
Discharge: HOME | End: 2024-07-24
Attending: INTERNAL MEDICINE
Payer: COMMERCIAL

## 2024-07-24 ENCOUNTER — OFFICE VISIT (OUTPATIENT)
Dept: INTERNAL MEDICINE | Facility: CLINIC | Age: 49
End: 2024-07-24
Payer: COMMERCIAL

## 2024-07-24 VITALS
DIASTOLIC BLOOD PRESSURE: 72 MMHG | BODY MASS INDEX: 35.14 KG/M2 | HEIGHT: 62 IN | HEART RATE: 69 BPM | OXYGEN SATURATION: 99 % | SYSTOLIC BLOOD PRESSURE: 110 MMHG | WEIGHT: 190.94 LBS

## 2024-07-24 DIAGNOSIS — C83.38 DIFFUSE LARGE B-CELL LYMPHOMA OF LYMPH NODES OF MULTIPLE REGIONS: Primary | ICD-10-CM

## 2024-07-24 DIAGNOSIS — Z12.31 SCREENING MAMMOGRAM FOR BREAST CANCER: ICD-10-CM

## 2024-07-24 DIAGNOSIS — D69.6 THROMBOCYTOPENIA: ICD-10-CM

## 2024-07-24 DIAGNOSIS — F33.1 DEPRESSION, MAJOR, RECURRENT, MODERATE: ICD-10-CM

## 2024-07-24 DIAGNOSIS — F41.9 ANXIETY: ICD-10-CM

## 2024-07-24 DIAGNOSIS — K21.9 GASTROESOPHAGEAL REFLUX DISEASE WITHOUT ESOPHAGITIS: ICD-10-CM

## 2024-07-24 PROCEDURE — 3078F DIAST BP <80 MM HG: CPT | Mod: CPTII,S$GLB,, | Performed by: INTERNAL MEDICINE

## 2024-07-24 PROCEDURE — 3074F SYST BP LT 130 MM HG: CPT | Mod: CPTII,S$GLB,, | Performed by: INTERNAL MEDICINE

## 2024-07-24 PROCEDURE — 99214 OFFICE O/P EST MOD 30 MIN: CPT | Mod: S$GLB,,, | Performed by: INTERNAL MEDICINE

## 2024-07-24 PROCEDURE — 99999 PR PBB SHADOW E&M-EST. PATIENT-LVL III: CPT | Mod: PBBFAC,,, | Performed by: INTERNAL MEDICINE

## 2024-07-24 PROCEDURE — 77067 SCR MAMMO BI INCL CAD: CPT | Mod: TC

## 2024-07-24 PROCEDURE — 3008F BODY MASS INDEX DOCD: CPT | Mod: CPTII,S$GLB,, | Performed by: INTERNAL MEDICINE

## 2024-07-24 RX ORDER — PROPRANOLOL HYDROCHLORIDE 20 MG/1
20 TABLET ORAL 3 TIMES DAILY PRN
Qty: 90 TABLET | Refills: 5 | Status: SHIPPED | OUTPATIENT
Start: 2024-07-24 | End: 2025-07-24

## 2024-07-24 RX ORDER — FLUOXETINE HYDROCHLORIDE 40 MG/1
40 CAPSULE ORAL DAILY
Qty: 90 CAPSULE | Refills: 1 | Status: SHIPPED | OUTPATIENT
Start: 2024-07-24 | End: 2025-07-24

## 2024-07-24 RX ORDER — FLUOXETINE 10 MG/1
10 CAPSULE ORAL DAILY
Qty: 30 CAPSULE | Refills: 11 | Status: SHIPPED | OUTPATIENT
Start: 2024-07-24 | End: 2025-07-24

## 2024-07-24 NOTE — PROGRESS NOTES
Chief Complaint:   Follow up of multiple issues     HPI: This is a 48 year old woman who presents for follow up of above     She fell on 1/20/24 and had a Displaced fracture of medial condyle of left tibia. She had ORIF of the left tibia on 1/21/24 at [facility].    On June 24, 2024 with Dr Chavez - she had laparoscopic surgery in the left knee - cleaned up scar tissue to help with mobility.   Swelling is much better.   Flexibility is improving.  She continues physical therapy.   SHe is walking with a cane with therapy.  Pain is 2-31/10.   She is no longer having to take oxycodone.      She is now taking metformin  mg one a day which is helping constipation.      She is taking fluoxetine 40 mg every day. She saw psychiatry 5/9/24 who recommended that she increase the fluoxetine by another 10 mg to equal 50 mg daily - pharmacy did not have the prescription. 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   She will be starting a new position and district which is part of her anxiety. She is going to be an  with [employer] - on [place].   She takes propranolol 20 mg daily as needed for anxiety. She has been taking the propranolol which helps.    She takes trazodone as needed.  She has been sleeping ok.  .     She saw sleep medicine and had a sleep study. She has sleep apnea and is wearing a CPAP machine. She was doing well with the CPAP machine.      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 She is being [facility] at [facility]. she saw Dr Diane [facility] on 2/23/23 and is in complete remission status. She followed up 5/31/24.      She is taking vitamin D 50,000 units once a week for vitamin D deficiency     Her last shot of Depo Provera was in December 2019. No periods.       She has some right TMJ issues- she saw an oral  surgeon and will see a doctor at [facility].  She has some jaw pain if she lays on her right side too long                      Past Surgical History:   Procedure Laterality Date    COLONOSCOPY        COLONOSCOPY N/A 11/8/2018     Performed by Ba Stewart MD at [facility] END[facility] (2ND FLR)    EGD (ESOPHAGOGASTRODUODENOSCOPY) N/A 11/8/2018     Performed by Ba Stewart MD at [facility] END[facility] (2ND FLR)    ESOPHAGOGASTRODUODENOSCOPY (EGD) N/A 3/23/2015     Performed by Ba Stewart MD at [facility] END[facility] (4TH FLR)    HERNIA REPAIR        AZ COLONOSCOPY,DIAGNOSTIC [94411 (CPT®)] N/A 7/2/2014     Performed by Cj Lassiter MD at [facility] END[facility] (4TH FLR)    ULTRASOUND-ENDOSCOPIC-UPPER N/A 12/12/2018     Performed by Venu Montiel MD at [facility] END[facility]            Meds and allergies: updated on Epic       General: alert, oriented x 3, no apparent distress.  Affect normal  HEENT: Conjunctivae: anicteric, PERRL, EOMI, TM clear, Oralpharynx clear  Neck: supple, no thyroid enlargement, no cervical lymphadenopathy  Resp: effort normal, lungs clear bilaterally  CV: Regular rate and rhythm without murmurs, gallops or rubs, no lower extremity edema,     Labs 5/31/24 reviewed     Assessment/Plan:     Displaced fracture of medial condyle of left tibia - s/p ORIF - in physical therapy        Sleep apnea -  on cpap machine     Stage 4 large B cell lymphoma - finished chemo 6/26/19. Follow up with [facility] -saw 5/31/24      History of Thombocytopenia - montiored     History of Anemia - s/p iron infusions years ago.  Doing well .       Anxiety and depression/insomnia/grief- continue therapy,  ok to increase fluoxetine to 50 mg daliy. Propranolol 20 mg three times daily as needed       Menopause - could be contributing to weight.      GERD - asx     Left breast abnormality on CT scan at [facility] - diagnostic left mammogram 8/16/2021 was fine - bilateral  Mammogram 7/2023   .   Obesity- can get back on metformin  mg one tablet daily for 7 days  then 2 tabelts daily.       .      Colonoscopy 11/2018 - 1- 4 mm hyperplastic polyp- given 10 years.     MMG 7/2023 fine - today        I will see her back in 4 months sooner if problems arise.

## 2024-08-14 ENCOUNTER — TELEPHONE (OUTPATIENT)
Dept: PSYCHIATRY | Facility: CLINIC | Age: 49
End: 2024-08-14
Payer: COMMERCIAL

## 2024-10-28 ENCOUNTER — TELEPHONE (OUTPATIENT)
Dept: PSYCHIATRY | Facility: CLINIC | Age: 49
End: 2024-10-28
Payer: COMMERCIAL

## 2024-12-03 ENCOUNTER — OFFICE VISIT (OUTPATIENT)
Dept: INTERNAL MEDICINE | Facility: CLINIC | Age: 49
End: 2024-12-03
Payer: COMMERCIAL

## 2024-12-03 VITALS
SYSTOLIC BLOOD PRESSURE: 116 MMHG | HEIGHT: 62 IN | HEART RATE: 69 BPM | BODY MASS INDEX: 36.37 KG/M2 | WEIGHT: 197.63 LBS | OXYGEN SATURATION: 97 % | DIASTOLIC BLOOD PRESSURE: 80 MMHG

## 2024-12-03 DIAGNOSIS — S82.202D: ICD-10-CM

## 2024-12-03 DIAGNOSIS — C83.38 DIFFUSE LARGE B-CELL LYMPHOMA OF LYMPH NODES OF MULTIPLE REGIONS: ICD-10-CM

## 2024-12-03 DIAGNOSIS — F39 MOOD DISORDER: ICD-10-CM

## 2024-12-03 DIAGNOSIS — E66.9 OBESITY, UNSPECIFIED CLASS, UNSPECIFIED OBESITY TYPE, UNSPECIFIED WHETHER SERIOUS COMORBIDITY PRESENT: Primary | ICD-10-CM

## 2024-12-03 DIAGNOSIS — K21.9 GASTROESOPHAGEAL REFLUX DISEASE WITHOUT ESOPHAGITIS: ICD-10-CM

## 2024-12-03 PROCEDURE — 99999 PR PBB SHADOW E&M-EST. PATIENT-LVL III: CPT | Mod: PBBFAC,,, | Performed by: INTERNAL MEDICINE

## 2024-12-03 RX ORDER — IBUPROFEN 800 MG/1
800 TABLET ORAL EVERY 6 HOURS PRN
Qty: 30 TABLET | Refills: 0 | Status: SHIPPED | OUTPATIENT
Start: 2024-12-03

## 2024-12-03 NOTE — PROGRESS NOTES
Chief Complaint:   Follow up of multiple issues     HPI: This is a 49 year old woman who presents for follow up of above     She fell on 1/20/24 and had a Displaced fracture of medial condyle of left tibia. She had ORIF of the left tibia on 1/21/24 at [facility].     On June 24, 2024 with Dr Chavez - she had laparoscopic surgery in the left knee - cleaned up scar tissue to help with mobility.   SHe is walking with a cane with therapy. She is still in physical therapy    She will be having an arthroscopic surgery on the left knee for ACL and PCL repair with Dr Chavez on 12/18/24 at [facility]. The left knee is not stable.  She still has some pain - she will take an occasional ibuprofen or tylenol. She has been trying to not walk on the left leg as much     She is  taking metformin  mg one a day inconsistently.  She gets diarrhea if she takes 2 tablets daily.       She is taking fluoxetine 40 - 50  every day.  Mood has been ok. She is  an  with [employer] - on [employer].  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      She takes propranolol 20 mg daily as needed for anxiety. She has been taking the propranolol which helps.    She takes trazodone as needed.  She has been sleeping ok.       She has sleep apnea and has a CPAP machine.  She wears the machine inconsistently.      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 [facility] is being [facility]. she saw Dr Diane [facility] on 2/23/23 and is in complete remission status. She followed up 5/31/24.      She is taking vitamin D 50,000 units once a week for vitamin D deficiency     Her last shot of Depo Provera was in December 2019. No periods.       She has some right TMJ issues-comes and goes. She was eating cereal 2 days ago,  she had some joint pain                    Past Surgical History:   Procedure Laterality Date    COLONOSCOPY        COLONOSCOPY N/A 11/8/2018     Performed by Ba Stewart MD at [facility] ENDO (2ND FLR)    EGD (ESOPHAGOGASTRODUODENOSCOPY) N/A 11/8/2018     Performed by Ba Stewart MD at [facility] ENDO (2ND FLR)    ESOPHAGOGASTRODUODENOSCOPY (EGD) N/A 3/23/2015     Performed by Ba Stewart MD at [facility] ENDO (4TH FLR)    HERNIA REPAIR        [place] COLONOSCOPY,DIAGNOSTIC [32364 (CPT®)] N/A 7/2/2014     Performed by Cj Lassiter MD at [facility] ENDO (4TH FLR)    ULTRASOUND-ENDOSCOPIC-UPPER N/A 12/12/2018     Performed by Venu Montiel MD at [facility] ENDO            Meds and allergies: updated on Epic       General: alert, oriented x 3, no apparent distress.  Affect normal  HEENT: Conjunctivae: anicteric, PERRL, EOMI, TM clear, Oralpharynx clear  Neck: supple, no thyroid enlargement, no cervical lymphadenopathy  Resp: effort normal, lungs clear bilaterally  CV: Regular rate and rhythm without murmurs, gallops or rubs, no lower extremity edema,     Labs 5/31/24 reviewed     Assessment/Plan:     Displaced fracture of medial condyle of left tibia - s/p ORIF - to have ACL and PCL repair - may proceed with surgery. Low cardiovasculr risk      Sleep apnea -  get on cpap machine     Stage 4 large B cell lymphoma - finished chemo 6/26/19. Follow up with [facility] -saw 5/31/24 - to see yearly     History of Thombocytopenia - montiored     History of Anemia - s/p iron infusions years ago.  Doing well .       Anxiety and depression/insomnia/grief-  doing well on fluoxetine to 50 mg daliy. Propranolol 20 mg three times daily as needed       Menopause - could be contributing to weight.      GERD - asx     Left breast abnormality on CT scan at [facility] - diagnostic left mammogram 8/16/2021 was fine - bilateral  Mammogram 7/2024   .   Obesity-  discussed swimming for exercise when she is healed from this last surgery.  Medical fitness  referral.       Colonoscopy 11/2018 - 1- 4 mm hyperplastic polyp- given 10 years.     MMG 7/2024        I will see her back in 4 months sooner if problems arise.

## 2025-04-15 ENCOUNTER — OFFICE VISIT (OUTPATIENT)
Dept: INTERNAL MEDICINE | Facility: CLINIC | Age: 50
End: 2025-04-15
Payer: COMMERCIAL

## 2025-04-15 VITALS
BODY MASS INDEX: 37.53 KG/M2 | WEIGHT: 203.94 LBS | DIASTOLIC BLOOD PRESSURE: 80 MMHG | HEART RATE: 74 BPM | OXYGEN SATURATION: 98 % | HEIGHT: 62 IN | SYSTOLIC BLOOD PRESSURE: 120 MMHG

## 2025-04-15 DIAGNOSIS — Z12.31 SCREENING MAMMOGRAM FOR BREAST CANCER: ICD-10-CM

## 2025-04-15 DIAGNOSIS — E66.01 CLASS 2 SEVERE OBESITY DUE TO EXCESS CALORIES WITH SERIOUS COMORBIDITY AND BODY MASS INDEX (BMI) OF 37.0 TO 37.9 IN ADULT: Primary | ICD-10-CM

## 2025-04-15 DIAGNOSIS — G47.33 OBSTRUCTIVE SLEEP APNEA: ICD-10-CM

## 2025-04-15 DIAGNOSIS — E66.812 CLASS 2 SEVERE OBESITY DUE TO EXCESS CALORIES WITH SERIOUS COMORBIDITY AND BODY MASS INDEX (BMI) OF 37.0 TO 37.9 IN ADULT: Primary | ICD-10-CM

## 2025-04-15 DIAGNOSIS — Z85.72 HISTORY OF DIFFUSE LARGE B-CELL LYMPHOMA: ICD-10-CM

## 2025-04-15 DIAGNOSIS — F33.1 DEPRESSION, MAJOR, RECURRENT, MODERATE: ICD-10-CM

## 2025-04-15 DIAGNOSIS — K58.1 IRRITABLE BOWEL SYNDROME WITH CONSTIPATION: ICD-10-CM

## 2025-04-15 PROBLEM — C83.37: Status: RESOLVED | Noted: 2019-03-13 | Resolved: 2025-04-15

## 2025-04-15 PROCEDURE — 99999 PR PBB SHADOW E&M-EST. PATIENT-LVL III: CPT | Mod: PBBFAC,,, | Performed by: INTERNAL MEDICINE

## 2025-04-15 RX ORDER — TIRZEPATIDE 5 MG/.5ML
5 INJECTION, SOLUTION SUBCUTANEOUS
Qty: 3 ML | Refills: 3 | Status: SHIPPED | OUTPATIENT
Start: 2025-05-14

## 2025-04-15 RX ORDER — TIRZEPATIDE 2.5 MG/.5ML
2.5 INJECTION, SOLUTION SUBCUTANEOUS
Qty: 3 ML | Refills: 3 | Status: SHIPPED | OUTPATIENT
Start: 2025-04-15 | End: 2025-04-15 | Stop reason: SDUPTHER

## 2025-04-15 RX ORDER — TIRZEPATIDE 2.5 MG/.5ML
2.5 INJECTION, SOLUTION SUBCUTANEOUS
Qty: 2 ML | Refills: 0 | Status: SHIPPED | OUTPATIENT
Start: 2025-04-15

## 2025-04-15 NOTE — PROGRESS NOTES
Chief Complaint:   Follow up of multiple issues     HPI: This is a 49 year old woman who presents for follow up of above     She fell on 1/20/24 and had a Displaced fracture of medial condyle of left tibia. She had ORIF of the left tibia on 1/21/24 at [facility].     On June 24, 2024 with Dr Chavez - she had laparoscopic surgery in the left knee - cleaned up scar tissue to help with mobility.   SHe is walking with a cane with therapy. She is still in physical therapy     She had an arthroscopic surgery on the left knee for ACL and PCL repair with Dr Chavez on 12/18/24 at [facility]. The knee is more stable since the surgery. She is still in therapy for the knee.  She has pain with certain activities. Pain is tolerable with certain movements or if has walked too much.   She takes ibuprofen 800 mg 1 tablet once a month. She generally does not take medication for the pain. She is walking with a cane for support.      She takes metformin  mg one a day inconsistently.  She gets diarrhea if she takes 2 tablets daily.       She is taking fluoxetine 40 - 50  every day.  Mood has been ok. She is  an  with [employer] - on [place].  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       She takes propranolol 20 mg daily as needed for anxiety. She has been taking the propranolol which helps.    She takes trazodone as needed.  She has been sleeping ok.       She has sleep apnea and has a CPAP machine.  She wears the machine inconsistently.      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 She is being treated at [facility]. she saw Dr Diane rayo on 2/23/23 and is in complete remission status. She followed up 5/31/24- will be seen summer 2025.      She is taking vitamin D 50,000 units once a  "week for vitamin D deficiency     Her last shot of Depo Provera was in December 2019. No periods.       She has some right TMJ issues-comes and goes.                  Past Surgical History:   Procedure Laterality Date    COLONOSCOPY        COLONOSCOPY N/A 11/8/2018     Performed by Ba Stewart MD at [facility] ENDO (2ND FLR)    EGD (ESOPHAGOGASTRODUODENOSCOPY) N/A 11/8/2018     Performed by Ba Stewart MD at [facility] ENDO (2ND FLR)    ESOPHAGOGASTRODUODENOSCOPY (EGD) N/A 3/23/2015     Performed by Ba Stewart MD at [facility] ENDO (4TH FLR)    HERNIA REPAIR        [place] COLONOSCOPY,DIAGNOSTIC [61786 (CPT®)] N/A 7/2/2014     Performed by Cj Lassiter MD at [facility] ENDO (4TH FLR)    ULTRASOUND-ENDOSCOPIC-UPPER N/A 12/12/2018     Performed by Venu Montiel MD at [facility] ENDO            Meds and allergies: updated on Epic      /80   Pulse 74   Ht 5' 2" (1.575 m)   Wt 92.5 kg (203 lb 14.8 oz)   LMP 04/17/2019   SpO2 98%   BMI 37.30 kg/m²      General: alert, oriented x 3, no apparent distress.  Affect normal  HEENT: Conjunctivae: anicteric, PERRL, EOMI, TM clear, Oralpharynx clear  Neck: supple, no thyroid enlargement, no cervical lymphadenopathy  Resp: effort normal, lungs clear bilaterally  CV: Regular rate and rhythm without murmurs, gallops or rubs, no lower extremity edema,  ABDOMEN: soft, non distended, non tender, bowel sounds present, no hepatosplenomgaly  BREAST: no abn seen, no nodules palpated, no axillary lad       Labs 5/31/24 reviewed     Assessment/Plan:     Morbid obesity with co morbidity of sleep apnea - Zepbound 2.5 mg once  a week for 4 weeks then Zepbound 5 mg once a week.     Displaced fracture of medial condyle of left tibia - s/p ORIF -s/p ACL and PCL repair - doing better     Sleep apnea -  get on cpap machine     Stage 4 large B cell lymphoma - finished chemo 6/26/19. Follow up with [facility] -saw 5/31/24 - to see yearly     History of Thombocytopenia - montiored     History of Anemia - " s/p iron infusions years ago.  Doing well .       Anxiety and depression/insomnia/grief-  doing well on fluoxetine 40- 50 mg daliy. Propranolol 20 mg three times daily as needed       Menopause - could be contributing to weight.      GERD - asx     Left breast abnormality on CT scan at [facility] - diagnostic left mammogram 8/16/2021 was fine - bilateral  Mammogram 7/2024   .     Colonoscopy 11/2018 - 1- 4 mm hyperplastic polyp- given 10 years.     MMG 7/2024- ordered    Pap smear 1/2023- due Jan 2026.         I will see her back in 6 months sooner if problems arise    Visit today included increased complexity associated with the care of the episodic problem  addressed and managing the longitudinal care of the patient due to the serious and/or complex managed problem(s) morbid obesity with co morbidity, sleelp apnea, gerd, mood disorder, knee problems, history of lymphoma.

## 2025-04-29 ENCOUNTER — TELEPHONE (OUTPATIENT)
Dept: INTERNAL MEDICINE | Facility: CLINIC | Age: 50
End: 2025-04-29

## 2025-04-29 ENCOUNTER — OFFICE VISIT (OUTPATIENT)
Dept: INTERNAL MEDICINE | Facility: CLINIC | Age: 50
End: 2025-04-29
Payer: COMMERCIAL

## 2025-04-29 DIAGNOSIS — M25.362 KNEE INSTABILITY, LEFT: Primary | ICD-10-CM

## 2025-04-29 PROCEDURE — 98008 SYNCH AUDIO-ONLY NEW SF 15: CPT | Mod: 93,,, | Performed by: INTERNAL MEDICINE

## 2025-04-29 NOTE — TELEPHONE ENCOUNTER
I called and left her a message But I did schd to phone visit   And told her in the message   For today at 430

## 2025-04-29 NOTE — PROGRESS NOTES
Audio Only Telehealth Visit     The patient location is: home  The chief complaint leading to consultation is: forms  Visit type: Virtual visit with audio only (telephone)  Total time spent in medical discussion with patient: 10 minutes  Total time spent on date of the encounter:15 minutes       The reason for the audio only service rather than synchronous audio and video virtual visit was related to technical difficulties or patient preference/necessity.       Each patient to whom I provide medical services by telemedicine is:  (1) informed of the relationship between the physician and patient and the respective role of any other health care provider with respect to management of the patient; and (2) notified that they may decline to receive medical services by telemedicine and may withdraw from such care at any time. Patient verbally consented to receive this service via voice-only telephone call.       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 She went to the [employer] with a cane and they required her to have the form filled out by her physician.    She uses a cane due to instability of her left leg from her surgeries for her left tibial fracture.      Form filled out .                       This service was not originating from a related E/M service provided within the previous 7 days nor will  to an E/M service or procedure within the next 24 hours or my soonest available appointment.  Prevailing standard of care was able to be met in this audio-only visit.

## 2025-05-13 ENCOUNTER — PATIENT MESSAGE (OUTPATIENT)
Dept: INTERNAL MEDICINE | Facility: CLINIC | Age: 50
End: 2025-05-13
Payer: COMMERCIAL

## 2025-05-13 RX ORDER — METFORMIN HYDROCHLORIDE 500 MG/1
TABLET, EXTENDED RELEASE ORAL
Qty: 180 TABLET | Refills: 3 | Status: SHIPPED | OUTPATIENT
Start: 2025-05-13

## 2025-07-02 ENCOUNTER — OFFICE VISIT (OUTPATIENT)
Dept: PSYCHIATRY | Facility: CLINIC | Age: 50
End: 2025-07-02
Payer: COMMERCIAL

## 2025-07-02 DIAGNOSIS — F41.1 GAD (GENERALIZED ANXIETY DISORDER): ICD-10-CM

## 2025-07-02 DIAGNOSIS — F33.1 MDD (MAJOR DEPRESSIVE DISORDER), RECURRENT EPISODE, MODERATE: Primary | ICD-10-CM

## 2025-07-02 DIAGNOSIS — R41.840 INATTENTION: ICD-10-CM

## 2025-07-02 PROCEDURE — 90837 PSYTX W PT 60 MINUTES: CPT | Mod: 95,,, | Performed by: PSYCHOLOGIST

## 2025-07-02 NOTE — PROGRESS NOTES
"Individual Psychotherapy (PhD/LCSW)    7/2/2025    Therapeutic Intervention: Met with patient.  Outpatient - Behavior modifying psychotherapy 60 min - CPT code 59303    The patient location is: Patient's home/ Patient reported that her location at the time of this visit was in the [facility]     Visit type: Virtual visit with synchronous audio and video     Each patient to whom he or she provides medical services by telemedicine is: (1) informed of the relationship between the physician and patient and the respective role of any other health care provider with respect to management of the patient; and (2) notified that he or she may decline to receive medical services by telemedicine and may withdraw from such care at any time.     Chief complaint/reason for encounter: depression and anxiety     Interval history and content of current session: Patient is presenting for follow up after one year (since June 2024).  Patient presented casually dressed, alert, and oriented.  Patient reported experiencing depressed mood, diminished interest, insomnia, fatigue, worthlessness/guilt, poor concentration, altered libido, social isolation, excessive anxiety/worry, muscle tension, difficulty concentrating, forgetfulness, easily distracted, and panic attacks.  Patient denied current suicidal and homicidal ideations.  Explored source of patient's depressed mood.  Patient described that she worries about issues related to family, personal safety, health, and finances, among other things.  Active listening skills were used as patient described the events leading up to and after a recent disagreement with her sister.  Patient exhibited insight when she stated that she has difficulty sharing her feelings with others because she "does not want to be confrontational."  Educated patient about using assertive communication skills to address her concerns and feelings with others.  Patient was taught behavioral coping strategies " such as sharing of feelings, setting boundaries, and increased assertiveness as ways to reduce feelings of depression and anxiety.  She also discussed her concerns about dating especially since she is feeling less confident because she uses a cane for ambulatory purposes.  Discussed patient's recent missed appointment and reminded her of the department attendance policy.         Treatment plan:  Target symptoms: depression, anxiety   Why chosen therapy is appropriate versus another modality: relevant to diagnosis  Outcome monitoring methods: self-report  Therapeutic intervention type: insight oriented psychotherapy, behavior modifying psychotherapy    Risk parameters:  Patient reports no suicidal ideation  Patient reports no homicidal ideation  Patient reports no self-injurious behavior  Patient reports no violent behavior    Verbal deficits: None    Patient's response to intervention:  The patient's response to intervention is accepting.    Progress toward goals and other mental status changes:  The patient's progress toward goals is fair .    Diagnosis:     ICD-10-CM ICD-9-CM   1. MDD (major depressive disorder), recurrent episode, moderate  F33.1 296.32   2. DARYL (generalized anxiety disorder)  F41.1 300.02   3. Inattention  R41.840 799.51       Plan:  individual psychotherapy and consult psychiatrist for medication evaluation    Return to clinic: 2 weeks    Length of Service (minutes): 60

## 2025-07-09 ENCOUNTER — TELEPHONE (OUTPATIENT)
Dept: INTERNAL MEDICINE | Facility: CLINIC | Age: 50
End: 2025-07-09
Payer: COMMERCIAL

## 2025-07-14 ENCOUNTER — TELEPHONE (OUTPATIENT)
Dept: PSYCHIATRY | Facility: CLINIC | Age: 50
End: 2025-07-14
Payer: COMMERCIAL

## 2025-07-15 ENCOUNTER — PATIENT MESSAGE (OUTPATIENT)
Dept: PSYCHIATRY | Facility: CLINIC | Age: 50
End: 2025-07-15
Payer: COMMERCIAL

## 2025-08-07 ENCOUNTER — HOSPITAL ENCOUNTER (OUTPATIENT)
Dept: RADIOLOGY | Facility: HOSPITAL | Age: 50
Discharge: HOME | End: 2025-08-07
Attending: INTERNAL MEDICINE
Payer: COMMERCIAL

## 2025-08-07 DIAGNOSIS — Z12.31 SCREENING MAMMOGRAM FOR BREAST CANCER: ICD-10-CM

## 2025-08-07 PROCEDURE — 77067 SCR MAMMO BI INCL CAD: CPT | Mod: TC

## 2025-08-08 ENCOUNTER — E-VISIT (OUTPATIENT)
Dept: INTERNAL MEDICINE | Facility: CLINIC | Age: 50
End: 2025-08-08
Payer: COMMERCIAL

## 2025-08-08 DIAGNOSIS — J45.909 ASTHMA, CURRENTLY ACTIVE: Primary | ICD-10-CM

## 2025-08-08 PROCEDURE — 99421 OL DIG E/M SVC 5-10 MIN: CPT | Mod: ,,, | Performed by: INTERNAL MEDICINE

## 2025-08-11 RX ORDER — ALBUTEROL SULFATE 90 UG/1
2 INHALANT RESPIRATORY (INHALATION) EVERY 6 HOURS PRN
Qty: 18 G | Refills: 3 | Status: SHIPPED | OUTPATIENT
Start: 2025-08-11

## 2025-08-12 ENCOUNTER — HOSPITAL ENCOUNTER (OUTPATIENT)
Dept: RADIOLOGY | Facility: HOSPITAL | Age: 50
Discharge: HOME | End: 2025-08-12
Attending: INTERNAL MEDICINE
Payer: COMMERCIAL

## 2025-08-12 DIAGNOSIS — J45.909 ASTHMA, CURRENTLY ACTIVE: ICD-10-CM

## 2025-08-12 PROCEDURE — 71046 X-RAY EXAM CHEST 2 VIEWS: CPT | Mod: 26,,, | Performed by: RADIOLOGY

## 2025-08-12 PROCEDURE — 71046 X-RAY EXAM CHEST 2 VIEWS: CPT | Mod: TC

## 2025-08-19 ENCOUNTER — OFFICE VISIT (OUTPATIENT)
Dept: INTERNAL MEDICINE | Facility: CLINIC | Age: 50
End: 2025-08-19
Payer: COMMERCIAL

## 2025-08-19 VITALS
HEIGHT: 62 IN | OXYGEN SATURATION: 96 % | HEART RATE: 71 BPM | BODY MASS INDEX: 37.85 KG/M2 | SYSTOLIC BLOOD PRESSURE: 106 MMHG | WEIGHT: 205.69 LBS | DIASTOLIC BLOOD PRESSURE: 70 MMHG

## 2025-08-19 DIAGNOSIS — M81.0 AGE-RELATED OSTEOPOROSIS WITHOUT CURRENT PATHOLOGICAL FRACTURE: ICD-10-CM

## 2025-08-19 DIAGNOSIS — E66.9 OBESITY, UNSPECIFIED CLASS, UNSPECIFIED OBESITY TYPE, UNSPECIFIED WHETHER SERIOUS COMORBIDITY PRESENT: ICD-10-CM

## 2025-08-19 DIAGNOSIS — K21.9 GASTROESOPHAGEAL REFLUX DISEASE WITHOUT ESOPHAGITIS: Primary | ICD-10-CM

## 2025-08-19 DIAGNOSIS — C83.38 DIFFUSE LARGE B-CELL LYMPHOMA OF LYMPH NODES OF MULTIPLE REGIONS: ICD-10-CM

## 2025-08-19 DIAGNOSIS — G47.33 OBSTRUCTIVE SLEEP APNEA: ICD-10-CM

## 2025-08-19 PROCEDURE — 3078F DIAST BP <80 MM HG: CPT | Mod: CPTII,S$GLB,, | Performed by: INTERNAL MEDICINE

## 2025-08-19 PROCEDURE — 1159F MED LIST DOCD IN RCRD: CPT | Mod: CPTII,S$GLB,, | Performed by: INTERNAL MEDICINE

## 2025-08-19 PROCEDURE — G2211 COMPLEX E/M VISIT ADD ON: HCPCS | Mod: S$GLB,,, | Performed by: INTERNAL MEDICINE

## 2025-08-19 PROCEDURE — 3074F SYST BP LT 130 MM HG: CPT | Mod: CPTII,S$GLB,, | Performed by: INTERNAL MEDICINE

## 2025-08-19 PROCEDURE — 3008F BODY MASS INDEX DOCD: CPT | Mod: CPTII,S$GLB,, | Performed by: INTERNAL MEDICINE

## 2025-08-19 PROCEDURE — 99999 PR PBB SHADOW E&M-EST. PATIENT-LVL IV: CPT | Mod: PBBFAC,,, | Performed by: INTERNAL MEDICINE

## 2025-08-19 PROCEDURE — 99215 OFFICE O/P EST HI 40 MIN: CPT | Mod: S$GLB,,, | Performed by: INTERNAL MEDICINE

## 2025-08-19 RX ORDER — METFORMIN HYDROCHLORIDE 500 MG/1
1000 TABLET, EXTENDED RELEASE ORAL
Qty: 180 TABLET | Refills: 3 | Status: SHIPPED | OUTPATIENT
Start: 2025-08-19

## 2025-08-19 RX ORDER — SODIUM CHLORIDE 0.9 % (FLUSH) 0.9 %
10 SYRINGE (ML) INJECTION
OUTPATIENT
Start: 2025-08-19

## 2025-08-19 RX ORDER — ZOLEDRONIC ACID 5 MG/100ML
5 INJECTION, SOLUTION INTRAVENOUS
OUTPATIENT
Start: 2025-08-19 | End: 2025-08-19

## 2025-08-19 RX ORDER — FLUOXETINE HYDROCHLORIDE 40 MG/1
40 CAPSULE ORAL DAILY
Qty: 90 CAPSULE | Refills: 4 | Status: SHIPPED | OUTPATIENT
Start: 2025-08-19 | End: 2026-11-12

## 2025-08-19 RX ORDER — FLUOXETINE 10 MG/1
10 CAPSULE ORAL DAILY
Qty: 90 CAPSULE | Refills: 4 | Status: SHIPPED | OUTPATIENT
Start: 2025-08-19 | End: 2026-08-19

## 2025-08-19 RX ORDER — HEPARIN 100 UNIT/ML
500 SYRINGE INTRAVENOUS
OUTPATIENT
Start: 2025-08-19

## 2025-08-19 RX ORDER — ACETAMINOPHEN 325 MG/1
650 TABLET ORAL
OUTPATIENT
Start: 2025-08-19 | End: 2025-08-19

## 2025-08-19 RX ORDER — PANTOPRAZOLE SODIUM 40 MG/1
40 TABLET, DELAYED RELEASE ORAL DAILY
Qty: 90 TABLET | Refills: 3 | Status: SHIPPED | OUTPATIENT
Start: 2025-08-19 | End: 2026-08-19

## 2025-08-25 ENCOUNTER — TELEPHONE (OUTPATIENT)
Dept: INFECTIOUS DISEASES | Facility: HOSPITAL | Age: 50
End: 2025-08-25
Payer: COMMERCIAL

## 2025-08-28 ENCOUNTER — E-VISIT (OUTPATIENT)
Dept: INTERNAL MEDICINE | Facility: CLINIC | Age: 50
End: 2025-08-28
Payer: COMMERCIAL

## 2025-08-28 DIAGNOSIS — J01.90 ACUTE BACTERIAL SINUSITIS: Primary | ICD-10-CM

## 2025-08-28 DIAGNOSIS — B96.89 ACUTE BACTERIAL SINUSITIS: Primary | ICD-10-CM

## 2025-08-28 RX ORDER — DOXYCYCLINE HYCLATE 100 MG
100 TABLET ORAL 2 TIMES DAILY
Qty: 20 TABLET | Refills: 0 | Status: SHIPPED | OUTPATIENT
Start: 2025-08-28 | End: 2025-09-07

## 2025-09-02 ENCOUNTER — TELEPHONE (OUTPATIENT)
Dept: INFECTIOUS DISEASES | Facility: HOSPITAL | Age: 50
End: 2025-09-02
Payer: COMMERCIAL

## (undated) DEVICE — Device

## (undated) DEVICE — SOL PVP-I SCRUB 7.5% 4OZ

## (undated) DEVICE — SOL IRR SOD CHL .9% POUR

## (undated) DEVICE — SEAL LENS SCOPE MYOSURE

## (undated) DEVICE — SOL POVIDONE PREP IODINE 4 OZ

## (undated) DEVICE — PACK FLUENT DISPOSABLE

## (undated) DEVICE — SOL NACL IRR 3000ML

## (undated) DEVICE — PAD PREP CUFFED NS 24X48IN

## (undated) DEVICE — GLOVE ENCORE ORTHO PWDR BRN 7

## (undated) DEVICE — SET BASIN 48X48IN 6000ML RING